# Patient Record
Sex: MALE | Race: WHITE | NOT HISPANIC OR LATINO | Employment: OTHER | URBAN - METROPOLITAN AREA
[De-identification: names, ages, dates, MRNs, and addresses within clinical notes are randomized per-mention and may not be internally consistent; named-entity substitution may affect disease eponyms.]

---

## 2017-05-17 ENCOUNTER — GENERIC CONVERSION - ENCOUNTER (OUTPATIENT)
Dept: OTHER | Facility: OTHER | Age: 82
End: 2017-05-17

## 2017-05-17 ENCOUNTER — TRANSCRIBE ORDERS (OUTPATIENT)
Dept: ADMINISTRATIVE | Facility: HOSPITAL | Age: 82
End: 2017-05-17

## 2017-05-17 DIAGNOSIS — C32.9 CARCINOMA LARYNX (HCC): Primary | ICD-10-CM

## 2017-05-22 ENCOUNTER — GENERIC CONVERSION - ENCOUNTER (OUTPATIENT)
Dept: OTHER | Facility: OTHER | Age: 82
End: 2017-05-22

## 2017-05-25 ENCOUNTER — APPOINTMENT (OUTPATIENT)
Dept: RADIATION ONCOLOGY | Facility: HOSPITAL | Age: 82
End: 2017-05-25
Attending: RADIOLOGY
Payer: COMMERCIAL

## 2017-05-25 ENCOUNTER — GENERIC CONVERSION - ENCOUNTER (OUTPATIENT)
Dept: OTHER | Facility: OTHER | Age: 82
End: 2017-05-25

## 2017-05-25 ENCOUNTER — ALLSCRIPTS OFFICE VISIT (OUTPATIENT)
Dept: OTHER | Facility: OTHER | Age: 82
End: 2017-05-25

## 2017-05-25 PROCEDURE — 99215 OFFICE O/P EST HI 40 MIN: CPT | Performed by: RADIOLOGY

## 2017-05-26 ENCOUNTER — APPOINTMENT (OUTPATIENT)
Dept: RADIATION ONCOLOGY | Facility: HOSPITAL | Age: 82
End: 2017-05-26
Attending: RADIOLOGY
Payer: COMMERCIAL

## 2017-05-26 PROCEDURE — 77290 THER RAD SIMULAJ FIELD CPLX: CPT | Performed by: RADIOLOGY

## 2017-05-26 PROCEDURE — 77334 RADIATION TREATMENT AID(S): CPT | Performed by: RADIOLOGY

## 2017-05-31 ENCOUNTER — GENERIC CONVERSION - ENCOUNTER (OUTPATIENT)
Dept: OTHER | Facility: OTHER | Age: 82
End: 2017-05-31

## 2017-06-01 ENCOUNTER — APPOINTMENT (OUTPATIENT)
Dept: RADIATION ONCOLOGY | Facility: HOSPITAL | Age: 82
End: 2017-06-01
Payer: COMMERCIAL

## 2017-06-08 PROCEDURE — 77300 RADIATION THERAPY DOSE PLAN: CPT | Performed by: RADIOLOGY

## 2017-06-08 PROCEDURE — 77301 RADIOTHERAPY DOSE PLAN IMRT: CPT | Performed by: RADIOLOGY

## 2017-06-08 PROCEDURE — 77338 DESIGN MLC DEVICE FOR IMRT: CPT | Performed by: RADIOLOGY

## 2017-06-08 RX ORDER — SODIUM CHLORIDE 9 MG/ML
20 INJECTION, SOLUTION INTRAVENOUS ONCE
Status: COMPLETED | OUTPATIENT
Start: 2017-06-09 | End: 2017-06-09

## 2017-06-09 ENCOUNTER — HOSPITAL ENCOUNTER (OUTPATIENT)
Dept: INFUSION CENTER | Facility: HOSPITAL | Age: 82
Discharge: HOME | End: 2017-06-09
Payer: COMMERCIAL

## 2017-06-09 VITALS
HEART RATE: 80 BPM | SYSTOLIC BLOOD PRESSURE: 152 MMHG | HEIGHT: 65 IN | WEIGHT: 156.75 LBS | RESPIRATION RATE: 18 BRPM | TEMPERATURE: 98.6 F | DIASTOLIC BLOOD PRESSURE: 71 MMHG | BODY MASS INDEX: 26.12 KG/M2

## 2017-06-09 LAB
ALBUMIN SERPL BCP-MCNC: 3.6 G/DL (ref 3.5–5)
ALP SERPL-CCNC: 68 U/L (ref 46–116)
ALT SERPL W P-5'-P-CCNC: 21 U/L (ref 12–78)
ANION GAP SERPL CALCULATED.3IONS-SCNC: 7 MMOL/L (ref 4–13)
AST SERPL W P-5'-P-CCNC: 15 U/L (ref 5–45)
BASOPHILS # BLD AUTO: 0.01 THOUSANDS/ΜL (ref 0–0.1)
BASOPHILS NFR BLD AUTO: 0 % (ref 0–1)
BILIRUB SERPL-MCNC: 0.43 MG/DL (ref 0.2–1)
BUN SERPL-MCNC: 18 MG/DL (ref 5–25)
CALCIUM SERPL-MCNC: 9.6 MG/DL (ref 8.3–10.1)
CHLORIDE SERPL-SCNC: 99 MMOL/L (ref 100–108)
CO2 SERPL-SCNC: 30 MMOL/L (ref 21–32)
CREAT SERPL-MCNC: 1.06 MG/DL (ref 0.6–1.3)
EOSINOPHIL # BLD AUTO: 0.03 THOUSAND/ΜL (ref 0–0.61)
EOSINOPHIL NFR BLD AUTO: 0 % (ref 0–6)
ERYTHROCYTE [DISTWIDTH] IN BLOOD BY AUTOMATED COUNT: 14.4 % (ref 11.6–15.1)
GFR SERPL CREATININE-BSD FRML MDRD: >60 ML/MIN/1.73SQ M
GLUCOSE SERPL-MCNC: 104 MG/DL (ref 65–140)
HCT VFR BLD AUTO: 39.5 % (ref 36.5–49.3)
HGB BLD-MCNC: 13.1 G/DL (ref 12–17)
LYMPHOCYTES # BLD AUTO: 0.9 THOUSANDS/ΜL (ref 0.6–4.47)
LYMPHOCYTES NFR BLD AUTO: 11 % (ref 14–44)
MCH RBC QN AUTO: 31.3 PG (ref 26.8–34.3)
MCHC RBC AUTO-ENTMCNC: 33.2 G/DL (ref 31.4–37.4)
MCV RBC AUTO: 95 FL (ref 82–98)
MONOCYTES # BLD AUTO: 0.68 THOUSAND/ΜL (ref 0.17–1.22)
MONOCYTES NFR BLD AUTO: 8 % (ref 4–12)
NEUTROPHILS # BLD AUTO: 6.51 THOUSANDS/ΜL (ref 1.85–7.62)
NEUTS SEG NFR BLD AUTO: 81 % (ref 43–75)
NRBC BLD AUTO-RTO: 0 /100 WBCS
PLATELET # BLD AUTO: 210 THOUSANDS/UL (ref 149–390)
PMV BLD AUTO: 9 FL (ref 8.9–12.7)
POTASSIUM SERPL-SCNC: 4.1 MMOL/L (ref 3.5–5.3)
PROT SERPL-MCNC: 7.6 G/DL (ref 6.4–8.2)
RBC # BLD AUTO: 4.18 MILLION/UL (ref 3.88–5.62)
SODIUM SERPL-SCNC: 136 MMOL/L (ref 136–145)
WBC # BLD AUTO: 8.14 THOUSAND/UL (ref 4.31–10.16)

## 2017-06-09 PROCEDURE — 96413 CHEMO IV INFUSION 1 HR: CPT

## 2017-06-09 PROCEDURE — 80053 COMPREHEN METABOLIC PANEL: CPT | Performed by: INTERNAL MEDICINE

## 2017-06-09 PROCEDURE — 85025 COMPLETE CBC W/AUTO DIFF WBC: CPT | Performed by: INTERNAL MEDICINE

## 2017-06-09 PROCEDURE — 77386 HB NTSTY MODUL RAD TX DLVR CPLX: CPT | Performed by: RADIOLOGY

## 2017-06-09 PROCEDURE — 96367 TX/PROPH/DG ADDL SEQ IV INF: CPT

## 2017-06-09 PROCEDURE — 96415 CHEMO IV INFUSION ADDL HR: CPT

## 2017-06-09 PROCEDURE — 77280 THER RAD SIMULAJ FIELD SMPL: CPT | Performed by: RADIOLOGY

## 2017-06-09 RX ORDER — LISINOPRIL 5 MG/1
5 TABLET ORAL DAILY
COMMUNITY
End: 2017-08-19

## 2017-06-09 RX ORDER — ALBUTEROL SULFATE 90 UG/1
1 AEROSOL, METERED RESPIRATORY (INHALATION) EVERY 4 HOURS PRN
COMMUNITY
End: 2017-08-19

## 2017-06-09 RX ORDER — HYDROCODONE BITARTRATE AND ACETAMINOPHEN 5; 325 MG/1; MG/1
1 TABLET ORAL EVERY 4 HOURS PRN
COMMUNITY
End: 2017-08-19

## 2017-06-09 RX ADMIN — DIPHENHYDRAMINE HYDROCHLORIDE 25 MG: 50 INJECTION, SOLUTION INTRAMUSCULAR; INTRAVENOUS at 13:17

## 2017-06-09 RX ADMIN — CETUXIMAB 700 MG: 2 SOLUTION INTRAVENOUS at 13:54

## 2017-06-09 RX ADMIN — SODIUM CHLORIDE 20 ML/HR: 0.9 INJECTION, SOLUTION INTRAVENOUS at 13:18

## 2017-06-09 NOTE — PLAN OF CARE
Problem: Potential for Falls  Goal: Patient will remain free of falls  INTERVENTIONS:  - Assess patient frequently for physical needs  - Identify cognitive and physical deficits and behaviors that affect risk of falls    - [place] fall precautions as indicated by assessment   - Educate patient/family on patient safety including physical limitations  - Instruct patient to call for assistance with activity based on assessment  - Modify environment to reduce risk of injury  - Consider OT/PT consult to assist with strengthening/mobility   Outcome: Progressing

## 2017-06-12 ENCOUNTER — GENERIC CONVERSION - ENCOUNTER (OUTPATIENT)
Dept: OTHER | Facility: OTHER | Age: 82
End: 2017-06-12

## 2017-06-12 PROCEDURE — 77386 HB NTSTY MODUL RAD TX DLVR CPLX: CPT | Performed by: RADIOLOGY

## 2017-06-12 NOTE — SOCIAL WORK
LSW reviewed pt's distress thermometer completed by pt in [facility] on 6/9/2017  Pt rated their distress as a 5/10 and named the following emotional problems: fears, nervousness, sadness, and worry  LSW previously introduced her role and assessed pt's interest in social work assistance on 5/31/2017  According to Akila Godfrey RN, pt was not interested in additional social work follow up  Pt was provided LSW's contact information on 5/31/2017  LSW will remain avaiable to provide psychosocial assistance as desired by pt

## 2017-06-13 PROCEDURE — 77386 HB NTSTY MODUL RAD TX DLVR CPLX: CPT | Performed by: RADIOLOGY

## 2017-06-14 PROCEDURE — 77386 HB NTSTY MODUL RAD TX DLVR CPLX: CPT | Performed by: RADIOLOGY

## 2017-06-15 ENCOUNTER — HOSPITAL ENCOUNTER (OUTPATIENT)
Dept: INFUSION CENTER | Facility: HOSPITAL | Age: 82
Discharge: HOME | End: 2017-06-15
Payer: COMMERCIAL

## 2017-06-15 VITALS
TEMPERATURE: 98.3 F | BODY MASS INDEX: 24.09 KG/M2 | RESPIRATION RATE: 18 BRPM | WEIGHT: 149.91 LBS | SYSTOLIC BLOOD PRESSURE: 133 MMHG | DIASTOLIC BLOOD PRESSURE: 72 MMHG | HEART RATE: 73 BPM | HEIGHT: 66 IN

## 2017-06-15 LAB
BASOPHILS # BLD AUTO: 0.01 THOUSANDS/ΜL (ref 0–0.1)
BASOPHILS NFR BLD AUTO: 0 % (ref 0–1)
EOSINOPHIL # BLD AUTO: 0.06 THOUSAND/ΜL (ref 0–0.61)
EOSINOPHIL NFR BLD AUTO: 1 % (ref 0–6)
ERYTHROCYTE [DISTWIDTH] IN BLOOD BY AUTOMATED COUNT: 14.1 % (ref 11.6–15.1)
HCT VFR BLD AUTO: 39.8 % (ref 36.5–49.3)
HGB BLD-MCNC: 13.2 G/DL (ref 12–17)
LYMPHOCYTES # BLD AUTO: 0.56 THOUSANDS/ΜL (ref 0.6–4.47)
LYMPHOCYTES NFR BLD AUTO: 8 % (ref 14–44)
MCH RBC QN AUTO: 31.2 PG (ref 26.8–34.3)
MCHC RBC AUTO-ENTMCNC: 33.2 G/DL (ref 31.4–37.4)
MCV RBC AUTO: 94 FL (ref 82–98)
MONOCYTES # BLD AUTO: 0.55 THOUSAND/ΜL (ref 0.17–1.22)
MONOCYTES NFR BLD AUTO: 7 % (ref 4–12)
NEUTROPHILS # BLD AUTO: 6.29 THOUSANDS/ΜL (ref 1.85–7.62)
NEUTS SEG NFR BLD AUTO: 84 % (ref 43–75)
NRBC BLD AUTO-RTO: 0 /100 WBCS
PLATELET # BLD AUTO: 234 THOUSANDS/UL (ref 149–390)
PMV BLD AUTO: 9 FL (ref 8.9–12.7)
RBC # BLD AUTO: 4.23 MILLION/UL (ref 3.88–5.62)
WBC # BLD AUTO: 7.49 THOUSAND/UL (ref 4.31–10.16)

## 2017-06-15 PROCEDURE — 77386 HB NTSTY MODUL RAD TX DLVR CPLX: CPT | Performed by: RADIOLOGY

## 2017-06-15 PROCEDURE — 96367 TX/PROPH/DG ADDL SEQ IV INF: CPT

## 2017-06-15 PROCEDURE — 85025 COMPLETE CBC W/AUTO DIFF WBC: CPT | Performed by: INTERNAL MEDICINE

## 2017-06-15 PROCEDURE — 96413 CHEMO IV INFUSION 1 HR: CPT

## 2017-06-15 PROCEDURE — 77336 RADIATION PHYSICS CONSULT: CPT | Performed by: RADIOLOGY

## 2017-06-15 RX ORDER — SODIUM CHLORIDE 9 MG/ML
20 INJECTION, SOLUTION INTRAVENOUS ONCE
Status: COMPLETED | OUTPATIENT
Start: 2017-06-15 | End: 2017-06-15

## 2017-06-15 RX ADMIN — SODIUM CHLORIDE 20 ML/HR: 0.9 INJECTION, SOLUTION INTRAVENOUS at 10:42

## 2017-06-15 RX ADMIN — DIPHENHYDRAMINE HYDROCHLORIDE 25 MG: 50 INJECTION, SOLUTION INTRAMUSCULAR; INTRAVENOUS at 11:17

## 2017-06-15 RX ADMIN — CETUXIMAB 458 MG: 2 SOLUTION INTRAVENOUS at 12:09

## 2017-06-15 NOTE — PLAN OF CARE
Problem: Potential for Falls  Goal: Patient will remain free of falls  INTERVENTIONS:  - Assess patient frequently for physical needs  -  Identify cognitive and physical deficits and behaviors that affect risk of falls    -  [place] fall precautions as indicated by assessment   - Educate patient/family on patient safety including physical limitations  - Instruct patient to call for assistance with activity based on assessment  - Modify environment to reduce risk of injury  - Consider OT/PT consult to assist with strengthening/mobility   Outcome: Progressing

## 2017-06-16 PROCEDURE — 77386 HB NTSTY MODUL RAD TX DLVR CPLX: CPT | Performed by: RADIOLOGY

## 2017-06-16 PROCEDURE — 77417 THER RADIOLOGY PORT IMAGE(S): CPT | Performed by: RADIOLOGY

## 2017-06-19 PROCEDURE — 77386 HB NTSTY MODUL RAD TX DLVR CPLX: CPT | Performed by: RADIOLOGY

## 2017-06-20 ENCOUNTER — APPOINTMENT (OUTPATIENT)
Dept: LAB | Facility: CLINIC | Age: 82
End: 2017-06-20
Payer: COMMERCIAL

## 2017-06-20 DIAGNOSIS — C32.9 MALIGNANT NEOPLASM OF LARYNX (HCC): ICD-10-CM

## 2017-06-20 LAB
ALBUMIN SERPL BCP-MCNC: 3 G/DL (ref 3.5–5)
ALP SERPL-CCNC: 73 U/L (ref 46–116)
ALT SERPL W P-5'-P-CCNC: 34 U/L (ref 12–78)
ANION GAP SERPL CALCULATED.3IONS-SCNC: 8 MMOL/L (ref 4–13)
AST SERPL W P-5'-P-CCNC: 21 U/L (ref 5–45)
BASOPHILS # BLD AUTO: 0.01 THOUSANDS/ΜL (ref 0–0.1)
BASOPHILS NFR BLD AUTO: 0 % (ref 0–1)
BILIRUB SERPL-MCNC: 0.49 MG/DL (ref 0.2–1)
BUN SERPL-MCNC: 17 MG/DL (ref 5–25)
CALCIUM SERPL-MCNC: 8.8 MG/DL (ref 8.3–10.1)
CHLORIDE SERPL-SCNC: 97 MMOL/L (ref 100–108)
CO2 SERPL-SCNC: 31 MMOL/L (ref 21–32)
CREAT SERPL-MCNC: 1.05 MG/DL (ref 0.6–1.3)
EOSINOPHIL # BLD AUTO: 0.05 THOUSAND/ΜL (ref 0–0.61)
EOSINOPHIL NFR BLD AUTO: 1 % (ref 0–6)
ERYTHROCYTE [DISTWIDTH] IN BLOOD BY AUTOMATED COUNT: 14.4 % (ref 11.6–15.1)
GFR SERPL CREATININE-BSD FRML MDRD: >60 ML/MIN/1.73SQ M
GLUCOSE SERPL-MCNC: 146 MG/DL (ref 65–140)
HCT VFR BLD AUTO: 40.5 % (ref 36.5–49.3)
HGB BLD-MCNC: 13 G/DL (ref 12–17)
LYMPHOCYTES # BLD AUTO: 0.44 THOUSANDS/ΜL (ref 0.6–4.47)
LYMPHOCYTES NFR BLD AUTO: 5 % (ref 14–44)
MCH RBC QN AUTO: 30.8 PG (ref 26.8–34.3)
MCHC RBC AUTO-ENTMCNC: 32.1 G/DL (ref 31.4–37.4)
MCV RBC AUTO: 96 FL (ref 82–98)
MONOCYTES # BLD AUTO: 0.57 THOUSAND/ΜL (ref 0.17–1.22)
MONOCYTES NFR BLD AUTO: 7 % (ref 4–12)
NEUTROPHILS # BLD AUTO: 7.16 THOUSANDS/ΜL (ref 1.85–7.62)
NEUTS SEG NFR BLD AUTO: 87 % (ref 43–75)
NRBC BLD AUTO-RTO: 0 /100 WBCS
PLATELET # BLD AUTO: 271 THOUSANDS/UL (ref 149–390)
PMV BLD AUTO: 9.8 FL (ref 8.9–12.7)
POTASSIUM SERPL-SCNC: 4.1 MMOL/L (ref 3.5–5.3)
PROT SERPL-MCNC: 7.1 G/DL (ref 6.4–8.2)
RBC # BLD AUTO: 4.22 MILLION/UL (ref 3.88–5.62)
SODIUM SERPL-SCNC: 136 MMOL/L (ref 136–145)
WBC # BLD AUTO: 8.24 THOUSAND/UL (ref 4.31–10.16)

## 2017-06-20 PROCEDURE — 85025 COMPLETE CBC W/AUTO DIFF WBC: CPT

## 2017-06-20 PROCEDURE — 80053 COMPREHEN METABOLIC PANEL: CPT

## 2017-06-20 PROCEDURE — 36415 COLL VENOUS BLD VENIPUNCTURE: CPT

## 2017-06-20 PROCEDURE — 77386 HB NTSTY MODUL RAD TX DLVR CPLX: CPT | Performed by: RADIOLOGY

## 2017-06-21 PROCEDURE — 77386 HB NTSTY MODUL RAD TX DLVR CPLX: CPT | Performed by: RADIOLOGY

## 2017-06-22 ENCOUNTER — ALLSCRIPTS OFFICE VISIT (OUTPATIENT)
Dept: OTHER | Facility: OTHER | Age: 82
End: 2017-06-22

## 2017-06-22 PROCEDURE — 77386 HB NTSTY MODUL RAD TX DLVR CPLX: CPT | Performed by: RADIOLOGY

## 2017-06-22 PROCEDURE — 77336 RADIATION PHYSICS CONSULT: CPT | Performed by: RADIOLOGY

## 2017-06-22 RX ORDER — SODIUM CHLORIDE 9 MG/ML
20 INJECTION, SOLUTION INTRAVENOUS ONCE
Status: COMPLETED | OUTPATIENT
Start: 2017-06-23 | End: 2017-06-23

## 2017-06-23 ENCOUNTER — HOSPITAL ENCOUNTER (OUTPATIENT)
Dept: INFUSION CENTER | Facility: HOSPITAL | Age: 82
Discharge: HOME | End: 2017-06-23
Payer: COMMERCIAL

## 2017-06-23 VITALS
RESPIRATION RATE: 22 BRPM | WEIGHT: 147.71 LBS | TEMPERATURE: 98.5 F | SYSTOLIC BLOOD PRESSURE: 121 MMHG | HEART RATE: 73 BPM | BODY MASS INDEX: 23.74 KG/M2 | HEIGHT: 66 IN | DIASTOLIC BLOOD PRESSURE: 68 MMHG

## 2017-06-23 PROCEDURE — 96413 CHEMO IV INFUSION 1 HR: CPT

## 2017-06-23 PROCEDURE — 96367 TX/PROPH/DG ADDL SEQ IV INF: CPT

## 2017-06-23 PROCEDURE — 77386 HB NTSTY MODUL RAD TX DLVR CPLX: CPT | Performed by: RADIOLOGY

## 2017-06-23 RX ADMIN — DIPHENHYDRAMINE HYDROCHLORIDE 25 MG: 50 INJECTION, SOLUTION INTRAMUSCULAR; INTRAVENOUS at 09:04

## 2017-06-23 RX ADMIN — SODIUM CHLORIDE 20 ML/HR: 0.9 INJECTION, SOLUTION INTRAVENOUS at 09:04

## 2017-06-23 RX ADMIN — CETUXIMAB 458 MG: 2 SOLUTION INTRAVENOUS at 09:34

## 2017-06-23 NOTE — PLAN OF CARE
Problem: Potential for Falls  Goal: Patient will remain free of falls  INTERVENTIONS:  - Assess patient frequently for physical needs  -  Identify cognitive and physical deficits and behaviors that affect risk of falls    -  [place] fall precautions as indicated by assessment   - Educate patient/family on patient safety including physical limitations  - Instruct patient to call for assistance with activity based on assessment  - Modify environment to reduce risk of injury  - Consider OT/PT consult to assist with strengthening/mobility   Outcome: Progressing

## 2017-06-26 PROCEDURE — 77386 HB NTSTY MODUL RAD TX DLVR CPLX: CPT | Performed by: RADIOLOGY

## 2017-06-26 PROCEDURE — 77387 GUIDANCE FOR RADJ TX DLVR: CPT | Performed by: RADIOLOGY

## 2017-06-27 PROCEDURE — 77386 HB NTSTY MODUL RAD TX DLVR CPLX: CPT | Performed by: RADIOLOGY

## 2017-06-28 PROCEDURE — 77386 HB NTSTY MODUL RAD TX DLVR CPLX: CPT | Performed by: RADIOLOGY

## 2017-06-29 PROCEDURE — 77336 RADIATION PHYSICS CONSULT: CPT | Performed by: RADIOLOGY

## 2017-06-29 PROCEDURE — 77417 THER RADIOLOGY PORT IMAGE(S): CPT | Performed by: RADIOLOGY

## 2017-06-29 PROCEDURE — 77386 HB NTSTY MODUL RAD TX DLVR CPLX: CPT | Performed by: RADIOLOGY

## 2017-06-29 RX ORDER — SODIUM CHLORIDE 9 MG/ML
20 INJECTION, SOLUTION INTRAVENOUS ONCE
Status: COMPLETED | OUTPATIENT
Start: 2017-06-30 | End: 2017-06-30

## 2017-06-30 ENCOUNTER — HOSPITAL ENCOUNTER (OUTPATIENT)
Dept: INFUSION CENTER | Facility: HOSPITAL | Age: 82
Discharge: HOME | End: 2017-06-30
Payer: COMMERCIAL

## 2017-06-30 VITALS
TEMPERATURE: 97.9 F | BODY MASS INDEX: 23.67 KG/M2 | SYSTOLIC BLOOD PRESSURE: 128 MMHG | HEIGHT: 66 IN | HEART RATE: 84 BPM | RESPIRATION RATE: 18 BRPM | WEIGHT: 147.27 LBS | DIASTOLIC BLOOD PRESSURE: 60 MMHG

## 2017-06-30 LAB
ALBUMIN SERPL BCP-MCNC: 2.9 G/DL (ref 3.5–5)
ALP SERPL-CCNC: 74 U/L (ref 46–116)
ALT SERPL W P-5'-P-CCNC: 37 U/L (ref 12–78)
ANION GAP SERPL CALCULATED.3IONS-SCNC: 3 MMOL/L (ref 4–13)
AST SERPL W P-5'-P-CCNC: 22 U/L (ref 5–45)
BASOPHILS # BLD AUTO: 0.01 THOUSANDS/ΜL (ref 0–0.1)
BASOPHILS NFR BLD AUTO: 0 % (ref 0–1)
BILIRUB SERPL-MCNC: 0.5 MG/DL (ref 0.2–1)
BUN SERPL-MCNC: 18 MG/DL (ref 5–25)
CALCIUM SERPL-MCNC: 8.8 MG/DL (ref 8.3–10.1)
CHLORIDE SERPL-SCNC: 99 MMOL/L (ref 100–108)
CO2 SERPL-SCNC: 33 MMOL/L (ref 21–32)
CREAT SERPL-MCNC: 1.02 MG/DL (ref 0.6–1.3)
EOSINOPHIL # BLD AUTO: 0.1 THOUSAND/ΜL (ref 0–0.61)
EOSINOPHIL NFR BLD AUTO: 1 % (ref 0–6)
ERYTHROCYTE [DISTWIDTH] IN BLOOD BY AUTOMATED COUNT: 14.1 % (ref 11.6–15.1)
GFR SERPL CREATININE-BSD FRML MDRD: >60 ML/MIN/1.73SQ M
GLUCOSE SERPL-MCNC: 112 MG/DL (ref 65–140)
HCT VFR BLD AUTO: 39.5 % (ref 36.5–49.3)
HGB BLD-MCNC: 13 G/DL (ref 12–17)
LYMPHOCYTES # BLD AUTO: 0.41 THOUSANDS/ΜL (ref 0.6–4.47)
LYMPHOCYTES NFR BLD AUTO: 5 % (ref 14–44)
MCH RBC QN AUTO: 31.4 PG (ref 26.8–34.3)
MCHC RBC AUTO-ENTMCNC: 32.9 G/DL (ref 31.4–37.4)
MCV RBC AUTO: 95 FL (ref 82–98)
MONOCYTES # BLD AUTO: 0.56 THOUSAND/ΜL (ref 0.17–1.22)
MONOCYTES NFR BLD AUTO: 7 % (ref 4–12)
NEUTROPHILS # BLD AUTO: 7.07 THOUSANDS/ΜL (ref 1.85–7.62)
NEUTS SEG NFR BLD AUTO: 87 % (ref 43–75)
NRBC BLD AUTO-RTO: 0 /100 WBCS
PLATELET # BLD AUTO: 269 THOUSANDS/UL (ref 149–390)
PMV BLD AUTO: 9.3 FL (ref 8.9–12.7)
POTASSIUM SERPL-SCNC: 4.1 MMOL/L (ref 3.5–5.3)
PROT SERPL-MCNC: 7.1 G/DL (ref 6.4–8.2)
RBC # BLD AUTO: 4.14 MILLION/UL (ref 3.88–5.62)
SODIUM SERPL-SCNC: 135 MMOL/L (ref 136–145)
WBC # BLD AUTO: 8.17 THOUSAND/UL (ref 4.31–10.16)

## 2017-06-30 PROCEDURE — 96367 TX/PROPH/DG ADDL SEQ IV INF: CPT

## 2017-06-30 PROCEDURE — 96413 CHEMO IV INFUSION 1 HR: CPT

## 2017-06-30 PROCEDURE — 85025 COMPLETE CBC W/AUTO DIFF WBC: CPT | Performed by: INTERNAL MEDICINE

## 2017-06-30 PROCEDURE — 77386 HB NTSTY MODUL RAD TX DLVR CPLX: CPT | Performed by: RADIOLOGY

## 2017-06-30 PROCEDURE — 80053 COMPREHEN METABOLIC PANEL: CPT | Performed by: INTERNAL MEDICINE

## 2017-06-30 RX ADMIN — CETUXIMAB 458 MG: 2 SOLUTION INTRAVENOUS at 10:11

## 2017-06-30 RX ADMIN — DIPHENHYDRAMINE HYDROCHLORIDE 25 MG: 50 INJECTION, SOLUTION INTRAMUSCULAR; INTRAVENOUS at 09:01

## 2017-06-30 RX ADMIN — SODIUM CHLORIDE 20 ML/HR: 0.9 INJECTION, SOLUTION INTRAVENOUS at 09:00

## 2017-06-30 NOTE — PLAN OF CARE
Problem: Potential for Falls  Goal: Patient will remain free of falls  INTERVENTIONS:  - Assess patient frequently for physical needs  -  Identify cognitive and physical deficits and behaviors that affect risk of falls    -  [place] fall precautions as indicated by assessment   - Educate patient/family on patient safety including physical limitations  - Instruct patient to call for assistance with activity based on assessment  - Modify environment to reduce risk of injury  - Consider OT/PT consult to assist with strengthening/mobility   Outcome: Progressing

## 2017-07-03 ENCOUNTER — APPOINTMENT (OUTPATIENT)
Dept: RADIATION ONCOLOGY | Facility: HOSPITAL | Age: 82
End: 2017-07-03
Payer: COMMERCIAL

## 2017-07-03 PROCEDURE — 77386 HB NTSTY MODUL RAD TX DLVR CPLX: CPT | Performed by: RADIOLOGY

## 2017-07-05 PROCEDURE — 77386 HB NTSTY MODUL RAD TX DLVR CPLX: CPT | Performed by: RADIOLOGY

## 2017-07-06 PROCEDURE — 77386 HB NTSTY MODUL RAD TX DLVR CPLX: CPT | Performed by: RADIOLOGY

## 2017-07-07 ENCOUNTER — TRANSCRIBE ORDERS (OUTPATIENT)
Dept: ADMINISTRATIVE | Facility: HOSPITAL | Age: 82
End: 2017-07-07

## 2017-07-07 ENCOUNTER — GENERIC CONVERSION - ENCOUNTER (OUTPATIENT)
Dept: OTHER | Facility: OTHER | Age: 82
End: 2017-07-07

## 2017-07-07 ENCOUNTER — HOSPITAL ENCOUNTER (OUTPATIENT)
Dept: INFUSION CENTER | Facility: HOSPITAL | Age: 82
Discharge: HOME | End: 2017-07-07
Payer: COMMERCIAL

## 2017-07-07 ENCOUNTER — ALLSCRIPTS OFFICE VISIT (OUTPATIENT)
Dept: OTHER | Facility: OTHER | Age: 82
End: 2017-07-07

## 2017-07-07 VITALS
WEIGHT: 144.84 LBS | RESPIRATION RATE: 18 BRPM | BODY MASS INDEX: 23.28 KG/M2 | TEMPERATURE: 97 F | HEART RATE: 82 BPM | HEIGHT: 66 IN | SYSTOLIC BLOOD PRESSURE: 150 MMHG | DIASTOLIC BLOOD PRESSURE: 72 MMHG

## 2017-07-07 DIAGNOSIS — C32.9 CARCINOMA LARYNX (HCC): Primary | ICD-10-CM

## 2017-07-07 LAB
ALBUMIN SERPL BCP-MCNC: 2.9 G/DL (ref 3.5–5)
ALP SERPL-CCNC: 70 U/L (ref 46–116)
ALT SERPL W P-5'-P-CCNC: 35 U/L (ref 12–78)
ANION GAP SERPL CALCULATED.3IONS-SCNC: 6 MMOL/L (ref 4–13)
AST SERPL W P-5'-P-CCNC: 27 U/L (ref 5–45)
BASOPHILS # BLD AUTO: 0.01 THOUSANDS/ΜL (ref 0–0.1)
BASOPHILS NFR BLD AUTO: 0 % (ref 0–1)
BILIRUB SERPL-MCNC: 0.48 MG/DL (ref 0.2–1)
BUN SERPL-MCNC: 18 MG/DL (ref 5–25)
CALCIUM SERPL-MCNC: 9.1 MG/DL (ref 8.3–10.1)
CHLORIDE SERPL-SCNC: 98 MMOL/L (ref 100–108)
CO2 SERPL-SCNC: 31 MMOL/L (ref 21–32)
CREAT SERPL-MCNC: 1.04 MG/DL (ref 0.6–1.3)
EOSINOPHIL # BLD AUTO: 0.11 THOUSAND/ΜL (ref 0–0.61)
EOSINOPHIL NFR BLD AUTO: 2 % (ref 0–6)
ERYTHROCYTE [DISTWIDTH] IN BLOOD BY AUTOMATED COUNT: 13.9 % (ref 11.6–15.1)
GFR SERPL CREATININE-BSD FRML MDRD: >60 ML/MIN/1.73SQ M
GLUCOSE SERPL-MCNC: 117 MG/DL (ref 65–140)
HCT VFR BLD AUTO: 38.5 % (ref 36.5–49.3)
HGB BLD-MCNC: 12.7 G/DL (ref 12–17)
LYMPHOCYTES # BLD AUTO: 0.39 THOUSANDS/ΜL (ref 0.6–4.47)
LYMPHOCYTES NFR BLD AUTO: 6 % (ref 14–44)
MCH RBC QN AUTO: 31.1 PG (ref 26.8–34.3)
MCHC RBC AUTO-ENTMCNC: 33 G/DL (ref 31.4–37.4)
MCV RBC AUTO: 94 FL (ref 82–98)
MONOCYTES # BLD AUTO: 0.45 THOUSAND/ΜL (ref 0.17–1.22)
MONOCYTES NFR BLD AUTO: 6 % (ref 4–12)
NEUTROPHILS # BLD AUTO: 6.04 THOUSANDS/ΜL (ref 1.85–7.62)
NEUTS SEG NFR BLD AUTO: 86 % (ref 43–75)
NRBC BLD AUTO-RTO: 0 /100 WBCS
PLATELET # BLD AUTO: 272 THOUSANDS/UL (ref 149–390)
PMV BLD AUTO: 8.9 FL (ref 8.9–12.7)
POTASSIUM SERPL-SCNC: 3.8 MMOL/L (ref 3.5–5.3)
PROT SERPL-MCNC: 7.2 G/DL (ref 6.4–8.2)
RBC # BLD AUTO: 4.09 MILLION/UL (ref 3.88–5.62)
SODIUM SERPL-SCNC: 135 MMOL/L (ref 136–145)
WBC # BLD AUTO: 7.01 THOUSAND/UL (ref 4.31–10.16)

## 2017-07-07 PROCEDURE — 77386 HB NTSTY MODUL RAD TX DLVR CPLX: CPT | Performed by: RADIOLOGY

## 2017-07-07 PROCEDURE — 85025 COMPLETE CBC W/AUTO DIFF WBC: CPT | Performed by: INTERNAL MEDICINE

## 2017-07-07 PROCEDURE — 96413 CHEMO IV INFUSION 1 HR: CPT

## 2017-07-07 PROCEDURE — 96367 TX/PROPH/DG ADDL SEQ IV INF: CPT

## 2017-07-07 PROCEDURE — 80053 COMPREHEN METABOLIC PANEL: CPT | Performed by: INTERNAL MEDICINE

## 2017-07-07 PROCEDURE — 77336 RADIATION PHYSICS CONSULT: CPT | Performed by: RADIOLOGY

## 2017-07-07 PROCEDURE — 77417 THER RADIOLOGY PORT IMAGE(S): CPT | Performed by: RADIOLOGY

## 2017-07-07 RX ADMIN — DIPHENHYDRAMINE HYDROCHLORIDE 25 MG: 50 INJECTION, SOLUTION INTRAMUSCULAR; INTRAVENOUS at 09:00

## 2017-07-07 RX ADMIN — CETUXIMAB 458 MG: 2 SOLUTION INTRAVENOUS at 10:06

## 2017-07-10 PROCEDURE — 77417 THER RADIOLOGY PORT IMAGE(S): CPT | Performed by: RADIOLOGY

## 2017-07-10 PROCEDURE — 77386 HB NTSTY MODUL RAD TX DLVR CPLX: CPT | Performed by: RADIOLOGY

## 2017-07-11 ENCOUNTER — TRANSCRIBE ORDERS (OUTPATIENT)
Dept: RADIATION ONCOLOGY | Facility: HOSPITAL | Age: 82
End: 2017-07-11

## 2017-07-11 DIAGNOSIS — C32.1 MALIGNANT NEOPLASM OF SUPRAGLOTTIS (HCC): ICD-10-CM

## 2017-07-11 DIAGNOSIS — C32.9 CARCINOMA LARYNX (HCC): Primary | ICD-10-CM

## 2017-07-11 PROCEDURE — 77386 HB NTSTY MODUL RAD TX DLVR CPLX: CPT | Performed by: RADIOLOGY

## 2017-07-12 ENCOUNTER — HOSPITAL ENCOUNTER (OUTPATIENT)
Dept: RADIOLOGY | Facility: HOSPITAL | Age: 82
Setting detail: RADIATION/ONCOLOGY SERIES
Discharge: HOME | End: 2017-07-12
Attending: RADIOLOGY
Payer: COMMERCIAL

## 2017-07-12 DIAGNOSIS — C32.9 CARCINOMA LARYNX (HCC): ICD-10-CM

## 2017-07-12 DIAGNOSIS — C32.1 MALIGNANT NEOPLASM OF SUPRAGLOTTIS (HCC): ICD-10-CM

## 2017-07-12 PROCEDURE — 77386 HB NTSTY MODUL RAD TX DLVR CPLX: CPT | Performed by: RADIOLOGY

## 2017-07-12 PROCEDURE — 77280 THER RAD SIMULAJ FIELD SMPL: CPT | Performed by: RADIOLOGY

## 2017-07-12 PROCEDURE — 77014 HB CT SCAN FOR THERAPY GUIDE: CPT

## 2017-07-13 PROCEDURE — 77386 HB NTSTY MODUL RAD TX DLVR CPLX: CPT | Performed by: RADIOLOGY

## 2017-07-14 ENCOUNTER — HOSPITAL ENCOUNTER (OUTPATIENT)
Dept: INFUSION CENTER | Facility: HOSPITAL | Age: 82
Discharge: HOME | End: 2017-07-14
Payer: COMMERCIAL

## 2017-07-14 VITALS
RESPIRATION RATE: 18 BRPM | WEIGHT: 142.86 LBS | SYSTOLIC BLOOD PRESSURE: 142 MMHG | BODY MASS INDEX: 22.96 KG/M2 | HEART RATE: 74 BPM | TEMPERATURE: 96 F | HEIGHT: 66 IN | DIASTOLIC BLOOD PRESSURE: 70 MMHG

## 2017-07-14 LAB
ALBUMIN SERPL BCP-MCNC: 2.9 G/DL (ref 3.5–5)
ALP SERPL-CCNC: 71 U/L (ref 46–116)
ALT SERPL W P-5'-P-CCNC: 40 U/L (ref 12–78)
ANION GAP SERPL CALCULATED.3IONS-SCNC: 6 MMOL/L (ref 4–13)
AST SERPL W P-5'-P-CCNC: 21 U/L (ref 5–45)
BASOPHILS # BLD AUTO: 0.01 THOUSANDS/ΜL (ref 0–0.1)
BASOPHILS NFR BLD AUTO: 0 % (ref 0–1)
BILIRUB SERPL-MCNC: 0.44 MG/DL (ref 0.2–1)
BUN SERPL-MCNC: 20 MG/DL (ref 5–25)
CALCIUM SERPL-MCNC: 8.8 MG/DL (ref 8.3–10.1)
CHLORIDE SERPL-SCNC: 101 MMOL/L (ref 100–108)
CHOLEST SERPL-MCNC: 143 MG/DL (ref 50–200)
CO2 SERPL-SCNC: 31 MMOL/L (ref 21–32)
CREAT SERPL-MCNC: 0.98 MG/DL (ref 0.6–1.3)
EOSINOPHIL # BLD AUTO: 0.05 THOUSAND/ΜL (ref 0–0.61)
EOSINOPHIL NFR BLD AUTO: 1 % (ref 0–6)
ERYTHROCYTE [DISTWIDTH] IN BLOOD BY AUTOMATED COUNT: 14.2 % (ref 11.6–15.1)
GFR SERPL CREATININE-BSD FRML MDRD: >60 ML/MIN/1.73SQ M
GLUCOSE P FAST SERPL-MCNC: 112 MG/DL (ref 65–99)
GLUCOSE SERPL-MCNC: 112 MG/DL (ref 65–140)
HCT VFR BLD AUTO: 38.4 % (ref 36.5–49.3)
HDLC SERPL-MCNC: 44 MG/DL (ref 40–60)
HGB BLD-MCNC: 12.4 G/DL (ref 12–17)
LDLC SERPL CALC-MCNC: 82 MG/DL (ref 0–100)
LYMPHOCYTES # BLD AUTO: 0.3 THOUSANDS/ΜL (ref 0.6–4.47)
LYMPHOCYTES NFR BLD AUTO: 5 % (ref 14–44)
MCH RBC QN AUTO: 30.8 PG (ref 26.8–34.3)
MCHC RBC AUTO-ENTMCNC: 32.3 G/DL (ref 31.4–37.4)
MCV RBC AUTO: 96 FL (ref 82–98)
MONOCYTES # BLD AUTO: 0.39 THOUSAND/ΜL (ref 0.17–1.22)
MONOCYTES NFR BLD AUTO: 6 % (ref 4–12)
NEUTROPHILS # BLD AUTO: 5.82 THOUSANDS/ΜL (ref 1.85–7.62)
NEUTS SEG NFR BLD AUTO: 88 % (ref 43–75)
NRBC BLD AUTO-RTO: 0 /100 WBCS
PLATELET # BLD AUTO: 238 THOUSANDS/UL (ref 149–390)
PMV BLD AUTO: 8.9 FL (ref 8.9–12.7)
POTASSIUM SERPL-SCNC: 3.9 MMOL/L (ref 3.5–5.3)
PROT SERPL-MCNC: 7.1 G/DL (ref 6.4–8.2)
RBC # BLD AUTO: 4.02 MILLION/UL (ref 3.88–5.62)
SODIUM SERPL-SCNC: 138 MMOL/L (ref 136–145)
TRIGL SERPL-MCNC: 83 MG/DL
TSH SERPL DL<=0.05 MIU/L-ACNC: 0.63 UIU/ML (ref 0.36–3.74)
WBC # BLD AUTO: 6.58 THOUSAND/UL (ref 4.31–10.16)

## 2017-07-14 PROCEDURE — 80061 LIPID PANEL: CPT | Performed by: INTERNAL MEDICINE

## 2017-07-14 PROCEDURE — 84443 ASSAY THYROID STIM HORMONE: CPT | Performed by: INTERNAL MEDICINE

## 2017-07-14 PROCEDURE — 96367 TX/PROPH/DG ADDL SEQ IV INF: CPT

## 2017-07-14 PROCEDURE — 77336 RADIATION PHYSICS CONSULT: CPT | Performed by: RADIOLOGY

## 2017-07-14 PROCEDURE — 85025 COMPLETE CBC W/AUTO DIFF WBC: CPT | Performed by: INTERNAL MEDICINE

## 2017-07-14 PROCEDURE — 80053 COMPREHEN METABOLIC PANEL: CPT | Performed by: INTERNAL MEDICINE

## 2017-07-14 PROCEDURE — 77386 HB NTSTY MODUL RAD TX DLVR CPLX: CPT | Performed by: RADIOLOGY

## 2017-07-14 PROCEDURE — 96413 CHEMO IV INFUSION 1 HR: CPT

## 2017-07-14 RX ADMIN — CETUXIMAB 442 MG: 2 SOLUTION INTRAVENOUS at 10:14

## 2017-07-14 RX ADMIN — DIPHENHYDRAMINE HYDROCHLORIDE 25 MG: 50 INJECTION, SOLUTION INTRAMUSCULAR; INTRAVENOUS at 09:38

## 2017-07-17 PROCEDURE — 77386 HB NTSTY MODUL RAD TX DLVR CPLX: CPT | Performed by: RADIOLOGY

## 2017-07-18 PROCEDURE — 77386 HB NTSTY MODUL RAD TX DLVR CPLX: CPT | Performed by: RADIOLOGY

## 2017-07-18 PROCEDURE — 77417 THER RADIOLOGY PORT IMAGE(S): CPT | Performed by: RADIOLOGY

## 2017-07-19 PROCEDURE — 77386 HB NTSTY MODUL RAD TX DLVR CPLX: CPT | Performed by: RADIOLOGY

## 2017-07-20 PROCEDURE — 77386 HB NTSTY MODUL RAD TX DLVR CPLX: CPT | Performed by: RADIOLOGY

## 2017-07-21 ENCOUNTER — HOSPITAL ENCOUNTER (OUTPATIENT)
Dept: INFUSION CENTER | Facility: HOSPITAL | Age: 82
Discharge: HOME | End: 2017-07-21
Payer: COMMERCIAL

## 2017-07-21 VITALS
HEIGHT: 66 IN | BODY MASS INDEX: 22.89 KG/M2 | RESPIRATION RATE: 18 BRPM | SYSTOLIC BLOOD PRESSURE: 118 MMHG | DIASTOLIC BLOOD PRESSURE: 66 MMHG | HEART RATE: 72 BPM | WEIGHT: 142.42 LBS | TEMPERATURE: 97.5 F

## 2017-07-21 LAB
ALBUMIN SERPL BCP-MCNC: 2.7 G/DL (ref 3.5–5)
ALP SERPL-CCNC: 65 U/L (ref 46–116)
ALT SERPL W P-5'-P-CCNC: 25 U/L (ref 12–78)
ANION GAP SERPL CALCULATED.3IONS-SCNC: 4 MMOL/L (ref 4–13)
AST SERPL W P-5'-P-CCNC: 24 U/L (ref 5–45)
BASOPHILS # BLD AUTO: 0.01 THOUSANDS/ΜL (ref 0–0.1)
BASOPHILS NFR BLD AUTO: 0 % (ref 0–1)
BILIRUB SERPL-MCNC: 0.58 MG/DL (ref 0.2–1)
BUN SERPL-MCNC: 20 MG/DL (ref 5–25)
CALCIUM SERPL-MCNC: 8.7 MG/DL (ref 8.3–10.1)
CHLORIDE SERPL-SCNC: 102 MMOL/L (ref 100–108)
CO2 SERPL-SCNC: 31 MMOL/L (ref 21–32)
CREAT SERPL-MCNC: 0.95 MG/DL (ref 0.6–1.3)
EOSINOPHIL # BLD AUTO: 0.06 THOUSAND/ΜL (ref 0–0.61)
EOSINOPHIL NFR BLD AUTO: 1 % (ref 0–6)
ERYTHROCYTE [DISTWIDTH] IN BLOOD BY AUTOMATED COUNT: 14.9 % (ref 11.6–15.1)
GFR SERPL CREATININE-BSD FRML MDRD: >60 ML/MIN/1.73SQ M
GLUCOSE SERPL-MCNC: 125 MG/DL (ref 65–140)
HCT VFR BLD AUTO: 37.8 % (ref 36.5–49.3)
HGB BLD-MCNC: 12 G/DL (ref 12–17)
LYMPHOCYTES # BLD AUTO: 0.24 THOUSANDS/ΜL (ref 0.6–4.47)
LYMPHOCYTES NFR BLD AUTO: 3 % (ref 14–44)
MCH RBC QN AUTO: 30.8 PG (ref 26.8–34.3)
MCHC RBC AUTO-ENTMCNC: 31.7 G/DL (ref 31.4–37.4)
MCV RBC AUTO: 97 FL (ref 82–98)
MONOCYTES # BLD AUTO: 0.49 THOUSAND/ΜL (ref 0.17–1.22)
MONOCYTES NFR BLD AUTO: 7 % (ref 4–12)
NEUTROPHILS # BLD AUTO: 6.57 THOUSANDS/ΜL (ref 1.85–7.62)
NEUTS SEG NFR BLD AUTO: 89 % (ref 43–75)
NRBC BLD AUTO-RTO: 0 /100 WBCS
PLATELET # BLD AUTO: 214 THOUSANDS/UL (ref 149–390)
PMV BLD AUTO: 8.9 FL (ref 8.9–12.7)
POTASSIUM SERPL-SCNC: 4.1 MMOL/L (ref 3.5–5.3)
PROT SERPL-MCNC: 7 G/DL (ref 6.4–8.2)
RBC # BLD AUTO: 3.9 MILLION/UL (ref 3.88–5.62)
SODIUM SERPL-SCNC: 137 MMOL/L (ref 136–145)
WBC # BLD AUTO: 7.38 THOUSAND/UL (ref 4.31–10.16)

## 2017-07-21 PROCEDURE — 85025 COMPLETE CBC W/AUTO DIFF WBC: CPT | Performed by: INTERNAL MEDICINE

## 2017-07-21 PROCEDURE — 77386 HB NTSTY MODUL RAD TX DLVR CPLX: CPT | Performed by: RADIOLOGY

## 2017-07-21 PROCEDURE — 96367 TX/PROPH/DG ADDL SEQ IV INF: CPT

## 2017-07-21 PROCEDURE — 80053 COMPREHEN METABOLIC PANEL: CPT | Performed by: INTERNAL MEDICINE

## 2017-07-21 PROCEDURE — 96413 CHEMO IV INFUSION 1 HR: CPT

## 2017-07-21 PROCEDURE — 77336 RADIATION PHYSICS CONSULT: CPT | Performed by: RADIOLOGY

## 2017-07-21 RX ADMIN — DIPHENHYDRAMINE HYDROCHLORIDE 25 MG: 50 INJECTION, SOLUTION INTRAMUSCULAR; INTRAVENOUS at 09:12

## 2017-07-21 RX ADMIN — CETUXIMAB 442 MG: 2 SOLUTION INTRAVENOUS at 09:44

## 2017-07-21 NOTE — PLAN OF CARE
Problem: Potential for Falls  Goal: Patient will remain free of falls  INTERVENTIONS:  - Assess patient frequently for physical needs  -  Identify cognitive and physical deficits and behaviors that affect risk of falls    -  [place] fall precautions as indicated by assessment   - Educate patient/family on patient safety including physical limitations  - Instruct patient to call for assistance with activity based on assessment  - Modify environment to reduce risk of injury  - Consider OT/PT consult to assist with strengthening/mobility   Outcome: Progressing

## 2017-07-24 PROCEDURE — 77386 HB NTSTY MODUL RAD TX DLVR CPLX: CPT | Performed by: RADIOLOGY

## 2017-07-25 PROCEDURE — 77417 THER RADIOLOGY PORT IMAGE(S): CPT | Performed by: RADIOLOGY

## 2017-07-25 PROCEDURE — 77386 HB NTSTY MODUL RAD TX DLVR CPLX: CPT | Performed by: RADIOLOGY

## 2017-07-26 PROCEDURE — 77386 HB NTSTY MODUL RAD TX DLVR CPLX: CPT | Performed by: RADIOLOGY

## 2017-07-27 PROCEDURE — 77386 HB NTSTY MODUL RAD TX DLVR CPLX: CPT | Performed by: RADIOLOGY

## 2017-07-28 PROCEDURE — 77336 RADIATION PHYSICS CONSULT: CPT | Performed by: RADIOLOGY

## 2017-07-28 PROCEDURE — 77386 HB NTSTY MODUL RAD TX DLVR CPLX: CPT | Performed by: RADIOLOGY

## 2017-08-19 ENCOUNTER — HOSPITAL ENCOUNTER (OUTPATIENT)
Facility: HOSPITAL | Age: 82
Setting detail: OBSERVATION
Discharge: LEFT BEFORE DISCHARGE | DRG: 012 | End: 2017-08-19
Attending: EMERGENCY MEDICINE | Admitting: INTERNAL MEDICINE
Payer: COMMERCIAL

## 2017-08-19 ENCOUNTER — APPOINTMENT (EMERGENCY)
Dept: RADIOLOGY | Facility: HOSPITAL | Age: 82
DRG: 012 | End: 2017-08-19
Payer: COMMERCIAL

## 2017-08-19 VITALS
DIASTOLIC BLOOD PRESSURE: 89 MMHG | OXYGEN SATURATION: 99 % | HEART RATE: 79 BPM | WEIGHT: 140 LBS | BODY MASS INDEX: 22.5 KG/M2 | SYSTOLIC BLOOD PRESSURE: 139 MMHG | RESPIRATION RATE: 18 BRPM | HEIGHT: 66 IN | TEMPERATURE: 97.7 F

## 2017-08-19 DIAGNOSIS — J45.901 ACUTE ASTHMA EXACERBATION: Primary | ICD-10-CM

## 2017-08-19 DIAGNOSIS — R06.02 SHORTNESS OF BREATH: ICD-10-CM

## 2017-08-19 PROBLEM — J45.909 ASTHMA: Status: ACTIVE | Noted: 2017-08-19

## 2017-08-19 LAB
ATRIAL RATE: 73 BPM
P AXIS: 87 DEGREES
PR INTERVAL: 148 MS
QRS AXIS: 29 DEGREES
QRSD INTERVAL: 70 MS
QT INTERVAL: 370 MS
QTC INTERVAL: 407 MS
T WAVE AXIS: 53 DEGREES
VENTRICULAR RATE: 73 BPM

## 2017-08-19 PROCEDURE — 96374 THER/PROPH/DIAG INJ IV PUSH: CPT

## 2017-08-19 PROCEDURE — 99284 EMERGENCY DEPT VISIT MOD MDM: CPT

## 2017-08-19 PROCEDURE — 93005 ELECTROCARDIOGRAM TRACING: CPT

## 2017-08-19 PROCEDURE — 71020 HB CHEST X-RAY 2VW FRONTAL&LATL: CPT

## 2017-08-19 PROCEDURE — 94640 AIRWAY INHALATION TREATMENT: CPT

## 2017-08-19 RX ORDER — PREDNISONE 20 MG/1
60 TABLET ORAL DAILY
Status: DISCONTINUED | OUTPATIENT
Start: 2017-08-19 | End: 2017-08-19 | Stop reason: HOSPADM

## 2017-08-19 RX ORDER — ALBUTEROL SULFATE 2.5 MG/3ML
2.5 SOLUTION RESPIRATORY (INHALATION)
Status: DISCONTINUED | OUTPATIENT
Start: 2017-08-19 | End: 2017-08-19 | Stop reason: HOSPADM

## 2017-08-19 RX ORDER — ALBUTEROL SULFATE 2.5 MG/3ML
5 SOLUTION RESPIRATORY (INHALATION) ONCE
Status: COMPLETED | OUTPATIENT
Start: 2017-08-19 | End: 2017-08-19

## 2017-08-19 RX ORDER — BENZONATATE 100 MG/1
100 CAPSULE ORAL 3 TIMES DAILY PRN
Status: DISCONTINUED | OUTPATIENT
Start: 2017-08-19 | End: 2017-08-19 | Stop reason: HOSPADM

## 2017-08-19 RX ORDER — AZITHROMYCIN 250 MG/1
250 TABLET, FILM COATED ORAL EVERY 24 HOURS
COMMUNITY
End: 2017-08-29 | Stop reason: HOSPADM

## 2017-08-19 RX ORDER — ALBUTEROL SULFATE 90 UG/1
2 AEROSOL, METERED RESPIRATORY (INHALATION) EVERY 4 HOURS PRN
Status: DISCONTINUED | OUTPATIENT
Start: 2017-08-19 | End: 2017-08-19 | Stop reason: HOSPADM

## 2017-08-19 RX ORDER — FLUNISOLIDE 0.25 MG/ML
2 SOLUTION NASAL DAILY
COMMUNITY
End: 2017-08-21 | Stop reason: SDUPTHER

## 2017-08-19 RX ORDER — ALBUTEROL SULFATE 90 UG/1
2 AEROSOL, METERED RESPIRATORY (INHALATION) AS NEEDED
COMMUNITY

## 2017-08-19 RX ORDER — OXYCODONE HYDROCHLORIDE 5 MG/1
5 CAPSULE ORAL AS NEEDED
COMMUNITY
Start: 2017-05-30 | End: 2017-08-29 | Stop reason: HOSPADM

## 2017-08-19 RX ADMIN — ALBUTEROL SULFATE 5 MG: 2.5 SOLUTION RESPIRATORY (INHALATION) at 05:34

## 2017-08-19 RX ADMIN — IPRATROPIUM BROMIDE 0.5 MG: 0.5 SOLUTION RESPIRATORY (INHALATION) at 05:34

## 2017-08-19 RX ADMIN — IPRATROPIUM BROMIDE 0.5 MG: 0.5 SOLUTION RESPIRATORY (INHALATION) at 04:38

## 2017-08-19 RX ADMIN — DEXAMETHASONE SODIUM PHOSPHATE 10 MG: 10 INJECTION, SOLUTION INTRAMUSCULAR; INTRAVENOUS at 05:32

## 2017-08-19 RX ADMIN — ALBUTEROL SULFATE 5 MG: 2.5 SOLUTION RESPIRATORY (INHALATION) at 04:38

## 2017-08-21 ENCOUNTER — HOSPITAL ENCOUNTER (INPATIENT)
Facility: HOSPITAL | Age: 82
LOS: 8 days | Discharge: NURSING, REHAB OR LONG-TERM CARE | DRG: 012 | End: 2017-08-29
Attending: EMERGENCY MEDICINE | Admitting: HOSPITALIST
Payer: COMMERCIAL

## 2017-08-21 ENCOUNTER — APPOINTMENT (EMERGENCY)
Dept: RADIOLOGY | Facility: HOSPITAL | Age: 82
DRG: 012 | End: 2017-08-21
Payer: COMMERCIAL

## 2017-08-21 DIAGNOSIS — J45.21 MILD INTERMITTENT ASTHMA WITH ACUTE EXACERBATION: ICD-10-CM

## 2017-08-21 DIAGNOSIS — R69 DIAGNOSIS UNKNOWN: ICD-10-CM

## 2017-08-21 DIAGNOSIS — R06.02 SOB (SHORTNESS OF BREATH): ICD-10-CM

## 2017-08-21 DIAGNOSIS — C32.9 LARYNGEAL CANCER (HCC): ICD-10-CM

## 2017-08-21 DIAGNOSIS — R06.02 SHORTNESS OF BREATH: ICD-10-CM

## 2017-08-21 DIAGNOSIS — R06.2 WHEEZING: Primary | ICD-10-CM

## 2017-08-21 PROBLEM — J45.901 REACTIVE AIRWAY DISEASE WITH ACUTE EXACERBATION: Status: ACTIVE | Noted: 2017-08-21

## 2017-08-21 LAB
ANION GAP SERPL CALCULATED.3IONS-SCNC: 5 MMOL/L (ref 4–13)
ATRIAL RATE: 80 BPM
BASOPHILS # BLD AUTO: 0.01 THOUSANDS/ΜL (ref 0–0.1)
BASOPHILS NFR BLD AUTO: 0 % (ref 0–1)
BUN SERPL-MCNC: 23 MG/DL (ref 5–25)
CALCIUM SERPL-MCNC: 9.3 MG/DL (ref 8.3–10.1)
CHLORIDE SERPL-SCNC: 99 MMOL/L (ref 100–108)
CO2 SERPL-SCNC: 33 MMOL/L (ref 21–32)
CREAT SERPL-MCNC: 0.98 MG/DL (ref 0.6–1.3)
EOSINOPHIL # BLD AUTO: 0.01 THOUSAND/ΜL (ref 0–0.61)
EOSINOPHIL NFR BLD AUTO: 0 % (ref 0–6)
ERYTHROCYTE [DISTWIDTH] IN BLOOD BY AUTOMATED COUNT: 15 % (ref 11.6–15.1)
GFR SERPL CREATININE-BSD FRML MDRD: 72 ML/MIN/1.73SQ M
GLUCOSE SERPL-MCNC: 91 MG/DL (ref 65–140)
HCT VFR BLD AUTO: 35.7 % (ref 36.5–49.3)
HGB BLD-MCNC: 11.4 G/DL (ref 12–17)
HOLD SPECIMEN: NORMAL
LYMPHOCYTES # BLD AUTO: 0.38 THOUSANDS/ΜL (ref 0.6–4.47)
LYMPHOCYTES NFR BLD AUTO: 4 % (ref 14–44)
MCH RBC QN AUTO: 30.6 PG (ref 26.8–34.3)
MCHC RBC AUTO-ENTMCNC: 31.9 G/DL (ref 31.4–37.4)
MCV RBC AUTO: 96 FL (ref 82–98)
MONOCYTES # BLD AUTO: 0.62 THOUSAND/ΜL (ref 0.17–1.22)
MONOCYTES NFR BLD AUTO: 7 % (ref 4–12)
NEUTROPHILS # BLD AUTO: 7.6 THOUSANDS/ΜL (ref 1.85–7.62)
NEUTS SEG NFR BLD AUTO: 89 % (ref 43–75)
NRBC BLD AUTO-RTO: 0 /100 WBCS
P AXIS: 73 DEGREES
PLATELET # BLD AUTO: 302 THOUSANDS/UL (ref 149–390)
PMV BLD AUTO: 8.9 FL (ref 8.9–12.7)
POTASSIUM SERPL-SCNC: 4 MMOL/L (ref 3.5–5.3)
PR INTERVAL: 138 MS
QRS AXIS: 35 DEGREES
QRSD INTERVAL: 72 MS
QT INTERVAL: 350 MS
QTC INTERVAL: 403 MS
RBC # BLD AUTO: 3.72 MILLION/UL (ref 3.88–5.62)
SODIUM SERPL-SCNC: 137 MMOL/L (ref 136–145)
T WAVE AXIS: 36 DEGREES
TROPONIN I SERPL-MCNC: <0.02 NG/ML
VENTRICULAR RATE: 80 BPM
WBC # BLD AUTO: 8.64 THOUSAND/UL (ref 4.31–10.16)

## 2017-08-21 PROCEDURE — 36415 COLL VENOUS BLD VENIPUNCTURE: CPT | Performed by: EMERGENCY MEDICINE

## 2017-08-21 PROCEDURE — 80048 BASIC METABOLIC PNL TOTAL CA: CPT | Performed by: EMERGENCY MEDICINE

## 2017-08-21 PROCEDURE — 94760 N-INVAS EAR/PLS OXIMETRY 1: CPT

## 2017-08-21 PROCEDURE — 94640 AIRWAY INHALATION TREATMENT: CPT

## 2017-08-21 PROCEDURE — 96374 THER/PROPH/DIAG INJ IV PUSH: CPT

## 2017-08-21 PROCEDURE — 94644 CONT INHLJ TX 1ST HOUR: CPT

## 2017-08-21 PROCEDURE — 93005 ELECTROCARDIOGRAM TRACING: CPT | Performed by: EMERGENCY MEDICINE

## 2017-08-21 PROCEDURE — 84484 ASSAY OF TROPONIN QUANT: CPT | Performed by: EMERGENCY MEDICINE

## 2017-08-21 PROCEDURE — 71020 HB CHEST X-RAY 2VW FRONTAL&LATL: CPT

## 2017-08-21 PROCEDURE — 85025 COMPLETE CBC W/AUTO DIFF WBC: CPT | Performed by: EMERGENCY MEDICINE

## 2017-08-21 PROCEDURE — 99285 EMERGENCY DEPT VISIT HI MDM: CPT

## 2017-08-21 RX ORDER — ACETAMINOPHEN 325 MG/1
650 TABLET ORAL EVERY 6 HOURS PRN
Status: DISCONTINUED | OUTPATIENT
Start: 2017-08-21 | End: 2017-08-29 | Stop reason: HOSPADM

## 2017-08-21 RX ORDER — PREDNISONE 20 MG/1
40 TABLET ORAL DAILY
Status: DISCONTINUED | OUTPATIENT
Start: 2017-08-21 | End: 2017-08-21

## 2017-08-21 RX ORDER — LANOLIN ALCOHOL/MO/W.PET/CERES
3 CREAM (GRAM) TOPICAL
Status: DISCONTINUED | OUTPATIENT
Start: 2017-08-21 | End: 2017-08-29 | Stop reason: HOSPADM

## 2017-08-21 RX ORDER — METHYLPREDNISOLONE SODIUM SUCCINATE 125 MG/2ML
125 INJECTION, POWDER, LYOPHILIZED, FOR SOLUTION INTRAMUSCULAR; INTRAVENOUS ONCE
Status: COMPLETED | OUTPATIENT
Start: 2017-08-21 | End: 2017-08-21

## 2017-08-21 RX ORDER — MIRTAZAPINE 15 MG/1
1 TABLET, FILM COATED ORAL
COMMUNITY
Start: 2017-06-12 | End: 2017-08-29 | Stop reason: HOSPADM

## 2017-08-21 RX ORDER — HYDROCODONE BITARTRATE AND ACETAMINOPHEN 10; 300 MG/1; MG/1
1 TABLET ORAL EVERY 6 HOURS PRN
COMMUNITY
End: 2017-08-29 | Stop reason: HOSPADM

## 2017-08-21 RX ORDER — METHYLPREDNISOLONE SODIUM SUCCINATE 40 MG/ML
40 INJECTION, POWDER, LYOPHILIZED, FOR SOLUTION INTRAMUSCULAR; INTRAVENOUS EVERY 12 HOURS SCHEDULED
Status: DISCONTINUED | OUTPATIENT
Start: 2017-08-21 | End: 2017-08-26

## 2017-08-21 RX ORDER — LEVALBUTEROL 1.25 MG/.5ML
1.25 SOLUTION, CONCENTRATE RESPIRATORY (INHALATION)
Status: DISCONTINUED | OUTPATIENT
Start: 2017-08-21 | End: 2017-08-22

## 2017-08-21 RX ORDER — MIRTAZAPINE 15 MG/1
15 TABLET, FILM COATED ORAL
Status: DISCONTINUED | OUTPATIENT
Start: 2017-08-21 | End: 2017-08-29

## 2017-08-21 RX ORDER — ALBUTEROL SULFATE 2.5 MG/3ML
5 SOLUTION RESPIRATORY (INHALATION) ONCE
Status: COMPLETED | OUTPATIENT
Start: 2017-08-21 | End: 2017-08-21

## 2017-08-21 RX ORDER — FLUNISOLIDE 0.25 MG/ML
SOLUTION NASAL
COMMUNITY
End: 2017-12-13

## 2017-08-21 RX ORDER — ALBUTEROL SULFATE 2.5 MG/3ML
10 SOLUTION RESPIRATORY (INHALATION) ONCE
Status: COMPLETED | OUTPATIENT
Start: 2017-08-21 | End: 2017-08-21

## 2017-08-21 RX ORDER — SODIUM CHLORIDE FOR INHALATION 0.9 %
3 VIAL, NEBULIZER (ML) INHALATION
Status: DISCONTINUED | OUTPATIENT
Start: 2017-08-21 | End: 2017-08-22

## 2017-08-21 RX ORDER — PROCHLORPERAZINE MALEATE 10 MG
1 TABLET ORAL EVERY 6 HOURS PRN
COMMUNITY
Start: 2017-06-02 | End: 2017-08-29 | Stop reason: HOSPADM

## 2017-08-21 RX ADMIN — ALBUTEROL SULFATE 5 MG: 2.5 SOLUTION RESPIRATORY (INHALATION) at 12:26

## 2017-08-21 RX ADMIN — ISODIUM CHLORIDE 3 ML: 0.03 SOLUTION RESPIRATORY (INHALATION) at 20:54

## 2017-08-21 RX ADMIN — MENTHOL 5.4 MG: 5.4 LOZENGE ORAL at 23:37

## 2017-08-21 RX ADMIN — LEVALBUTEROL HYDROCHLORIDE 1.25 MG: 1.25 SOLUTION, CONCENTRATE RESPIRATORY (INHALATION) at 20:54

## 2017-08-21 RX ADMIN — IPRATROPIUM BROMIDE 0.5 MG: 0.5 SOLUTION RESPIRATORY (INHALATION) at 12:26

## 2017-08-21 RX ADMIN — MELATONIN TAB 3 MG 3 MG: 3 TAB at 22:33

## 2017-08-21 RX ADMIN — METHYLPREDNISOLONE SODIUM SUCCINATE 125 MG: 125 INJECTION, POWDER, FOR SOLUTION INTRAMUSCULAR; INTRAVENOUS at 08:48

## 2017-08-21 RX ADMIN — METHYLPREDNISOLONE SODIUM SUCCINATE 40 MG: 40 INJECTION, POWDER, FOR SOLUTION INTRAMUSCULAR; INTRAVENOUS at 20:53

## 2017-08-21 RX ADMIN — IPRATROPIUM BROMIDE 1 MG: 0.5 SOLUTION RESPIRATORY (INHALATION) at 08:38

## 2017-08-21 RX ADMIN — ALBUTEROL SULFATE 10 MG: 2.5 SOLUTION RESPIRATORY (INHALATION) at 08:38

## 2017-08-21 RX ADMIN — LEVALBUTEROL HYDROCHLORIDE 1.25 MG: 1.25 SOLUTION, CONCENTRATE RESPIRATORY (INHALATION) at 17:23

## 2017-08-21 RX ADMIN — MIRTAZAPINE 15 MG: 15 TABLET, FILM COATED ORAL at 20:53

## 2017-08-21 RX ADMIN — ISODIUM CHLORIDE 3 ML: 0.03 SOLUTION RESPIRATORY (INHALATION) at 17:23

## 2017-08-21 RX ADMIN — ENOXAPARIN SODIUM 40 MG: 40 INJECTION SUBCUTANEOUS at 14:57

## 2017-08-21 RX ADMIN — PREDNISONE 40 MG: 20 TABLET ORAL at 14:56

## 2017-08-22 ENCOUNTER — APPOINTMENT (INPATIENT)
Dept: RADIOLOGY | Facility: HOSPITAL | Age: 82
DRG: 012 | End: 2017-08-22
Payer: COMMERCIAL

## 2017-08-22 LAB
ANION GAP SERPL CALCULATED.3IONS-SCNC: 4 MMOL/L (ref 4–13)
BUN SERPL-MCNC: 23 MG/DL (ref 5–25)
CALCIUM SERPL-MCNC: 9.8 MG/DL (ref 8.3–10.1)
CHLORIDE SERPL-SCNC: 101 MMOL/L (ref 100–108)
CO2 SERPL-SCNC: 32 MMOL/L (ref 21–32)
CREAT SERPL-MCNC: 0.88 MG/DL (ref 0.6–1.3)
ERYTHROCYTE [DISTWIDTH] IN BLOOD BY AUTOMATED COUNT: 15.3 % (ref 11.6–15.1)
GFR SERPL CREATININE-BSD FRML MDRD: 81 ML/MIN/1.73SQ M
GLUCOSE SERPL-MCNC: 108 MG/DL (ref 65–140)
HCT VFR BLD AUTO: 36.7 % (ref 36.5–49.3)
HGB BLD-MCNC: 12.2 G/DL (ref 12–17)
MAGNESIUM SERPL-MCNC: 2.3 MG/DL (ref 1.6–2.6)
MCH RBC QN AUTO: 31.4 PG (ref 26.8–34.3)
MCHC RBC AUTO-ENTMCNC: 33.2 G/DL (ref 31.4–37.4)
MCV RBC AUTO: 95 FL (ref 82–98)
PHOSPHATE SERPL-MCNC: 3.4 MG/DL (ref 2.3–4.1)
PLATELET # BLD AUTO: 356 THOUSANDS/UL (ref 149–390)
PMV BLD AUTO: 9 FL (ref 8.9–12.7)
POTASSIUM SERPL-SCNC: 4.5 MMOL/L (ref 3.5–5.3)
RBC # BLD AUTO: 3.88 MILLION/UL (ref 3.88–5.62)
SODIUM SERPL-SCNC: 137 MMOL/L (ref 136–145)
WBC # BLD AUTO: 6.34 THOUSAND/UL (ref 4.31–10.16)

## 2017-08-22 PROCEDURE — 84100 ASSAY OF PHOSPHORUS: CPT | Performed by: HOSPITALIST

## 2017-08-22 PROCEDURE — 94640 AIRWAY INHALATION TREATMENT: CPT

## 2017-08-22 PROCEDURE — 80048 BASIC METABOLIC PNL TOTAL CA: CPT | Performed by: HOSPITALIST

## 2017-08-22 PROCEDURE — 83735 ASSAY OF MAGNESIUM: CPT | Performed by: HOSPITALIST

## 2017-08-22 PROCEDURE — 85027 COMPLETE CBC AUTOMATED: CPT | Performed by: HOSPITALIST

## 2017-08-22 PROCEDURE — 87070 CULTURE OTHR SPECIMN AEROBIC: CPT | Performed by: HOSPITALIST

## 2017-08-22 PROCEDURE — 94760 N-INVAS EAR/PLS OXIMETRY 1: CPT

## 2017-08-22 PROCEDURE — 70491 CT SOFT TISSUE NECK W/DYE: CPT

## 2017-08-22 PROCEDURE — 87205 SMEAR GRAM STAIN: CPT | Performed by: HOSPITALIST

## 2017-08-22 RX ORDER — FLUCONAZOLE 200 MG/1
200 TABLET ORAL DAILY
Status: DISCONTINUED | OUTPATIENT
Start: 2017-08-22 | End: 2017-08-25

## 2017-08-22 RX ORDER — ZOLPIDEM TARTRATE 5 MG/1
5 TABLET ORAL
Status: DISCONTINUED | OUTPATIENT
Start: 2017-08-22 | End: 2017-08-29 | Stop reason: HOSPADM

## 2017-08-22 RX ORDER — SODIUM CHLORIDE 9 MG/ML
75 INJECTION, SOLUTION INTRAVENOUS CONTINUOUS
Status: DISCONTINUED | OUTPATIENT
Start: 2017-08-22 | End: 2017-08-23

## 2017-08-22 RX ORDER — SODIUM CHLORIDE FOR INHALATION 0.9 %
3 VIAL, NEBULIZER (ML) INHALATION
Status: DISCONTINUED | OUTPATIENT
Start: 2017-08-22 | End: 2017-08-24

## 2017-08-22 RX ORDER — LEVALBUTEROL 1.25 MG/.5ML
1.25 SOLUTION, CONCENTRATE RESPIRATORY (INHALATION) EVERY 6 HOURS PRN
Status: DISCONTINUED | OUTPATIENT
Start: 2017-08-22 | End: 2017-08-24

## 2017-08-22 RX ORDER — GUAIFENESIN 600 MG
600 TABLET, EXTENDED RELEASE 12 HR ORAL EVERY 12 HOURS SCHEDULED
Status: DISCONTINUED | OUTPATIENT
Start: 2017-08-22 | End: 2017-08-29 | Stop reason: HOSPADM

## 2017-08-22 RX ADMIN — MIRTAZAPINE 15 MG: 15 TABLET, FILM COATED ORAL at 21:08

## 2017-08-22 RX ADMIN — MELATONIN TAB 3 MG 3 MG: 3 TAB at 21:08

## 2017-08-22 RX ADMIN — IOHEXOL 85 ML: 350 INJECTION, SOLUTION INTRAVENOUS at 20:15

## 2017-08-22 RX ADMIN — FLUCONAZOLE 200 MG: 200 TABLET ORAL at 12:17

## 2017-08-22 RX ADMIN — GUAIFENESIN 600 MG: 600 TABLET, EXTENDED RELEASE ORAL at 09:55

## 2017-08-22 RX ADMIN — ZOLPIDEM TARTRATE 5 MG: 5 TABLET ORAL at 21:09

## 2017-08-22 RX ADMIN — ISODIUM CHLORIDE 3 ML: 0.03 SOLUTION RESPIRATORY (INHALATION) at 07:29

## 2017-08-22 RX ADMIN — ISODIUM CHLORIDE 3 ML: 0.03 SOLUTION RESPIRATORY (INHALATION) at 22:11

## 2017-08-22 RX ADMIN — GUAIFENESIN 600 MG: 600 TABLET, EXTENDED RELEASE ORAL at 21:08

## 2017-08-22 RX ADMIN — LEVALBUTEROL HYDROCHLORIDE 1.25 MG: 1.25 SOLUTION, CONCENTRATE RESPIRATORY (INHALATION) at 07:29

## 2017-08-22 RX ADMIN — SODIUM CHLORIDE 75 ML/HR: 0.9 INJECTION, SOLUTION INTRAVENOUS at 17:46

## 2017-08-22 RX ADMIN — METHYLPREDNISOLONE SODIUM SUCCINATE 40 MG: 40 INJECTION, POWDER, FOR SOLUTION INTRAMUSCULAR; INTRAVENOUS at 21:08

## 2017-08-22 RX ADMIN — METHYLPREDNISOLONE SODIUM SUCCINATE 40 MG: 40 INJECTION, POWDER, FOR SOLUTION INTRAMUSCULAR; INTRAVENOUS at 08:33

## 2017-08-23 LAB
ANION GAP SERPL CALCULATED.3IONS-SCNC: 5 MMOL/L (ref 4–13)
BUN SERPL-MCNC: 26 MG/DL (ref 5–25)
CALCIUM SERPL-MCNC: 9.6 MG/DL (ref 8.3–10.1)
CHLORIDE SERPL-SCNC: 100 MMOL/L (ref 100–108)
CO2 SERPL-SCNC: 32 MMOL/L (ref 21–32)
CREAT SERPL-MCNC: 0.87 MG/DL (ref 0.6–1.3)
GFR SERPL CREATININE-BSD FRML MDRD: 81 ML/MIN/1.73SQ M
GLUCOSE SERPL-MCNC: 114 MG/DL (ref 65–140)
POTASSIUM SERPL-SCNC: 4.3 MMOL/L (ref 3.5–5.3)
SODIUM SERPL-SCNC: 137 MMOL/L (ref 136–145)

## 2017-08-23 PROCEDURE — 80048 BASIC METABOLIC PNL TOTAL CA: CPT | Performed by: INTERNAL MEDICINE

## 2017-08-23 RX ORDER — PANTOPRAZOLE SODIUM 40 MG/1
40 TABLET, DELAYED RELEASE ORAL
Status: DISCONTINUED | OUTPATIENT
Start: 2017-08-24 | End: 2017-08-29 | Stop reason: HOSPADM

## 2017-08-23 RX ADMIN — MELATONIN TAB 3 MG 3 MG: 3 TAB at 22:13

## 2017-08-23 RX ADMIN — METHYLPREDNISOLONE SODIUM SUCCINATE 40 MG: 40 INJECTION, POWDER, FOR SOLUTION INTRAMUSCULAR; INTRAVENOUS at 09:27

## 2017-08-23 RX ADMIN — GUAIFENESIN 600 MG: 600 TABLET, EXTENDED RELEASE ORAL at 09:27

## 2017-08-23 RX ADMIN — ZOLPIDEM TARTRATE 5 MG: 5 TABLET ORAL at 22:13

## 2017-08-23 RX ADMIN — MIRTAZAPINE 15 MG: 15 TABLET, FILM COATED ORAL at 22:13

## 2017-08-23 RX ADMIN — FLUCONAZOLE 200 MG: 200 TABLET ORAL at 09:27

## 2017-08-23 RX ADMIN — ENOXAPARIN SODIUM 40 MG: 40 INJECTION SUBCUTANEOUS at 09:27

## 2017-08-23 RX ADMIN — GUAIFENESIN 600 MG: 600 TABLET, EXTENDED RELEASE ORAL at 22:13

## 2017-08-23 RX ADMIN — METHYLPREDNISOLONE SODIUM SUCCINATE 40 MG: 40 INJECTION, POWDER, FOR SOLUTION INTRAMUSCULAR; INTRAVENOUS at 22:13

## 2017-08-23 RX ADMIN — ACETAMINOPHEN 650 MG: 325 TABLET, FILM COATED ORAL at 22:18

## 2017-08-24 LAB
BACTERIA SPT RESP CULT: NORMAL
GRAM STN SPEC: NORMAL

## 2017-08-24 PROCEDURE — 94760 N-INVAS EAR/PLS OXIMETRY 1: CPT

## 2017-08-24 PROCEDURE — 0CJS8ZZ INSPECTION OF LARYNX, VIA NATURAL OR ARTIFICIAL OPENING ENDOSCOPIC: ICD-10-PCS | Performed by: OTOLARYNGOLOGY

## 2017-08-24 PROCEDURE — 94640 AIRWAY INHALATION TREATMENT: CPT

## 2017-08-24 PROCEDURE — 94668 MNPJ CHEST WALL SBSQ: CPT

## 2017-08-24 RX ORDER — LEVALBUTEROL 1.25 MG/.5ML
1.25 SOLUTION, CONCENTRATE RESPIRATORY (INHALATION) EVERY 6 HOURS PRN
Status: DISCONTINUED | OUTPATIENT
Start: 2017-08-24 | End: 2017-08-29 | Stop reason: HOSPADM

## 2017-08-24 RX ORDER — LORAZEPAM 0.5 MG/1
0.25 TABLET ORAL ONCE
Status: COMPLETED | OUTPATIENT
Start: 2017-08-24 | End: 2017-08-24

## 2017-08-24 RX ORDER — SODIUM CHLORIDE FOR INHALATION 0.9 %
3 VIAL, NEBULIZER (ML) INHALATION EVERY 6 HOURS PRN
Status: DISCONTINUED | OUTPATIENT
Start: 2017-08-24 | End: 2017-08-29 | Stop reason: HOSPADM

## 2017-08-24 RX ADMIN — MIRTAZAPINE 15 MG: 15 TABLET, FILM COATED ORAL at 21:52

## 2017-08-24 RX ADMIN — MELATONIN TAB 3 MG 3 MG: 3 TAB at 21:52

## 2017-08-24 RX ADMIN — LEVALBUTEROL HYDROCHLORIDE 1.25 MG: 1.25 SOLUTION, CONCENTRATE RESPIRATORY (INHALATION) at 23:29

## 2017-08-24 RX ADMIN — ISODIUM CHLORIDE 3 ML: 0.03 SOLUTION RESPIRATORY (INHALATION) at 23:29

## 2017-08-24 RX ADMIN — LORAZEPAM 0.25 MG: 0.5 TABLET ORAL at 23:58

## 2017-08-24 RX ADMIN — METHYLPREDNISOLONE SODIUM SUCCINATE 40 MG: 40 INJECTION, POWDER, FOR SOLUTION INTRAMUSCULAR; INTRAVENOUS at 10:04

## 2017-08-24 RX ADMIN — FLUCONAZOLE 200 MG: 200 TABLET ORAL at 10:04

## 2017-08-24 RX ADMIN — GUAIFENESIN 600 MG: 600 TABLET, EXTENDED RELEASE ORAL at 21:52

## 2017-08-24 RX ADMIN — METHYLPREDNISOLONE SODIUM SUCCINATE 40 MG: 40 INJECTION, POWDER, FOR SOLUTION INTRAMUSCULAR; INTRAVENOUS at 21:52

## 2017-08-24 RX ADMIN — PANTOPRAZOLE SODIUM 40 MG: 40 TABLET, DELAYED RELEASE ORAL at 05:54

## 2017-08-24 RX ADMIN — GUAIFENESIN 600 MG: 600 TABLET, EXTENDED RELEASE ORAL at 10:04

## 2017-08-24 RX ADMIN — ENOXAPARIN SODIUM 40 MG: 40 INJECTION SUBCUTANEOUS at 10:04

## 2017-08-25 ENCOUNTER — ANESTHESIA EVENT (INPATIENT)
Dept: PERIOP | Facility: HOSPITAL | Age: 82
DRG: 012 | End: 2017-08-25
Payer: COMMERCIAL

## 2017-08-25 ENCOUNTER — ANESTHESIA (INPATIENT)
Dept: PERIOP | Facility: HOSPITAL | Age: 82
DRG: 012 | End: 2017-08-25
Payer: COMMERCIAL

## 2017-08-25 LAB — GLUCOSE SERPL-MCNC: 92 MG/DL (ref 65–140)

## 2017-08-25 PROCEDURE — 94760 N-INVAS EAR/PLS OXIMETRY 1: CPT

## 2017-08-25 PROCEDURE — 82948 REAGENT STRIP/BLOOD GLUCOSE: CPT

## 2017-08-25 PROCEDURE — 0CBS8ZX EXCISION OF LARYNX, VIA NATURAL OR ARTIFICIAL OPENING ENDOSCOPIC, DIAGNOSTIC: ICD-10-PCS | Performed by: OTOLARYNGOLOGY

## 2017-08-25 PROCEDURE — 88305 TISSUE EXAM BY PATHOLOGIST: CPT | Performed by: OTOLARYNGOLOGY

## 2017-08-25 PROCEDURE — 0B110F4 BYPASS TRACHEA TO CUTANEOUS WITH TRACHEOSTOMY DEVICE, OPEN APPROACH: ICD-10-PCS | Performed by: OTOLARYNGOLOGY

## 2017-08-25 PROCEDURE — 93005 ELECTROCARDIOGRAM TRACING: CPT | Performed by: PHYSICIAN ASSISTANT

## 2017-08-25 RX ORDER — FENTANYL CITRATE 50 UG/ML
INJECTION, SOLUTION INTRAMUSCULAR; INTRAVENOUS AS NEEDED
Status: DISCONTINUED | OUTPATIENT
Start: 2017-08-25 | End: 2017-08-25 | Stop reason: SURG

## 2017-08-25 RX ORDER — LIDOCAINE HYDROCHLORIDE AND EPINEPHRINE 10; 10 MG/ML; UG/ML
INJECTION, SOLUTION INFILTRATION; PERINEURAL AS NEEDED
Status: DISCONTINUED | OUTPATIENT
Start: 2017-08-25 | End: 2017-08-25 | Stop reason: HOSPADM

## 2017-08-25 RX ORDER — MORPHINE SULFATE 2 MG/ML
2 INJECTION, SOLUTION INTRAMUSCULAR; INTRAVENOUS EVERY 4 HOURS PRN
Status: DISCONTINUED | OUTPATIENT
Start: 2017-08-25 | End: 2017-08-29 | Stop reason: HOSPADM

## 2017-08-25 RX ORDER — LIDOCAINE HYDROCHLORIDE 20 MG/ML
5 INJECTION, SOLUTION EPIDURAL; INFILTRATION; INTRACAUDAL; PERINEURAL ONCE
Status: COMPLETED | OUTPATIENT
Start: 2017-08-25 | End: 2017-08-25

## 2017-08-25 RX ORDER — SODIUM CHLORIDE, SODIUM LACTATE, POTASSIUM CHLORIDE, CALCIUM CHLORIDE 600; 310; 30; 20 MG/100ML; MG/100ML; MG/100ML; MG/100ML
50 INJECTION, SOLUTION INTRAVENOUS CONTINUOUS
Status: DISCONTINUED | OUTPATIENT
Start: 2017-08-25 | End: 2017-08-29

## 2017-08-25 RX ORDER — PROPOFOL 10 MG/ML
INJECTION, EMULSION INTRAVENOUS CONTINUOUS PRN
Status: DISCONTINUED | OUTPATIENT
Start: 2017-08-25 | End: 2017-08-25 | Stop reason: SURG

## 2017-08-25 RX ORDER — SODIUM CHLORIDE, SODIUM LACTATE, POTASSIUM CHLORIDE, CALCIUM CHLORIDE 600; 310; 30; 20 MG/100ML; MG/100ML; MG/100ML; MG/100ML
INJECTION, SOLUTION INTRAVENOUS CONTINUOUS PRN
Status: DISCONTINUED | OUTPATIENT
Start: 2017-08-25 | End: 2017-08-25 | Stop reason: SURG

## 2017-08-25 RX ORDER — FENTANYL CITRATE/PF 50 MCG/ML
25 SYRINGE (ML) INJECTION
Status: DISCONTINUED | OUTPATIENT
Start: 2017-08-25 | End: 2017-08-25 | Stop reason: HOSPADM

## 2017-08-25 RX ORDER — LIDOCAINE HYDROCHLORIDE 40 MG/ML
SOLUTION TOPICAL AS NEEDED
Status: DISCONTINUED | OUTPATIENT
Start: 2017-08-25 | End: 2017-08-25 | Stop reason: HOSPADM

## 2017-08-25 RX ORDER — ONDANSETRON 2 MG/ML
4 INJECTION INTRAMUSCULAR; INTRAVENOUS ONCE AS NEEDED
Status: DISCONTINUED | OUTPATIENT
Start: 2017-08-25 | End: 2017-08-25 | Stop reason: HOSPADM

## 2017-08-25 RX ORDER — GLYCOPYRROLATE 0.2 MG/ML
INJECTION INTRAMUSCULAR; INTRAVENOUS AS NEEDED
Status: DISCONTINUED | OUTPATIENT
Start: 2017-08-25 | End: 2017-08-25 | Stop reason: SURG

## 2017-08-25 RX ADMIN — FENTANYL CITRATE 50 MCG: 50 INJECTION, SOLUTION INTRAMUSCULAR; INTRAVENOUS at 18:54

## 2017-08-25 RX ADMIN — DEXMEDETOMIDINE HYDROCHLORIDE 0.5 MCG/KG/HR: 100 INJECTION, SOLUTION INTRAVENOUS at 18:29

## 2017-08-25 RX ADMIN — METHYLPREDNISOLONE SODIUM SUCCINATE 40 MG: 40 INJECTION, POWDER, FOR SOLUTION INTRAMUSCULAR; INTRAVENOUS at 08:37

## 2017-08-25 RX ADMIN — SODIUM CHLORIDE, SODIUM LACTATE, POTASSIUM CHLORIDE, AND CALCIUM CHLORIDE: .6; .31; .03; .02 INJECTION, SOLUTION INTRAVENOUS at 19:16

## 2017-08-25 RX ADMIN — LIDOCAINE HYDROCHLORIDE 5 ML: 20 INJECTION, SOLUTION EPIDURAL; INFILTRATION; INTRACAUDAL; PERINEURAL at 18:07

## 2017-08-25 RX ADMIN — METHYLPREDNISOLONE SODIUM SUCCINATE 40 MG: 40 INJECTION, POWDER, FOR SOLUTION INTRAMUSCULAR; INTRAVENOUS at 22:12

## 2017-08-25 RX ADMIN — PROPOFOL 50 MCG/KG/MIN: 10 INJECTION, EMULSION INTRAVENOUS at 18:50

## 2017-08-25 RX ADMIN — SODIUM CHLORIDE, SODIUM LACTATE, POTASSIUM CHLORIDE, AND CALCIUM CHLORIDE: .6; .31; .03; .02 INJECTION, SOLUTION INTRAVENOUS at 18:08

## 2017-08-25 RX ADMIN — FENTANYL CITRATE 25 MCG: 50 INJECTION INTRAMUSCULAR; INTRAVENOUS at 19:45

## 2017-08-25 RX ADMIN — GLYCOPYRROLATE 0.2 MG: 0.2 INJECTION, SOLUTION INTRAMUSCULAR; INTRAVENOUS at 18:25

## 2017-08-25 RX ADMIN — FENTANYL CITRATE 50 MCG: 50 INJECTION, SOLUTION INTRAMUSCULAR; INTRAVENOUS at 18:50

## 2017-08-25 RX ADMIN — PANTOPRAZOLE SODIUM 40 MG: 40 TABLET, DELAYED RELEASE ORAL at 06:04

## 2017-08-26 LAB
ATRIAL RATE: 67 BPM
GLUCOSE SERPL-MCNC: 93 MG/DL (ref 65–140)
P AXIS: -8 DEGREES
PR INTERVAL: 110 MS
QRS AXIS: -33 DEGREES
QRSD INTERVAL: 74 MS
QT INTERVAL: 398 MS
QTC INTERVAL: 420 MS
T WAVE AXIS: 27 DEGREES
VENTRICULAR RATE: 67 BPM

## 2017-08-26 PROCEDURE — 94760 N-INVAS EAR/PLS OXIMETRY 1: CPT

## 2017-08-26 PROCEDURE — 82948 REAGENT STRIP/BLOOD GLUCOSE: CPT

## 2017-08-26 RX ORDER — METHYLPREDNISOLONE SODIUM SUCCINATE 40 MG/ML
40 INJECTION, POWDER, LYOPHILIZED, FOR SOLUTION INTRAMUSCULAR; INTRAVENOUS DAILY
Status: DISCONTINUED | OUTPATIENT
Start: 2017-08-27 | End: 2017-08-28

## 2017-08-26 RX ADMIN — GUAIFENESIN 600 MG: 600 TABLET, EXTENDED RELEASE ORAL at 10:52

## 2017-08-26 RX ADMIN — METHYLPREDNISOLONE SODIUM SUCCINATE 40 MG: 40 INJECTION, POWDER, FOR SOLUTION INTRAMUSCULAR; INTRAVENOUS at 10:51

## 2017-08-26 RX ADMIN — GUAIFENESIN 600 MG: 600 TABLET, EXTENDED RELEASE ORAL at 22:06

## 2017-08-26 RX ADMIN — MIRTAZAPINE 15 MG: 15 TABLET, FILM COATED ORAL at 22:06

## 2017-08-26 RX ADMIN — ZOLPIDEM TARTRATE 5 MG: 5 TABLET ORAL at 22:06

## 2017-08-26 RX ADMIN — MELATONIN TAB 3 MG 3 MG: 3 TAB at 22:06

## 2017-08-26 RX ADMIN — ENOXAPARIN SODIUM 40 MG: 40 INJECTION SUBCUTANEOUS at 10:52

## 2017-08-27 ENCOUNTER — GENERIC CONVERSION - ENCOUNTER (OUTPATIENT)
Dept: OTHER | Facility: OTHER | Age: 82
End: 2017-08-27

## 2017-08-27 LAB
ANION GAP SERPL CALCULATED.3IONS-SCNC: 4 MMOL/L (ref 4–13)
BUN SERPL-MCNC: 34 MG/DL (ref 5–25)
CALCIUM SERPL-MCNC: 8.6 MG/DL (ref 8.3–10.1)
CHLORIDE SERPL-SCNC: 102 MMOL/L (ref 100–108)
CO2 SERPL-SCNC: 32 MMOL/L (ref 21–32)
CREAT SERPL-MCNC: 0.98 MG/DL (ref 0.6–1.3)
GFR SERPL CREATININE-BSD FRML MDRD: 72 ML/MIN/1.73SQ M
GLUCOSE SERPL-MCNC: 109 MG/DL (ref 65–140)
MAGNESIUM SERPL-MCNC: 2.2 MG/DL (ref 1.6–2.6)
POTASSIUM SERPL-SCNC: 3.9 MMOL/L (ref 3.5–5.3)
SODIUM SERPL-SCNC: 138 MMOL/L (ref 136–145)

## 2017-08-27 PROCEDURE — 92610 EVALUATE SWALLOWING FUNCTION: CPT

## 2017-08-27 PROCEDURE — 80048 BASIC METABOLIC PNL TOTAL CA: CPT | Performed by: PHYSICIAN ASSISTANT

## 2017-08-27 PROCEDURE — 94760 N-INVAS EAR/PLS OXIMETRY 1: CPT

## 2017-08-27 PROCEDURE — L8501 TRACHEOSTOMY SPEAKING VALVE: HCPCS

## 2017-08-27 PROCEDURE — 83735 ASSAY OF MAGNESIUM: CPT | Performed by: PHYSICIAN ASSISTANT

## 2017-08-27 PROCEDURE — 92522 EVALUATE SPEECH PRODUCTION: CPT

## 2017-08-27 RX ADMIN — MIRTAZAPINE 15 MG: 15 TABLET, FILM COATED ORAL at 21:03

## 2017-08-27 RX ADMIN — GUAIFENESIN 600 MG: 600 TABLET, EXTENDED RELEASE ORAL at 21:03

## 2017-08-27 RX ADMIN — METHYLPREDNISOLONE SODIUM SUCCINATE 40 MG: 40 INJECTION, POWDER, FOR SOLUTION INTRAMUSCULAR; INTRAVENOUS at 08:17

## 2017-08-27 RX ADMIN — ENOXAPARIN SODIUM 40 MG: 40 INJECTION SUBCUTANEOUS at 08:17

## 2017-08-27 RX ADMIN — MELATONIN TAB 3 MG 3 MG: 3 TAB at 21:03

## 2017-08-28 PROCEDURE — G8989 SELF CARE D/C STATUS: HCPCS

## 2017-08-28 PROCEDURE — G8988 SELF CARE GOAL STATUS: HCPCS

## 2017-08-28 PROCEDURE — G8987 SELF CARE CURRENT STATUS: HCPCS

## 2017-08-28 PROCEDURE — 97165 OT EVAL LOW COMPLEX 30 MIN: CPT

## 2017-08-28 RX ORDER — POLYETHYLENE GLYCOL 3350 17 G/17G
17 POWDER, FOR SOLUTION ORAL DAILY PRN
Status: DISCONTINUED | OUTPATIENT
Start: 2017-08-28 | End: 2017-08-29 | Stop reason: HOSPADM

## 2017-08-28 RX ADMIN — ENOXAPARIN SODIUM 40 MG: 40 INJECTION SUBCUTANEOUS at 08:17

## 2017-08-28 RX ADMIN — GUAIFENESIN 600 MG: 600 TABLET, EXTENDED RELEASE ORAL at 08:17

## 2017-08-28 RX ADMIN — ACETAMINOPHEN 650 MG: 325 TABLET, FILM COATED ORAL at 02:22

## 2017-08-28 RX ADMIN — METHYLPREDNISOLONE SODIUM SUCCINATE 40 MG: 40 INJECTION, POWDER, FOR SOLUTION INTRAMUSCULAR; INTRAVENOUS at 08:17

## 2017-08-28 RX ADMIN — POLYETHYLENE GLYCOL 3350 17 G: 17 POWDER, FOR SOLUTION ORAL at 19:02

## 2017-08-28 RX ADMIN — MIRTAZAPINE 15 MG: 15 TABLET, FILM COATED ORAL at 21:47

## 2017-08-28 RX ADMIN — PANTOPRAZOLE SODIUM 40 MG: 40 TABLET, DELAYED RELEASE ORAL at 05:28

## 2017-08-28 RX ADMIN — MELATONIN TAB 3 MG 3 MG: 3 TAB at 21:47

## 2017-08-28 RX ADMIN — GUAIFENESIN 600 MG: 600 TABLET, EXTENDED RELEASE ORAL at 21:46

## 2017-08-29 VITALS
HEART RATE: 84 BPM | DIASTOLIC BLOOD PRESSURE: 75 MMHG | OXYGEN SATURATION: 93 % | TEMPERATURE: 97.9 F | HEIGHT: 67 IN | BODY MASS INDEX: 21.97 KG/M2 | SYSTOLIC BLOOD PRESSURE: 142 MMHG | RESPIRATION RATE: 20 BRPM | WEIGHT: 140 LBS

## 2017-08-29 PROBLEM — J45.909 ASTHMA: Status: ACTIVE | Noted: 2017-08-29

## 2017-08-29 PROBLEM — J45.901 REACTIVE AIRWAY DISEASE WITH ACUTE EXACERBATION: Status: RESOLVED | Noted: 2017-08-21 | Resolved: 2017-08-29

## 2017-08-29 PROBLEM — R06.02 SOB (SHORTNESS OF BREATH): Status: RESOLVED | Noted: 2017-08-19 | Resolved: 2017-08-29

## 2017-08-29 PROBLEM — J38.6 LARYNGEAL STENOSIS: Status: ACTIVE | Noted: 2017-08-29

## 2017-08-29 PROBLEM — F32.A DEPRESSION: Status: ACTIVE | Noted: 2017-08-29

## 2017-08-29 PROCEDURE — 94640 AIRWAY INHALATION TREATMENT: CPT

## 2017-08-29 PROCEDURE — G8979 MOBILITY GOAL STATUS: HCPCS

## 2017-08-29 PROCEDURE — G8978 MOBILITY CURRENT STATUS: HCPCS

## 2017-08-29 PROCEDURE — 97163 PT EVAL HIGH COMPLEX 45 MIN: CPT

## 2017-08-29 PROCEDURE — 94760 N-INVAS EAR/PLS OXIMETRY 1: CPT

## 2017-08-29 RX ORDER — PANTOPRAZOLE SODIUM 40 MG/1
40 TABLET, DELAYED RELEASE ORAL
Refills: 0 | Status: ON HOLD
Start: 2017-08-29 | End: 2017-11-20 | Stop reason: ALTCHOICE

## 2017-08-29 RX ORDER — MIRTAZAPINE 15 MG/1
30 TABLET, FILM COATED ORAL
Status: DISCONTINUED | OUTPATIENT
Start: 2017-08-29 | End: 2017-08-29 | Stop reason: HOSPADM

## 2017-08-29 RX ORDER — LEVALBUTEROL 1.25 MG/.5ML
1.25 SOLUTION, CONCENTRATE RESPIRATORY (INHALATION) EVERY 6 HOURS PRN
Qty: 1 EACH | Refills: 0
Start: 2017-08-29 | End: 2017-11-20

## 2017-08-29 RX ORDER — POLYETHYLENE GLYCOL 3350 17 G/17G
17 POWDER, FOR SOLUTION ORAL DAILY PRN
Qty: 14 EACH | Refills: 0
Start: 2017-08-29

## 2017-08-29 RX ORDER — ACETAMINOPHEN 325 MG/1
650 TABLET ORAL EVERY 6 HOURS PRN
Qty: 30 TABLET | Refills: 0
Start: 2017-08-29

## 2017-08-29 RX ORDER — LANOLIN ALCOHOL/MO/W.PET/CERES
3 CREAM (GRAM) TOPICAL
Refills: 0
Start: 2017-08-29

## 2017-08-29 RX ORDER — GUAIFENESIN 600 MG
600 TABLET, EXTENDED RELEASE 12 HR ORAL EVERY 12 HOURS SCHEDULED
Refills: 0 | Status: ON HOLD
Start: 2017-08-29 | End: 2017-11-20 | Stop reason: ALTCHOICE

## 2017-08-29 RX ORDER — ZOLPIDEM TARTRATE 5 MG/1
5 TABLET ORAL
Qty: 30 TABLET | Refills: 0 | Status: ON HOLD
Start: 2017-08-29 | End: 2017-11-20 | Stop reason: ALTCHOICE

## 2017-08-29 RX ORDER — SODIUM CHLORIDE FOR INHALATION 0.9 %
3 VIAL, NEBULIZER (ML) INHALATION EVERY 6 HOURS PRN
Qty: 90 ML | Refills: 0
Start: 2017-08-29 | End: 2017-12-13

## 2017-08-29 RX ORDER — MIRTAZAPINE 30 MG/1
30 TABLET, FILM COATED ORAL
Refills: 0 | Status: ON HOLD
Start: 2017-08-29 | End: 2017-11-20 | Stop reason: ALTCHOICE

## 2017-08-29 RX ADMIN — LEVALBUTEROL HYDROCHLORIDE 1.25 MG: 1.25 SOLUTION, CONCENTRATE RESPIRATORY (INHALATION) at 11:31

## 2017-08-29 RX ADMIN — GUAIFENESIN 600 MG: 600 TABLET, EXTENDED RELEASE ORAL at 10:00

## 2017-08-29 RX ADMIN — ENOXAPARIN SODIUM 40 MG: 40 INJECTION SUBCUTANEOUS at 09:00

## 2017-08-29 RX ADMIN — PANTOPRAZOLE SODIUM 40 MG: 40 TABLET, DELAYED RELEASE ORAL at 05:26

## 2017-08-29 RX ADMIN — ISODIUM CHLORIDE 3 ML: 0.03 SOLUTION RESPIRATORY (INHALATION) at 11:31

## 2017-08-29 RX ADMIN — ACETAMINOPHEN 650 MG: 325 TABLET, FILM COATED ORAL at 01:51

## 2017-09-06 ENCOUNTER — TRANSCRIBE ORDERS (OUTPATIENT)
Dept: ADMINISTRATIVE | Facility: HOSPITAL | Age: 82
End: 2017-09-06

## 2017-09-06 DIAGNOSIS — R63.4 LOSS OF WEIGHT: ICD-10-CM

## 2017-09-06 DIAGNOSIS — C32.9 CARCINOMA LARYNX (HCC): Primary | ICD-10-CM

## 2017-09-08 ENCOUNTER — HOSPITAL ENCOUNTER (OUTPATIENT)
Dept: RADIOLOGY | Facility: HOSPITAL | Age: 82
Discharge: HOME | End: 2017-09-08
Attending: OTOLARYNGOLOGY
Payer: COMMERCIAL

## 2017-09-08 DIAGNOSIS — C32.9 CARCINOMA LARYNX (HCC): ICD-10-CM

## 2017-09-08 PROCEDURE — 71260 CT THORAX DX C+: CPT

## 2017-09-08 RX ADMIN — IODIXANOL 85 ML: 320 INJECTION, SOLUTION INTRAVASCULAR at 14:44

## 2017-09-14 ENCOUNTER — GENERIC CONVERSION - ENCOUNTER (OUTPATIENT)
Dept: OTHER | Facility: OTHER | Age: 82
End: 2017-09-14

## 2017-09-19 ENCOUNTER — APPOINTMENT (OUTPATIENT)
Dept: LAB | Facility: CLINIC | Age: 82
End: 2017-09-19
Payer: COMMERCIAL

## 2017-09-19 ENCOUNTER — GENERIC CONVERSION - ENCOUNTER (OUTPATIENT)
Dept: OTHER | Facility: OTHER | Age: 82
End: 2017-09-19

## 2017-09-19 DIAGNOSIS — R63.4 LOSS OF WEIGHT: ICD-10-CM

## 2017-09-19 LAB
ALBUMIN SERPL BCP-MCNC: 2.2 G/DL (ref 3.5–5)
ALP SERPL-CCNC: 68 U/L (ref 46–116)
ALT SERPL W P-5'-P-CCNC: 36 U/L (ref 12–78)
ANION GAP SERPL CALCULATED.3IONS-SCNC: 6 MMOL/L (ref 4–13)
AST SERPL W P-5'-P-CCNC: 22 U/L (ref 5–45)
BILIRUB SERPL-MCNC: 0.46 MG/DL (ref 0.2–1)
BUN SERPL-MCNC: 19 MG/DL (ref 5–25)
CALCIUM SERPL-MCNC: 8.8 MG/DL (ref 8.3–10.1)
CHLORIDE SERPL-SCNC: 103 MMOL/L (ref 100–108)
CO2 SERPL-SCNC: 30 MMOL/L (ref 21–32)
CREAT SERPL-MCNC: 0.85 MG/DL (ref 0.6–1.3)
GFR SERPL CREATININE-BSD FRML MDRD: 82 ML/MIN/1.73SQ M
GLUCOSE P FAST SERPL-MCNC: 108 MG/DL (ref 65–99)
POTASSIUM SERPL-SCNC: 3.7 MMOL/L (ref 3.5–5.3)
PROT SERPL-MCNC: 6.5 G/DL (ref 6.4–8.2)
SODIUM SERPL-SCNC: 139 MMOL/L (ref 136–145)

## 2017-09-19 PROCEDURE — 80053 COMPREHEN METABOLIC PANEL: CPT

## 2017-09-19 PROCEDURE — 36415 COLL VENOUS BLD VENIPUNCTURE: CPT

## 2017-10-02 ENCOUNTER — APPOINTMENT (OUTPATIENT)
Dept: LAB | Age: 82
End: 2017-10-02
Payer: COMMERCIAL

## 2017-10-02 ENCOUNTER — TRANSCRIBE ORDERS (OUTPATIENT)
Dept: ADMINISTRATIVE | Age: 82
End: 2017-10-02

## 2017-10-02 DIAGNOSIS — C32.9 CARCINOMA LARYNX (HCC): Primary | ICD-10-CM

## 2017-10-02 DIAGNOSIS — C32.9 CARCINOMA LARYNX (HCC): ICD-10-CM

## 2017-10-02 LAB
T4 FREE SERPL-MCNC: 1.26 NG/DL (ref 0.76–1.46)
TSH SERPL DL<=0.05 MIU/L-ACNC: 1.02 UIU/ML (ref 0.36–3.74)

## 2017-10-02 PROCEDURE — 84439 ASSAY OF FREE THYROXINE: CPT

## 2017-10-02 PROCEDURE — 84443 ASSAY THYROID STIM HORMONE: CPT

## 2017-10-02 PROCEDURE — 36415 COLL VENOUS BLD VENIPUNCTURE: CPT

## 2017-10-04 ENCOUNTER — TRANSCRIBE ORDERS (OUTPATIENT)
Dept: ADMINISTRATIVE | Facility: HOSPITAL | Age: 82
End: 2017-10-04

## 2017-10-04 DIAGNOSIS — C32.9 CARCINOMA LARYNX (HCC): Primary | ICD-10-CM

## 2017-10-13 ENCOUNTER — HOSPITAL ENCOUNTER (OUTPATIENT)
Dept: RADIOLOGY | Facility: HOSPITAL | Age: 82
Discharge: HOME | End: 2017-10-13
Payer: COMMERCIAL

## 2017-10-13 ENCOUNTER — GENERIC CONVERSION - ENCOUNTER (OUTPATIENT)
Dept: OTHER | Facility: OTHER | Age: 82
End: 2017-10-13

## 2017-10-13 DIAGNOSIS — C32.9 CARCINOMA LARYNX (HCC): ICD-10-CM

## 2017-10-13 PROCEDURE — 70491 CT SOFT TISSUE NECK W/DYE: CPT

## 2017-10-13 RX ADMIN — IOHEXOL 85 ML: 350 INJECTION, SOLUTION INTRAVENOUS at 14:03

## 2017-10-16 DIAGNOSIS — C32.9 MALIGNANT NEOPLASM OF LARYNX (HCC): ICD-10-CM

## 2017-11-19 ENCOUNTER — APPOINTMENT (EMERGENCY)
Dept: RADIOLOGY | Facility: HOSPITAL | Age: 82
DRG: 853 | End: 2017-11-19
Payer: COMMERCIAL

## 2017-11-19 ENCOUNTER — HOSPITAL ENCOUNTER (INPATIENT)
Facility: HOSPITAL | Age: 82
LOS: 8 days | Discharge: NURSING, REHAB OR LONG-TERM CARE | DRG: 853 | End: 2017-11-27
Attending: EMERGENCY MEDICINE | Admitting: INTERNAL MEDICINE
Payer: COMMERCIAL

## 2017-11-19 DIAGNOSIS — E43 SEVERE PROTEIN-CALORIE MALNUTRITION (HCC): Primary | ICD-10-CM

## 2017-11-19 DIAGNOSIS — F32.A DEPRESSION: ICD-10-CM

## 2017-11-19 DIAGNOSIS — A41.9 SEPSIS (HCC): ICD-10-CM

## 2017-11-19 DIAGNOSIS — L89.154 SACRAL DECUBITUS ULCER, STAGE IV (HCC): ICD-10-CM

## 2017-11-19 DIAGNOSIS — L89.154 DECUBITUS ULCER OF SACRAL REGION, STAGE 4 (HCC): ICD-10-CM

## 2017-11-19 DIAGNOSIS — N39.0 UTI (URINARY TRACT INFECTION): ICD-10-CM

## 2017-11-19 LAB
ALBUMIN SERPL BCP-MCNC: 2 G/DL (ref 3.5–5)
ALP SERPL-CCNC: 67 U/L (ref 46–116)
ALT SERPL W P-5'-P-CCNC: 36 U/L (ref 12–78)
ANION GAP SERPL CALCULATED.3IONS-SCNC: 5 MMOL/L (ref 4–13)
APTT PPP: 34 SECONDS (ref 23–35)
AST SERPL W P-5'-P-CCNC: 35 U/L (ref 5–45)
BACTERIA UR QL AUTO: ABNORMAL /HPF
BILIRUB SERPL-MCNC: 0.51 MG/DL (ref 0.2–1)
BILIRUB UR QL STRIP: NEGATIVE
BUN SERPL-MCNC: 15 MG/DL (ref 5–25)
CALCIUM SERPL-MCNC: 8 MG/DL (ref 8.3–10.1)
CHLORIDE SERPL-SCNC: 100 MMOL/L (ref 100–108)
CLARITY UR: CLEAR
CO2 SERPL-SCNC: 31 MMOL/L (ref 21–32)
COLOR UR: YELLOW
CREAT SERPL-MCNC: 0.74 MG/DL (ref 0.6–1.3)
GFR SERPL CREATININE-BSD FRML MDRD: 87 ML/MIN/1.73SQ M
GLUCOSE SERPL-MCNC: 105 MG/DL (ref 65–140)
GLUCOSE UR STRIP-MCNC: NEGATIVE MG/DL
HGB UR QL STRIP.AUTO: NEGATIVE
HYALINE CASTS #/AREA URNS LPF: ABNORMAL /LPF
INR PPP: 1.16 (ref 0.86–1.16)
KETONES UR STRIP-MCNC: NEGATIVE MG/DL
LACTATE SERPL-SCNC: 1.4 MMOL/L (ref 0.5–2)
LEUKOCYTE ESTERASE UR QL STRIP: ABNORMAL
NITRITE UR QL STRIP: NEGATIVE
NON-SQ EPI CELLS URNS QL MICRO: ABNORMAL /HPF
PH UR STRIP.AUTO: 8.5 [PH] (ref 4.5–8)
POTASSIUM SERPL-SCNC: 4 MMOL/L (ref 3.5–5.3)
PROT SERPL-MCNC: 5.7 G/DL (ref 6.4–8.2)
PROT UR STRIP-MCNC: NEGATIVE MG/DL
PROTHROMBIN TIME: 14.9 SECONDS (ref 12.1–14.4)
RBC #/AREA URNS AUTO: ABNORMAL /HPF
SODIUM SERPL-SCNC: 136 MMOL/L (ref 136–145)
SP GR UR STRIP.AUTO: 1.01 (ref 1–1.03)
TROPONIN I SERPL-MCNC: <0.02 NG/ML
UROBILINOGEN UR QL STRIP.AUTO: 1 E.U./DL
WBC #/AREA URNS AUTO: ABNORMAL /HPF

## 2017-11-19 PROCEDURE — 85007 BL SMEAR W/DIFF WBC COUNT: CPT | Performed by: EMERGENCY MEDICINE

## 2017-11-19 PROCEDURE — 36415 COLL VENOUS BLD VENIPUNCTURE: CPT

## 2017-11-19 PROCEDURE — 93005 ELECTROCARDIOGRAM TRACING: CPT | Performed by: EMERGENCY MEDICINE

## 2017-11-19 PROCEDURE — 83605 ASSAY OF LACTIC ACID: CPT | Performed by: EMERGENCY MEDICINE

## 2017-11-19 PROCEDURE — 84484 ASSAY OF TROPONIN QUANT: CPT | Performed by: EMERGENCY MEDICINE

## 2017-11-19 PROCEDURE — 85730 THROMBOPLASTIN TIME PARTIAL: CPT | Performed by: EMERGENCY MEDICINE

## 2017-11-19 PROCEDURE — 80053 COMPREHEN METABOLIC PANEL: CPT | Performed by: EMERGENCY MEDICINE

## 2017-11-19 PROCEDURE — 85027 COMPLETE CBC AUTOMATED: CPT | Performed by: EMERGENCY MEDICINE

## 2017-11-19 PROCEDURE — 81001 URINALYSIS AUTO W/SCOPE: CPT | Performed by: EMERGENCY MEDICINE

## 2017-11-19 PROCEDURE — 85610 PROTHROMBIN TIME: CPT | Performed by: EMERGENCY MEDICINE

## 2017-11-19 PROCEDURE — 87040 BLOOD CULTURE FOR BACTERIA: CPT | Performed by: EMERGENCY MEDICINE

## 2017-11-19 PROCEDURE — 71010 HB CHEST X-RAY 1 VIEW FRONTAL: CPT

## 2017-11-19 PROCEDURE — 96360 HYDRATION IV INFUSION INIT: CPT

## 2017-11-19 RX ORDER — GUARN/MA-HUANG/P.GIN/S.GINSENG
1 TABLET ORAL DAILY
COMMUNITY

## 2017-11-19 RX ORDER — DOXAZOSIN 2 MG/1
2 TABLET ORAL
COMMUNITY
End: 2018-02-02 | Stop reason: HOSPADM

## 2017-11-19 RX ORDER — OMEPRAZOLE 40 MG/1
40 CAPSULE, DELAYED RELEASE ORAL DAILY
COMMUNITY

## 2017-11-19 RX ORDER — LISINOPRIL 2.5 MG/1
2.5 TABLET ORAL DAILY
COMMUNITY
End: 2017-12-13

## 2017-11-19 RX ORDER — OXYCODONE HYDROCHLORIDE 5 MG/1
5 CAPSULE ORAL EVERY 4 HOURS PRN
Status: ON HOLD | COMMUNITY
End: 2018-02-02

## 2017-11-19 RX ADMIN — SODIUM CHLORIDE 1000 ML: 0.9 INJECTION, SOLUTION INTRAVENOUS at 21:59

## 2017-11-19 RX ADMIN — CEFEPIME 2000 MG: 2 INJECTION, POWDER, FOR SOLUTION INTRAMUSCULAR; INTRAVENOUS at 23:53

## 2017-11-20 PROBLEM — J18.9 HCAP (HEALTHCARE-ASSOCIATED PNEUMONIA): Status: ACTIVE | Noted: 2017-11-20

## 2017-11-20 PROBLEM — F11.20 CONTINUOUS OPIOID DEPENDENCE (HCC): Status: ACTIVE | Noted: 2017-11-20

## 2017-11-20 PROBLEM — D64.9 ANEMIA: Status: ACTIVE | Noted: 2017-11-20

## 2017-11-20 PROBLEM — L89.154 DECUBITUS ULCER OF SACRAL REGION, STAGE 4 (HCC): Status: ACTIVE | Noted: 2017-11-20

## 2017-11-20 LAB
ALBUMIN SERPL BCP-MCNC: 1.9 G/DL (ref 3.5–5)
ALP SERPL-CCNC: 66 U/L (ref 46–116)
ALT SERPL W P-5'-P-CCNC: 35 U/L (ref 12–78)
ANION GAP SERPL CALCULATED.3IONS-SCNC: 5 MMOL/L (ref 4–13)
ANISOCYTOSIS BLD QL SMEAR: PRESENT
AST SERPL W P-5'-P-CCNC: 65 U/L (ref 5–45)
ATRIAL RATE: 100 BPM
BACTERIA UR QL AUTO: NORMAL /HPF
BASOPHILS # BLD MANUAL: 0 THOUSAND/UL (ref 0–0.1)
BASOPHILS NFR MAR MANUAL: 0 % (ref 0–1)
BILIRUB SERPL-MCNC: 0.49 MG/DL (ref 0.2–1)
BILIRUB UR QL STRIP: NEGATIVE
BUN SERPL-MCNC: 13 MG/DL (ref 5–25)
CALCIUM SERPL-MCNC: 8 MG/DL (ref 8.3–10.1)
CHLORIDE SERPL-SCNC: 103 MMOL/L (ref 100–108)
CLARITY UR: CLEAR
CO2 SERPL-SCNC: 30 MMOL/L (ref 21–32)
COLOR UR: YELLOW
CREAT SERPL-MCNC: 0.68 MG/DL (ref 0.6–1.3)
EOSINOPHIL # BLD MANUAL: 0 THOUSAND/UL (ref 0–0.4)
EOSINOPHIL NFR BLD MANUAL: 0 % (ref 0–6)
ERYTHROCYTE [DISTWIDTH] IN BLOOD BY AUTOMATED COUNT: 16.2 % (ref 11.6–15.1)
ERYTHROCYTE [DISTWIDTH] IN BLOOD BY AUTOMATED COUNT: 16.2 % (ref 11.6–15.1)
GFR SERPL CREATININE-BSD FRML MDRD: 90 ML/MIN/1.73SQ M
GLUCOSE SERPL-MCNC: 95 MG/DL (ref 65–140)
GLUCOSE UR STRIP-MCNC: NEGATIVE MG/DL
HCT VFR BLD AUTO: 29.1 % (ref 36.5–49.3)
HCT VFR BLD AUTO: 29.9 % (ref 36.5–49.3)
HGB BLD-MCNC: 9.4 G/DL (ref 12–17)
HGB BLD-MCNC: 9.7 G/DL (ref 12–17)
HGB UR QL STRIP.AUTO: ABNORMAL
HYALINE CASTS #/AREA URNS LPF: NORMAL /LPF
KETONES UR STRIP-MCNC: NEGATIVE MG/DL
L PNEUMO1 AG UR QL IA.RAPID: NEGATIVE
LEUKOCYTE ESTERASE UR QL STRIP: NEGATIVE
LYMPHOCYTES # BLD AUTO: 0.13 THOUSAND/UL (ref 0.6–4.47)
LYMPHOCYTES # BLD AUTO: 1 % (ref 14–44)
MAGNESIUM SERPL-MCNC: 2.1 MG/DL (ref 1.6–2.6)
MCH RBC QN AUTO: 31.5 PG (ref 26.8–34.3)
MCH RBC QN AUTO: 31.8 PG (ref 26.8–34.3)
MCHC RBC AUTO-ENTMCNC: 32.3 G/DL (ref 31.4–37.4)
MCHC RBC AUTO-ENTMCNC: 32.4 G/DL (ref 31.4–37.4)
MCV RBC AUTO: 98 FL (ref 82–98)
MCV RBC AUTO: 98 FL (ref 82–98)
MONOCYTES # BLD AUTO: 0.39 THOUSAND/UL (ref 0–1.22)
MONOCYTES NFR BLD: 3 % (ref 4–12)
NEUTROPHILS # BLD MANUAL: 12.6 THOUSAND/UL (ref 1.85–7.62)
NEUTS SEG NFR BLD AUTO: 96 % (ref 43–75)
NITRITE UR QL STRIP: NEGATIVE
NON-SQ EPI CELLS URNS QL MICRO: NORMAL /HPF
NRBC BLD AUTO-RTO: 0 /100 WBCS
P AXIS: 61 DEGREES
PH UR STRIP.AUTO: 8 [PH] (ref 4.5–8)
PHOSPHATE SERPL-MCNC: 2.7 MG/DL (ref 2.3–4.1)
PLATELET # BLD AUTO: 252 THOUSANDS/UL (ref 149–390)
PLATELET # BLD AUTO: 257 THOUSANDS/UL (ref 149–390)
PLATELET BLD QL SMEAR: ADEQUATE
PMV BLD AUTO: 8.4 FL (ref 8.9–12.7)
PMV BLD AUTO: 8.5 FL (ref 8.9–12.7)
POTASSIUM SERPL-SCNC: 4 MMOL/L (ref 3.5–5.3)
PR INTERVAL: 134 MS
PROT SERPL-MCNC: 5.7 G/DL (ref 6.4–8.2)
PROT UR STRIP-MCNC: NEGATIVE MG/DL
QRS AXIS: 55 DEGREES
QRSD INTERVAL: 68 MS
QT INTERVAL: 330 MS
QTC INTERVAL: 425 MS
RBC # BLD AUTO: 2.98 MILLION/UL (ref 3.88–5.62)
RBC # BLD AUTO: 3.05 MILLION/UL (ref 3.88–5.62)
RBC #/AREA URNS AUTO: NORMAL /HPF
RBC MORPH BLD: PRESENT
S PNEUM AG UR QL: NEGATIVE
SODIUM SERPL-SCNC: 138 MMOL/L (ref 136–145)
SP GR UR STRIP.AUTO: 1.01 (ref 1–1.03)
T WAVE AXIS: 62 DEGREES
UROBILINOGEN UR QL STRIP.AUTO: 0.2 E.U./DL
VENTRICULAR RATE: 100 BPM
WBC # BLD AUTO: 12.86 THOUSAND/UL (ref 4.31–10.16)
WBC # BLD AUTO: 13.13 THOUSAND/UL (ref 4.31–10.16)
WBC #/AREA URNS AUTO: NORMAL /HPF

## 2017-11-20 PROCEDURE — 83735 ASSAY OF MAGNESIUM: CPT | Performed by: HOSPITALIST

## 2017-11-20 PROCEDURE — 87070 CULTURE OTHR SPECIMN AEROBIC: CPT | Performed by: HOSPITALIST

## 2017-11-20 PROCEDURE — 94760 N-INVAS EAR/PLS OXIMETRY 1: CPT

## 2017-11-20 PROCEDURE — 85027 COMPLETE CBC AUTOMATED: CPT | Performed by: HOSPITALIST

## 2017-11-20 PROCEDURE — 87186 SC STD MICRODIL/AGAR DIL: CPT | Performed by: HOSPITALIST

## 2017-11-20 PROCEDURE — 80053 COMPREHEN METABOLIC PANEL: CPT | Performed by: HOSPITALIST

## 2017-11-20 PROCEDURE — 92610 EVALUATE SWALLOWING FUNCTION: CPT

## 2017-11-20 PROCEDURE — 87081 CULTURE SCREEN ONLY: CPT | Performed by: HOSPITALIST

## 2017-11-20 PROCEDURE — 87147 CULTURE TYPE IMMUNOLOGIC: CPT | Performed by: HOSPITALIST

## 2017-11-20 PROCEDURE — 84100 ASSAY OF PHOSPHORUS: CPT | Performed by: HOSPITALIST

## 2017-11-20 PROCEDURE — 81001 URINALYSIS AUTO W/SCOPE: CPT | Performed by: HOSPITALIST

## 2017-11-20 PROCEDURE — 87205 SMEAR GRAM STAIN: CPT | Performed by: HOSPITALIST

## 2017-11-20 PROCEDURE — 87449 NOS EACH ORGANISM AG IA: CPT | Performed by: PHYSICIAN ASSISTANT

## 2017-11-20 PROCEDURE — 99285 EMERGENCY DEPT VISIT HI MDM: CPT

## 2017-11-20 RX ORDER — POLYETHYLENE GLYCOL 3350 17 G/17G
17 POWDER, FOR SOLUTION ORAL DAILY PRN
Status: DISCONTINUED | OUTPATIENT
Start: 2017-11-20 | End: 2017-11-26

## 2017-11-20 RX ORDER — FLUTICASONE PROPIONATE 110 UG/1
2 AEROSOL, METERED RESPIRATORY (INHALATION)
Status: DISCONTINUED | OUTPATIENT
Start: 2017-11-20 | End: 2017-11-27 | Stop reason: HOSPADM

## 2017-11-20 RX ORDER — SODIUM CHLORIDE FOR INHALATION 0.9 %
VIAL, NEBULIZER (ML) INHALATION
Status: DISCONTINUED
Start: 2017-11-20 | End: 2017-11-20 | Stop reason: WASHOUT

## 2017-11-20 RX ORDER — DOXAZOSIN 2 MG/1
2 TABLET ORAL
Status: DISCONTINUED | OUTPATIENT
Start: 2017-11-20 | End: 2017-11-20

## 2017-11-20 RX ORDER — ZOLPIDEM TARTRATE 5 MG/1
5 TABLET ORAL
Status: DISCONTINUED | OUTPATIENT
Start: 2017-11-20 | End: 2017-11-20

## 2017-11-20 RX ORDER — LANOLIN ALCOHOL/MO/W.PET/CERES
3 CREAM (GRAM) TOPICAL
Status: DISCONTINUED | OUTPATIENT
Start: 2017-11-20 | End: 2017-11-27 | Stop reason: HOSPADM

## 2017-11-20 RX ORDER — OXYCODONE HYDROCHLORIDE 5 MG/1
5 TABLET ORAL EVERY 6 HOURS PRN
Status: DISCONTINUED | OUTPATIENT
Start: 2017-11-20 | End: 2017-11-20

## 2017-11-20 RX ORDER — SODIUM CHLORIDE 9 MG/ML
100 INJECTION, SOLUTION INTRAVENOUS CONTINUOUS
Status: DISCONTINUED | OUTPATIENT
Start: 2017-11-20 | End: 2017-11-22

## 2017-11-20 RX ORDER — ACETAMINOPHEN 325 MG/1
650 TABLET ORAL EVERY 6 HOURS PRN
Status: DISCONTINUED | OUTPATIENT
Start: 2017-11-20 | End: 2017-11-20 | Stop reason: SDUPTHER

## 2017-11-20 RX ORDER — ACETAMINOPHEN 325 MG/1
975 TABLET ORAL EVERY 8 HOURS SCHEDULED
Status: DISCONTINUED | OUTPATIENT
Start: 2017-11-20 | End: 2017-11-27 | Stop reason: HOSPADM

## 2017-11-20 RX ORDER — MIRTAZAPINE 15 MG/1
15 TABLET, FILM COATED ORAL
Status: DISCONTINUED | OUTPATIENT
Start: 2017-11-20 | End: 2017-11-27 | Stop reason: HOSPADM

## 2017-11-20 RX ORDER — SODIUM CHLORIDE FOR INHALATION 0.9 %
3 VIAL, NEBULIZER (ML) INHALATION EVERY 6 HOURS PRN
Status: DISCONTINUED | OUTPATIENT
Start: 2017-11-20 | End: 2017-11-20

## 2017-11-20 RX ORDER — FLUTICASONE PROPIONATE 50 MCG
1 SPRAY, SUSPENSION (ML) NASAL 2 TIMES DAILY
Status: DISCONTINUED | OUTPATIENT
Start: 2017-11-20 | End: 2017-11-27 | Stop reason: HOSPADM

## 2017-11-20 RX ORDER — MIRTAZAPINE 15 MG/1
30 TABLET, FILM COATED ORAL
Status: DISCONTINUED | OUTPATIENT
Start: 2017-11-20 | End: 2017-11-20

## 2017-11-20 RX ORDER — LISINOPRIL 2.5 MG/1
2.5 TABLET ORAL DAILY
Status: DISCONTINUED | OUTPATIENT
Start: 2017-11-20 | End: 2017-11-20

## 2017-11-20 RX ORDER — B-COMPLEX WITH VITAMIN C
1 TABLET ORAL
Status: DISCONTINUED | OUTPATIENT
Start: 2017-11-20 | End: 2017-11-27 | Stop reason: HOSPADM

## 2017-11-20 RX ORDER — IPRATROPIUM BROMIDE AND ALBUTEROL SULFATE 2.5; .5 MG/3ML; MG/3ML
3 SOLUTION RESPIRATORY (INHALATION)
Status: DISCONTINUED | OUTPATIENT
Start: 2017-11-20 | End: 2017-11-20

## 2017-11-20 RX ORDER — AMOXICILLIN 250 MG
1 CAPSULE ORAL
Status: DISCONTINUED | OUTPATIENT
Start: 2017-11-20 | End: 2017-11-23

## 2017-11-20 RX ORDER — PANTOPRAZOLE SODIUM 20 MG/1
20 TABLET, DELAYED RELEASE ORAL
Status: DISCONTINUED | OUTPATIENT
Start: 2017-11-20 | End: 2017-11-20 | Stop reason: SDUPTHER

## 2017-11-20 RX ORDER — ALBUTEROL SULFATE 90 UG/1
2 AEROSOL, METERED RESPIRATORY (INHALATION) EVERY 4 HOURS PRN
Status: DISCONTINUED | OUTPATIENT
Start: 2017-11-20 | End: 2017-11-27 | Stop reason: HOSPADM

## 2017-11-20 RX ORDER — HEPARIN SODIUM 5000 [USP'U]/ML
5000 INJECTION, SOLUTION INTRAVENOUS; SUBCUTANEOUS EVERY 12 HOURS SCHEDULED
Status: DISCONTINUED | OUTPATIENT
Start: 2017-11-20 | End: 2017-11-27 | Stop reason: HOSPADM

## 2017-11-20 RX ORDER — BUDESONIDE AND FORMOTEROL FUMARATE DIHYDRATE 160; 4.5 UG/1; UG/1
2 AEROSOL RESPIRATORY (INHALATION)
Status: DISCONTINUED | OUTPATIENT
Start: 2017-11-20 | End: 2017-11-27 | Stop reason: HOSPADM

## 2017-11-20 RX ORDER — PANTOPRAZOLE SODIUM 40 MG/1
40 TABLET, DELAYED RELEASE ORAL
Status: DISCONTINUED | OUTPATIENT
Start: 2017-11-20 | End: 2017-11-27 | Stop reason: HOSPADM

## 2017-11-20 RX ORDER — OXYCODONE HYDROCHLORIDE 10 MG/1
10 TABLET ORAL EVERY 4 HOURS PRN
Status: DISCONTINUED | OUTPATIENT
Start: 2017-11-20 | End: 2017-11-21

## 2017-11-20 RX ORDER — ACETAMINOPHEN 325 MG/1
650 TABLET ORAL EVERY 6 HOURS PRN
Status: DISCONTINUED | OUTPATIENT
Start: 2017-11-20 | End: 2017-11-20

## 2017-11-20 RX ORDER — GUAIFENESIN 600 MG
600 TABLET, EXTENDED RELEASE 12 HR ORAL EVERY 12 HOURS SCHEDULED
Status: DISCONTINUED | OUTPATIENT
Start: 2017-11-20 | End: 2017-11-27 | Stop reason: HOSPADM

## 2017-11-20 RX ADMIN — HEPARIN SODIUM 5000 UNITS: 5000 INJECTION, SOLUTION INTRAVENOUS; SUBCUTANEOUS at 02:06

## 2017-11-20 RX ADMIN — MIRTAZAPINE 15 MG: 15 TABLET, FILM COATED ORAL at 21:24

## 2017-11-20 RX ADMIN — ACETAMINOPHEN 975 MG: 325 TABLET ORAL at 13:19

## 2017-11-20 RX ADMIN — HEPARIN SODIUM 5000 UNITS: 5000 INJECTION, SOLUTION INTRAVENOUS; SUBCUTANEOUS at 09:30

## 2017-11-20 RX ADMIN — FLUTICASONE PROPIONATE 1 SPRAY: 50 SPRAY, METERED NASAL at 21:25

## 2017-11-20 RX ADMIN — OXYCODONE HYDROCHLORIDE 10 MG: 10 TABLET ORAL at 19:55

## 2017-11-20 RX ADMIN — HEPARIN SODIUM 5000 UNITS: 5000 INJECTION, SOLUTION INTRAVENOUS; SUBCUTANEOUS at 21:24

## 2017-11-20 RX ADMIN — OXYCODONE HYDROCHLORIDE 10 MG: 10 TABLET ORAL at 10:06

## 2017-11-20 RX ADMIN — Medication 1 TABLET: at 02:05

## 2017-11-20 RX ADMIN — COLLAGENASE SANTYL: 250 OINTMENT TOPICAL at 09:30

## 2017-11-20 RX ADMIN — CEFEPIME HYDROCHLORIDE 2000 MG: 2 INJECTION, POWDER, FOR SOLUTION INTRAVENOUS at 12:12

## 2017-11-20 RX ADMIN — GUAIFENESIN 600 MG: 600 TABLET, EXTENDED RELEASE ORAL at 21:24

## 2017-11-20 RX ADMIN — ACETAMINOPHEN 975 MG: 325 TABLET ORAL at 21:24

## 2017-11-20 RX ADMIN — MELATONIN TAB 3 MG 3 MG: 3 TAB at 02:05

## 2017-11-20 RX ADMIN — Medication 1 TABLET: at 21:24

## 2017-11-20 RX ADMIN — OXYCODONE HYDROCHLORIDE 5 MG: 5 TABLET ORAL at 02:06

## 2017-11-20 RX ADMIN — GUAIFENESIN 600 MG: 600 TABLET, EXTENDED RELEASE ORAL at 09:30

## 2017-11-20 RX ADMIN — SODIUM CHLORIDE 100 ML/HR: 0.9 INJECTION, SOLUTION INTRAVENOUS at 12:12

## 2017-11-20 RX ADMIN — SODIUM CHLORIDE 100 ML/HR: 0.9 INJECTION, SOLUTION INTRAVENOUS at 02:03

## 2017-11-20 RX ADMIN — PANTOPRAZOLE SODIUM 40 MG: 40 TABLET, DELAYED RELEASE ORAL at 09:30

## 2017-11-20 RX ADMIN — MELATONIN TAB 3 MG 3 MG: 3 TAB at 21:24

## 2017-11-20 RX ADMIN — CALCIUM CARBONATE 500 MG (1,250 MG)-VITAMIN D3 200 UNIT TABLET 1 TABLET: at 09:30

## 2017-11-20 NOTE — ASSESSMENT & PLAN NOTE
· Clearly patient with very deep sacral decubitus ulcer--will ask for ID and wound care to assess    Continue to turn and reposition frequently to off load weight  · Continue medication for pain

## 2017-11-20 NOTE — PLAN OF CARE
Problem: SLP ADULT - SWALLOWING, IMPAIRED  Goal: Initial SLP swallow eval performed  Outcome: Progressing

## 2017-11-20 NOTE — CONSULTS
[facility] - [id number] [facility] [address]  81 y o  male MRN: [id number]  Unit/Bed#: -01 Encounter: [id number]      Assessment/Plan     Assessment:  Patient Active Problem List   Diagnosis    Asthma    Laryngeal [facility] ([facility] )    Asthma    Depression    Laryngeal stenosis    [facility]    Decubitus ulcer of sacral region, stage 4 ([facility] )    HCAP (healthcare-associated pneumonia)    Continuous opioid dependence ([facility] )    Anemia       Plan:  Symptom management:  Patient's primary symptoms are depression and low appetite  We will start mirtazapine q h s  15 mg for appetite stimulation hopefully for some antidepressant effect  Patient has used some of these agents before for sleep but has refused to take them  I had a productive 1st conversation with patient's daughter regarding his symptoms and we will continue to revisit his condition daily  For pain we will continue to monitor patient's p r n  medication use, I expect that tomorrow we will make a recommendation on standing medications for Akhil Turk  Goals of care:  Initial goals of care discussion with daughter indicates that patient and daughter would like him to receive full treatment  They are hopeful that if his nutritional status would improve that he would make a full recovery  This will continue to be an ongoing conversation  History of Present Illness   Physician Requesting Consult: Kristyn Kang DO  Reason for Consult / Principal Problem:  Depression, poor appetite  Hx and PE limited by: The patient not awakening to talk with me at this point  HPI: Andreea Hayden  is a 81y o  year old male with stage IV laryngeal cancer who presents for pain, wound care, fever, and decreased oral intake  Patient was an active independent man until his diagnosis several months ago  Since that time he underwent chemo radiation as well as recent laryngectomy    Unfortunately patient was not willing to speak with me today, but I did contact his daughter  She tells me that since his diagnosis he has been depressed  He has had poor oral intake  He has not made much recovery with his appetite progression  He had been offered antidepressant as well as medications support his appetite previously but he had denied wanting to take them at that time  She is hopeful that during this hospital stay we can get him on such medication and ultimately get him on the path back toward wound healing and further treatment  Inpatient consult to Palliative Care  Date/Time: 11/20/2017 5:17 PM  Performed by: Urvashi Castrejon  Authorized by: Ciara Mills           Review of Systems   Unable to perform ROS: Other (patient not wishing to communicate with me at this time)       Historical Information   Past Medical History:   Diagnosis Date    Asthma     Hypertension     Larynx cancer [facility])      Past Surgical History:   Procedure Laterality Date    EGD AND COLONOSCOPY      TRACHEOSTOMY N/A 8/25/2017    Procedure: TRACHEOSTOMY (POSSIBLE AWAKE) , MICRO DIRECT LARYNGOSCOPY, Biopsy;  Surgeon: Nikko Martínez MD;  Location: [facility];  Service: ENT     Social History     Social History    Marital status: Single     Spouse name: N/A    Number of children: N/A    Years of education: N/A     Social History Main Topics    Smoking status: Former Smoker    Smokeless tobacco: None    Alcohol use No    Drug use: No    Sexual activity: Not Asked     Other Topics Concern    None     Social History Narrative    None     History reviewed  No pertinent family history      Meds/Allergies   all current active meds have been reviewed and current meds:   Current Facility-Administered Medications   Medication Dose Route Frequency    acetaminophen (TYLENOL) tablet 975 mg  975 mg Oral Q8H [address]    albuterol (PROVENTIL HFA,VENTOLIN HFA) inhaler 2 puff  2 puff Inhalation Q4H PRN    budesonide-formoterol (SYMBICORT) 160-4 5 mcg/act inhaler 2 puff  2 puff Inhalation BID    calcium carbonate-vitamin D (OSCAL-D) 500 mg-200 units per tablet 1 tablet  1 tablet Oral Daily With Breakfast    cefepime (MAXIPIME) 2,000 mg in dextrose 5 % 50 mL IVPB  2,000 mg Intravenous Q12H    collagenase (SANTYL) ointment   Topical Daily    fluticasone (FLONASE) 50 mcg/act nasal spray 1 spray  1 spray Each Nare BID    fluticasone (FLOVENT HFA) 110 MCG/ACT inhaler 2 puff  2 puff Inhalation BID    guaiFENesin (MUCINEX) 12 hr tablet 600 mg  600 mg Oral Q12H JAMES    heparin (porcine) subcutaneous injection 5,000 Units  5,000 Units Subcutaneous Q12H [address]    melatonin tablet 3 mg  3 mg Oral HS    mirtazapine (REMERON) tablet 15 mg  15 mg Oral HS    oxyCODONE (ROXICODONE) immediate release tablet 10 mg  10 mg Oral Q4H PRN    pantoprazole (PROTONIX) EC tablet 40 mg  40 mg Oral Early Morning    polyethylene glycol (MIRALAX) packet 17 g  17 g Oral Daily PRN    senna-docusate sodium (SENOKOT S) 8 6-50 mg per tablet 1 tablet  1 tablet Oral HS    sodium chloride 0 9 % infusion  100 mL/hr Intravenous Continuous           Allergies   Allergen Reactions    Aspirin     Cleocin [Clindamycin]        Objective     Physical Exam   Constitutional: No distress  HENT:   Significant facial swelling   Eyes:   closed   Neck:   trach   Cardiovascular: Normal rate and normal heart sounds  Pulmonary/Chest: Effort normal  No stridor  No respiratory distress  Abdominal: Soft  He exhibits no distension  Musculoskeletal: He exhibits edema  Neurological:   Asleep, refusing to communicate   Skin: Skin is warm  He is not diaphoretic  Lab Results:   I have personally reviewed pertinent labs  , CBC:   Lab Results   Component Value Date    WBC 12 86 (H) 11/20/2017    HGB 9 7 (L) 11/20/2017    HCT 29 9 (L) 11/20/2017    MCV 98 11/20/2017     11/20/2017    MCH 31 8 11/20/2017    MCHC 32 4 11/20/2017    RDW 16 2 (H) 11/20/2017    MPV 8 4 (L) 11/20/2017    NRBC 0 11/19/2017   , CMP:   Lab Results   Component Value Date     11/20/2017    K 4 0 11/20/2017     11/20/2017    CO2 30 11/20/2017    ANIONGAP 5 11/20/2017    BUN 13 11/20/2017    CREATININE 0 68 11/20/2017    GLUCOSE 95 11/20/2017    CALCIUM 8 0 (L) 11/20/2017    AST 65 (H) 11/20/2017    ALT 35 11/20/2017    ALKPHOS 66 11/20/2017    PROT 5 7 (L) 11/20/2017    ALBUMIN 1 9 (L) 11/20/2017    BILITOT 0 49 11/20/2017    EGFR 90 11/20/2017     Imaging Studies: I have personally reviewed pertinent reports  Chest x-ray suspicious for pneumonia  EKG, Pathology, and Other Studies: I have personally reviewed pertinent reports  Code Status: Level 1 - Full Code  Advance Directive and Living Will:     none  Power of : Yes artem Berry  POLST:   n/a    Counseling / Coordination of Care  Total floor / unit time spent today 30 minutes  Greater than 50% of total time was spent with the patient and / or family counseling and / or coordination of care  A description of the counseling / coordination of care:  Patient evaluation, medication review, initial goals discussion with patient's daughter, addition of new medication

## 2017-11-20 NOTE — ASSESSMENT & PLAN NOTE
· Continue IV cefepime, may need to broaden antibiotics to include vancomycin and Flagyl as well    · Will ask for speech therapy evaluation to assess for aspiration and also consider video barium swallow

## 2017-11-20 NOTE — ASSESSMENT & PLAN NOTE
· Status post total laryngectomy at [facility]  · Surgical site appears to be healing well

## 2017-11-20 NOTE — MALNUTRITION/BMI
This medical record reflects one or more clinical indicators suggestive of malnutrition     Malnutrition in the context of chronic illness/other severe protein/calorie malnutrition evidenced by 25% wt loss x 6 mo, <75% energy intake compared to estimated energy needs for >1 mo, muscle loss    chronic illness  other severe protein calorie malnutrition    BMI Findings:  19-24 9    Body mass index is 21 3 kg/m²  See Nutrition note dated 11/20/2017 for additional details  Completed nutrition assessment is viewable in the nutrition documentation

## 2017-11-20 NOTE — ED PROVIDER NOTES
History  Chief Complaint   Patient presents with    Fever - 75 years or older     Pt started with fever of 102 4 today at the [facility]  Pt is currently being treated for a UTI     Patient is a 81year old male presenting to the ER with a chief complaint of fever  One patient is coming from a local nursing home block  Patient had been treated for a urinary tract infection for the past 2 days  The patient had nitrate positive urine grew E coli in his urine  At this time I do not know what antibiotic he was put on  Nonetheless tonight patient's fever became as high as 102  Given this change patient was given 650 mg of Tylenol and was transported to the ER for further evaluation and treatment  A:  -Fever  -Tachycardia  P:  -Sepsis labs, abx, admission         History provided by:  Patient   used: No        Prior to Admission Medications   Prescriptions Last Dose Informant Patient Reported? Taking?    Menthol 5 4 MG LOZG   No No   Sig: Apply 1 lozenge to the mouth or throat every 2 (two) hours as needed (Sore throat)   Mometasone Furo-Formoterol Fum (DULERA) 200-5 MCG/ACT AERO   Yes No   Sig: Inhale 2 puffs 2 (two) times a day   acetaminophen (TYLENOL) 325 mg tablet   No No   Sig: Take 2 tablets by mouth every 6 (six) hours as needed for mild pain, headaches or fever   albuterol (PROVENTIL HFA) 90 mcg/act inhaler   Yes No   Sig: Inhale 2 puffs as needed   flunisolide (NASALIDE) 25 MCG/ACT (0 025%) SOLN   Yes No   Sig: into each nostril   guaiFENesin (MUCINEX) 600 mg 12 hr tablet   No No   Sig: Take 1 tablet by mouth every 12 (twelve) hours   levalbuterol (XOPENEX) 1 25 mg/0 5 mL nebulizer solution   No No   Sig: Take 0 5 mL by nebulization every 6 (six) hours as needed for wheezing   melatonin 3 mg   No No   Sig: Take 1 tablet by mouth daily at bedtime   mirtazapine (REMERON) 30 mg tablet   No No   Sig: Take 1 tablet by mouth daily at bedtime   mometasone (ASMANEX 14 METERED DOSES) 220 MCG/INH inhaler   Yes No   Sig: Inhale 1 puff daily   pantoprazole (PROTONIX) 40 mg tablet   No No   Sig: Take 1 tablet by mouth daily in the early morning   polyethylene glycol (MIRALAX) 17 g packet   No No   Sig: Take 17 g by mouth daily as needed (CONSTIPATION)   sodium chloride 0 9 % nebulizer solution   No No   Sig: Take 3 mL by nebulization every 6 (six) hours as needed for wheezing   zolpidem (AMBIEN) 5 mg tablet   No No   Sig: Take 1 tablet by mouth daily at bedtime as needed for sleep for up to 10 days      Facility-Administered Medications: None       Past Medical History:   Diagnosis Date    Asthma     Hypertension     Larynx cancer ([facility] [address] )        Past Surgical History:   Procedure Laterality Date    EGD AND COLONOSCOPY      TRACHEOSTOMY N/A 8/25/2017    Procedure: TRACHEOSTOMY (POSSIBLE AWAKE) , MICRO DIRECT LARYNGOSCOPY, Biopsy;  Surgeon: Bonifacio King MD;  Location: [facility];  Service: ENT       History reviewed  No pertinent family history  I have reviewed and agree with the history as documented  Social History   Substance Use Topics    Smoking status: Former Smoker    Smokeless tobacco: Not on file    Alcohol use No        Review of Systems   Constitutional: Positive for fever  Negative for activity change, appetite change, chills and fatigue  HENT: Negative  Eyes: Negative  Respiratory: Negative  Cardiovascular: Negative  Gastrointestinal: Negative for abdominal distention, abdominal pain, blood in stool, constipation, diarrhea, nausea and vomiting  Endocrine: Negative  Genitourinary: Negative for decreased urine volume, difficulty urinating, dysuria, enuresis, flank pain, frequency, hematuria, penile swelling, scrotal swelling, testicular pain and urgency  Musculoskeletal: Negative  Skin: Negative  Allergic/Immunologic: Negative  Neurological: Negative  Hematological: Negative  Psychiatric/Behavioral: Negative      All other systems reviewed and are negative  Physical Exam  ED Triage Vitals   Temperature Pulse Respirations Blood Pressure SpO2   11/19/17 2136 11/19/17 2132 11/19/17 2145 11/19/17 2145 11/19/17 2132   100 5 °F (38 1 °C) 101 15 106/58 92 %      Temp Source Heart Rate Source Patient Position - Orthostatic VS BP Location FiO2 (%)   11/19/17 2136 11/19/17 2132 11/19/17 2132 11/19/17 2132 --   Rectal Monitor Lying Right arm       Pain Score       --                  Orthostatic Vital Signs  Vitals:    11/19/17 2132 11/19/17 2145   BP:  106/58   Pulse: 101 100   Patient Position - Orthostatic VS: Lying        Physical Exam   Constitutional: He is oriented to person, place, and time  He appears well-developed and well-nourished  HENT:   Head: Normocephalic and atraumatic  Right Ear: External ear normal    Left Ear: External ear normal    Nose: Nose normal    Mouth/Throat: Oropharynx is clear and moist    Eyes: Conjunctivae and EOM are normal  Pupils are equal, round, and reactive to light  Neck: Normal range of motion  Neck supple  No JVD present  No tracheal deviation present  No thyromegaly present  Cardiovascular: Normal rate, regular rhythm, normal heart sounds and intact distal pulses  Exam reveals no gallop and no friction rub  No murmur heard  Pulmonary/Chest: Effort normal and breath sounds normal  No stridor  No respiratory distress  He has no wheezes  He has no rales  He exhibits no tenderness  Abdominal: Soft  Bowel sounds are normal  He exhibits no distension and no mass  There is no tenderness  There is no rebound and no guarding  No hernia  Musculoskeletal: Normal range of motion  He exhibits no edema, tenderness or deformity  Lymphadenopathy:     He has no cervical adenopathy  Neurological: He is alert and oriented to person, place, and time  He has normal reflexes  He displays normal reflexes  No cranial nerve deficit  He exhibits normal muscle tone  Coordination normal    Skin: Skin is warm  No rash noted  No erythema  No pallor  Psychiatric: He has a normal mood and affect  His behavior is normal  Judgment and thought content normal    Nursing note and vitals reviewed  ED Medications  Medications   cefepime (MAXIPIME) 2 g/50 mL dextrose IVPB (not administered)   sodium chloride 0 9 % bolus 1,000 mL (1,000 mL Intravenous New Bag 11/19/17 2159)       Diagnostic Studies  Results Reviewed     Procedure Component Value Units Date/Time    APTT [[id number]]  (Normal) Collected:  11/19/17 2143    Lab Status:  Final result Specimen:  Blood from Arm, Right Updated:  11/19/17 2249     PTT 34 seconds     Narrative:          Therapeutic Heparin Range = 60-90 seconds    Protime-INR [[id number]]  (Abnormal) Collected:  11/19/17 2143    Lab Status:  Final result Specimen:  Blood from Arm, Right Updated:  11/19/17 2249     Protime 14 9 (H) seconds      INR 1 16    Urine Microscopic [[id number]]  (Abnormal) Collected:  11/19/17 2157    Lab Status:  Final result Specimen:  Urine from Urine, Clean Catch Updated:  11/19/17 2225     RBC, UA None Seen /hpf      WBC, UA 2-4 (A) /hpf      Epithelial Cells None Seen /hpf      Bacteria, UA None Seen /hpf      Hyaline Casts, UA None Seen /lpf     Comprehensive metabolic panel [[id number]]  (Abnormal) Collected:  11/19/17 2143    Lab Status:  Final result Specimen:  Blood from Arm, Right Updated:  11/19/17 2224     Sodium 136 mmol/L      Potassium 4 0 mmol/L      Chloride 100 mmol/L      CO2 31 mmol/L      Anion Gap 5 mmol/L      BUN 15 mg/dL      Creatinine 0 74 mg/dL      Glucose 105 mg/dL      Calcium 8 0 (L) mg/dL      AST 35 U/L      ALT 36 U/L      Alkaline Phosphatase 67 U/L      Total Protein 5 7 (L) g/dL      Albumin 2 0 (L) g/dL      Total Bilirubin 0 51 mg/dL      eGFR 87 ml/min/1 73sq m     Narrative:         National Kidney Disease Education Program recommendations are as follows:  GFR calculation is accurate only with a steady state creatinine  Chronic Kidney disease less than 60 ml/min/1 73 sq  meters  Kidney failure less than 15 ml/min/1 73 sq  meters  Lactic Acid x2 [[id number]]  (Normal) Collected:  11/19/17 2143    Lab Status:  Final result Specimen:  Blood from Arm, Right Updated:  11/19/17 2223     LACTIC ACID 1 4 mmol/L     Narrative:         Result may be elevated if tourniquet was used during collection  UA w Reflex to Microscopic w Reflex to Culture [[id number]]  (Abnormal) Collected:  11/19/17 2157    Lab Status:  Final result Specimen:  Urine from Urine, Clean Catch Updated:  11/19/17 2219     Color, UA Yellow     Clarity, UA Clear     Specific Gravity, UA 1 012     pH, UA 8 5 (H)     Leukocytes, UA Trace (A)     Nitrite, UA Negative     Protein, UA Negative mg/dl      Glucose, UA Negative mg/dl      Ketones, UA Negative mg/dl      Urobilinogen, UA 1 0 E U /dl      Bilirubin, UA Negative     Blood, UA Negative    Troponin I [[id number]]  (Normal) Collected:  11/19/17 2143    Lab Status:  Final result Specimen:  Blood from Arm, Right Updated:  11/19/17 2216     Troponin I <0 02 ng/mL     Narrative:         [employer] Chemistry analyzer 99% cutoff is > 0 04 ng/mL in network labs    o cTnI 99% cutoff is useful only when applied to patients in the clinical setting of myocardial ischemia  o cTnI 99% cutoff should be interpreted in the context of clinical history, ECG findings and possibly cardiac imaging to establish correct diagnosis  o cTnI 99% cutoff may be suggestive but clearly not indicative of a coronary event without the clinical setting of myocardial ischemia      CBC and differential [[id number]]  (Abnormal) Collected:  11/19/17 2144    Lab Status:  Preliminary result Specimen:  Blood from Arm, Right Updated:  11/19/17 2204     WBC 13 13 (H) Thousand/uL      RBC 2 98 (L) Million/uL      Hemoglobin 9 4 (L) g/dL      Hematocrit 29 1 (L) %      MCV 98 fL      MCH 31 5 pg      MCHC 32 3 g/dL      RDW 16 2 (H) %      MPV 8 5 (L) fL      Platelets 205 Thousands/uL     Blood culture #2 [[id number]] Collected:  11/19/17 2143    Lab Status: In process Specimen:  Blood from Arm, Right Updated:  11/19/17 2154    Blood culture #1 [[id number]] Collected:  11/19/17 2147    Lab Status: In process Specimen:  Blood from Arm, Left Updated:  11/19/17 2154    Lactic Acid x2 [[id number]]     Lab Status:  No result Specimen:  Blood                  XR chest 1 view    (Results Pending)         Procedures  Procedures      Phone Consults  ED Phone Contact    ED Course  ED Course as of Nov 19 2321   Shelbie Rossi Nov 19, 2017 2205 WBC: (!) 13 13   2205 Hemoglobin: (!) 9 4   2228 LACTIC ACID: 1 4                         Initial Sepsis Screening     Row Name 11/19/17 2304 11/19/17 2150             Is the patient's history suggestive of a new or worsening infection? (!)  Yes (Proceed)  -DR Lianet Espinosa  Yes (Proceed)  -       Suspected source of infection pneumonia;urinary tract infection  - urinary tract infection  -DR       Are two or more of the following signs & symptoms of infection both present and new to the patient? (!)  Yes (Proceed)  -DR Yun Vauxhall       Indicate SIRS criteria Hyperthemia > 38 3C (100 9F); Leukocytosis (WBC > 90596 IJL)  - Tachycardia > 90 bpm;Hyperthemia > 38 3C (100 9F)  -       If the answer is yes to both questions, suspicion of sepsis is present  Jama Vauxhall         If severe sepsis is present AND tissue hypoperfusion perists in the hour after fluid resuscitation or lactate > 4, the patient meets criteria for SEPTIC SHOCK           Are any of the following organ dysfunction criteria present within 6 hours of suspected infection and SIRS criteria that are NOT considered to be chronic conditions?  (!)  Yes  -DR Lianet Espinosa  Yes  -       Organ dysfunction SBP < 90 mmHg  -DR Yun Vauxhall       Date of presentation of severe sepsis           Time of presentation of severe sepsis           Tissue hypoperfusion persists in the hour after crystalloid fluid administration, evidenced, by either:           Was hypotension present within one hour of the conclusion of crystalloid fluid administration? Antonio Moody       Date of presentation of septic shock           Time of presentation of septic shock             User Key  (r) = Recorded By, (t) = Taken By, (c) = Cosigned By    [address] Name Provider Type    DR Bennie Guzman DO Resident                  MDM  Number of Diagnoses or Management Options  Sepsis [facility]): new and requires workup  UTI (urinary tract infection): new and requires workup     Amount and/or Complexity of Data Reviewed  Clinical lab tests: ordered and reviewed  Tests in the radiology section of CPT®: ordered and reviewed  Tests in the medicine section of CPT®: reviewed and ordered  Decide to obtain previous medical records or to obtain history from someone other than the patient: yes  Independent visualization of images, tracings, or specimens: yes    Patient Progress  Patient progress: stable    CritCare Time    Disposition  Final diagnoses:   UTI (urinary tract infection)   Sepsis ([address] )     Time reflects when diagnosis was documented in both MDM as applicable and the Disposition within this note     Time User Action Codes Description Comment    11/19/2017 11:18 PM Yessenia Alexander Add [N39 0] UTI (urinary tract infection)     11/19/2017 11:18 PM Veronika Sy Add [A41 9] Sepsis [facility])       ED Disposition     ED Disposition Condition Comment    Admit  Case was discussed with Shazia Mills and the patient's admission status was agreed to be Admission Status: inpatient status to the service of Dr Shazia Mills    Follow-up Information    None       Patient's Medications   Discharge Prescriptions    No medications on file     No discharge procedures on file  ED Provider  Attending physically available and evaluated Laurie Ames I managed the patient along with the ED Attending      Electronically Signed by         Bennie Guzman DO  Resident  11/19/17 8563

## 2017-11-20 NOTE — ASSESSMENT & PLAN NOTE
· Continue p r n  oxycodone for pain    Consider changing to an around the clock dose if we are not able to adequately address his pain or if palliative medicine team feels that is the preferred method

## 2017-11-20 NOTE — PROGRESS NOTES
Rounded with JAMILAH Montemayor  Orders to follow for consults  Daughter at bedside discussing care plan with Phys

## 2017-11-20 NOTE — PROGRESS NOTES
Progress Note - Aliyah Dean  81 y o  male MRN: [id number]    Unit/Bed#: -01 Encounter: [id number]        * Sepsis [facility])   Assessment & Plan    · Patient with leukocytosis, fever, tachycardia, hypotension present on admission  · Recently hospitalized for 3 weeks at [facility] and then was [facility] [place]  Reportedly already started developing fevers at [facility] more than a week ago  Family tells me that patient was treated with antibiotics (not sure which one) for UTI and also to cover for bacteria cultured from the sacral ulcer recently  · Noted to have significant stage IV sacral ulcer--needs to be evaluated by Infectious Disease and by wound care nurse  Defer to ID regarding what additional imaging may be appropriate  · Chest x-ray shows right lower lobe pneumonia, qualifies as healthcare acquired pneumonia/possible gram-negative phu pneumonia, need to question possibility of aspiration  · Continue IV cefepime and IV fluids at this time  · Blood cultures pending        HCAP (healthcare-associated pneumonia)   Assessment & Plan    · Continue IV cefepime, may need to broaden antibiotics to include vancomycin and Flagyl as well  · Will ask for speech therapy evaluation to assess for aspiration and also consider video barium swallow        Decubitus ulcer of sacral region, stage 4 (HCC)   Assessment & Plan    · Clearly patient with very deep sacral decubitus ulcer--will ask for ID and wound care to assess  Continue to turn and reposition frequently to off load weight  · Continue medication for pain        Depression   Assessment & Plan    · Spoke with family at length, they have been struggling to get patient has situational depression adequately treated  · Will involve our palliative medicine team as per my discussion with them    · Am also concerned that he is underweight, albumin is low, will ask for nutrition evaluation but suspect he has protein calorie malnutrition overall Laryngeal cancer [facility])   Assessment & Plan    · Status post total laryngectomy at [facility]  · Surgical site appears to be healing well        Continuous opioid dependence ([address] )   Assessment & Plan    · Continue p r n  oxycodone for pain  Consider changing to an around the clock dose if we are not able to adequately address his pain or if palliative medicine team feels that is the preferred method        Anemia   Assessment & Plan    · Suspected anemia of chronic disease, will monitor          Pharmacologic: Heparin  Mechanical VTE Prophylaxis in Place: Yes    Patient Centered Rounds: I have performed bedside rounds with nursing staff today  Discussions with Specialists or Other Care Team Provider: palliative medicine    Education and Discussions with Family / Patient:  Spoke with patient's daughter in detail at bedside  All questions and concerns addressed  She left her phone number and would like to be updated    Time Spent for Care: 35 min  More than 50% of total time spent on counseling and coordination of care as described above  Current Length of Stay: 1 day(s)    Current Patient Status: Inpatient   Certification Statement: The patient will continue to require additional inpatient hospital stay due to sepsis    Discharge Plan / Estimated Discharge Date: not stable    Code Status: Level 1 - Full Code    Subjective:   Patient indicates that he has pain in his sacral decubitus ulcer by pointing when asked  Nursing has not reported any respiratory distress but did report to me earlier this morning that he was not being given the appropriate dose of oxycodone, which I immediately remedied remotely prior to evaluating him  I spoke with his daughter at bedside who was very helpful in providing information  She states that he had been very physically active and healthy and independent until about 6 months ago    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 he had chemo and radiation and then subsequently the total laryngectomy a [facility] where he remained for 3 weeks  After that he was discharged to [facility]   He was already noted to have increasing temperatures sometime around  and she states she was told that he had a urinary tract infection and also an infection in his sacral decubitus ulcer based on a wound culture  He was given antibiotics at that time but his temperature was getting worse at which point they brought him over  His daughter also states she did not realize the severity of his sacral ulcer until she got here  She reports they have also been struggling with him and depression and have not found of physician who has been willing to treat him for such  She states at times he does refuse to comply with treatment but she believes that that all circles back to the depression  Patient also was noted to eat food orally and does not have any artificial feeding tube  Objective:     Vitals:   Temp (24hrs), Av 6 °F (37 6 °C), Min:98 7 °F (37 1 °C), Max:100 5 °F (38 1 °C)    HR:  [] 71  Resp:  [15-18] 18  BP: ()/(46-58) 106/49  SpO2:  [92 %-96 %] 96 %  Body mass index is 21 3 kg/m²  Input and Output Summary (last 24 hours): Intake/Output Summary (Last 24 hours) at 17 1056  Last data filed at 17 1024   Gross per 24 hour   Intake          1591 67 ml   Output              750 ml   Net           841 67 ml       Physical Exam:     Physical Exam   Constitutional: No distress  Frail underweight appearing gentleman   HENT:   Head: Normocephalic and atraumatic  Eyes: Conjunctivae are normal  Right eye exhibits no discharge  Left eye exhibits no discharge  No scleral icterus  Neck:   Incision from laryngectomy is healing well  Tracheostomy in place  Cardiovascular: Normal rate, regular rhythm and normal heart sounds  No murmur heard  Pulmonary/Chest: Effort normal  No respiratory distress  He has no wheezes   He has no rales  No cough or dyspnea noted   Abdominal: Soft  Bowel sounds are normal  He exhibits no distension  There is no tenderness  There is no rebound  Genitourinary:   Genitourinary Comments: Very deep stage IV sacral decubitus ulcer noted with mild surrounding erythema  Musculoskeletal: He exhibits no edema  Neurological: He is alert  Awake alert but does not interact verbally  Skin: Skin is warm and dry  No rash noted  He is not diaphoretic  No erythema  No pallor  Psychiatric:   Patient appears depressed and withdrawn   Nursing note and vitals reviewed        Additional Data:  All labs and diagnostic studies were reviewed    Labs:      Results from last 7 days  Lab Units 11/20/17  0435 11/19/17  2144   WBC Thousand/uL 12 86* 13 13*   HEMOGLOBIN g/dL 9 7* 9 4*   HEMATOCRIT % 29 9* 29 1*   PLATELETS Thousands/uL 252 257   LYMPHO PCT %  --  1*   MONO PCT MAN %  --  3*   EOSINO PCT MANUAL %  --  0       Results from last 7 days  Lab Units 11/20/17  0435   SODIUM mmol/L 138   POTASSIUM mmol/L 4 0   CHLORIDE mmol/L 103   CO2 mmol/L 30   BUN mg/dL 13   CREATININE mg/dL 0 68   CALCIUM mg/dL 8 0*   TOTAL PROTEIN g/dL 5 7*   BILIRUBIN TOTAL mg/dL 0 49   ALK PHOS U/L 66   ALT U/L 35   AST U/L 65*   GLUCOSE RANDOM mg/dL 95       Results from last 7 days  Lab Units 11/19/17  2143   INR  1 16     Recent Cultures (last 7 days):           Last 24 Hours Medication List:     budesonide-formoterol 2 puff Inhalation BID   calcium carbonate-vitamin D 1 tablet Oral Daily With Breakfast   cefepime 2,000 mg Intravenous Q12H   collagenase  Topical Daily   fluticasone 1 spray Each Nare BID   fluticasone 2 puff Inhalation BID   guaiFENesin 600 mg Oral Q12H [address]   heparin (porcine) 5,000 Units Subcutaneous Q12H [address]   melatonin 3 mg Oral HS   pantoprazole 40 mg Oral Early Morning   senna-docusate sodium 1 tablet Oral HS        Today, Patient Was Seen By: David Hwang PA-C    ** Please Note: Dragon 360 Dictation voice to text software may have been used in the creation of this document   **

## 2017-11-20 NOTE — SEPSIS NOTE
Sepsis Note   Randy Jolly  81 y o  male MRN: [id number]  Unit/Bed#: ED 20 Encounter: [id number]            Initial Sepsis Screening     Row Name 11/19/17 0582                Is the patient's history suggestive of a new or worsening infection? (!)  Yes (Proceed)  -DR        Suspected source of infection urinary tract infection  -DR        Are two or more of the following signs & symptoms of infection both present and new to the patient?         Indicate SIRS criteria Tachycardia > 90 bpm;Hyperthemia > 38 3C (100 9F)  -DR        If the answer is yes to both questions, suspicion of sepsis is present          If severe sepsis is present AND tissue hypoperfusion perists in the hour after fluid resuscitation or lactate > 4, the patient meets criteria for SEPTIC SHOCK          Are any of the following organ dysfunction criteria present within 6 hours of suspected infection and SIRS criteria that are NOT considered to be chronic conditions? (!)  Yes  -DR        Organ dysfunction          Date of presentation of severe sepsis          Time of presentation of severe sepsis          Tissue hypoperfusion persists in the hour after crystalloid fluid administration, evidenced, by either:          Was hypotension present within one hour of the conclusion of crystalloid fluid administration?           Date of presentation of septic shock          Time of presentation of septic shock            User Key  (r) = Recorded By, (t) = Taken By, (c) = Cosigned By    Initials Name Provider Type    DR Harmony Mays DO Resident

## 2017-11-20 NOTE — CASE MANAGEMENT
[address] [facility] in the [facility] by Gerry Jacobson for 2017  Network Utilization Review Department  Phone: [phone]; Fax [phone]  ATTENTION: The Network Utilization Review Department is now centralized for our 7 Facilities  Make a note that we have a new phone and fax numbers for our Department  Please call with any questions or concerns to [phone] and carefully follow the prompts so that you are directed to the right person  All voicemails are confidential  Fax any determinations, approvals, denials, and requests for initial or continue stay review clinical to [phone]  Due to HIGH CALL volume, it would be easier if you could please send faxed requests to expedite your requests and in part, help us provide discharge notifications faster  Initial Clinical Review    Admission: Date/Time/Statement:   11/19/17 AT 2319     Orders Placed This Encounter   Procedures    Inpatient Admission     Standing Status:   Standing     Number of Occurrences:   1     Order Specific Question:   Admitting Physician     Answer:   Pam Clarke [[id number]]     Order Specific Question:   Level of Care     Answer:   Med Surg [16]     Order Specific Question:   Estimated length of stay     Answer:   More than 2 Midnights     Order Specific Question:   Certification     Answer:   I certify that inpatient services are medically necessary for this patient for a duration of greater than two midnights  See H&P and MD Progress Notes for additional information about the patient's course of treatment       ED: Date/Time/Mode of Arrival:   ED Arrival Information     Expected Arrival Acuity Means of Arrival Escorted By Service Admission Type    11/19/2017 20:58 11/19/2017 21:30 Emergent Ambulance [facility] Medicine Emergency    Arrival Complaint    fevers        Chief Complaint:   Chief Complaint   Patient presents with    Fever - 76 years or older     Pt started with fever of 102 4 today at the [facility]  Pt is currently being treated for a UTI     Chief Complaint:   Referred from nursing for further evaluation of fever  History of Present Illness:   Keely Lees  is a 81 y o  male who presents with fever after being brought from nursing home  He has a 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, currently has a tracheostomy-collar  He is able to communicated with mouthing words/ and writing  ROS significant for back pain  History is limited due to trach-collar  He denied any headaches, chest pain, shortness of breath, abdominal pain  He  pointed severe lower back pain more in the sacral area  He had nonproductive cough during interview  Review of Systems:  Reason unable to perform ROS: trach-collar  Constitutional: Positive for fever  Physical Exam   Constitutional: He is oriented to person, place, and time  Poorly nourished   Trach collar present, no notable purulent discharges from ostomy    Cardiovascular: Normal rate, regular rhythm, normal heart sounds and intact distal pulses  Exam reveals no gallop and no friction rub  No murmur heard  Pulmonary/Chest: Effort normal  No stridor  No respiratory distress  He has no wheezes  He has no rales  He exhibits no tenderness  Diminished breath sounds bilaterally   Abdominal: Soft  Bowel sounds are normal  He exhibits no distension  There is tenderness  There is no rebound and no guarding  Right lower quadrant   Genitourinary: Penis normal    Musculoskeletal: Normal range of motion  He exhibits no edema, tenderness or deformity     Large surgical repair scar of right lateral thigh       ED Vital Signs:   ED Triage Vitals   Temperature Pulse Respirations Blood Pressure SpO2   11/19/17 2136 11/19/17 2132 11/19/17 2145 11/19/17 2145 11/19/17 2132   100 5 °F (38 1 °C) 101 15 106/58 92 %      Temp Source Heart Rate Source Patient Position - Orthostatic VS BP Location FiO2 (%)   11/19/17 2136 11/19/17 2132 11/19/17 2132 11/19/17 2132 --   Rectal Monitor Lying Right arm       Pain Score       11/20/17 0100       4        Wt Readings from Last 1 Encounters:   11/20/17 61 7 kg (136 lb)      11/19 0701  11/20 0700 11/20 0701  11/20 1045  Most Recent    Temperature (°F) 98 7100 5    98 7 (37 1)    Pulse 71101    71    Respirations 1518    18    Blood Pressure 96/46    106/49    SpO2 (%) 9296    96        LABS/Diagnostic Test Results:   CBC  Results from last 7 days  Lab Units 11/19/17  2144   WBC Thousand/uL 13 13*   HEMOGLOBIN g/dL 9 4*   HEMATOCRIT % 29 1*   PLATELETS Thousands/uL 257      CMP  Results from last 7 days  Lab Units 11/19/17  2143   SODIUM mmol/L 136   POTASSIUM mmol/L 4 0   CHLORIDE mmol/L 100   CO2 mmol/L 31   BUN mg/dL 15   CREATININE mg/dL 0 74   CALCIUM mg/dL 8 0*   TOTAL PROTEIN g/dL 5 7*   BILIRUBIN TOTAL mg/dL 0 51   ALK PHOS U/L 67   ALT U/L 36   AST U/L 35   GLUCOSE RANDOM mg/dL 105      Lab Units 11/19/17  2143   INR   1 16     Microbiology:  Procedure Component Value - Date/Time   MRSA culture [[id number]] Collected: 11/20/17 0138   Lab Status: In process Specimen: Nares from Nose Updated: 11/20/17 0145   Sputum culture and Gram stain [[id number]]    Lab Status: No result Specimen: Sputum from Tracheal Aspirate    Blood culture #1 [[id number]] Collected: 11/19/17 2147   Lab Status: In process Specimen: Blood from Arm, Left Updated: 11/19/17 2154   Blood culture #2 [[id number]] Collected: 11/19/17 2143   Lab Status:  In process Specimen: Blood from Arm, Right Updated: 11/19/17 2154       CHEST X RAY -  New right basilar patchy consolidation is suspicious for pneumonia          ED Treatment:   Medication Administration from 11/19/2017 2058 to 11/20/2017 0031       Date/Time Order Dose Route Action Action by Comments     11/19/2017 2259 sodium chloride 0 9 % bolus 1,000 mL 0 mL Intravenous Stopped Denia Ribera RN      11/19/2017 2159 sodium chloride 0 9 % bolus 1,000 mL 1,000 mL Intravenous New Bag Rosalia Sidhu RN      11/19/2017 0946 cefepime (MAXIPIME) 2 g/50 mL dextrose IVPB 2,000 mg Intravenous New Bag Rosalia Sidhu RN           Past Medical/Surgical History: Active Ambulatory Problems     Diagnosis Date Noted    Asthma 08/19/2017    [facility] ([facility] ) 08/21/2017    Asthma 08/29/2017    Depression 08/29/2017    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     Resolved Ambulatory Problems     Diagnosis Date Noted    SOB (shortness of breath) 08/19/2017    Reactive airway disease with acute exacerbation 08/21/2017     Past Medical History:   Diagnosis Date    Asthma     Hypertension     Larynx cancer ([place] [address] )        Admitting Diagnosis: UTI (urinary tract infection) [N39 0]  Severe protein-calorie malnutrition ([place] [address] ) [E43]  Sepsis ([place] [address] ) [A41 9]    Age/Sex: 81 y o  male      Assessment/Plan:  Hospital Problem List:    Principal Problem:    Sepsis ([place] [address] )  Active Problems:    Asthma    Laryngeal cancer ([place] [address] )    Depression      Assessment /Plan for the Primary Problem(s):  · Sepsis with unknown source:  Final signs with tachycardia, fever, and on admission in the ER hypotension with blood pressure 96/46 mm Hg    Responded well to IV fluid resuscitation;   T-max recorded 100 5 degree F; leukocytosis of 13 13, anemia hemoglobin 9 4/hematocrit 29 1  Increased prothrombin time 14 9; corrected calcium within normal range  Lactic acid 1 4-within normal range  Urinalysis benign  Sign of multiorgan involvement  Possible sources of sepsis includes  chest with pneumonia, and infected sacral decubitus stage IV  -Follow-up chest x-ray result   -vital signs monitoring  -continue IV fluid hydration  -follow labs :  CBC for leukocytosis, CMP  -trend lactic acid  -follow-up blood ,urine, sputum cultures  -continue with cefepime, for broad-spectrum coverage  -consider ID consult- have clinical status worsens  -continue suctioning per respiratory secretion clearance from the airway  -wound care consult for stage IV  sacral decubitus I  -CT of abdomen and pelvis without contrast-due to concerns of right lower quadrant tenderness on clinical exam     Plan for Additional Problems:   · Reactive airway disease without exacerbation:  Continue duo nebs q 6 hours; Symbicort  · Laryngeal cancer- status post chemo radiation; stable  · Protein calorie malnutrition:  Severe- nutrition consult; protein supplementation 3 times a day  · Depression:  Stable      VTE Prophylaxis: Heparin  / sequential compression device   Code Status: full  POLST: POLST is not applicable to this patient     Anticipated Length of Stay:  Patient will be admitted on an Inpatient basis with an anticipated length of stay of  > 2 midnights  Justification for Hospital Stay:  Medical management of sepsis        Admission Orders:  11/19/17 AT 2319  ADMIT INPATIENT  TELEMETRY  VS q4HRS    Up + OOB as Tolerated  SCD    Diet Dysphagia/Modified Consistency;  Dysphagia 3-Dental Soft; Dysphagia 3-Dental Soft; Honey Thick Liquid     Dietary nutrition supplements Prosource Protein QD with Meals + HS      Continuous IV Infusions:   sodium chloride 100 mL/hr Last Rate: 100 mL/hr (11/20/17 0203)       Scheduled Meds:   budesonide-formoterol 2 puff Inhalation BID   calcium carbonate-vitamin D 1 tablet Oral Daily With Breakfast   cefepime 2,000 mg Intravenous Q12H   collagenase  Topical Daily   fluticasone 1 spray Each Nare BID   fluticasone 2 puff Inhalation BID   guaiFENesin 600 mg Oral Q12H [facility]   heparin (porcine) 5,000 Units Subcutaneous Q12H [facility]   melatonin 3 mg Oral HS   pantoprazole 40 mg Oral Early Morning   senna-docusate sodium 1 tablet Oral HS     PRN Meds:     acetaminophen    albuterol    oxyCODONE 5 mg q4hrs prn given x 1     oxyCODONE 10 mg q4hrs prn given x 1    polyethylene glycol    Consult I+D pending    PT Eval    OT Eval    SPEECH Eval

## 2017-11-20 NOTE — H&P
History and Physical - [facility]    Patient Information: Echo Mott  81 y o  male MRN: [id number]  Unit/Bed#: ED 20 Encounter: [id number]  Admitting Physician: Romeo Beverly MD  PCP: Cj Awad MD  Date of Admission:  11/20/17    Assessment/Plan:    Hospital Problem List:     Principal Problem:    Sepsis [facility])  Active Problems:    Asthma    Laryngeal cancer ([facility] )    Depression      Assessment /Plan for the Primary Problem(s):  · Sepsis with unknown source:  Final signs with tachycardia, fever, and on admission in the ER hypotension with blood pressure 96/46 mm Hg  Responded well to IV fluid resuscitation;   T-max recorded 100 5 degree F; leukocytosis of 13 13, anemia hemoglobin 9 4/hematocrit 29 1  Increased prothrombin time 14 9; corrected calcium within normal range  Lactic acid 1 4-within normal range  Urinalysis benign  Sign of multiorgan involvement  Possible sources of sepsis includes  chest with pneumonia, and infected sacral decubitus stage IV  -Follow-up chest x-ray result   -vital signs monitoring  -continue IV fluid hydration  -follow labs :  CBC for leukocytosis, CMP  -trend lactic acid  -follow-up blood ,urine, sputum cultures  -continue with cefepime, for broad-spectrum coverage  -consider ID consult- have clinical status worsens  -continue suctioning per respiratory secretion clearance from the airway  -wound care consult for stage IV  sacral decubitus I  -CT of abdomen and pelvis without contrast-due to concerns of right lower quadrant tenderness on clinical exam    Plan for Additional Problems:   · Reactive airway disease without exacerbation:  Continue duo nebs q 6 hours;  Symbicort  · Laryngeal cancer- status post chemo radiation; stable  · Protein calorie malnutrition:  Severe- nutrition consult; protein supplementation 3 times a day  · Depression:  Stable      VTE Prophylaxis: Heparin  / sequential compression device   Code Status: full  POLST: POLST is not applicable to this patient    Anticipated Length of Stay:  Patient will be admitted on an Inpatient basis with an anticipated length of stay of  > 2 midnights  Justification for Hospital Stay:  Medical management of sepsis  Total Time for Visit, including Counseling / Coordination of Care: 30 minutes  Greater than 50% of this total time spent on direct patient counseling and coordination of care  Chief Complaint:   Referred from nursing for further evaluation of fever  History of Present Illness:    Eric Gomez  is a 81 y o  male who presents with fever after being brought from nursing home  He has a 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, currently has a tracheostomy-collar  He is able to communicated with mouthing words/ and writing  ROS significant for back pain  History is limited due to trach-collar  He denied any headaches, chest pain, shortness of breath, abdominal pain  He  pointed severe lower back pain more in the sacral area  He had nonproductive cough during interview  Review of Systems:    Review of Systems   Reason unable to perform ROS: trach-collar  Constitutional: Positive for fever  HENT: Negative  Past Medical and Surgical History:     Past Medical History:   Diagnosis Date    Asthma     Hypertension     Larynx cancer [facility])        Past Surgical History:   Procedure Laterality Date    EGD AND COLONOSCOPY      TRACHEOSTOMY N/A 8/25/2017    Procedure: TRACHEOSTOMY (POSSIBLE AWAKE) , MICRO DIRECT LARYNGOSCOPY, Biopsy;  Surgeon: Amaury Kapoor MD;  Location: [facility];  Service: ENT       Meds/Allergies:    Prior to Admission medications    Medication Sig Start Date End Date Taking?  Authorizing Provider   Calcium 600-200 MG-UNIT per tablet Take 1 tablet by mouth daily   Yes Historical Provider, MD   collagenase (SANTYL) ointment Apply topically daily   Yes Historical Provider, MD   doxazosin (CARDURA) 2 mg tablet Take 2 mg by mouth daily at bedtime   Yes Historical Provider, MD   lisinopril (ZESTRIL) 2 5 mg tablet Take 2 5 mg by mouth daily   Yes Historical Provider, MD   melatonin 3 mg Take 1 tablet by mouth daily at bedtime 8/29/17  Yes [place] [employer], DO   omeprazole (PriLOSEC) 40 MG capsule Take 40 mg by mouth daily   Yes Historical Provider, MD   oxyCODONE (OXY-IR) 5 MG capsule Take 5 mg by mouth every 4 (four) hours as needed for moderate pain 5 mg for mod pain q4 10 mg for severe pain q4   Yes Historical Provider, MD   Sennosides-Docusate Sodium (SENEXON-S PO) Take 1 tablet by mouth   Yes Historical Provider, MD   acetaminophen (TYLENOL) 325 mg tablet Take 2 tablets by mouth every 6 (six) hours as needed for mild pain, headaches or fever 8/29/17   [place] [employer]iment, DO   albuterol (PROVENTIL HFA) 90 mcg/act inhaler Inhale 2 puffs as needed    Historical Provider, MD   flunisolide (NASALIDE) 25 MCG/ACT (0 025%) SOLN into each nostril    Historical Provider, MD   guaiFENesin (MUCINEX) 600 mg 12 hr tablet Take 1 tablet by mouth every 12 (twelve) hours 8/29/17   [place] [employer]iment, DO   Menthol 5 4 MG LOZG Apply 1 lozenge to the mouth or throat every 2 (two) hours as needed (Sore throat) 8/29/17   [place] Aliment, DO   mirtazapine (REMERON) 30 mg tablet Take 1 tablet by mouth daily at bedtime 8/29/17   [place] Aliment, DO   mometasone (ASMANEX 14 METERED DOSES) 220 MCG/INH inhaler Inhale 1 puff daily    Historical Provider, MD   Mometasone Furo-Formoterol Fum (DULERA) 200-5 MCG/ACT AERO Inhale 2 puffs 2 (two) times a day    Historical Provider, MD   pantoprazole (PROTONIX) 40 mg tablet Take 1 tablet by mouth daily in the early morning 8/29/17   [place] Aliment, DO   polyethylene glycol (MIRALAX) 17 g packet Take 17 g by mouth daily as needed (CONSTIPATION) 8/29/17   [place] Aliment, DO   sodium chloride 0 9 % nebulizer solution Take 3 mL by nebulization every 6 (six) hours as needed for wheezing 8/29/17   Yohan Wagner,    zolpidem (AMBIEN) 5 mg tablet Take 1 tablet by mouth daily at bedtime as needed for sleep for up to 10 days 8/29/17 9/8/17  Michael Bose, DO   levalbuterol (XOPENEX) 1 25 mg/0 5 mL nebulizer solution Take 0 5 mL by nebulization every 6 (six) hours as needed for wheezing 8/29/17 11/20/17  Michael Bose, DO     I have reviewed home medications with patient personally  Allergies: Allergies   Allergen Reactions    Aspirin     Cleocin [Clindamycin]        Social History:     Marital Status: Single   Occupation:  Unknown  Patient Pre-hospital Living Situation:  Nursing  Patient Pre-hospital Level of Mobility:  In bed    Substance Use History:   History   Alcohol Use No     History   Smoking Status    Former Smoker   Smokeless Tobacco    Not on file     History   Drug Use No       Family History:    History reviewed  No pertinent family history  Physical Exam:     Vitals:   Blood Pressure: (!) 96/46 (11/20/17 0000)  Pulse: 100 (11/20/17 0000)  Temperature: 100 5 °F (38 1 °C) (11/19/17 2136)  Temp Source: Rectal (11/19/17 2136)  Respirations: 15 (11/19/17 2145)  Height: 5' 7" (170 2 cm) (11/19/17 2132)  SpO2: 93 % (11/20/17 0000)    Physical Exam   Constitutional: He is oriented to person, place, and time  No distress  Poorly nourished   HENT:   Head: Normocephalic and atraumatic  Right Ear: External ear normal    Left Ear: External ear normal    Mouth/Throat: Oropharynx is clear and moist  No oropharyngeal exudate  Eyes: Conjunctivae and EOM are normal  Pupils are equal, round, and reactive to light  Right eye exhibits no discharge  Neck: Normal range of motion  Neck supple  No JVD present  No thyromegaly present  Trach collar present, no notable purulent discharges from ostomy    Cardiovascular: Normal rate, regular rhythm, normal heart sounds and intact distal pulses  Exam reveals no gallop and no friction rub  No murmur heard  Pulmonary/Chest: Effort normal  No stridor  No respiratory distress  He has no wheezes   He has no rales  He exhibits no tenderness  Diminished breath sounds bilaterally     Abdominal: Soft  Bowel sounds are normal  He exhibits no distension  There is tenderness  There is no rebound and no guarding  Right lower quadrant   Genitourinary: Penis normal    Musculoskeletal: Normal range of motion  He exhibits no edema, tenderness or deformity  Large surgical repair scar of right lateral thigh   Lymphadenopathy:     He has no cervical adenopathy  Neurological: He is alert and oriented to person, place, and time  He has normal reflexes  No cranial nerve deficit  Skin: Skin is warm and dry  No rash noted  He is not diaphoretic  No erythema  Nails show no clubbing  Psychiatric: He has a normal mood and affect  His behavior is normal  Judgment and thought content normal            Additional Data:     Lab Results: I have personally reviewed pertinent reports  Results from last 7 days  Lab Units 11/19/17  2144   WBC Thousand/uL 13 13*   HEMOGLOBIN g/dL 9 4*   HEMATOCRIT % 29 1*   PLATELETS Thousands/uL 257       Results from last 7 days  Lab Units 11/19/17  2143   SODIUM mmol/L 136   POTASSIUM mmol/L 4 0   CHLORIDE mmol/L 100   CO2 mmol/L 31   BUN mg/dL 15   CREATININE mg/dL 0 74   CALCIUM mg/dL 8 0*   TOTAL PROTEIN g/dL 5 7*   BILIRUBIN TOTAL mg/dL 0 51   ALK PHOS U/L 67   ALT U/L 36   AST U/L 35   GLUCOSE RANDOM mg/dL 105       Results from last 7 days  Lab Units 11/19/17  2143   INR  1 16       Imaging: I have personally reviewed pertinent reports  No results found  EKG, Pathology, and Other Studies Reviewed on Admission:   · EKG:     Allscripts / Epic Records Reviewed: Yes     ** Please Note: This note has been constructed using a voice recognition system   **

## 2017-11-20 NOTE — ASSESSMENT & PLAN NOTE
· Spoke with family at length, they have been struggling to get patient has situational depression adequately treated  · Will involve our palliative medicine team as per my discussion with them    · Am also concerned that he is underweight, albumin is low, will ask for nutrition evaluation but suspect he has protein calorie malnutrition overall

## 2017-11-20 NOTE — CONSULTS
Progress Note - Wound   Denis Baires  81 y o  male MRN: [id number]  Unit/Bed#: -01 Encounter: [id number]      Assessment: Patient is seen for wound care consult today   The patient is a 81year old male admitted from a skilled nursing facility with fevers   The patient has a past medical history of squamous cell carcinoma / laryngeal cancer S/P chemo and radiation   The patient has a trach and is non verbal   The patient is seen for sacral wound that he has had for sometime   Noted unstageable sacral wound POA with 100% thick yellow slough and rolled edges  Note able to access into the wound 1 5 cm   Distal to the wound is an area of induration and hard painful to the touch   The wound will need surgical debridement and surgery is consulted   Following debridement the wound bed is suspected to be a stage 4   Bilateral heels are dry and intact   Noted bruise on the left hip that is reabsorbing   Discussed plan of care with the RN   Placed orders for the maxorb to the wound bed due to amount of drainage from the wound   Please refer to the plan of care listed below   Plan:   1  Sacral wound - cleanse with NSS then place maxorb to wound bed then top with silicone foam change every other day   2  Apply skin nourishing cream to the skin daily   3  Apply hydraguard bid and prn to heels   4  Elevate heels off of the bed with pillows   5  Turn and reposition every 2 hours to offload and prevent pressure   6  Soft care cushion when OOB in the chair           Objective:    Vitals: Blood pressure 93/51, pulse 64, temperature 98 3 °F (36 8 °C), temperature source Axillary, resp  rate 18, height 5' 7" (1 702 m), weight 61 7 kg (136 lb), SpO2 97 %  ,Body mass index is 21 3 kg/m²            Pressure Ulcer 11/20/17 Sacrum Mid Unstageable - POA  (Active)   Staging Unstagable 11/20/2017  3:43 PM   Wound Description [facility] 11/20/2017  3:43 PM   Kelly-wound Assessment Erythema;Fragile 11/20/2017  3:43 PM   Shape oval 11/20/2017  3:43 PM   Wound Length (cm) 2 cm 11/20/2017  3:43 PM   Wound Width (cm) 1 5 cm 11/20/2017  3:43 PM   Wound Depth (cm) 1 5 11/20/2017  3:43 PM   Calculated Wound Area (cm^2) 3 cm^2 11/20/2017  3:43 PM   Calculated Wound Volume (cm^3) 4 5 cm^3 11/20/2017  3:43 PM   Drainage Amount Moderate 11/20/2017  3:43 PM   Drainage Description Tan 11/20/2017  3:43 PM   Treatment Offload; Turn & reposition 11/20/2017  3:43 PM   Dressing Calcium Alginate; Foam 11/20/2017  3:43 PM   Dressing Changed Reinforced 11/20/2017  9:00 AM   Patient Tolerance Tolerated well 11/20/2017  3:43 PM   Dressing Status Dry; Intact; Clean 11/20/2017  9:00 AM       Will follow weekly     Bartolo Jacobsen RN BSN Shawn Salamanca

## 2017-11-20 NOTE — CONSULTS
Consultation - Infectious Disease   Shu Castro  81 y o  male MRN: [id number]  Unit/Bed#: -01 Encounter: [id number]      Assessment/Plan     Shu Castro  is a 81y o  year old male who with a history of laryngeal SCC s/p recent laryngectomy presenting from nursing facility w/ sepsis w/ CXR infiltrates and deep sacral decubitus ulcer  Cultures  Blood cx x2: pending  Sputum: pending  MRSA: pending     Antibiotics   Cefepime      1  Sepsis POA: tachycardia, fevers  Lactic acid 1 4, WBC 13 13  Unclear source, PNA v  sacral decub infection  CXR w/ new right basilar patchy consolidation  · Currently afebrile, SBP trending 100s  Non-toxic appearing  · Follow-up blood cx   · Continue broad-spectrum abx for now  Would continue Rdscndif7t q12H, consider adding flagyl for aspiration and/or vancomycin for concern infected decub  2  HCAP PNA w/ risk factors pseudomonas: CXR w/ new right basilar patchy consolidation suspsisous for PNA  · HCAP risk factors: resident nursing facility, recent hospitalization, wound care  · MDR risk factors: non-ambulatory, antibiotics (3 days cipro)   · Would continue broad-spectrum treatment with Cefepime for now, consider adding flagyl for concern aspiration     3  Decubitus ulcer of sacral region   · Per wound care complete yellow sloughing of open wound  · Follow-up surgery consult     4  UTI - treated outpatient with cipro x4 days    · UA negative of 4 days cipro  No flank tenderness/other suggestion pyelo currently        History of Present Illness   Physician Requesting Consult: Yogesh Slade DO  Reason for Consult / Principal Problem:   Hx and PE limited by: Non-vocal  HPI: Shu Castro  is a 81y o  year old male who with a history of laryngeal cancer s/p laryngectomy presenting from nursing facility w/ fever to 102  Per EMS became hypotensive to SBP 70s, on arrival to ED BP SBP 100s  Temp on admission 100 5,        Patient is on antibiotics for e  Coli UTI, bacteria cultured from sacral ulcer  The patient reports no cough/SOB/diarrhea/abdominal pain/dysuria before admission  May 2017: Chemoradiation w/ Erbitux  Tracheostomy in July  Laryngectomy a few weeks ago    Inpatient consult to Infectious Diseases  Performed by: SARAH GOLDEN  Authorized by: Lucas Mccullough           Review of Systems    Historical Information   Past Medical History:   Diagnosis Date    Asthma     Hypertension     Larynx cancer [facility])      Past Surgical History:   Procedure Laterality Date    EGD AND COLONOSCOPY      TRACHEOSTOMY N/A 8/25/2017    Procedure: TRACHEOSTOMY (POSSIBLE AWAKE) , MICRO DIRECT LARYNGOSCOPY, Biopsy;  Surgeon: Bonifacio King MD;  [facility];  Service: ENT     Social History   History   Alcohol Use No     History   Drug Use No     History   Smoking Status    Former Smoker   Smokeless Tobacco    Not on file         Meds/Allergies       Allergies   Allergen Reactions    Aspirin     Cleocin [Clindamycin]        Objective       Intake/Output Summary (Last 24 hours) at 11/20/17 1138  Last data filed at 11/20/17 1024   Gross per 24 hour   Intake          1591 67 ml   Output              750 ml   Net           841 67 ml       Invasive Devices:   Peripheral IV 11/19/17 Left Forearm (Active)   Site Assessment Clean;Dry; Intact 11/20/2017  1:30 AM   Dressing Type Transparent 11/20/2017  1:30 AM   Line Status Flushed;Saline locked 11/20/2017  1:30 AM   Dressing Status Clean;Dry; Intact 11/20/2017  1:30 AM       Peripheral IV 11/20/17 Right Antecubital (Active)   Site Assessment Clean;Dry; Intact 11/20/2017  1:30 AM   Dressing Type Transparent 11/20/2017  1:30 AM   Line Status Flushed;Saline locked 11/20/2017  1:30 AM   Dressing Status Clean;Dry; Intact 11/20/2017  1:30 AM       Surgical Airway (Active)   Status Capped 11/20/2017  1:00 AM   Site Assessment Clean;Dry 11/20/2017  1:00 AM   Inner Cannula Care No inner cannula 11/20/2017  1:00 AM Ties Assessment Clean;Dry; Intact; Secure 11/20/2017  1:00 AM       Physical Exam   Constitutional: No distress  HENT:   Head: Normocephalic and atraumatic  Neck:   Capped trach collar   Pulmonary/Chest: Effort normal and breath sounds normal  No respiratory distress  Abdominal: Soft  Bowel sounds are normal    Musculoskeletal: He exhibits no edema  Skin: Skin is warm and dry  He is not diaphoretic  Sacral decub ulcer with yellow sloughing, some surrounding erythema        Lab Results: I have personally reviewed pertinent labs  Imaging Studies: I have personally reviewed pertinent reports  EKG, Pathology, and Other Studies: I have personally reviewed pertinent reports  Counseling / Coordination of Care  Total floor / unit time spent today 45 minutes  Greater than 50% of total time was spent with the patient and / or family counseling and / or coordination of care   A description of the counseling / coordination of care: 45min

## 2017-11-20 NOTE — ED ATTENDING ATTESTATION
Gila Shah DO, saw and evaluated the patient  I have discussed the patient with the resident/non-physician practitioner and agree with the resident's/non-physician practitioner's findings, Plan of Care, and MDM as documented in the resident's/non-physician practitioner's note, except where noted  All available labs and Radiology studies were reviewed  At this point I agree with the current assessment done in the Emergency Department  I have conducted an independent evaluation of this patient a history and physical is as follows:      81-year-old male brought in by ambulance from [facility] for evaluation of sepsis  Patient is currently being treated for urinary tract infection cultures are growing pansensitive E coli  Patient offers no other complaints at this time  EN route to the ED EMS noted that his blood pressure dropped into the 69H systolic however upon arrival it was greater than 100  Patient noted to have a fever with a T-max a 100 5°  Impression:  Sepsis due to urinary tract infection plan:  Septic eval, IV fluids, the broad-spectrum antibiotics  Will require admission for further evaluation and treatment        Critical Care Time  CritCare Time

## 2017-11-20 NOTE — PLAN OF CARE

## 2017-11-20 NOTE — SPEECH THERAPY NOTE
Speech Language/Pathology  Speech/Language Pathology  Assessment    Patient Name: Germaine Wu  Today's Date: 11/20/2017     Problem List  Patient Active Problem List   Diagnosis    Asthma    [facility]    Asthma    Depression    Laryngeal stenosis    Sepsis [facility] [address] [facility]    Decubitus ulcer of sacral region, stage 4 ([facility] [address] [facility]    HCAP (healthcare-associated pneumonia)    Continuous opioid dependence ([facility] [address] [facility]    Anemia     Past Medical History  Past Medical History:   Diagnosis Date    Asthma     Hypertension     Larynx cancer ([facility] [address] [facility]      Past Surgical History  Past Surgical History:   Procedure Laterality Date    EGD AND COLONOSCOPY      TRACHEOSTOMY N/A 8/25/2017    Procedure: TRACHEOSTOMY (POSSIBLE AWAKE) , MICRO DIRECT LARYNGOSCOPY, Biopsy;  Surgeon: Sonia Chadwick MD;  [facility];  Service: ENT     Patient indicates that he has pain in his sacral decubitus ulcer by pointing when asked  Nursing has not reported any respiratory distress but did report to me earlier this morning that he was not being given the appropriate dose of oxycodone, which I immediately remedied remotely prior to evaluating him  I spoke with his daughter at bedside who was very helpful in providing information  She states that he had been very physically active and healthy and independent until about 6 months ago  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 he had chemo and radiation and then subsequently the total laryngectomy a [facility] where he remained for 3 weeks  After that he was [place] [facility] [place]  He was already noted to have increasing temperatures sometime around November 13th and she states she was told that he had a urinary tract infection and also an infection in his sacral decubitus ulcer based on a wound culture  He was given antibiotics at that time but his temperature was getting worse at which point they brought him over    His daughter also states she did not realize the severity of his sacral ulcer until she got here  She reports they have also been struggling with him and depression and have not found of physician who has been willing to treat him for such  She states at times he does refuse to comply with treatment but she believes that that all circles back to the depression  Patient also was noted to eat food orally and does not have any artificial feeding tube  Reason for consult:  Determine safest and least restrictive diet  current pna    Current diet:  dysphagai level 3 c HTL  Premorbid diet[de-identified]  Full liquid per daughter  Previous VBS:  Yes, @ [facility] following laryngectomy  10/30/17: Speech pathology study without evidence of leak, penetration    or aspiration in this patient status post total laryngectomy  O2 requirement:  RA  Voice/Speech:  No voicing attempts noted today  Social:  Currently at [facility] for rehab  Follows commands: Yes                        Cognitive Status:  Intact  Oral [facility] exam:  Adequate dentition  Full symmetry  Pt noted to have edematous lips affecting lip seal    Items administered:  Puree, thin liquids  Liquids were taken by straw/cup  Oral stage:  Lip closure: decreased due to edema  Mastication: n/a  Bolus formation: adequate  Bolus control: adequate, mildly decreased c cup sips of thin  Transfer: prompt  Oral residue: no  Pocketing: no    Pharyngeal stage:  Swallow promptness: prompt  Wet voice: n/a  Throat clear: no  Cough: no  Secondary swallows: no  Audible swallows: no  No s/s aspiration    Esophageal stage:  No s/s reported    Summary:  Pt presents w/ functional oropharyngeal swallow skills for liquids with LIMITED sample  Pt c mild anterior spillage of thin liquids via cups sips, eliminated c use of straw  Pt given chocolate pudding and hot cocoa c no material noted at laryngectomy site  Pt also noted to have HME for improved moisture of secretions   Given minimal intake and reported history of liquid diet only, will recommend initiating full liquid diet c thin liquids, will follow up tomorrow to determine appropriateness for solid food initiation  Also rec Ensure supplements to ensure adequate nutrition  Recommendations:  Diet: full liquid  Liquid: thin  Meds: as tolerated  Positioning:Upright  Strategies: use straws, slow rate  Aspiration precautions  Reflux precautions      Results d/w:  RN, physician    Consider consult w/:  Nutrition    Goal(s):    Pt will tolerate least restrictive diet w/out s/s aspiration or oral/pharyngeal difficulties

## 2017-11-20 NOTE — SEPSIS NOTE
Sepsis Note   Fer Sommer  81 y o  male MRN: [id number]  Unit/Bed#: ED 20 Encounter: [id number]            Initial Sepsis Screening     Row Name 11/19/17 4044 11/19/17 2150             Is the patient's history suggestive of a new or worsening infection? (!)  Yes (Proceed)  -DR Mercedes Hong  Yes (Proceed)  -       Suspected source of infection pneumonia;urinary tract infection  - urinary tract infection  -DR       Are two or more of the following signs & symptoms of infection both present and new to the patient? (!)  Yes (Proceed)  -DR Richard Weber       Indicate SIRS criteria Hyperthemia > 38 3C (100 9F); Leukocytosis (WBC > 60078 IJL)  - Tachycardia > 90 bpm;Hyperthemia > 38 3C (100 9F)  -       If the answer is yes to both questions, suspicion of sepsis is present  Richard Weber         If severe sepsis is present AND tissue hypoperfusion perists in the hour after fluid resuscitation or lactate > 4, the patient meets criteria for SEPTIC SHOCK           Are any of the following organ dysfunction criteria present within 6 hours of suspected infection and SIRS criteria that are NOT considered to be chronic conditions? (!)  Yes  -DR Mercedes Hong  Yes  -DR       Organ dysfunction SBP < 90 mmHg  -DR Richard Weber       Date of presentation of severe sepsis           Time of presentation of severe sepsis           Tissue hypoperfusion persists in the hour after crystalloid fluid administration, evidenced, by either:           Was hypotension present within one hour of the conclusion of crystalloid fluid administration?   Martin Rosado       Date of presentation of septic shock           Time of presentation of septic shock             User Key  (r) = Recorded By, (t) = Taken By, (c) = Cosigned By    [address] Name Provider Type    DR Martin Sears, DO Resident

## 2017-11-21 LAB
ANION GAP SERPL CALCULATED.3IONS-SCNC: 5 MMOL/L (ref 4–13)
BASOPHILS # BLD AUTO: 0.01 THOUSANDS/ΜL (ref 0–0.1)
BASOPHILS NFR BLD AUTO: 0 % (ref 0–1)
BUN SERPL-MCNC: 14 MG/DL (ref 5–25)
CALCIUM SERPL-MCNC: 8.2 MG/DL (ref 8.3–10.1)
CHLORIDE SERPL-SCNC: 104 MMOL/L (ref 100–108)
CO2 SERPL-SCNC: 29 MMOL/L (ref 21–32)
CREAT SERPL-MCNC: 0.75 MG/DL (ref 0.6–1.3)
EOSINOPHIL # BLD AUTO: 0.13 THOUSAND/ΜL (ref 0–0.61)
EOSINOPHIL NFR BLD AUTO: 3 % (ref 0–6)
ERYTHROCYTE [DISTWIDTH] IN BLOOD BY AUTOMATED COUNT: 16.5 % (ref 11.6–15.1)
GFR SERPL CREATININE-BSD FRML MDRD: 86 ML/MIN/1.73SQ M
GLUCOSE SERPL-MCNC: 128 MG/DL (ref 65–140)
HCT VFR BLD AUTO: 34 % (ref 36.5–49.3)
HGB BLD-MCNC: 10.7 G/DL (ref 12–17)
LYMPHOCYTES # BLD AUTO: 0.39 THOUSANDS/ΜL (ref 0.6–4.47)
LYMPHOCYTES NFR BLD AUTO: 8 % (ref 14–44)
MCH RBC QN AUTO: 31.3 PG (ref 26.8–34.3)
MCHC RBC AUTO-ENTMCNC: 31.5 G/DL (ref 31.4–37.4)
MCV RBC AUTO: 99 FL (ref 82–98)
MONOCYTES # BLD AUTO: 0.2 THOUSAND/ΜL (ref 0.17–1.22)
MONOCYTES NFR BLD AUTO: 4 % (ref 4–12)
MRSA NOSE QL CULT: NORMAL
NEUTROPHILS # BLD AUTO: 4.2 THOUSANDS/ΜL (ref 1.85–7.62)
NEUTS SEG NFR BLD AUTO: 85 % (ref 43–75)
NRBC BLD AUTO-RTO: 0 /100 WBCS
PLATELET # BLD AUTO: 280 THOUSANDS/UL (ref 149–390)
PMV BLD AUTO: 8.5 FL (ref 8.9–12.7)
POTASSIUM SERPL-SCNC: 4 MMOL/L (ref 3.5–5.3)
RBC # BLD AUTO: 3.42 MILLION/UL (ref 3.88–5.62)
SODIUM SERPL-SCNC: 138 MMOL/L (ref 136–145)
WBC # BLD AUTO: 4.95 THOUSAND/UL (ref 4.31–10.16)

## 2017-11-21 PROCEDURE — G8988 SELF CARE GOAL STATUS: HCPCS

## 2017-11-21 PROCEDURE — G8987 SELF CARE CURRENT STATUS: HCPCS

## 2017-11-21 PROCEDURE — G8979 MOBILITY GOAL STATUS: HCPCS

## 2017-11-21 PROCEDURE — 85025 COMPLETE CBC W/AUTO DIFF WBC: CPT | Performed by: PHYSICIAN ASSISTANT

## 2017-11-21 PROCEDURE — 97167 OT EVAL HIGH COMPLEX 60 MIN: CPT

## 2017-11-21 PROCEDURE — 97162 PT EVAL MOD COMPLEX 30 MIN: CPT

## 2017-11-21 PROCEDURE — 80048 BASIC METABOLIC PNL TOTAL CA: CPT | Performed by: PHYSICIAN ASSISTANT

## 2017-11-21 PROCEDURE — G8978 MOBILITY CURRENT STATUS: HCPCS

## 2017-11-21 RX ORDER — OXYCODONE HYDROCHLORIDE 10 MG/1
10 TABLET ORAL
Status: DISCONTINUED | OUTPATIENT
Start: 2017-11-21 | End: 2017-11-24

## 2017-11-21 RX ORDER — OXYCODONE HYDROCHLORIDE 10 MG/1
10 TABLET ORAL EVERY 2 HOUR PRN
Status: DISCONTINUED | OUTPATIENT
Start: 2017-11-21 | End: 2017-11-27 | Stop reason: HOSPADM

## 2017-11-21 RX ADMIN — OXYCODONE HYDROCHLORIDE 10 MG: 10 TABLET ORAL at 08:59

## 2017-11-21 RX ADMIN — OXYCODONE HYDROCHLORIDE 10 MG: 10 TABLET ORAL at 12:58

## 2017-11-21 RX ADMIN — OXYCODONE HYDROCHLORIDE 10 MG: 10 TABLET ORAL at 16:09

## 2017-11-21 RX ADMIN — OXYCODONE HYDROCHLORIDE 10 MG: 10 TABLET ORAL at 03:57

## 2017-11-21 RX ADMIN — CEFEPIME HYDROCHLORIDE 2000 MG: 2 INJECTION, POWDER, FOR SOLUTION INTRAVENOUS at 23:59

## 2017-11-21 RX ADMIN — HEPARIN SODIUM 5000 UNITS: 5000 INJECTION, SOLUTION INTRAVENOUS; SUBCUTANEOUS at 22:10

## 2017-11-21 RX ADMIN — ACETAMINOPHEN 975 MG: 325 TABLET ORAL at 06:32

## 2017-11-21 RX ADMIN — CEFEPIME HYDROCHLORIDE 2000 MG: 2 INJECTION, POWDER, FOR SOLUTION INTRAVENOUS at 12:41

## 2017-11-21 RX ADMIN — HEPARIN SODIUM 5000 UNITS: 5000 INJECTION, SOLUTION INTRAVENOUS; SUBCUTANEOUS at 09:07

## 2017-11-21 RX ADMIN — FLUTICASONE PROPIONATE 2 PUFF: 110 AEROSOL, METERED RESPIRATORY (INHALATION) at 22:04

## 2017-11-21 RX ADMIN — OXYCODONE HYDROCHLORIDE 10 MG: 10 TABLET ORAL at 22:10

## 2017-11-21 RX ADMIN — PANTOPRAZOLE SODIUM 40 MG: 40 TABLET, DELAYED RELEASE ORAL at 06:32

## 2017-11-21 RX ADMIN — Medication 1 TABLET: at 22:10

## 2017-11-21 RX ADMIN — ACETAMINOPHEN 975 MG: 325 TABLET ORAL at 16:08

## 2017-11-21 RX ADMIN — SODIUM CHLORIDE 100 ML/HR: 0.9 INJECTION, SOLUTION INTRAVENOUS at 20:21

## 2017-11-21 RX ADMIN — SODIUM CHLORIDE 100 ML/HR: 0.9 INJECTION, SOLUTION INTRAVENOUS at 08:59

## 2017-11-21 RX ADMIN — GUAIFENESIN 600 MG: 600 TABLET, EXTENDED RELEASE ORAL at 08:59

## 2017-11-21 RX ADMIN — GUAIFENESIN 600 MG: 600 TABLET, EXTENDED RELEASE ORAL at 22:10

## 2017-11-21 RX ADMIN — CALCIUM CARBONATE 500 MG (1,250 MG)-VITAMIN D3 200 UNIT TABLET 1 TABLET: at 06:33

## 2017-11-21 RX ADMIN — MIRTAZAPINE 15 MG: 15 TABLET, FILM COATED ORAL at 22:10

## 2017-11-21 RX ADMIN — COLLAGENASE SANTYL: 250 OINTMENT TOPICAL at 09:07

## 2017-11-21 RX ADMIN — MELATONIN TAB 3 MG 3 MG: 3 TAB at 22:10

## 2017-11-21 RX ADMIN — CEFEPIME HYDROCHLORIDE 2000 MG: 2 INJECTION, POWDER, FOR SOLUTION INTRAVENOUS at 00:31

## 2017-11-21 RX ADMIN — BUDESONIDE AND FORMOTEROL FUMARATE DIHYDRATE 2 PUFF: 160; 4.5 AEROSOL RESPIRATORY (INHALATION) at 22:04

## 2017-11-21 RX ADMIN — ACETAMINOPHEN 975 MG: 325 TABLET ORAL at 22:10

## 2017-11-21 NOTE — CONSULTS
Consultation - General Surgery   Densi Baires  81 y o  male MRN: [id number]  Unit/Bed#: -01 Encounter: [id number]    Assessment/Plan     Assessment:  80 yo M with sacral wound, r/o sacral abscess    Plan:  - continue local wound care with maxorb, mepilex dressings per wound care  - favor pneumonia over sacral wound as source of sepsis  - plan for OR for washout and debridement  - frequent turning and repositioning  - continue antibiotics per ID  - no need for imaging at this time    History of Present Illness     HPI:  Denis Baires  is a 81 y o  male who presents with decubitus ulcer  He presented to [facility] on 11/19 for fevers from [employer]  Patient is non verbal 2/2 resection for SCCa of larynx  Patient was recently treated at outside facility for UTI  He reports that he is ambulatory at facility  Denies pain in sacrum currently  Denies cough, fevers, chills, burning on urination  Inpatient consult to Acute Care Surgery  Performed by: Michael Sauceda  Authorized by: Junie Sarah           Review of Systems   Unable to perform ROS: Patient nonverbal       Historical Information   Past Medical History:   Diagnosis Date    Asthma     Hypertension     Larynx cancer [facility])      Past Surgical History:   Procedure Laterality Date    EGD AND COLONOSCOPY      TRACHEOSTOMY N/A 8/25/2017    Procedure: TRACHEOSTOMY (POSSIBLE AWAKE) , MICRO DIRECT LARYNGOSCOPY, Biopsy;  Surgeon: Sangita Lopez MD;  [facility];  Service: ENT     Social History   History   Alcohol Use No     History   Drug Use No     History   Smoking Status    Former Smoker   Smokeless Tobacco    Not on file     Family History: History reviewed  No pertinent family history      Meds/Allergies   all current active meds have been reviewed, current meds:   Current Facility-Administered Medications   Medication Dose Route Frequency    acetaminophen (TYLENOL) tablet 975 mg  975 mg Oral Q8H [address]    albuterol (PROVENTIL HFA,VENTOLIN HFA) inhaler 2 puff  2 puff Inhalation Q4H PRN    budesonide-formoterol (SYMBICORT) 160-4 5 mcg/act inhaler 2 puff  2 puff Inhalation BID    calcium carbonate-vitamin D (OSCAL-D) 500 mg-200 units per tablet 1 tablet  1 tablet Oral Daily With Breakfast    cefepime (MAXIPIME) 2,000 mg in dextrose 5 % 50 mL IVPB  2,000 mg Intravenous Q12H    collagenase (SANTYL) ointment   Topical Daily    fluticasone (FLONASE) 50 mcg/act nasal spray 1 spray  1 spray Each Nare BID    fluticasone (FLOVENT HFA) 110 MCG/ACT inhaler 2 puff  2 puff Inhalation BID    guaiFENesin (MUCINEX) 12 hr tablet 600 mg  600 mg Oral Q12H JAMES    heparin (porcine) subcutaneous injection 5,000 Units  5,000 Units Subcutaneous Q12H [facility]    melatonin tablet 3 mg  3 mg Oral HS    mirtazapine (REMERON) tablet 15 mg  15 mg Oral HS    oxyCODONE (ROXICODONE) immediate release tablet 10 mg  10 mg Oral Q4H PRN    pantoprazole (PROTONIX) EC tablet 40 mg  40 mg Oral Early Morning    polyethylene glycol (MIRALAX) packet 17 g  17 g Oral Daily PRN    senna-docusate sodium (SENOKOT S) 8 6-50 mg per tablet 1 tablet  1 tablet Oral HS    sodium chloride 0 9 % infusion  100 mL/hr Intravenous Continuous    and PTA meds:   Prior to Admission Medications   Prescriptions Last Dose Informant Patient Reported? Taking?    Calcium 600-200 MG-UNIT per tablet   Yes Yes   Sig: Take 1 tablet by mouth daily   Mometasone Furo-Formoterol Fum (DULERA) 200-5 MCG/ACT AERO   Yes No   Sig: Inhale 2 puffs 2 (two) times a day   Sennosides-Docusate Sodium (SENEXON-S PO)   Yes Yes   Sig: Take 1 tablet by mouth   acetaminophen (TYLENOL) 325 mg tablet Unknown at Unknown time  No No   Sig: Take 2 tablets by mouth every 6 (six) hours as needed for mild pain, headaches or fever   albuterol (PROVENTIL HFA) 90 mcg/act inhaler Unknown at Unknown time  Yes No   Sig: Inhale 2 puffs as needed   collagenase (SANTYL) ointment   Yes Yes   Sig: Apply topically daily   doxazosin (CARDURA) 2 mg tablet 11/19/2017 at Unknown time  Yes Yes   Sig: Take 2 mg by mouth daily at bedtime   flunisolide (NASALIDE) 25 MCG/ACT (0 025%) SOLN   Yes No   Sig: into each nostril   lisinopril (ZESTRIL) 2 5 mg tablet 11/19/2017 at Unknown time  Yes Yes   Sig: Take 2 5 mg by mouth daily   melatonin 3 mg 11/18/2017 at Unknown time  No Yes   Sig: Take 1 tablet by mouth daily at bedtime   mometasone (ASMANEX 14 METERED DOSES) 220 MCG/INH inhaler   Yes No   Sig: Inhale 1 puff daily   omeprazole (PriLOSEC) 40 MG capsule   Yes Yes   Sig: Take 40 mg by mouth daily   oxyCODONE (OXY-IR) 5 MG capsule   Yes Yes   Sig: Take 5 mg by mouth every 4 (four) hours as needed for moderate pain 5 mg for mod pain q4 10 mg for severe pain q4   polyethylene glycol (MIRALAX) 17 g packet   No No   Sig: Take 17 g by mouth daily as needed (CONSTIPATION)   sodium chloride 0 9 % nebulizer solution   No No   Sig: Take 3 mL by nebulization every 6 (six) hours as needed for wheezing      Facility-Administered Medications: None     Allergies   Allergen Reactions    Aspirin     Cleocin [Clindamycin]        Objective   First Vitals:   Blood Pressure: 106/58 (11/19/17 2145)  Pulse: 101 (11/19/17 2132)  Temperature: 100 5 °F (38 1 °C) (11/19/17 2136)  Temp Source: Rectal (11/19/17 2136)  Respirations: 15 (11/19/17 2145)  Height: 5' 7" (170 2 cm) (11/19/17 2132)  Weight - Scale: 61 7 kg (136 lb) (11/20/17 0100)  SpO2: 92 % (11/19/17 2132)    Current Vitals:   Blood Pressure: 93/51 (11/20/17 1543)  Pulse: 64 (11/20/17 1543)  Temperature: 98 3 °F (36 8 °C) (11/20/17 1543)  Temp Source: Axillary (11/20/17 1543)  Respirations: 18 (11/20/17 1543)  Height: 5' 7" (170 2 cm) (11/20/17 0100)  Weight - Scale: 61 7 kg (136 lb) (11/20/17 0100)  SpO2: 97 % (11/20/17 1543)      Intake/Output Summary (Last 24 hours) at 11/21/17 1403  Last data filed at 11/21/17 0550   Gross per 24 hour   Intake              480 ml   Output              900 ml   Net             -420 ml Invasive Devices     Peripheral Intravenous Line            Peripheral IV 11/19/17 Left Forearm 1 day    Peripheral IV 11/20/17 Right Antecubital 1 day          Airway            Surgical Airway 87 days                Physical Exam   Constitutional: He is oriented to person, place, and time  He appears well-developed and well-nourished  HENT:   Head: Normocephalic and atraumatic  Eyes: Pupils are equal, round, and reactive to light  Neck: Normal range of motion  Cardiovascular: Normal rate and regular rhythm  Pulmonary/Chest: Effort normal and breath sounds normal    Abdominal: Soft  He exhibits no distension and no mass  There is no tenderness  There is no rebound and no guarding  Musculoskeletal: Normal range of motion  Neurological: He is alert and oriented to person, place, and time  Skin: Skin is warm and dry  Lab Results:   CBC:   Lab Results   Component Value Date    WBC 4 95 11/21/2017    HGB 10 7 (L) 11/21/2017    HCT 34 0 (L) 11/21/2017    MCV 99 (H) 11/21/2017     11/21/2017    MCH 31 3 11/21/2017    MCHC 31 5 11/21/2017    RDW 16 5 (H) 11/21/2017    MPV 8 5 (L) 11/21/2017    NRBC 0 11/21/2017   , CMP:   Lab Results   Component Value Date     11/21/2017    K 4 0 11/21/2017     11/21/2017    CO2 29 11/21/2017    ANIONGAP 5 11/21/2017    BUN 14 11/21/2017    CREATININE 0 75 11/21/2017    GLUCOSE 128 11/21/2017    CALCIUM 8 2 (L) 11/21/2017    EGFR 86 11/21/2017   , Coagulation: No results found for: PT, INR, APTT, Urinalysis: No results found for: Inetta Hench, SPECGRAV, PHUR, LEUKOCYTESUR, NITRITE, PROTEINUA, GLUCOSEU, KETONESU, BILIRUBINUR, BLOODU  Imaging: I have personally reviewed pertinent reports  and I have personally reviewed pertinent films in PACS   Xr Chest 1 View    Result Date: 11/20/2017  Impression: New right basilar patchy consolidation is suspicious for pneumonia    Workstation performed: [id number]     EKG, Pathology, and Other Studies: I have personally reviewed pertinent reports  and I have personally reviewed pertinent films in PACS    Counseling / Coordination of Care  Total floor / unit time spent today 30 minutes  Greater than 50% of total time was spent with the patient and / or family counseling and / or coordination of care

## 2017-11-21 NOTE — ASSESSMENT & PLAN NOTE
· Status post total laryngectomy at [facility]  · Surgical site appears to be healing well  · Trach suctioning, wound care

## 2017-11-21 NOTE — ASSESSMENT & PLAN NOTE
· Clearly patient with very deep sacral decubitus ulcer--will ask for ID and wound care to assess, appreciate input   · Continue to turn and reposition frequently to off load weight  · Continue medication for pain  · Surgery c/s to evaluate

## 2017-11-21 NOTE — PLAN OF CARE
Problem: OCCUPATIONAL THERAPY ADULT  Goal: Performs self-care activities at highest level of function for planned discharge setting  See evaluation for individualized goals  Treatment Interventions: ADL retraining, Functional transfer training, Endurance training, Patient/family training, Equipment evaluation/education, Compensatory technique education, Activityengagement          See flowsheet documentation for full assessment, interventions and recommendations  Limitation: Decreased ADL status, Decreased endurance, Decreased self-care trans, Decreased high-level ADLs  Prognosis: Good  Assessment: Pt is a 81 y o  male who was admitted to [facility] on 11/19/2017 with Sepsis ([address] ), uti, malnutrition and stg IV sacral wound   Pt's problem list also includes PMH of HTN, previous surgery, cancer history and depression, laryngeal stenosis, continuous opioid dependence, anemia, asthma  At baseline pt was completing adls with assist from family/facility staff  Pt lives with family however has been receiving rehab at [facility]  Currently pt requires min assist for overall ADLS and min assist for functional mobility/transfers  Pt currently presents with impairments in the following categories -difficulty performing ADLS, difficulty performing IADLS , flat affect, decreased initiation and engagement  and health management  activity tolerance, endurance and standing balance/tolerance  These impairments, as well as pt's fatigue, pain and risk for falls  limit pt's ability to safely engage in all baseline areas of occupation, includingbathing, dressing, toileting, functional mobility/transfers, community mobility, house maintenance, social participation  and leisure activities  From OT standpoint, recommend return to inpt rehab upon D/C  OT will continue to follow to address the below stated goals        OT Discharge Recommendation: Short Term Rehab

## 2017-11-21 NOTE — PROGRESS NOTES
Progress note - Palliative and Supportive Care   Shu Castro  81 y o  male [id number]    Assessment:   Asthma    [facility] ([facility] )    Asthma    Depression    Laryngeal stenosis    Sepsis ([facility] )    Decubitus ulcer of sacral region, stage 4 ([facility] )    HCAP (healthcare-associated pneumonia)    Continuous opioid dependence ([facility] )    Anemia        Plan:  1  Schedule oxyIR 10 mg q4H ATC with hold parameters  2  Continue ATC tylenol  3  Increase availability of PRN oxycodone to q2h PRN, suspect he will ultimately need ER opioid  4  Continue Remeron  5  Goals - Spoke with daughter, confirmed that she is POA and verified paperwork  Spent time reviewing his clinical course and providing updates  Questions/concerns were addressed to her satisfaction  Continue disease directed cares  Code Status: Full - Level 1   Power of :  presumed to be daughter by [place] Act 169   Advance Directive / Living Will: no   POLST:  no      Interval history:       Patient having a lot of pain issues today  Spoke to his daughter today and discussed plans for depression and pain  Patient not really engaging in conversation but does note that he has ongoing and uncontrolled pain on his backside       MEDICATIONS / ALLERGIES:    all current active meds have been reviewed and current meds:   Current Facility-Administered Medications   Medication Dose Route Frequency    acetaminophen (TYLENOL) tablet 975 mg  975 mg Oral Q8H [address]    albuterol (PROVENTIL HFA,VENTOLIN HFA) inhaler 2 puff  2 puff Inhalation Q4H PRN    budesonide-formoterol (SYMBICORT) 160-4 5 mcg/act inhaler 2 puff  2 puff Inhalation BID    calcium carbonate-vitamin D (OSCAL-D) 500 mg-200 units per tablet 1 tablet  1 tablet Oral Daily With Breakfast    cefepime (MAXIPIME) 2,000 mg in dextrose 5 % 50 mL IVPB  2,000 mg Intravenous Q12H    collagenase (SANTYL) ointment   Topical Daily    fluticasone (FLONASE) 50 mcg/act nasal spray 1 spray  1 spray Each Nare BID  fluticasone (FLOVENT HFA) 110 MCG/ACT inhaler 2 puff  2 puff Inhalation BID    guaiFENesin (MUCINEX) 12 hr tablet 600 mg  600 mg Oral Q12H [address]    heparin (porcine) subcutaneous injection 5,000 Units  5,000 Units Subcutaneous Q12H [address]    melatonin tablet 3 mg  3 mg Oral HS    mirtazapine (REMERON) tablet 15 mg  15 mg Oral HS    oxyCODONE (ROXICODONE) immediate release tablet 10 mg  10 mg Oral Q2H PRN    oxyCODONE (ROXICODONE) immediate release tablet 10 mg  10 mg Oral Q4H While Awake    pantoprazole (PROTONIX) EC tablet 40 mg  40 mg Oral Early Morning    polyethylene glycol (MIRALAX) packet 17 g  17 g Oral Daily PRN    senna-docusate sodium (SENOKOT S) 8 6-50 mg per tablet 1 tablet  1 tablet Oral HS    sodium chloride 0 9 % infusion  100 mL/hr Intravenous Continuous       Allergies   Allergen Reactions    Aspirin     Cleocin [Clindamycin]        OBJECTIVE:    Physical Exam  Physical Exam   Constitutional: He is oriented to person, place, and time  Ill appearing    HENT:   Head: Normocephalic and atraumatic  Lips are swollen   Neck:   Trach in place   Cardiovascular: Intact distal pulses  Pulmonary/Chest: Effort normal  No respiratory distress  Abdominal: Soft  Bowel sounds are normal    Musculoskeletal: He exhibits no edema  Neurological: He is alert and oriented to person, place, and time  Skin: Skin is warm and dry  Did not assess sacral decubitus ulcer   Psychiatric:   Depressed mood and blunted affect   Nursing note and vitals reviewed  Lab Results:   I have personally reviewed pertinent labs  , CBC:   Lab Results   Component Value Date    WBC 4 95 11/21/2017    HGB 10 7 (L) 11/21/2017    HCT 34 0 (L) 11/21/2017    MCV 99 (H) 11/21/2017     11/21/2017    MCH 31 3 11/21/2017    MCHC 31 5 11/21/2017    RDW 16 5 (H) 11/21/2017    MPV 8 5 (L) 11/21/2017    NRBC 0 11/21/2017   , CMP:   Lab Results   Component Value Date     11/21/2017    K 4 0 11/21/2017     11/21/2017    CO2 29 11/21/2017    ANIONGAP 5 11/21/2017    BUN 14 11/21/2017    CREATININE 0 75 11/21/2017    GLUCOSE 128 11/21/2017    CALCIUM 8 2 (L) 11/21/2017    EGFR 86 11/21/2017     Imaging Studies: reviewed  EKG, Pathology, and Other Studies: reviewed    Counseling / Coordination of Care  Total floor / unit time spent today 35+ minutes  Greater than 50% of total time was spent with the patient and / or family counseling and / or coordination of care   A description of the counseling / coordination of care: symptom assessment and discussions with family

## 2017-11-21 NOTE — PROGRESS NOTES
Pt reported to staff that he hadn't received dinner earlier today, not telling staff prior  Pt offered juice and milk per diet order  Pt was encouraged to inform staff if his tray does not come

## 2017-11-21 NOTE — ASSESSMENT & PLAN NOTE
· Continue p r n  oxycodone for pain      · Consider changing to an around the clock dose if we are not able to adequately address his pain or if palliative medicine team feels that is the preferred method

## 2017-11-21 NOTE — PROGRESS NOTES
Progress Note - Infectious Disease   Germaine Wu  81 y o  male MRN: [id number]  Unit/Bed#: -01 Encounter: [id number]    Assessment/Plan:     Germaine Wu  is a 81y o  year old male who with a history of laryngeal SCC s/p recent laryngectomy presenting from nursing facility w/ sepsis w/ CXR infiltrates and deep sacral decubitus ulcer       Cultures  Blood cx x2: NGx24H  Sputum: Rare polys, no bacteria   MRSA nares: pending      Antibiotics   Cefepime      1  Sepsis POA: tachycardia, fevers  Lactic acid 1 4, WBC 13 13  Unclear source, PNA v  sacral decub infection  CXR w/ new right basilar patchy consolidation  · Currently afebrile, SBP soft trending 90s-100s   Non-toxic appearing  · Blood culture from admission w/ NGx24H x2  · Continue Rxhdhato5p q12H     2  HCAP PNA w/ risk factors pseudomonas: CXR w/ new right basilar patchy consolidation suspsisous for PNA  · HCAP risk factors: resident nursing facility, recent hospitalization, wound care  · MDR risk factors: non-ambulatory, antibiotics (3 days cipro)   · Sputum w/ rare polys, rare bacteria  Legionella, strep PNA negative  · Per speech/swallow eval no signs/symptoms of aspiration   · Continue treatment with Cefepime for Group 2 HCAP/concern gram-negative PNA      3  Decubitus ulcer of sacral region   · Per wound care complete yellow sloughing of open wound  · Follow-up surgery consult      4  UTI - treated outpatient with cipro x4 days    · UA negative after 4 days cipro  No flank tenderness/other suggestion pyelo currently         Subjective/Objective   Chief Complaint: Back pain     Subjective: Patient continues to report back pain, 8/10, unchanged from prior  He denies fever/chills/nausea/CP/SOB/abdominal pain   When asked if he's hungry says yes and writes "do wake me"     Objective:     HR:  [64] 64  Resp:  [18] 18  BP: (93)/(51) 93/51  SpO2:  [97 %] 97 %  Temp (24hrs), Av 3 °F (36 8 °C), Min:98 3 °F (36 8 °C), Max:98 3 °F (36 8 °C)  Current: Temperature: 98 3 °F (36 8 °C)    Physical Exam:   Physical Exam   Constitutional: No distress  HENT:   Head: Normocephalic and atraumatic  Cardiovascular: Normal rate and regular rhythm  Pulmonary/Chest: Effort normal and breath sounds normal    Trach capped    Abdominal: Soft  Bowel sounds are normal    Musculoskeletal: He exhibits no edema  Skin: Skin is warm and dry  He is not diaphoretic  Invasive Devices     Peripheral Intravenous Line            Peripheral IV 11/19/17 Left Forearm 1 day    Peripheral IV 11/20/17 Right Antecubital 1 day          Airway            Surgical Airway 87 days                Lab, Imaging and other studies: I have personally reviewed pertinent reports

## 2017-11-21 NOTE — OCCUPATIONAL THERAPY NOTE
[address] Evaluation     Patient Name: Page Mail  Today's Date: 11/21/2017  Problem List  Patient Active Problem List   Diagnosis    Asthma    [facility] )    Asthma    Depression    Laryngeal stenosis    Sepsis ([facility] )    [facility], [facility]    HCAP (healthcare-associated pneumonia)    Continuous opioid dependence ([facility] )    Anemia     Past Medical History  Past Medical History:   Diagnosis Date    Asthma     Hypertension     [facility]      Past Surgical History  Past Surgical History:   Procedure Laterality Date    EGD AND COLONOSCOPY      TRACHEOSTOMY N/A 8/25/2017    Procedure: TRACHEOSTOMY (POSSIBLE AWAKE) , MICRO DIRECT LARYNGOSCOPY, Biopsy;  Surgeon: Tomasz Power MD;  [facility];  Service: ENT           11/21/17 0915   Note Type   Note type Eval/Treat   Restrictions/Precautions   Weight Bearing Precautions Per Order No   Other Precautions Fall Risk;Pain;Multiple lines   Pain Assessment   Pain Assessment 0-10   Pain Score 5   Pain Type Acute pain   [facility]   Hospital Pain Intervention(s) Repositioned; Ambulation/increased activity; Emotional support   Home Living   Type of Home Other (Comment)  (WAS AT [facility] FOR REHAB)   Prior Function   Level of [place] Needs assistance with IADLs; Needs assistance with ADLs and functional mobility   Lives With Family   Receives Help From Family   ADL Assistance Needs assistance   IADLs Needs assistance   Vocational Retired   Comments WAS AT [address] [place] [address] PTA   Lifestyle   Autonomy REQUIRES ASSIST FOR ADLS AND MOBILITY - WAS RECEIVING REHAB AT [facility] PTA   Reciprocal Relationships SUPPORTIVE FAMILY   Service to Others RETIRED   Intrinsic Gratification MOSTLY SEDENTARY   Subjective   Subjective OFFERS NO C/O    ADL   Eating Assistance 5  Supervision/Setup   Grooming Assistance 4  Minimal Assistance   UB Bathing Assistance 4  Minimal Assistance   LB Bathing Assistance 4  Minimal Assistance   UB Dressing Assistance 4  Minimal Assistance   LB Dressing Assistance 4  Minimal Assistance   Bed Mobility   Rolling R 5  Supervision   Rolling L 5  Supervision   Supine to Sit 5  Supervision   Sit to Supine 5  Supervision   Transfers   Sit to Stand 4  Minimal assistance   Stand to Sit 4  Minimal assistance   Stand pivot 4  Minimal assistance   Functional Mobility   Functional Mobility 4  Minimal assistance   Additional items Rolling walker   Balance   Static Sitting Good   Dynamic Sitting Fair +   Static Standing Fair   Dynamic Standing Fair   Ambulatory Fair   Activity Tolerance   Activity Tolerance Patient limited by fatigue;Patient limited by pain;Treatment limited secondary to medical complications (Comment)   RUE Assessment   RUE Assessment WFL   LUE Assessment   LUE Assessment WFL   Cognition   Overall Cognitive Status WFL   Following Commands (NEEDS ENCOURAGEMENT TO PARTICIPATE)   Assessment   Limitation Decreased ADL status; Decreased endurance;Decreased self-care trans;Decreased high-level ADLs   Prognosis Good   Assessment Pt is a 81 y o  male who was admitted to [facility] on 11/19/2017 with Sepsis ([facility] [address] ), uti, malnutrition and stg IV sacral wound   Pt's problem list also includes PMH of HTN, previous surgery, cancer history and depression, laryngeal stenosis, continuous opioid dependence, anemia, asthma  At baseline pt was completing adls with assist from family/facility staff  Pt lives with family however has been receiving rehab at [facility]  Currently pt requires min assist for overall ADLS and min assist for functional mobility/transfers  Pt currently presents with impairments in the following categories -difficulty performing ADLS, difficulty performing IADLS , flat affect, decreased initiation and engagement  and health management  activity tolerance, endurance and standing balance/tolerance   These impairments, as well as pt's fatigue, pain and risk for falls  limit pt's ability to safely engage in all baseline areas of occupation, includingbathing, dressing, toileting, functional mobility/transfers, community mobility, house maintenance, social participation  and leisure activities  From OT standpoint, recommend return to inpt rehab upon D/C  OT will continue to follow to address the below stated goals  Goals   Patient Goals none stated   LTG Time Frame 10-14   Long Term Goal #1 refer to established goals below   Plan   Treatment Interventions ADL retraining;Functional transfer training; Endurance training;Patient/family training;Equipment evaluation/education; Compensatory technique education; Activityengagement   Goal Expiration Date 12/05/17   OT Frequency 3-5x/wk   Recommendation   OT Discharge Recommendation Short Term Rehab   Barthel Index   Feeding 10   Bathing 0   Grooming Score 0   Dressing Score 5   Bladder Score 0   Bowels Score 10   Toilet Use Score 5   Transfers (Bed/Chair) Score 10   Mobility (Level Surface) Score 10   Stairs Score 0   Barthel Index Score 50         OCCUPATIONAL THERAPY GOALS:    *SBA ADLS AFTER SETUP WITH USE OF ADAPTIVE DEVICES PRN  *SBA TOILETING AND CLOTHING MANAGEMENT   *SBA FUNCTIONAL MOB AND TRANSFERS TO ALL SURFACES WITH FAIR+ TO GOOD BALANCE/SAFETY FOR PARTICIPATION IN DYNAMIC ADLS   *DEMONSTRATE GOOD CARRYOVER WITH SAFE USE OF RW DURING FUNCTIONAL TASKS  *ASSESS DME NEEDS  *INCREASE ACTIVITY TOLERANCE TO 40-45 MIN FOR PARTICIPATION IN ADLS AND ENJOYABLE ACTIVITIES     * ASSIST WITH SAFE D/C RECOMMENDATIONS     Aguilar Taylor, OT

## 2017-11-21 NOTE — PLAN OF CARE
CHANGE IN BODY IMAGE     Pt/Family communicate acceptance of loss or change in body image and expresses psychological comfort and peace [address] Discharge to home or other facility with appropriate resources Progressing        INFECTION - ADULT     Absence or prevention of progression during hospitalization Progressing     Absence of fever/infection during neutropenic period Progressing        Nutrition/Hydration-ADULT     Nutrient/Hydration intake appropriate for improving, restoring or maintaining nutritional needs Progressing        PAIN - ADULT     Verbalizes/displays adequate comfort level or baseline comfort level Progressing        Potential for Falls     Patient will remain free of falls Progressing        Prexisting or High Potential for Compromised Skin Integrity     Skin integrity is maintained or improved Progressing        RESPIRATORY - ADULT     Achieves optimal ventilation and oxygenation Progressing        SAFETY ADULT     Maintain or return to baseline ADL function Progressing     Maintain or return mobility status to optimal level Progressing     Patient will remain free of falls Progressing

## 2017-11-21 NOTE — PHYSICAL THERAPY NOTE
Physical Therapy Evaluation    Patient's Name: Sol Kerns  Admitting Diagnosis  UTI (urinary tract infection) [N39 0]  Severe protein-calorie malnutrition ([email or web address] [E43]  Sepsis ([facility] ) [A41 9]    Problem List  Patient Active Problem List   Diagnosis    Asthma    Laryngeal cancer ([email or web address]    Asthma    Depression    Laryngeal stenosis    Sepsis ([email or web address]    Decubitus ulcer of sacral region, stage 4 ([place] [address] )    HCAP (healthcare-associated pneumonia)    Continuous opioid dependence ([place] [address] )    Anemia       Past Medical History  Past Medical History:   Diagnosis Date    Asthma     Hypertension     Larynx cancer ([email or web address]        Past Surgical History  Past Surgical History:   Procedure Laterality Date    EGD AND COLONOSCOPY      TRACHEOSTOMY N/A 8/25/2017    Procedure: TRACHEOSTOMY (POSSIBLE AWAKE) , MICRO DIRECT LARYNGOSCOPY, Biopsy;  Surgeon: Donnie Valentine MD;  [facility];  Service: ENT        11/21/17 0900   Note Type   Note type Eval only   Pain Assessment   Pain Assessment 0-10   Pain Score 8   Pain Type Acute pain   Pain Location Buttocks   Patient's Stated Pain Goal No pain   Hospital Pain Intervention(s) Ambulation/increased activity;Repositioned   Response to Interventions unchanged   Home Living   Type of Home Other (Comment)   Additional Comments Per pt- [facility] resides in [facility], presently at [facility] for rehab   reports ambulatory w/ RW and assist      Prior Function   Level of [place] Needs assistance with ADLs and functional mobility   Falls in the last 6 months 1 to 4   Restrictions/Precautions   Weight Bearing Precautions Per Order No   Other Precautions Impulsive;Cognitive; Chair Alarm; Bed Alarm;Multiple lines; Fall Risk;Pain   General   Family/Caregiver Present No   Cognition   Overall Cognitive Status Impaired   Arousal/Participation Responsive   Orientation Level Oriented to person;Oriented to place;Oriented to situation   Memory Unable to assess   Following Commands Follows one step commands without difficulty   RLE Assessment   RLE Assessment (strength 4-/5)   LLE Assessment   LLE Assessment (strength 4-/5)   Bed Mobility   Supine to Sit 5  Supervision   Additional items Assist x 1   Sit to Supine 5  Supervision   Additional items Assist x 1   Transfers   Sit to Stand 4  Minimal assistance   Additional items Assist x 1   Stand to Sit 4  Minimal assistance   Additional items Assist x 1   Ambulation/Elevation   Gait pattern (ataxia, lateral sway w/ LOB)   Gait Assistance 4  Minimal assist   Additional items Assist x 1   Assistive Device Rolling walker   Distance 120'   Balance   Static Sitting Good   Dynamic Sitting Fair   Static Standing Fair -   Dynamic Standing Fair -   Ambulatory Fair -   Endurance Deficit   Endurance Deficit Yes   Activity Tolerance   Activity Tolerance (fatigue, weakness, pain)   Nurse Made Aware yes   Assessment   Prognosis Fair   Problem List Decreased strength;Decreased endurance; Impaired balance;Decreased mobility; Decreased cognition; Impaired judgement;Decreased safety awareness;Pain   Assessment Pt seen for moderate complexity physical therapy evaluation  Pt is an 82 y/o male w/ history/comorbidities of laryngeal CA w/ trach, depression, HTN who is now admitted from [facility] rehab w/ fever, tachycardia  Dx include sepsis, HCAP  Pt also w/ sacral decub  Due to fall, impulsivity, and pain, note evolving clinical picture  PT consulted to assess mobility, d/c needs  Pt presents w/ decreased functional mob, standing balance, endurance, B LE strength, barriers at home  will benefit from skilled PT to correct for the above problems    Recommend return to [facility] rehab once stable   Goals   Patient Goals none stated   STG Expiration Date 12/05/17   Short Term Goal #1 1-2 wks: bed mob and transfers w/ S/indep, standing balance to good w/ device, ambulate 200 ft w/ RW and S/mod I, increase B LE strength by 1/2 -1 grade   Treatment Day 0   Plan   Treatment/Interventions LE strengthening/ROM; Functional transfer training; Endurance training; Therapeutic exercise;Equipment eval/education;Patient/family training;Bed mobility;Gait training   PT Frequency 2-3x/wk   Recommendation   Recommendation (recommend return to sn[facility] rehab when stable)   Equipment Recommended Walker   PT - OK to Discharge Yes  (back to [facility] rehab when stable)   Modified [place] Scale   Modified [place] Scale 4   Barthel Index   Feeding 10   Bathing 0   Grooming Score 0   Dressing Score 5   Bladder Score 0   Bowels Score 10   Toilet Use Score 5   Transfers (Bed/Chair) Score 5   Mobility (Level Surface) Score 0   Stairs Score 0   Barthel Index Score 35         Luz Muñoz PT, DPT, CSRS

## 2017-11-21 NOTE — PROGRESS NOTES
Progress Note - Eric Going  81 y o  male MRN: [id number]    Unit/Bed#: -01 Encounter: [id number]    * Sepsis [facility])   Assessment & Plan    · Patient with leukocytosis, fever, tachycardia, hypotension present on admission  · Recently hospitalized for 3 weeks at [facility] and [place] [facility] [place]  Reportedly already started developing fevers [place] [facility] [place] a week ago  Family tells me that patient was treated with antibiotics (not sure which one) for UTI and also to cover for bacteria cultured from the sacral ulcer recently  · Noted to have significant stage IV sacral ulcer  · Chest x-ray shows right lower lobe pneumonia, qualifies as healthcare acquired pneumonia/possible gram-negative phu pneumonia, need to question possibility of aspiration  · Continue IV cefepime and IV fluids at this time  · Blood cultures neg @ 24 hours   · UA negative after 4 days of cipro         HCAP (healthcare-associated pneumonia)   Assessment & Plan    · Continue IV cefepime, appreciate ID input   · appreciate speech input, recommend thin liquid diet         Decubitus ulcer of sacral region, stage 4 (HCC)   Assessment & Plan    · Clearly patient with very deep sacral decubitus ulcer--will ask for ID and wound care to assess, appreciate input   · Continue to turn and reposition frequently to off load weight  · Continue medication for pain  · Surgery c/s to evaluate        Laryngeal cancer [facility])   Assessment & Plan    · Status post total laryngectomy at [facility]  · Surgical site appears to be healing well  · Trach suctioning, wound care         Continuous opioid dependence ([facility] [address] )   Assessment & Plan    · Continue p r n  oxycodone for pain      · Consider changing to an around the clock dose if we are not able to adequately address his pain or if palliative medicine team feels that is the preferred method        Anemia   Assessment & Plan    · Suspected anemia of chronic disease, will monitor hgb Depression   Assessment & Plan    · Per family, unable to get situational depression controlled   · Appreciate pall med team input, Remeron added   · Am also concerned that he is underweight, albumin is low, will ask for nutrition evaluation but suspect he has protein calorie malnutrition overall, c/w supplements as patient prefers vanilla          Discussed with nutrition, consider temporary tube feeds if not candidate for solid diet  Per speech note from yesterday, will re-eval today to see if we can advance from full liq diet to solid  If not, consider tube feeding for 12 hours with possible oral diet  Has stage IV wound and hypoalbuminemia, requiring additional protein supplementation and nutritional needs  VTE Pharmacologic Prophylaxis:   Pharmacologic: Heparin  Mechanical VTE Prophylaxis in Place: Yes    Patient Centered Rounds: I have performed bedside rounds with nursing staff today  Discussions with Specialists or Other Care Team Provider: nursing     Education and Discussions with Family / Patient: patient, son in law at bedside     Time Spent for Care: 30 minutes  More than 50% of total time spent on counseling and coordination of care as described above  Current Length of Stay: 2 day(s)    Current Patient Status: Inpatient   Certification Statement: The patient will continue to require additional inpatient hospital stay due to IV abx, surgery eval, monitor PO intake     Discharge Plan: pending, will likely go back to rehab at [facility] care     Code Status: Level 1 - Full Code      Subjective:   Patient seen and examined at bedside  Patient frustrated, rude at times  No complains expect that he wants vanilla ensure not chocolate  He is using oxycodone around the clock  His son in law says he should have been home by now  He still points to right hip with pain   Son in law states facial swelling has been chronic since surgery and that it is worse when he lays down, typically getting better at the end of the day  Objective:     Vitals:   Temp (24hrs), Av 3 °F (36 8 °C), Min:98 3 °F (36 8 °C), Max:98 3 °F (36 8 °C)    HR:  [64] 64  Resp:  [18] 18  BP: (93)/(51) 93/51  SpO2:  [97 %] 97 %  Body mass index is 21 3 kg/m²  Input and Output Summary (last 24 hours): Intake/Output Summary (Last 24 hours) at 17 1023  Last data filed at 17 0550   Gross per 24 hour   Intake             1115 ml   Output             1300 ml   Net             -185 ml       Physical Exam:     Physical Exam   Constitutional: He is oriented to person, place, and time  He appears well-developed and well-nourished  No distress  HENT:   Edematous lips and chin/jaw  Trach in place  Eyes: EOM are normal  No scleral icterus  Neck: Normal range of motion  Neck supple  Cardiovascular: Normal rate, regular rhythm and normal heart sounds  No murmur heard  Pulmonary/Chest: Effort normal and breath sounds normal  No respiratory distress  He has no wheezes  He has no rales  Abdominal: Soft  Bowel sounds are normal    Musculoskeletal: Normal range of motion  He exhibits no edema  Neurological: He is alert and oriented to person, place, and time  Skin: Skin is warm and dry  Psychiatric: He has a normal mood and affect   His behavior is normal        Additional Data:     Labs:      Results from last 7 days  Lab Units 17  0435 17  2144   WBC Thousand/uL 12 86* 13 13*   HEMOGLOBIN g/dL 9 7* 9 4*   HEMATOCRIT % 29 9* 29 1*   PLATELETS Thousands/uL 252 257   LYMPHO PCT %  --  1*   MONO PCT MAN %  --  3*   EOSINO PCT MANUAL %  --  0       Results from last 7 days  Lab Units 17  0435   SODIUM mmol/L 138   POTASSIUM mmol/L 4 0   CHLORIDE mmol/L 103   CO2 mmol/L 30   BUN mg/dL 13   CREATININE mg/dL 0 68   CALCIUM mg/dL 8 0*   TOTAL PROTEIN g/dL 5 7*   BILIRUBIN TOTAL mg/dL 0 49   ALK PHOS U/L 66   ALT U/L 35   AST U/L 65*   GLUCOSE RANDOM mg/dL 95       Results from last 7 days  Lab Units 11/19/17  2143   INR  1 16       * I Have Reviewed All Lab Data Listed Above  * Additional Pertinent Lab Tests Reviewed: Franky 66 Admission Reviewed    Imaging:    Imaging Reports Reviewed Today Include: cxr   Imaging Personally Reviewed by Myself Includes:  none    Recent Cultures (last 7 days):       Results from last 7 days  Lab Units 11/20/17  1738 11/20/17  1314 11/19/17  2147 11/19/17 2143   BLOOD CULTURE   --   --  No Growth at 24 hrs  No Growth at 24 hrs  SPUTUM CULTURE  Culture too young- will reincubate  --   --   --    GRAM STAIN RESULT  Rare Polys  No bacteria seen  --   --   --    LEGIONELLA URINARY ANTIGEN   --  Negative  --   --        Last 24 Hours Medication List:     acetaminophen 975 mg Oral Q8H [address]   budesonide-formoterol 2 puff Inhalation BID   calcium carbonate-vitamin D 1 tablet Oral Daily With Breakfast   cefepime 2,000 mg Intravenous Q12H   collagenase  Topical Daily   fluticasone 1 spray Each Nare BID   fluticasone 2 puff Inhalation BID   guaiFENesin 600 mg Oral Q12H JAMES   heparin (porcine) 5,000 Units Subcutaneous Q12H [address]   melatonin 3 mg Oral HS   mirtazapine 15 mg Oral HS   pantoprazole 40 mg Oral Early Morning   senna-docusate sodium 1 tablet Oral HS        Today, Patient Was Seen By: Satish Moore PA-C    ** Please Note: Dragon 360 Dictation voice to text software may have been used in the creation of this document   **

## 2017-11-21 NOTE — CASE MANAGEMENT
[address] [facility] in the [facility] by Gerry Jacobson for 2017  Network Utilization Review Department  Phone: [phone]; Fax [phone]  ATTENTION: The Network Utilization Review Department is now centralized for our 7 Facilities  Make a note that we have a new phone and fax numbers for our Department  Please call with any questions or concerns to [phone] and carefully follow the prompts so that you are directed to the right person  All voicemails are confidential  Fax any determinations, approvals, denials, and requests for initial or continue stay review clinical to [phone]  Due to HIGH CALL volume, it would be easier if you could please send faxed requests to expedite your requests and in part, help us provide discharge notifications faster  Continued Stay Review    Date: 11/21/17 Tuesday ACUTE MED SURG LEVEL OF CARE    Vital Signs: BP 93/51   Pulse 64   Temp 98 3 °F (36 8 °C) (Axillary)   Resp 18   Ht 5' 7" (1 702 m)   Wt 61 7 kg (136 lb)   SpO2 97%   BMI 21 30 kg/m²     Diet Surgical; Full Liquid;  Full Liquid     Dietary nutrition supplements Ensure Enlive TID with Meals + HS      Continuous IV Infusions:   sodium chloride 100 mL/hr Last Rate: 100 mL/hr (11/21/17 0859)       Medications:   Scheduled Meds:   acetaminophen 975 mg Oral Q8H [address]   budesonide-formoterol 2 puff Inhalation BID   calcium carbonate-vitamin D 1 tablet Oral Daily With Breakfast   cefepime 2,000 mg Intravenous Q12H   collagenase  Topical Daily   fluticasone 1 spray Each Nare BID   fluticasone 2 puff Inhalation BID   guaiFENesin 600 mg Oral Q12H [address]   heparin (porcine) 5,000 Units Subcutaneous Q12H [address]   melatonin 3 mg Oral HS   mirtazapine 15 mg Oral HS   pantoprazole 40 mg Oral Early Morning   senna-docusate sodium 1 tablet Oral HS     PRN Meds:      albuterol    oxyCODONE 10 mg q4hrs prn given x 3/ 24 hrs    polyethylene glycol      LABS/Diagnostic Results:   CBC Results from last 7 days  Lab Units 11/20/17  0435 11/19/17  2144   WBC Thousand/uL 12 86* 13 13*   HEMOGLOBIN g/dL 9 7* 9 4*   HEMATOCRIT % 29 9* 29 1*   PLATELETS Thousands/uL 252 257   LYMPHO PCT %  --  1*   MONO PCT MAN %  --  3*   EOSINO PCT MANUAL %  --  0      CMP  Results from last 7 days  Lab Units 11/20/17  0435   SODIUM mmol/L 138   POTASSIUM mmol/L 4 0   CHLORIDE mmol/L 103   CO2 mmol/L 30   BUN mg/dL 13   CREATININE mg/dL 0 68   CALCIUM mg/dL 8 0*   TOTAL PROTEIN g/dL 5 7*   BILIRUBIN TOTAL mg/dL 0 49   ALK PHOS U/L 66   ALT U/L 35   AST U/L 65*   GLUCOSE RANDOM mg/dL 95         Results from last 7 days  Lab Units 11/19/17  2143   INR   1 16     Results from last 7 days  Lab Units 11/20/17  1738 11/20/17  1314 11/19/17  2147 11/19/17  2143   BLOOD CULTURE    --   --  No Growth at 24 hrs  No Growth at 24 hrs  SPUTUM CULTURE   Culture too young- will reincubate  --   --   --    GRAM STAIN RESULT   Rare Polys  No bacteria seen  --   --   --    LEGIONELLA URINARY ANTIGEN    --  Negative  --           Age/Sex: 81 y o  male     Assessment/Plan:       * Sepsis ([facility] [address] )   Assessment & Plan     · Patient with leukocytosis, fever, tachycardia, hypotension present on admission  · Recently hospitalized for 3 weeks at [facility] and then was at [facility] [place]hab  Reportedly already started developing fevers at [facility] more than a week ago    Family tells me that patient was treated with antibiotics (not sure which one) for UTI and also to cover for bacteria cultured from the sacral ulcer recently  · Noted to have significant stage IV sacral ulcer  · Chest x-ray shows right lower lobe pneumonia, qualifies as healthcare acquired pneumonia/possible gram-negative phu pneumonia, need to question possibility of aspiration  · Continue IV cefepime and IV fluids at this time  · Blood cultures neg @ 24 hours   · UA negative after 4 days of cipro        HCAP (healthcare-associated pneumonia)   Assessment & Plan     · Continue IV cefepime, appreciate ID input   · appreciate speech input, recommend thin liquid diet        Decubitus ulcer of sacral region, stage 4 (HCC)   Assessment & Plan     · Clearly patient with very deep sacral decubitus ulcer--will ask for ID and wound care to assess, appreciate input   · Continue to turn and reposition frequently to off load weight  · Continue medication for pain  · Surgery c/s to evaluate       Laryngeal cancer [facility])   Assessment & Plan     · Status post total laryngectomy at [facility]  · Surgical site appears to be healing well  · Trach suctioning, wound care        Continuous opioid dependence ([facility] [address] )   Assessment & Plan     · Continue p r n  oxycodone for pain  · Consider changing to an around the clock dose if we are not able to adequately address his pain or if palliative medicine team feels that is the preferred method       Anemia   Assessment & Plan     · Suspected anemia of chronic disease, will monitor hgb       Depression   Assessment & Plan     · Per family, unable to get situational depression controlled   · Appreciate pall med team input, Remeron added   · Am also concerned that he is underweight, albumin is low, will ask for nutrition evaluation but suspect he has protein calorie malnutrition overall, c/w supplements as patient prefers vanilla          Discussed with nutrition, consider temporary tube feeds if not candidate for solid diet  Per speech note from yesterday, will re-eval today to see if we can advance from full liq diet to solid  If not, consider tube feeding for 12 hours with possible oral diet  Has stage IV wound and hypoalbuminemia, requiring additional protein supplementation and nutritional needs       VTE Pharmacologic Prophylaxis:   Pharmacologic: Heparin  Mechanical VTE Prophylaxis in Place:  Yes     Current Length of Stay: 2 day(s)     Current Patient Status: Inpatient   Certification Statement: The patient will continue to require additional inpatient hospital stay due to IV abx, surgery eval, monitor PO intake          Discharge Plan:   ANTICIPATE DISCHARGE BACK TO SKILLED REHAB FACILITY  WHEN MEDICALLY CLEARED    PER PT TODAY 11/21/17  Plan   Treatment/Interventions LE strengthening/ROM; Functional transfer training; Endurance training; Therapeutic exercise;Equipment eval/education;Patient/family training;Bed mobility;Gait training   PT Frequency 2-3x/wk   Recommendation   Recommendation (recommend return to snf rehab when stable)     CASE MANAGEMENT FOLLOWING CLOSELY FOR ALL DISCHARGE NEEDS

## 2017-11-21 NOTE — ASSESSMENT & PLAN NOTE
· Per family, unable to get situational depression controlled   · Appreciate pall med team input, Remeron added   · Am also concerned that he is underweight, albumin is low, will ask for nutrition evaluation but suspect he has protein calorie malnutrition overall, c/w supplements as patient prefers vanilla

## 2017-11-21 NOTE — ASSESSMENT & PLAN NOTE
· Patient with leukocytosis, fever, tachycardia, hypotension present on admission  · Recently hospitalized for 3 weeks at [facility] and then [place] [facility] [place]  Reportedly already started developing fevers [place] [facility] [place] ago    Family tells me that patient was treated with antibiotics (not sure which one) for UTI and also to cover for bacteria cultured from the sacral ulcer recently  · Noted to have significant stage IV sacral ulcer  · Chest x-ray shows right lower lobe pneumonia, qualifies as healthcare acquired pneumonia/possible gram-negative phu pneumonia, need to question possibility of aspiration  · Continue IV cefepime and IV fluids at this time  · Blood cultures neg @ 24 hours   · UA negative after 4 days of cipro

## 2017-11-21 NOTE — PLAN OF CARE
Problem: PHYSICAL THERAPY ADULT  Goal: Performs mobility at highest level of function for planned discharge setting  See evaluation for individualized goals  Treatment/Interventions: LE strengthening/ROM, Functional transfer training, Endurance training, Therapeutic exercise, Equipment eval/education, Patient/family training, Bed mobility, Gait training  Equipment Recommended: Lesly Calix       See flowsheet documentation for full assessment, interventions and recommendations  Prognosis: Fair  Problem List: Decreased strength, Decreased endurance, Impaired balance, Decreased mobility, Decreased cognition, Impaired judgement, Decreased safety awareness, Pain  Assessment: Pt seen for moderate complexity physical therapy evaluation  Pt is an 82 y/o male w/ history/comorbidities of laryngeal CA w/ trach, depression, HTN who is now admitted from snf rehab w/ fever, tachycardia  Dx include sepsis, HCAP  Pt also w/ sacral decub  Due to fall, impulsivity, and pain, note evolving clinical picture  PT consulted to assess mobility, d/c needs  Pt presents w/ decreased functional mob, standing balance, endurance, B LE strength, barriers at home  will benefit from skilled PT to correct for the above problems  Recommend return to snf rehab once stable        Recommendation:  (recommend return to snf rehab when stable)     PT - OK to Discharge: (S) Yes (back to snf rehab when stable)    See flowsheet documentation for full assessment

## 2017-11-22 ENCOUNTER — ANESTHESIA (INPATIENT)
Dept: PERIOP | Facility: HOSPITAL | Age: 82
DRG: 853 | End: 2017-11-22
Payer: COMMERCIAL

## 2017-11-22 ENCOUNTER — ANESTHESIA EVENT (INPATIENT)
Dept: PERIOP | Facility: HOSPITAL | Age: 82
DRG: 853 | End: 2017-11-22
Payer: COMMERCIAL

## 2017-11-22 PROBLEM — E43 SEVERE PROTEIN-CALORIE MALNUTRITION (HCC): Status: ACTIVE | Noted: 2017-11-22

## 2017-11-22 PROCEDURE — 87176 TISSUE HOMOGENIZATION CULTR: CPT | Performed by: SURGERY

## 2017-11-22 PROCEDURE — 0JB70ZZ EXCISION OF BACK SUBCUTANEOUS TISSUE AND FASCIA, OPEN APPROACH: ICD-10-PCS | Performed by: SURGERY

## 2017-11-22 PROCEDURE — 87070 CULTURE OTHR SPECIMN AEROBIC: CPT | Performed by: SURGERY

## 2017-11-22 PROCEDURE — 87077 CULTURE AEROBIC IDENTIFY: CPT | Performed by: SURGERY

## 2017-11-22 PROCEDURE — 87075 CULTR BACTERIA EXCEPT BLOOD: CPT | Performed by: SURGERY

## 2017-11-22 PROCEDURE — 87205 SMEAR GRAM STAIN: CPT | Performed by: SURGERY

## 2017-11-22 PROCEDURE — 87147 CULTURE TYPE IMMUNOLOGIC: CPT | Performed by: SURGERY

## 2017-11-22 PROCEDURE — 87186 SC STD MICRODIL/AGAR DIL: CPT | Performed by: SURGERY

## 2017-11-22 RX ORDER — ONDANSETRON 2 MG/ML
4 INJECTION INTRAMUSCULAR; INTRAVENOUS ONCE AS NEEDED
Status: DISCONTINUED | OUTPATIENT
Start: 2017-11-22 | End: 2017-11-22 | Stop reason: HOSPADM

## 2017-11-22 RX ORDER — FENTANYL CITRATE 50 UG/ML
INJECTION, SOLUTION INTRAMUSCULAR; INTRAVENOUS AS NEEDED
Status: DISCONTINUED | OUTPATIENT
Start: 2017-11-22 | End: 2017-11-22 | Stop reason: SURG

## 2017-11-22 RX ORDER — PROPOFOL 10 MG/ML
INJECTION, EMULSION INTRAVENOUS AS NEEDED
Status: DISCONTINUED | OUTPATIENT
Start: 2017-11-22 | End: 2017-11-22 | Stop reason: SURG

## 2017-11-22 RX ORDER — LIDOCAINE HYDROCHLORIDE 10 MG/ML
INJECTION, SOLUTION INFILTRATION; PERINEURAL AS NEEDED
Status: DISCONTINUED | OUTPATIENT
Start: 2017-11-22 | End: 2017-11-22 | Stop reason: SURG

## 2017-11-22 RX ORDER — ONDANSETRON 2 MG/ML
INJECTION INTRAMUSCULAR; INTRAVENOUS AS NEEDED
Status: DISCONTINUED | OUTPATIENT
Start: 2017-11-22 | End: 2017-11-22 | Stop reason: SURG

## 2017-11-22 RX ORDER — METRONIDAZOLE 500 MG/1
500 TABLET ORAL EVERY 8 HOURS SCHEDULED
Status: DISCONTINUED | OUTPATIENT
Start: 2017-11-22 | End: 2017-11-24

## 2017-11-22 RX ORDER — SODIUM CHLORIDE, SODIUM LACTATE, POTASSIUM CHLORIDE, CALCIUM CHLORIDE 600; 310; 30; 20 MG/100ML; MG/100ML; MG/100ML; MG/100ML
INJECTION, SOLUTION INTRAVENOUS CONTINUOUS PRN
Status: DISCONTINUED | OUTPATIENT
Start: 2017-11-22 | End: 2017-11-22 | Stop reason: SURG

## 2017-11-22 RX ORDER — EPHEDRINE SULFATE 50 MG/ML
INJECTION, SOLUTION INTRAVENOUS AS NEEDED
Status: DISCONTINUED | OUTPATIENT
Start: 2017-11-22 | End: 2017-11-22 | Stop reason: SURG

## 2017-11-22 RX ORDER — SODIUM CHLORIDE, SODIUM LACTATE, POTASSIUM CHLORIDE, CALCIUM CHLORIDE 600; 310; 30; 20 MG/100ML; MG/100ML; MG/100ML; MG/100ML
50 INJECTION, SOLUTION INTRAVENOUS CONTINUOUS
Status: DISCONTINUED | OUTPATIENT
Start: 2017-11-22 | End: 2017-11-23

## 2017-11-22 RX ORDER — MAGNESIUM HYDROXIDE 1200 MG/15ML
LIQUID ORAL AS NEEDED
Status: DISCONTINUED | OUTPATIENT
Start: 2017-11-22 | End: 2017-11-22 | Stop reason: HOSPADM

## 2017-11-22 RX ADMIN — GUAIFENESIN 600 MG: 600 TABLET, EXTENDED RELEASE ORAL at 20:22

## 2017-11-22 RX ADMIN — PROPOFOL 50 MG: 10 INJECTION, EMULSION INTRAVENOUS at 12:43

## 2017-11-22 RX ADMIN — METRONIDAZOLE 500 MG: 500 TABLET ORAL at 18:02

## 2017-11-22 RX ADMIN — LIDOCAINE HYDROCHLORIDE 50 MG: 10 INJECTION, SOLUTION INFILTRATION; PERINEURAL at 12:42

## 2017-11-22 RX ADMIN — OXYCODONE HYDROCHLORIDE 10 MG: 10 TABLET ORAL at 19:51

## 2017-11-22 RX ADMIN — OXYCODONE HYDROCHLORIDE 10 MG: 10 TABLET ORAL at 21:12

## 2017-11-22 RX ADMIN — Medication 1 TABLET: at 21:21

## 2017-11-22 RX ADMIN — PANTOPRAZOLE SODIUM 40 MG: 40 TABLET, DELAYED RELEASE ORAL at 06:29

## 2017-11-22 RX ADMIN — ACETAMINOPHEN 975 MG: 325 TABLET ORAL at 06:29

## 2017-11-22 RX ADMIN — FLUTICASONE PROPIONATE 1 SPRAY: 50 SPRAY, METERED NASAL at 17:16

## 2017-11-22 RX ADMIN — OXYCODONE HYDROCHLORIDE 10 MG: 10 TABLET ORAL at 17:16

## 2017-11-22 RX ADMIN — SODIUM CHLORIDE, SODIUM LACTATE, POTASSIUM CHLORIDE, AND CALCIUM CHLORIDE: .6; .31; .03; .02 INJECTION, SOLUTION INTRAVENOUS at 12:30

## 2017-11-22 RX ADMIN — METRONIDAZOLE 500 MG: 500 TABLET ORAL at 21:21

## 2017-11-22 RX ADMIN — CALCIUM CARBONATE 500 MG (1,250 MG)-VITAMIN D3 200 UNIT TABLET 1 TABLET: at 06:30

## 2017-11-22 RX ADMIN — OXYCODONE HYDROCHLORIDE 10 MG: 10 TABLET ORAL at 06:29

## 2017-11-22 RX ADMIN — PROPOFOL 50 MG: 10 INJECTION, EMULSION INTRAVENOUS at 12:42

## 2017-11-22 RX ADMIN — BUDESONIDE AND FORMOTEROL FUMARATE DIHYDRATE 2 PUFF: 160; 4.5 AEROSOL RESPIRATORY (INHALATION) at 19:52

## 2017-11-22 RX ADMIN — MIRTAZAPINE 15 MG: 15 TABLET, FILM COATED ORAL at 21:21

## 2017-11-22 RX ADMIN — ONDANSETRON 4 MG: 2 INJECTION INTRAMUSCULAR; INTRAVENOUS at 13:04

## 2017-11-22 RX ADMIN — EPHEDRINE SULFATE 10 MG: 50 INJECTION, SOLUTION INTRAMUSCULAR; INTRAVENOUS; SUBCUTANEOUS at 12:42

## 2017-11-22 RX ADMIN — FLUTICASONE PROPIONATE 2 PUFF: 110 AEROSOL, METERED RESPIRATORY (INHALATION) at 19:55

## 2017-11-22 RX ADMIN — OXYCODONE HYDROCHLORIDE 10 MG: 10 TABLET ORAL at 10:46

## 2017-11-22 RX ADMIN — SODIUM CHLORIDE 100 ML/HR: 0.9 INJECTION, SOLUTION INTRAVENOUS at 06:45

## 2017-11-22 RX ADMIN — FENTANYL CITRATE 50 MCG: 50 INJECTION, SOLUTION INTRAMUSCULAR; INTRAVENOUS at 12:42

## 2017-11-22 NOTE — PLAN OF CARE
Problem: DISCHARGE PLANNING - CARE MANAGEMENT  Goal: Discharge to post-acute care or home with appropriate resources  INTERVENTIONS:  - Conduct assessment to determine patient/family and health care team treatment goals, and need for post-acute services based on payer coverage, community resources, and patient preferences, and barriers to discharge  - Address psychosocial, clinical, and financial barriers to discharge as identified in assessment in conjunction with the patient/family and health care team  - Arrange appropriate level of post-acute services according to patient's   needs and preference and payer coverage in collaboration with the physician and health care team  - Communicate with and update the patient/family, physician, and health care team regarding progress on the discharge plan  - Arrange appropriate transportation to post-acute venues  -Assist patient and family with making referrals for DME, potential wound vac, inpatient rehab    Outcome: Progressing

## 2017-11-22 NOTE — ANESTHESIA POSTPROCEDURE EVALUATION
Post-Op Assessment Note      CV Status:  Stable    Mental Status:  Alert and awake    Hydration Status:  Euvolemic    PONV Controlled:  Controlled    Airway Patency:  Patent    Post Op Vitals Reviewed: Yes          Staff: CRNA, Anesthesiologist           /66 (11/22/17 1320)    Temp 98 5 °F (36 9 °C) (11/22/17 1320)    Pulse 71 (11/22/17 1320)   Resp 14 (11/22/17 1320)    SpO2 100 % (11/22/17 1320)

## 2017-11-22 NOTE — ASSESSMENT & PLAN NOTE
· Per family, unable to get situational depression controlled   · Appreciate pall med team input, Remeron added

## 2017-11-22 NOTE — ASSESSMENT & PLAN NOTE
· Patient with leukocytosis, fever, tachycardia, POA  · Suspected secondary to PNA vs sacral ulcer infx  · Chest x-ray shows right lower lobe pneumonia  · Blood cultures negative  · ID following  · Continue cefepime, day 4

## 2017-11-22 NOTE — PLAN OF CARE

## 2017-11-22 NOTE — ASSESSMENT & PLAN NOTE
· Evidenced by 25% weight loss x 6 months, <75% energy intake, muscle loss  · If unable to meet calorie needs po, will have to consider enteral nutrition  · Goals of care discussion ongoing

## 2017-11-22 NOTE — ANESTHESIA PREPROCEDURE EVALUATION
Review of Systems/Medical History  Patient summary reviewed  Chart reviewed  No history of anesthetic complications     Cardiovascular  EKG reviewed, Exercise tolerance: poor,  Hypertension controlled,    Pulmonary  Pneumonia, Asthma: poorly controlled Last rescue: < 1 week ago Asthma type of rescue: daily nebulizer, ,        GI/Hepatic            Endo/Other     GYN       Hematology  Anemia ,     Musculoskeletal       Neurology   Psychology   Depression ,            Physical Exam    Airway       Dental       Cardiovascular      Pulmonary  Decreased breath sounds,     Other Findings        Anesthesia Plan  ASA Score- 3       Anesthesia Type- general with ASA Monitors  Additional Monitors:   Airway Plan:     Comment: Plan to replace existing tracheostomy device with 6 cuffed Shiley in OR to allow machine ventilation and GA          Induction- intravenous  Informed Consent- Anesthetic plan and risks discussed with patient

## 2017-11-22 NOTE — PROGRESS NOTES
Progress Note - General Surgery   Don Ríos  81 y o  male MRN: [id number]  Unit/Bed#: MS Mac-01 Encounter: [id number]  11/22/17  5:35 AM          Assessment:  80 yo M with sacral wound, r/o sacral abscess, OR today     Plan:  - NPO for procedure  - continue local wound care with maxorb, mepilex dressings per wound care  - frequent turning and repositioning  - continue antibiotics per ID  - no need for imaging at this time      Subjective: patient seen and examined at his bed, patient is poor historian, answer questions with only yes or no, denies any events overnight  Objective:     Blood pressure 124/61, pulse 70, temperature 98 5 °F (36 9 °C), temperature source Oral, resp  rate 18, height 5' 7" (1 702 m), weight 61 7 kg (136 lb), SpO2 96 %  ,Body mass index is 21 3 kg/m²  Intake/Output Summary (Last 24 hours) at 11/22/17 0535  Last data filed at 11/22/17 0522   Gross per 24 hour   Intake              300 ml   Output             2000 ml   Net            -1700 ml       Invasive Devices     Peripheral Intravenous Line            Peripheral IV 11/19/17 Left Forearm 2 days    Peripheral IV 11/20/17 Right Antecubital 2 days          Airway            Surgical Airway 88 days                Physical Exam: /61   Pulse 70   Temp 98 5 °F (36 9 °C) (Oral)   Resp 18   Ht 5' 7" (1 702 m)   Wt 61 7 kg (136 lb)   SpO2 96%   BMI 21 30 kg/m²     General Appearance:    cooperative, no distress, appears stated age   Lungs:     Clear to auscultation bilaterally, respirations unlabored   Heart:    Regular rate and rhythm, S1 and S2 normal, no murmur, rub   or gallop   Abdomen:     Soft, non-tender, bowel sounds active all four quadrants,     no masses, no organomegaly     Back:   Decubitus ulcer   Neurologic:   CNII-XII intact  Normal strength, sensation and reflexes       throughout       Lab, Imaging and other studies:  I have personally reviewed pertinent lab results    , CBC:   Lab Results   Component Value Date    WBC 4 95 11/21/2017    HGB 10 7 (L) 11/21/2017    HCT 34 0 (L) 11/21/2017    MCV 99 (H) 11/21/2017     11/21/2017    MCH 31 3 11/21/2017    MCHC 31 5 11/21/2017    RDW 16 5 (H) 11/21/2017    MPV 8 5 (L) 11/21/2017    NRBC 0 11/21/2017   , CMP:   Lab Results   Component Value Date     11/21/2017    K 4 0 11/21/2017     11/21/2017    CO2 29 11/21/2017    ANIONGAP 5 11/21/2017    BUN 14 11/21/2017    CREATININE 0 75 11/21/2017    GLUCOSE 128 11/21/2017    CALCIUM 8 2 (L) 11/21/2017    EGFR 86 11/21/2017   , Coagulation: No results found for: PT, INR, APTT, Urinalysis: No results found for: COLORU, CLARITYU, SPECGRAV, PHUR, LEUKOCYTESUR, NITRITE, PROTEINUA, GLUCOSEU, KETONESU, BILIRUBINUR, BLOODU, Amylase: No results found for: AMYLASE, Lipase: No results found for: LIPASE  VTE Pharmacologic Prophylaxis: Heparin  VTE Mechanical Prophylaxis: sequential compression device    Current Facility-Administered Medications:     acetaminophen (TYLENOL) tablet 975 mg, 975 mg, Oral, Q8H [address], Yamil Coreas MD, 975 mg at 11/21/17 2210    albuterol (PROVENTIL HFA,VENTOLIN HFA) inhaler 2 puff, 2 puff, Inhalation, Q4H PRN, Gilmer Sharyn, DO    budesonide-formoterol (SYMBICORT) 160-4 5 mcg/act inhaler 2 puff, 2 puff, Inhalation, BID, Sandi Amin MD, 2 puff at 11/21/17 2204    calcium carbonate-vitamin D (OSCAL-D) 500 mg-200 units per tablet 1 tablet, 1 tablet, Oral, Daily With Breakfast, Sandi Amin MD, 1 tablet at 11/21/17 2702    cefepime (MAXIPIME) 2,000 mg in dextrose 5 % 50 mL IVPB, 2,000 mg, Intravenous, Q12H, Sandi Amin MD, Last Rate: 100 mL/hr at 11/21/17 2359, 2,000 mg at 11/21/17 2359    collagenase (SANTYL) ointment, , Topical, Daily, Sandi Amin MD    fluticasone (FLONASE) 50 mcg/act nasal spray 1 spray, 1 spray, Each Nare, BID, Sandi Amin MD, 1 spray at 11/20/17 2125    fluticasone (FLOVENT HFA) 110 MCG/ACT inhaler 2 puff, 2 puff, Inhalation, BID, Roxana Vidya Miranda MD, 2 puff at 11/21/17 2204    guaiFENesin (MUCINEX) 12 hr tablet 600 mg, 600 mg, Oral, Q12H [address], Mone Garcia MD, 600 mg at 11/21/17 2210    heparin (porcine) subcutaneous injection 5,000 Units, 5,000 Units, Subcutaneous, Q12H [address], 5,000 Units at 11/21/17 2210 **AND** Platelet count, , , Once, Mone Garcia MD    melatonin tablet 3 mg, 3 mg, Oral, HS, Mone Garcia MD, 3 mg at 11/21/17 2210    mirtazapine (REMERON) tablet 15 mg, 15 mg, Oral, HS, Williams Briceno MD, 15 mg at 11/21/17 2210    oxyCODONE (ROXICODONE) immediate release tablet 10 mg, 10 mg, Oral, Q2H PRN, Laura Giraldo DO    oxyCODONE (ROXICODONE) immediate release tablet 10 mg, 10 mg, Oral, Q4H While Awake, Laura Giraldo DO, 10 mg at 11/21/17 2210    pantoprazole (PROTONIX) EC tablet 40 mg, 40 mg, Oral, Early Morning, Mone Garcia MD, 40 mg at 11/21/17 5989    polyethylene glycol (MIRALAX) packet 17 g, 17 g, Oral, Daily PRN, Mone Garcia MD    senna-docusate sodium (SENOKOT S) 8 6-50 mg per tablet 1 tablet, 1 tablet, Oral, HS, Mone Garcia MD, 1 tablet at 11/21/17 2210    sodium chloride 0 9 % infusion, 100 mL/hr, Intravenous, Continuous, Mone Garcia MD, Last Rate: 100 mL/hr at 11/21/17 2021, 100 mL/hr at 11/21/17 [address]

## 2017-11-22 NOTE — PROGRESS NOTES
Unable to obtain IV access at this time  Patient's prior IV access has infiltrated  SLIM aware  Multiple attempts made my multiple RNs  Blood return noted on insertion however, IV catheter cannot be advanced fully, nor flushed  Patient supposed to go to the OR later this afternoon

## 2017-11-22 NOTE — OP NOTE
OPERATIVE REPORT  PATIENT NAME: Sourav Wang  :  1935  MRN: [id number]  Pt Location:  OR ROOM 06    SURGERY DATE: 2017    Surgeon(s) and Role:     * Eva Curtis DO - Primary     * Jewell Krueger MD - Assisting    Preop Diagnosis:  Decubitus ulcer of sacral region, stage 4 ([address] ) [L89 154]    Post-Op Diagnosis Codes:     * Decubitus ulcer of sacral region, stage 4 ([address] ) [L89 154]    Procedure(s) (LRB):  DEBRIDEMENT WOUND ([address] OUT), sacrum (N/A)    Specimen(s):  * No specimens in log *    Estimated Blood Loss:   Minimal    Drains:       Anesthesia Type:   General    Operative Indications:  Decubitus ulcer of sacral region, stage 4 ([address] ) [L89 154]      Operative Findings:  Stage 4 sacral decubitus ulcer     Complications:   None    Procedure and Technique:  The patient was seen in the holding area using armband and consent  The patient was taken to the operating room  The patient underwent general routine endotracheal anesthesia  The patient was placed in the lateral decubitus position left side down  The patient was prepped and draped in a sterile routine fashion  A preoperative pause was performed to identify patient and procedure  The sacral wound was examined and was noted to be stage IV with purulent fluid in the wound  The wound base was full purulent debris   Sharp dissection was performed with DeBakey and Metzenbaum scissors  Dissection was continued all the way to healthy bleeding tissue  Hemostasis was assured  The wound measured 2 x 1 x 1 5 cm  Half-inch Nu Gauze was placed in the wound  All counts or instruments were correct  The patient was extubated and transferred to the PACU in stable conditions  The Maday Barrientos was present throughout the entire portion of the case      Patient Disposition:  PACU     SIGNATURE: Neha Murillo MD  DATE: 2017  TIME: 1:05 PM

## 2017-11-22 NOTE — PROGRESS NOTES
Progress Note - Infectious Disease   Saba Pack  81 y o  male MRN: [id number]  Unit/Bed#: -01 Encounter: [id number]    Assessment/Plan:     Saba Pack  is a 81y o  year old male who with a history of laryngeal SCC s/p recent laryngectomy presenting from nursing facility w/ sepsis w/ CXR infiltrates and deep sacral decubitus ulcer       Cultures  Blood cx x2: NGx48H  Sputum: Rare polys, no bacteria  Follow-up culture  MRSA nares: negative      Antibiotics   Cefepime  - current      1  Sepsis POA: tachycardia, fevers, leukocytosis  Lactic acid 1 4, WBC initially 13 13  Unclear source, PNA v  sacral decub infection  CXR w/ new right basilar patchy consolidation  · Afebrile x48H SBP soft trending 90s-100s   Non-toxic appearing  · WBC 4 95 () < 12 86 ()   · Blood culture from admission w/ NGx48H x2  · Continue Vksmtdas0x q12H     2  HCAP PNA w/ risk factors pseudomonas: CXR w/ new right basilar patchy consolidation suspsisous for PNA  · HCAP risk factors: resident nursing facility, recent hospitalization, wound care  · MDR risk factors: non-ambulatory, recent hospitalization/antibiotics    · Sputum w/ rare polys, rare bacteria  Legionella, strep PNA negative  · Per speech/swallow eval no signs/symptoms of aspiration   · Continue treatment with Cefepime for Group 2 HCAP/concern gram-negative PNA      3  Decubitus ulcer of sacral region   · Per wound care complete yellow sloughing of open wound  · Per surgery plan for OR washout/debridement today     4  UTI - treated outpatient with cipro x4 days    · UA negative after 4 days cipro  No flank tenderness/other suggestion pyelo currently         Subjective/Objective   Chief Complaint: Back pain     Subjective: Patient NPO for OR today  He denies fever/chills/nausea/CP/SOB/abdominal pain       Objective:     HR:  [70] 70  Resp:  [18] 18  BP: (112-124)/(60-61) 112/60  SpO2:  [96 %] 96 %  Temp (24hrs), Av 3 °F (36 8 °C), Min:98 1 °F (36 7 °C), Max:98 5 °F (36 9 °C)  Current: Temperature: 98 1 °F (36 7 °C)    Physical Exam:   Physical Exam   Constitutional: No distress  HENT:   Head: Normocephalic and atraumatic  Cardiovascular: Normal rate and regular rhythm  Pulmonary/Chest: Effort normal and breath sounds normal    Trach capped    Abdominal: Soft  Bowel sounds are normal    Musculoskeletal: He exhibits no edema  Skin: Skin is warm and dry  He is not diaphoretic  Invasive Devices     Peripheral Intravenous Line            Peripheral IV 11/19/17 Left Forearm 2 days    Peripheral IV 11/20/17 Right Antecubital 2 days          Airway            Surgical Airway 88 days                Lab, Imaging and other studies: I have personally reviewed pertinent reports

## 2017-11-22 NOTE — PROGRESS NOTES
Spoke with charge MICAH Gottlieb in the [address] regarding patient's IV access  Stated that they will attempt downstairs

## 2017-11-22 NOTE — ASSESSMENT & PLAN NOTE
· Chest x-ray with evidence for RLL pneumonia  · At risk for gram negative given recent hospitalization, SNF  · Continue cefepime day 4  · ID following

## 2017-11-22 NOTE — PROGRESS NOTES
Progress Note - Nacho Loveless  81 y o  male MRN: [id number]    Unit/Bed#: OR POOL Encounter: [id number]    * Sepsis [facility])   Assessment & Plan    · Patient with leukocytosis, fever, tachycardia, POA  · Suspected secondary to PNA vs sacral ulcer infx  · Chest x-ray shows right lower lobe pneumonia  · Blood cultures negative  · ID following  · Continue cefepime, day 4        HCAP (healthcare-associated pneumonia)   Assessment & Plan    · Chest x-ray with evidence for RLL pneumonia  · At risk for gram negative given recent hospitalization, SNF  · Continue cefepime day 4  · ID following        Decubitus ulcer of sacral region, stage 4 (HCC)   Assessment & Plan    · Surgery following  · To OR today for washout/debridement        Severe protein-calorie malnutrition (HCC)   Assessment & Plan    · Evidenced by 25% weight loss x 6 months, <75% energy intake, muscle loss  · If unable to meet calorie needs po, will have to consider enteral nutrition  · Goals of care discussion ongoing        Anemia   Assessment & Plan    · Hgb stable        Continuous opioid dependence (HCC)   Assessment & Plan    · Palliative managing pain, appreciate input        Depression   Assessment & Plan    · Per family, unable to get situational depression controlled   · Appreciate pall med team input, Remeron added         Laryngeal cancer ([facility] [address] )   Assessment & Plan    · Status post [facility]  · Continue trach care          VTE Pharmacologic Prophylaxis:   Pharmacologic: Heparin  Mechanical VTE Prophylaxis in Place: Yes    Patient Centered Rounds: I have performed bedside rounds with nursing staff today  Discussions with Specialists or Other Care Team Provider: None    Education and Discussions with Family / Patient: Patient  Also discussed with artem Flaherty  Time Spent for Care: 45 minutes  More than 50% of total time spent on counseling and coordination of care as described above      Current Length of Stay: 3 day(s)    Current Patient Status: Inpatient   Certification Statement: The patient will continue to require additional inpatient hospital stay due to sepsis, IV antibiotics  Discharge Plan: None yet  Likely not for a few days  Code Status: Level 1 - Full Code      Subjective:   Denies cough, SOB  No current complaints  Objective:     Vitals:   Temp (24hrs), Av 3 °F (36 8 °C), Min:98 1 °F (36 7 °C), Max:98 5 °F (36 9 °C)    HR:  [70] 70  Resp:  [18] 18  BP: (112-124)/(60-61) 112/60  SpO2:  [96 %] 96 %  Body mass index is 21 3 kg/m²  Input and Output Summary (last 24 hours): Intake/Output Summary (Last 24 hours) at 17 1230  Last data filed at 17 0902   Gross per 24 hour   Intake          4141 67 ml   Output             2450 ml   Net          1691 67 ml       Physical Exam:     Physical Exam   Constitutional: He is oriented to person, place, and time  No distress  HENT:   Head: Normocephalic and atraumatic  Eyes: No scleral icterus  Neck:   Trach in place   Cardiovascular: Normal rate, regular rhythm and normal heart sounds  Pulmonary/Chest: Effort normal and breath sounds normal  No respiratory distress  He has no wheezes  He has no rales  Musculoskeletal: He exhibits no edema  Neurological: He is alert and oriented to person, place, and time  Communicates by writing   Skin: Skin is warm and dry  He is not diaphoretic  Psychiatric: He has a normal mood and affect   His behavior is normal        Additional Data:     Labs:      Results from last 7 days  Lab Units 17  1253   WBC Thousand/uL 4 95   HEMOGLOBIN g/dL 10 7*   HEMATOCRIT % 34 0*   PLATELETS Thousands/uL 280   NEUTROS PCT % 85*   LYMPHS PCT % 8*   MONOS PCT % 4   EOS PCT % 3       Results from last 7 days  Lab Units 17  1253 17  0435   SODIUM mmol/L 138 138   POTASSIUM mmol/L 4 0 4 0   CHLORIDE mmol/L 104 103   CO2 mmol/L 29 30   BUN mg/dL 14 13   CREATININE mg/dL 0 75 0 68   CALCIUM mg/dL 8 2* 8 0*   TOTAL PROTEIN g/dL  --  5 7*   BILIRUBIN TOTAL mg/dL  --  0 49   ALK PHOS U/L  --  66   ALT U/L  --  35   AST U/L  --  65*   GLUCOSE RANDOM mg/dL 128 95       Results from last 7 days  Lab Units 11/19/17  2143   INR  1 16       * I Have Reviewed All Lab Data Listed Above  * Additional Pertinent Lab Tests Reviewed: All Labs Within Last 24 Hours Reviewed    Imaging:    Imaging Reports Reviewed Today Include: None  Imaging Personally Reviewed by Myself Includes:  None    Recent Cultures (last 7 days):       Results from last 7 days  Lab Units 11/20/17  1738 11/20/17  1314 11/19/17  2147 11/19/17 2143   BLOOD CULTURE   --   --  No Growth at 48 hrs  No Growth at 48 hrs  SPUTUM CULTURE  1+ Growth of Staphylococcus aureus*  --   --   --    GRAM STAIN RESULT  Rare Polys  No bacteria seen  --   --   --    LEGIONELLA URINARY ANTIGEN   --  Negative  --   --        Last 24 Hours Medication List:     [MAR Hold] acetaminophen 975 mg Oral Q8H [address]   [MAR Hold] budesonide-formoterol 2 puff Inhalation BID   [MAR Hold] calcium carbonate-vitamin D 1 tablet Oral Daily With Breakfast   [MAR Hold] cefepime 2,000 mg Intravenous Q12H   [MAR Hold] collagenase  Topical Daily   [MAR Hold] fluticasone 1 spray Each Nare BID   [MAR Hold] fluticasone 2 puff Inhalation BID   [MAR Hold] guaiFENesin 600 mg Oral Q12H [address]   [MAR Hold] heparin (porcine) 5,000 Units Subcutaneous Q12H [address]   [MAR Hold] melatonin 3 mg Oral HS   [MAR Hold] mirtazapine 15 mg Oral HS   [MAR Hold] oxyCODONE 10 mg Oral Q4H While Awake   [MAR Hold] pantoprazole 40 mg Oral Early Morning   [MAR Hold] senna-docusate sodium 1 tablet Oral HS        Today, Patient Was Seen By: Maddy Hewitt PA-C    ** Please Note: Dragon 360 Dictation voice to text software may have been used in the creation of this document   **

## 2017-11-22 NOTE — SOCIAL WORK
Patient not in room, in OR, CM called patient's daughter Krystina,explained CM role with introduction  6months prior, patient was independent, with ADL's trach care, chores, driving, since October 18th, after total Larynjectomy, patient's functioning ability has declined  Patient was at [facility]   For 3 weeks, then went to [facility] for the past 1 5 weeks  Daughter reports patient uses [facility] on [email or web address], gets most of his meds from the [place]  PCP is Dr Sam Elias, daughter reporting this is an allergist, he may need a new PCP  [facility], paperwork has been brought in on a previous admission to be copied and scanned into the computer chart  Daughter stated patient  was started on medication fro depression, was refusing meds in the past   CM called [facility], left message requesting return phone call  At 12:20 PM    CM received a return phone call from Aniceto at [facility], reporting patient had fallen twice recently at the facility, and was then afraid to ambulate without someone close by  Aniceto reports he requires assist with bathing, independent with dressing, and was unmotivated and depressed most recently  Primary  is patient's daughter [facility] [phone]  PRUDENCIO tejada follow for discharge needs

## 2017-11-22 NOTE — NUTRITION
11/22/17 1506   Recommendations/Interventions   Summary Patient NPO for OR today for washout and debridement of sacral ulcer  Patient continued with poor po intake over the past few weeks, weight loss noted  Calorie count results poor, patient meeting <25% nutritional needs  ? initiate EN to provide 75% nutritional needs  Nutrition Recommendations Other (specify); Tube Feeding Recommendation provided  (After procedure completed sugg advance diet to full liquid w/ ensure enlive TID  If pt agreeable sugg PEG placement w/ cyclic EN   Rec: Jevity 1 2 kcal @ a goal rate of 95 ml/hr with 150 ml free h2o flush every 4 hrs while EN running from 7p-7a  )

## 2017-11-23 PROBLEM — K59.00 CONSTIPATION: Status: ACTIVE | Noted: 2017-11-23

## 2017-11-23 LAB
ANION GAP SERPL CALCULATED.3IONS-SCNC: 4 MMOL/L (ref 4–13)
BACTERIA SPT RESP CULT: ABNORMAL
BUN SERPL-MCNC: 16 MG/DL (ref 5–25)
CALCIUM SERPL-MCNC: 8.3 MG/DL (ref 8.3–10.1)
CHLORIDE SERPL-SCNC: 102 MMOL/L (ref 100–108)
CO2 SERPL-SCNC: 33 MMOL/L (ref 21–32)
CREAT SERPL-MCNC: 0.67 MG/DL (ref 0.6–1.3)
ERYTHROCYTE [DISTWIDTH] IN BLOOD BY AUTOMATED COUNT: 16.5 % (ref 11.6–15.1)
GFR SERPL CREATININE-BSD FRML MDRD: 90 ML/MIN/1.73SQ M
GLUCOSE SERPL-MCNC: 92 MG/DL (ref 65–140)
GRAM STN SPEC: ABNORMAL
GRAM STN SPEC: ABNORMAL
HCT VFR BLD AUTO: 30.4 % (ref 36.5–49.3)
HGB BLD-MCNC: 9.7 G/DL (ref 12–17)
MCH RBC QN AUTO: 31.8 PG (ref 26.8–34.3)
MCHC RBC AUTO-ENTMCNC: 31.9 G/DL (ref 31.4–37.4)
MCV RBC AUTO: 100 FL (ref 82–98)
PLATELET # BLD AUTO: 329 THOUSANDS/UL (ref 149–390)
PMV BLD AUTO: 8.4 FL (ref 8.9–12.7)
POTASSIUM SERPL-SCNC: 4.5 MMOL/L (ref 3.5–5.3)
RBC # BLD AUTO: 3.05 MILLION/UL (ref 3.88–5.62)
SODIUM SERPL-SCNC: 139 MMOL/L (ref 136–145)
WBC # BLD AUTO: 5.28 THOUSAND/UL (ref 4.31–10.16)

## 2017-11-23 PROCEDURE — 92526 ORAL FUNCTION THERAPY: CPT

## 2017-11-23 PROCEDURE — 80048 BASIC METABOLIC PNL TOTAL CA: CPT | Performed by: INTERNAL MEDICINE

## 2017-11-23 PROCEDURE — 85027 COMPLETE CBC AUTOMATED: CPT | Performed by: INTERNAL MEDICINE

## 2017-11-23 RX ORDER — SENNOSIDES 8.6 MG
1 TABLET ORAL 2 TIMES DAILY
Status: DISCONTINUED | OUTPATIENT
Start: 2017-11-23 | End: 2017-11-27 | Stop reason: HOSPADM

## 2017-11-23 RX ORDER — DOCUSATE SODIUM 100 MG/1
100 CAPSULE, LIQUID FILLED ORAL 2 TIMES DAILY
Status: DISCONTINUED | OUTPATIENT
Start: 2017-11-23 | End: 2017-11-27 | Stop reason: HOSPADM

## 2017-11-23 RX ADMIN — PANTOPRAZOLE SODIUM 40 MG: 40 TABLET, DELAYED RELEASE ORAL at 06:07

## 2017-11-23 RX ADMIN — METRONIDAZOLE 500 MG: 500 TABLET ORAL at 06:08

## 2017-11-23 RX ADMIN — FLUTICASONE PROPIONATE 2 PUFF: 110 AEROSOL, METERED RESPIRATORY (INHALATION) at 11:17

## 2017-11-23 RX ADMIN — DOCUSATE SODIUM 100 MG: 100 CAPSULE, LIQUID FILLED ORAL at 11:46

## 2017-11-23 RX ADMIN — SENNOSIDES 8.6 MG: 8.6 TABLET, FILM COATED ORAL at 11:46

## 2017-11-23 RX ADMIN — OXYCODONE HYDROCHLORIDE 10 MG: 10 TABLET ORAL at 09:53

## 2017-11-23 RX ADMIN — FLUTICASONE PROPIONATE 1 SPRAY: 50 SPRAY, METERED NASAL at 09:54

## 2017-11-23 RX ADMIN — ACETAMINOPHEN 975 MG: 325 TABLET ORAL at 06:07

## 2017-11-23 RX ADMIN — OXYCODONE HYDROCHLORIDE 10 MG: 10 TABLET ORAL at 14:58

## 2017-11-23 RX ADMIN — OXYCODONE HYDROCHLORIDE 10 MG: 10 TABLET ORAL at 03:18

## 2017-11-23 RX ADMIN — OXYCODONE HYDROCHLORIDE 10 MG: 10 TABLET ORAL at 17:30

## 2017-11-23 RX ADMIN — METRONIDAZOLE 500 MG: 500 TABLET ORAL at 14:58

## 2017-11-23 RX ADMIN — ACETAMINOPHEN 975 MG: 325 TABLET ORAL at 21:14

## 2017-11-23 RX ADMIN — CALCIUM CARBONATE 500 MG (1,250 MG)-VITAMIN D3 200 UNIT TABLET 1 TABLET: at 06:37

## 2017-11-23 RX ADMIN — HEPARIN SODIUM 5000 UNITS: 5000 INJECTION, SOLUTION INTRAVENOUS; SUBCUTANEOUS at 21:17

## 2017-11-23 RX ADMIN — OXYCODONE HYDROCHLORIDE 10 MG: 10 TABLET ORAL at 21:15

## 2017-11-23 RX ADMIN — CEFEPIME HYDROCHLORIDE 2000 MG: 2 INJECTION, POWDER, FOR SOLUTION INTRAVENOUS at 11:46

## 2017-11-23 RX ADMIN — ACETAMINOPHEN 975 MG: 325 TABLET ORAL at 14:58

## 2017-11-23 RX ADMIN — MELATONIN TAB 3 MG 3 MG: 3 TAB at 21:14

## 2017-11-23 RX ADMIN — OXYCODONE HYDROCHLORIDE 10 MG: 10 TABLET ORAL at 06:07

## 2017-11-23 RX ADMIN — CEFEPIME HYDROCHLORIDE 2000 MG: 2 INJECTION, POWDER, FOR SOLUTION INTRAVENOUS at 00:56

## 2017-11-23 RX ADMIN — GUAIFENESIN 600 MG: 600 TABLET, EXTENDED RELEASE ORAL at 09:53

## 2017-11-23 RX ADMIN — FLUTICASONE PROPIONATE 2 PUFF: 110 AEROSOL, METERED RESPIRATORY (INHALATION) at 21:16

## 2017-11-23 RX ADMIN — GUAIFENESIN 600 MG: 600 TABLET, EXTENDED RELEASE ORAL at 21:15

## 2017-11-23 RX ADMIN — METRONIDAZOLE 500 MG: 500 TABLET ORAL at 21:16

## 2017-11-23 RX ADMIN — BUDESONIDE AND FORMOTEROL FUMARATE DIHYDRATE 2 PUFF: 160; 4.5 AEROSOL RESPIRATORY (INHALATION) at 09:53

## 2017-11-23 RX ADMIN — HEPARIN SODIUM 5000 UNITS: 5000 INJECTION, SOLUTION INTRAVENOUS; SUBCUTANEOUS at 09:53

## 2017-11-23 RX ADMIN — BUDESONIDE AND FORMOTEROL FUMARATE DIHYDRATE 2 PUFF: 160; 4.5 AEROSOL RESPIRATORY (INHALATION) at 21:16

## 2017-11-23 RX ADMIN — MIRTAZAPINE 15 MG: 15 TABLET, FILM COATED ORAL at 21:14

## 2017-11-23 NOTE — ASSESSMENT & PLAN NOTE
· Status post total laryngectomy at [facility]  · Continue trach care  · Appreciate speech input, advance diet to dysphagia 3 with thins

## 2017-11-23 NOTE — PROGRESS NOTES
Progress Note - General Surgery   Elijah Mobley  81 y o  male MRN: [id number]  Unit/Bed#: MS Mac-01 Encounter: [id number]  11/23/17  6:00 AM          Assessment:  80 yo M with sacral wound, r/o sacral abscess, debridement on 11/22     Plan:    - continue local wound care with maxorb, mepilex dressings per wound care  - frequent turning and repositioning  - continue antibiotics per ID      Subjective: patient seen and examined at his bed, patient is poor historian, does not talk due to tracheostomy, denies any events overnight, has been tolerating poorly the PO route  Objective:     Blood pressure 131/70, pulse 78, temperature 98 5 °F (36 9 °C), temperature source Oral, resp  rate 18, height 5' 7" (1 702 m), weight 61 7 kg (136 lb), SpO2 95 %  ,Body mass index is 21 3 kg/m²  Intake/Output Summary (Last 24 hours) at 11/23/17 0600  Last data filed at 11/23/17 0251   Gross per 24 hour   Intake          5421 67 ml   Output             2150 ml   Net          3271 67 ml       Invasive Devices     Peripheral Intravenous Line            Peripheral IV 11/22/17 Left Hand less than 1 day          Airway            Surgical Airway 89 days                Physical Exam: /70   Pulse 78   Temp 98 5 °F (36 9 °C) (Oral)   Resp 18   Ht 5' 7" (1 702 m)   Wt 61 7 kg (136 lb)   SpO2 95%   BMI 21 30 kg/m²     General Appearance:    cooperative, no distress, appears stated age   Lungs:     Clear to auscultation bilaterally, respirations unlabored   Heart:    Regular rate and rhythm, S1 and S2 normal, no murmur, rub   or gallop   Abdomen:     Soft, non-tender, bowel sounds active all four quadrants,     no masses, no organomegaly     Back:   Decubitus ulcer   Neurologic:   CNII-XII intact  Normal strength, sensation and reflexes       throughout       Lab, Imaging and other studies:  I have personally reviewed pertinent lab results    , CBC:   No results found for: WBC, HGB, HCT, MCV, PLT, ADJUSTEDWBC, MCH, MCHC, RDW, MPV, NRBC, CMP:   No results found for: NA, K, CL, CO2, ANIONGAP, BUN, CREATININE, GLUCOSE, CALCIUM, AST, ALT, ALKPHOS, PROT, ALBUMIN, BILITOT, EGFR, Coagulation: No results found for: PT, INR, APTT, Urinalysis: No results found for: COLORU, CLARITYU, SPECGRAV, PHUR, LEUKOCYTESUR, NITRITE, PROTEINUA, GLUCOSEU, KETONESU, BILIRUBINUR, BLOODU, Amylase: No results found for: AMYLASE, Lipase: No results found for: LIPASE  VTE Pharmacologic Prophylaxis: Heparin  VTE Mechanical Prophylaxis: sequential compression device    Current Facility-Administered Medications:     acetaminophen (TYLENOL) tablet 975 mg, 975 mg, Oral, Q8H [facility], Kelli Mcneil MD, 975 mg at 11/22/17 0629    albuterol (PROVENTIL HFA,VENTOLIN HFA) inhaler 2 puff, 2 puff, Inhalation, Q4H PRN, Kristyn Kang DO    budesonide-formoterol (SYMBICORT) 160-4 5 mcg/act inhaler 2 puff, 2 puff, Inhalation, BID, Maryuri Mccabe MD, 2 puff at 11/22/17 1952    calcium carbonate-vitamin D (OSCAL-D) 500 mg-200 units per tablet 1 tablet, 1 tablet, Oral, Daily With Breakfast, Maryuri Mccabe MD, 1 tablet at 11/22/17 0630    cefepime (MAXIPIME) 2,000 mg in dextrose 5 % 50 mL IVPB, 2,000 mg, Intravenous, Q12H, Maryuri Mccabe MD, Last Rate: 100 mL/hr at 11/23/17 0056, 2,000 mg at 11/23/17 0056    collagenase (SANTYL) ointment, , Topical, Daily, Maryuri Mccabe MD, Stopped at 11/22/17 0915    fluticasone (FLONASE) 50 mcg/act nasal spray 1 spray, 1 spray, Each Nare, BID, Maryuri Mccabe MD, 1 spray at 11/22/17 1716    fluticasone (FLOVENT HFA) 110 MCG/ACT inhaler 2 puff, 2 puff, Inhalation, BID, Maryuri Mccabe MD, 2 puff at 11/22/17 1955    guaiFENesin (MUCINEX) 12 hr tablet 600 mg, 600 mg, Oral, Q12H [facility], Maryuri Mccabe MD, 600 mg at 11/22/17 2022    heparin (porcine) subcutaneous injection 5,000 Units, 5,000 Units, Subcutaneous, Q12H [facility], Stopped at 11/22/17 0905 **AND** Platelet count, , , Once, Maryuri Mccabe MD    lactated ringers infusion, 50 mL/hr, Intravenous, Continuous, Shirley Vazquez CRNA, Stopped at 11/22/17 1410    melatonin tablet 3 mg, 3 mg, Oral, HS, Drake Nixon MD, 3 mg at 11/21/17 2210    metroNIDAZOLE (FLAGYL) tablet 500 mg, 500 mg, Oral, Q8H [address], Rhonda Jose MD, 500 mg at 11/22/17 2121    mirtazapine (REMERON) tablet 15 mg, 15 mg, Oral, HS, Anastasia Haddad MD, 15 mg at 11/22/17 2121    oxyCODONE (ROXICODONE) immediate release tablet 10 mg, 10 mg, Oral, Q2H PRN, Laura Giraldo DO, 10 mg at 11/23/17 0318    oxyCODONE (ROXICODONE) immediate release tablet 10 mg, 10 mg, Oral, Q4H While Awake, Laura Giraldo DO, 10 mg at 11/22/17 2112    pantoprazole (PROTONIX) EC tablet 40 mg, 40 mg, Oral, Early Morning, Drake Nixon MD, 40 mg at 11/22/17 0257    polyethylene glycol (MIRALAX) packet 17 g, 17 g, Oral, Daily PRN, Drake Nixon MD    senna-docusate sodium (SENOKOT S) 8 6-50 mg per tablet 1 tablet, 1 tablet, Oral, HS, Drake Nixon MD, 1 tablet at 11/22/17 2121      [facility]

## 2017-11-23 NOTE — PLAN OF CARE
Problem: SLP ADULT - SWALLOWING, IMPAIRED  Goal: Initial SLP swallow eval performed  Outcome: Completed Date Met: 11/23/17    Goal: Advance to least restrictive diet without signs or symptoms of aspiration for planned discharge setting  See evaluation for individualized goals    Outcome: Progressing

## 2017-11-23 NOTE — PROGRESS NOTES
Progress Note - Nacho Loveless  81 y o  male MRN: [id number]    Unit/Bed#: -01 DOS: 11/23/17 Encounter: [id number]      * Sepsis ([address] )   Assessment & Plan    · Patient with leukocytosis, fever, tachycardia, POA  · Suspected secondary to PNA vs sacral ulcer infx  · Chest x-ray shows right lower lobe pneumonia  · Blood cultures negative  · ID following  · Continue cefepime, day 5  · Also on flagyl         HCAP (healthcare-associated pneumonia)   Assessment & Plan    · Chest x-ray with evidence for RLL pneumonia  · At risk for gram negative given recent hospitalization, SNF  · Continue cefepime day 5  · ID following, appreciate input         Decubitus ulcer of sacral region, stage 4 (HCC)   Assessment & Plan    · Surgery following  · S/p OR washout/debridement POD #1  · F/u         Severe protein-calorie malnutrition (HCC)   Assessment & Plan    · Evidenced by 25% weight loss x 6 months, <75% energy intake, muscle loss  · If unable to meet calorie needs po, will have to consider enteral nutrition  · Goals of care discussion ongoing  · Monitor PO intake closely         Laryngeal cancer (HCC)   Assessment & Plan    · Status post total laryngectomy at [facility]  · Continue trach care  · Appreciate speech input, advance diet to dysphagia 3 with thins         Continuous opioid dependence (HCC)   Assessment & Plan    · Palliative managing pain, appreciate input        Anemia   Assessment & Plan    · Hgb stable 9 7        Depression   Assessment & Plan    · Per family, unable to get situational depression controlled   · Appreciate pall med team input, Remeron added         Constipation   Assessment & Plan    · Continue with bowel regimen   · Avoid THAIS          VTE Pharmacologic Prophylaxis:   Pharmacologic: Heparin  Mechanical VTE Prophylaxis in Place: Yes    Patient Centered Rounds: I have performed bedside rounds with nursing staff today      Discussions with Specialists or Other Care Team Provider: nursing    Education and Discussions with Family / Patient: patient, called daughter Vicente Sanchez to update    Time Spent for Care: 30 minutes  More than 50% of total time spent on counseling and coordination of care as described above  Current Length of Stay: 4 day(s)    Current Patient Status: Inpatient   Certification Statement: The patient will continue to require additional inpatient hospital stay due to IV abx, monitor PO intake, pain control     Discharge Plan: pending, likely back to [facility]     Code Status: Level 1 - Full Code      Subjective:   Patient seen and examined at bedside  Tells me his  Sacrum is "sore" but d/w nursing patient not using prn pain meds  Patient would like to have diet changed  No chest pain or SOB  Normal urination  No BM  Objective:     Vitals:   Temp (24hrs), Av 4 °F (36 9 °C), Min:98 2 °F (36 8 °C), Max:98 5 °F (36 9 °C)    HR:  [59-78] 59  Resp:  [14-25] 18  BP: (108-148)/(51-80) 108/51  SpO2:  [95 %-100 %] 95 %  Body mass index is 21 3 kg/m²  Input and Output Summary (last 24 hours): Intake/Output Summary (Last 24 hours) at 17 1102  Last data filed at 17 1001   Gross per 24 hour   Intake             1280 ml   Output             2050 ml   Net             -770 ml       Physical Exam:     Physical Exam   Constitutional: He is oriented to person, place, and time  He appears well-developed and well-nourished  No distress  HENT:   Head: Normocephalic and atraumatic  Mouth/Throat: Oropharynx is clear and moist    Trach in place    Eyes: EOM are normal  No scleral icterus  Neck: Neck supple  Cardiovascular: Normal rate, regular rhythm and normal heart sounds  No murmur heard  Pulmonary/Chest: Effort normal and breath sounds normal  No respiratory distress  He has no wheezes  He has no rales  On room air    Abdominal: Soft  Bowel sounds are normal    Musculoskeletal: Normal range of motion  He exhibits no edema     Neurological: He is alert and oriented to person, place, and time  Skin: Skin is warm and dry  Psychiatric: He has a normal mood and affect  His behavior is normal    Frustrated at times    Nursing note and vitals reviewed  Additional Data:     Labs:      Results from last 7 days  Lab Units 11/23/17  0621 11/21/17  1253   WBC Thousand/uL 5 28 4 95   HEMOGLOBIN g/dL 9 7* 10 7*   HEMATOCRIT % 30 4* 34 0*   PLATELETS Thousands/uL 329 280   NEUTROS PCT %  --  85*   LYMPHS PCT %  --  8*   MONOS PCT %  --  4   EOS PCT %  --  3       Results from last 7 days  Lab Units 11/23/17  0621  11/20/17  0435   SODIUM mmol/L 139  < > 138   POTASSIUM mmol/L 4 5  < > 4 0   CHLORIDE mmol/L 102  < > 103   CO2 mmol/L 33*  < > 30   BUN mg/dL 16  < > 13   CREATININE mg/dL 0 67  < > 0 68   CALCIUM mg/dL 8 3  < > 8 0*   TOTAL PROTEIN g/dL  --   --  5 7*   BILIRUBIN TOTAL mg/dL  --   --  0 49   ALK PHOS U/L  --   --  66   ALT U/L  --   --  35   AST U/L  --   --  65*   GLUCOSE RANDOM mg/dL 92  < > 95   < > = values in this interval not displayed  Results from last 7 days  Lab Units 11/19/17  2143   INR  1 16       * I Have Reviewed All Lab Data Listed Above  * Additional Pertinent Lab Tests Reviewed: [address] Admission Reviewed    Imaging:    Imaging Reports Reviewed Today Include: none  Imaging Personally Reviewed by Myself Includes:  none    Recent Cultures (last 7 days):       Results from last 7 days  Lab Units 11/22/17  1304 11/20/17  1738 11/20/17  1314 11/19/17  2147 11/19/17  2143   BLOOD CULTURE   --   --   --  No Growth at 72 hrs  No Growth at 72 hrs     SPUTUM CULTURE   --  1+ Growth of Staphylococcus aureus*  --   --   --    GRAM STAIN RESULT  Rare Polys  1+ Gram positive cocci in pairs Rare Polys  No bacteria seen  --   --   --    LEGIONELLA URINARY ANTIGEN   --   --  Negative  --   --        Last 24 Hours Medication List:     acetaminophen 975 mg Oral Q8H [address]   budesonide-formoterol 2 puff Inhalation BID   calcium carbonate-vitamin D 1 tablet Oral Daily With Breakfast   cefepime 2,000 mg Intravenous Q12H   collagenase  Topical Daily   fluticasone 1 spray Each Nare BID   fluticasone 2 puff Inhalation BID   guaiFENesin 600 mg Oral Q12H JAMES   heparin (porcine) 5,000 Units Subcutaneous Q12H [address]   melatonin 3 mg Oral HS   metroNIDAZOLE 500 mg Oral Q8H JAMES   mirtazapine 15 mg Oral HS   oxyCODONE 10 mg Oral Q4H While Awake   pantoprazole 40 mg Oral Early Morning   senna-docusate sodium 1 tablet Oral HS        Today, Patient Was Seen By: Claudio Rose PA-C    ** Please Note: Dragon 360 Dictation voice to text software may have been used in the creation of this document   **

## 2017-11-23 NOTE — ASSESSMENT & PLAN NOTE
· Evidenced by 25% weight loss x 6 months, <75% energy intake, muscle loss  · If unable to meet calorie needs po, will have to consider enteral nutrition  · Goals of care discussion ongoing  · Monitor PO intake closely

## 2017-11-23 NOTE — SPEECH THERAPY NOTE
Speech Language/Pathology                             SLP  Note  Patient Name: Megan Núñez  Today's Date: 11/23/2017     Problem List  Patient Active Problem List   Diagnosis    Asthma    [facility]    Asthma    Depression    Laryngeal stenosis    Sepsis [facility]    Decubitus ulcer of sacral region, stage 4 ([facility] [facility] )    HCAP (healthcare-associated pneumonia)    Continuous opioid dependence ([facility] [facility]    Anemia    Severe protein-calorie malnutrition ([facility] [facility] )     Past Medical History  Past Medical History:   Diagnosis Date    Asthma     Hypertension     Larynx cancer ([facility] [address] )      Past Surgical History  Past Surgical History:   Procedure Laterality Date    EGD AND COLONOSCOPY      TRACHEOSTOMY N/A 8/25/2017    Procedure: TRACHEOSTOMY (POSSIBLE AWAKE) , MICRO DIRECT LARYNGOSCOPY, Biopsy;  Surgeon: Raul Pink MD;  [facility]: [facility];  Service: ENT         Subjective:  Pt sleeping but easily aroused and able to sit up at the edge of the bed  Objective:  Pt seen for ongoing dysphagia therapy  Per nursing, pt is requesting solid foods  Pt seen c level 2 collin crackers and level 3 hard cookies c sips of Boost  Pt demonstrated mildly prolonged but functional mastication of level 3 cookies c good bolus formation and control  AP transfer appeared prompt  Pt c trace oral residue p the swallow  Assessment:  Pt tolerated trials up to dysphagia level 3 c functional mastication/bolus control       Plan/Recommendations:  Upgrade to dysphagi level 3 c thin liquids  F/U x1 to ensure diet tolerance

## 2017-11-23 NOTE — PROGRESS NOTES
Progress Note - Infectious Disease   Page Mail  81 y o  male MRN: [id number]  Unit/Bed#: -01 Encounter: [id number]      Impression/Plan:  1  Stevan Thorpe   Tachycardia, fever, leukocytosis  Possibly secondary to pneumonia versus an infected decubitus ulcer with abscess as below  The leukocytosis has resolved as has the tachycardia   Decreased temperature curve  Thus far the blood cultures remain negative   -continue cefepime and Flagyl for now at current dose  -follow-up blood cultures, sputum culture and wound culture and adjust antibiotics as needed  -monitor CBC with diff and creatinine  -supportive care     2   Probable pneumonia-based upon the patient's clinical and radiographic presentation  He is certainly at reasonable risk of having more resistant organisms such as Pseudomonas aeruginosa   Thus far he seems to be tolerating the antibiotics well without difficulty   His respiratory status has remained stable  Thus far the sputum cultures only revealing methicillin sensitive Staph aureus  -antibiotics as above  -follow-up final sputum culture  -discussed with micro lab in detail  -monitor respiratory status  -pulmonary toilet     3   Infected sacral decubitus ulcer/abscess-patient is now status post incision and drainage  Probably polymicrobial   Clinically stable   -continue cefepime and Flagyl for now  -follow up wound cultures and adjust antibiotics as needed  -local wound care  -close surgical follow-up     4   Laryngeal carcinoma-status post laryngectomy, radiation, and chemotherapy  -monitor respiratory status  -additional management as per the primary service    5  Severe protein calorie malnutrition    Antibiotics:  Cefepime 5   Flagyl 2  Subjective:  Patient has no fever, chills, sweats; no nausea, vomiting, diarrhea; no cough, shortness of breath; no increased pain  No new symptoms      Objective:  Vitals:  HR:  [59-78] 59  Resp:  [14-25] 18  BP: (108-148)/(51-80) 108/51  SpO2: [95 %-100 %] 95 %  Temp (24hrs), Av 4 °F (36 9 °C), Min:98 2 °F (36 8 °C), Max:98 5 °F (36 9 °C)  Current: Temperature: 98 5 °F (36 9 °C)    Physical Exam:   General Appearance:  Alert, nontoxic, no acute distress  Neck:   Trach site without erythema or drainage   Throat: Oropharynx moist without lesions  Lungs:   Decreased breath sounds bilaterally; respirations unlabored   Heart:  RRR; no murmur, rub or gallop   Abdomen:   Soft, non-tender, non-distended, positive bowel sounds  Sacral wound dressed without spreading erythema  Extremities: No clubbing, cyanosis or edema   Skin: No new rashes or lesions  No draining wounds noted  Labs, Imaging, & Other studies:   All pertinent labs and imaging studies were personally reviewed    Results from last 7 days  Lab Units 17  0621 17  1253 17  0435   WBC Thousand/uL 5 28 4 95 12 86*   HEMOGLOBIN g/dL 9 7* 10 7* 9 7*   PLATELETS Thousands/uL 329 280 252       Results from last 7 days  Lab Units 17  0621 17  1253 17  0435 17  2143   SODIUM mmol/L 139 138 138 136   POTASSIUM mmol/L 4 5 4 0 4 0 4 0   CHLORIDE mmol/L 102 104 103 100   CO2 mmol/L 33* 29 30 31   ANION GAP mmol/L 4 5 5 5   BUN mg/dL 16 14 13 15   CREATININE mg/dL 0 67 0 75 0 68 0 74   EGFR ml/min/1 73sq m 90 86 90 87   GLUCOSE RANDOM mg/dL 92 128 95 105   CALCIUM mg/dL 8 3 8 2* 8 0* 8 0*   AST U/L  --   --  65* 35   ALT U/L  --   --  35 36   ALK PHOS U/L  --   --  66 67   TOTAL PROTEIN g/dL  --   --  5 7* 5 7*   ALBUMIN g/dL  --   --  1 9* 2 0*   BILIRUBIN TOTAL mg/dL  --   --  0 49 0 51       Results from last 7 days  Lab Units 17  1304 17  1738 17  1314 17  0138 17  2147 17  2143   BLOOD CULTURE   --   --   --   --  No Growth at 72 hrs  No Growth at 72 hrs     SPUTUM CULTURE   --  1+ Growth of Staphylococcus aureus*  --   --   --   --    GRAM STAIN RESULT  Rare Polys  1+ Gram positive cocci in pairs Rare Polys  No bacteria seen  --   --   --   --    MRSA CULTURE ONLY   --   --   --  No Methicillin Resistant Staphlyococcus aureus (MRSA) isolated  --   --    LEGIONELLA URINARY ANTIGEN   --   --  Negative  --   --   --

## 2017-11-23 NOTE — ASSESSMENT & PLAN NOTE
· Chest x-ray with evidence for RLL pneumonia  · At risk for gram negative given recent hospitalization, SNF  · Continue cefepime day 5  · ID following, appreciate input

## 2017-11-23 NOTE — PROGRESS NOTES
Progress note - Palliative and Supportive Care   Marzena Rosales  81 y o  male [id number]    Assessment:   Asthma    [facility] [address] [facility]    Asthma    Depression    Laryngeal stenosis    [facility]    Decubitus ulcer of sacral region, stage 4 ([facility] )    HCAP (healthcare-associated pneumonia)    Continuous opioid dependence ([facility] )    Anemia    - post op pain- sacral wound debridement 11/22    Plan:  1  Continue scheduled oxyIR 10 mg q4H ATC with hold parameters  2  Continue ATC tylenol  3  Continue PRN oxycodone q2h PRN  4  Continue Remeron  5  Bowel regimen to prevent OIC  6  Nutritional status continues to be sub-par, if goals continue to be all life-prolonging procedures, may need to discuss PEG tube  Continue supplements, appreciate nutrition assistance   7  Goals - disease directed     Code Status: Full - Level 1   Power of :  presumed to be daughter by [place] Act 169   Advance Directive / Living Will: no   POLST:  no      Interval history:       Patient continues to not be very engaging in conversation  Pain seems controlled at this time  Patient offers no complaints       MEDICATIONS / ALLERGIES:    all current active meds have been reviewed and current meds:   Current Facility-Administered Medications   Medication Dose Route Frequency    acetaminophen (TYLENOL) tablet 975 mg  975 mg Oral Q8H [address]    albuterol (PROVENTIL HFA,VENTOLIN HFA) inhaler 2 puff  2 puff Inhalation Q4H PRN    budesonide-formoterol (SYMBICORT) 160-4 5 mcg/act inhaler 2 puff  2 puff Inhalation BID    calcium carbonate-vitamin D (OSCAL-D) 500 mg-200 units per tablet 1 tablet  1 tablet Oral Daily With Breakfast    cefepime (MAXIPIME) 2,000 mg in dextrose 5 % 50 mL IVPB  2,000 mg Intravenous Q12H    collagenase (SANTYL) ointment   Topical Daily    fluticasone (FLONASE) 50 mcg/act nasal spray 1 spray  1 spray Each Nare BID    fluticasone (FLOVENT HFA) 110 MCG/ACT inhaler 2 puff  2 puff Inhalation BID    guaiFENesin (MUCINEX) 12 hr tablet 600 mg  600 mg Oral Q12H [address]    heparin (porcine) subcutaneous injection 5,000 Units  5,000 Units Subcutaneous Q12H [address]    lactated ringers infusion  50 mL/hr Intravenous Continuous    melatonin tablet 3 mg  3 mg Oral HS    metroNIDAZOLE (FLAGYL) tablet 500 mg  500 mg Oral Q8H [address]    mirtazapine (REMERON) tablet 15 mg  15 mg Oral HS    oxyCODONE (ROXICODONE) immediate release tablet 10 mg  10 mg Oral Q2H PRN    oxyCODONE (ROXICODONE) immediate release tablet 10 mg  10 mg Oral Q4H While Awake    pantoprazole (PROTONIX) EC tablet 40 mg  40 mg Oral Early Morning    polyethylene glycol (MIRALAX) packet 17 g  17 g Oral Daily PRN    senna-docusate sodium (SENOKOT S) 8 6-50 mg per tablet 1 tablet  1 tablet Oral HS       Allergies   Allergen Reactions    Aspirin     Cleocin [Clindamycin]        OBJECTIVE:    Physical Exam  Physical Exam   Constitutional: No distress  HENT:   Head: Normocephalic and atraumatic  Eyes: EOM are normal    Cardiovascular: Normal rate and intact distal pulses  Pulmonary/Chest: Effort normal  No respiratory distress  Abdominal: Soft  Musculoskeletal: He exhibits no edema  Skin:   Did not assess sacral decub   Nursing note and vitals reviewed  Lab Results:   I have personally reviewed pertinent labs  , CBC:   Lab Results   Component Value Date    WBC 5 28 11/23/2017    HGB 9 7 (L) 11/23/2017    HCT 30 4 (L) 11/23/2017     (H) 11/23/2017     11/23/2017    MCH 31 8 11/23/2017    MCHC 31 9 11/23/2017    RDW 16 5 (H) 11/23/2017    MPV 8 4 (L) 11/23/2017   , CMP:   Lab Results   Component Value Date     11/23/2017    K 4 5 11/23/2017     11/23/2017    CO2 33 (H) 11/23/2017    ANIONGAP 4 11/23/2017    BUN 16 11/23/2017    CREATININE 0 67 11/23/2017    GLUCOSE 92 11/23/2017    CALCIUM 8 3 11/23/2017    EGFR 90 11/23/2017     Imaging Studies: reviewed  EKG, Pathology, and Other Studies: reviewed    Counseling / Coordination of Care  Total floor / unit time spent today 25+ minutes  Greater than 50% of total time was spent with the patient and / or family counseling and / or coordination of care   A description of the counseling / coordination of care: symptom assessment and discussions with family

## 2017-11-24 RX ORDER — OXYCODONE HYDROCHLORIDE 10 MG/1
10 TABLET ORAL
Status: DISPENSED | OUTPATIENT
Start: 2017-11-24 | End: 2017-11-25

## 2017-11-24 RX ORDER — ONDANSETRON 4 MG/1
4 TABLET, ORALLY DISINTEGRATING ORAL
Status: DISCONTINUED | OUTPATIENT
Start: 2017-11-24 | End: 2017-11-27 | Stop reason: HOSPADM

## 2017-11-24 RX ORDER — FENTANYL 25 UG/H
25 PATCH TRANSDERMAL
Status: DISCONTINUED | OUTPATIENT
Start: 2017-11-24 | End: 2017-11-27 | Stop reason: HOSPADM

## 2017-11-24 RX ADMIN — CALCIUM CARBONATE 500 MG (1,250 MG)-VITAMIN D3 200 UNIT TABLET 1 TABLET: at 10:11

## 2017-11-24 RX ADMIN — PANTOPRAZOLE SODIUM 40 MG: 40 TABLET, DELAYED RELEASE ORAL at 05:20

## 2017-11-24 RX ADMIN — FENTANYL 25 MCG: 25 PATCH, EXTENDED RELEASE TRANSDERMAL at 16:27

## 2017-11-24 RX ADMIN — CEFEPIME HYDROCHLORIDE 2000 MG: 2 INJECTION, SOLUTION INTRAVENOUS at 00:31

## 2017-11-24 RX ADMIN — SENNOSIDES 8.6 MG: 8.6 TABLET, FILM COATED ORAL at 10:10

## 2017-11-24 RX ADMIN — AMPICILLIN SODIUM AND SULBACTAM SODIUM 3 G: 2; 1 INJECTION, POWDER, FOR SOLUTION INTRAMUSCULAR; INTRAVENOUS at 22:09

## 2017-11-24 RX ADMIN — HEPARIN SODIUM 5000 UNITS: 5000 INJECTION, SOLUTION INTRAVENOUS; SUBCUTANEOUS at 21:11

## 2017-11-24 RX ADMIN — OXYCODONE HYDROCHLORIDE 10 MG: 10 TABLET ORAL at 18:57

## 2017-11-24 RX ADMIN — BUDESONIDE AND FORMOTEROL FUMARATE DIHYDRATE 2 PUFF: 160; 4.5 AEROSOL RESPIRATORY (INHALATION) at 10:16

## 2017-11-24 RX ADMIN — ACETAMINOPHEN 975 MG: 325 TABLET ORAL at 14:11

## 2017-11-24 RX ADMIN — ACETAMINOPHEN 975 MG: 325 TABLET ORAL at 05:20

## 2017-11-24 RX ADMIN — OXYCODONE HYDROCHLORIDE 10 MG: 10 TABLET ORAL at 10:11

## 2017-11-24 RX ADMIN — GUAIFENESIN 600 MG: 600 TABLET, EXTENDED RELEASE ORAL at 21:11

## 2017-11-24 RX ADMIN — HEPARIN SODIUM 5000 UNITS: 5000 INJECTION, SOLUTION INTRAVENOUS; SUBCUTANEOUS at 10:11

## 2017-11-24 RX ADMIN — SENNOSIDES 8.6 MG: 8.6 TABLET, FILM COATED ORAL at 18:57

## 2017-11-24 RX ADMIN — AMPICILLIN SODIUM AND SULBACTAM SODIUM 3 G: 2; 1 INJECTION, POWDER, FOR SOLUTION INTRAMUSCULAR; INTRAVENOUS at 16:25

## 2017-11-24 RX ADMIN — DOCUSATE SODIUM 100 MG: 100 CAPSULE, LIQUID FILLED ORAL at 10:11

## 2017-11-24 RX ADMIN — MIRTAZAPINE 15 MG: 15 TABLET, FILM COATED ORAL at 21:10

## 2017-11-24 RX ADMIN — MELATONIN TAB 3 MG 3 MG: 3 TAB at 21:10

## 2017-11-24 RX ADMIN — CEFEPIME HYDROCHLORIDE 2000 MG: 2 INJECTION, SOLUTION INTRAVENOUS at 10:20

## 2017-11-24 RX ADMIN — COLLAGENASE SANTYL: 250 OINTMENT TOPICAL at 10:16

## 2017-11-24 RX ADMIN — FLUTICASONE PROPIONATE 1 SPRAY: 50 SPRAY, METERED NASAL at 10:17

## 2017-11-24 RX ADMIN — GUAIFENESIN 600 MG: 600 TABLET, EXTENDED RELEASE ORAL at 10:11

## 2017-11-24 RX ADMIN — OXYCODONE HYDROCHLORIDE 10 MG: 10 TABLET ORAL at 05:20

## 2017-11-24 RX ADMIN — DOCUSATE SODIUM 100 MG: 100 CAPSULE, LIQUID FILLED ORAL at 18:57

## 2017-11-24 RX ADMIN — ACETAMINOPHEN 975 MG: 325 TABLET ORAL at 21:10

## 2017-11-24 RX ADMIN — METRONIDAZOLE 500 MG: 500 TABLET ORAL at 05:20

## 2017-11-24 NOTE — CASE MANAGEMENT
Continued Stay Review    Date:   11/24/2017    Vital Signs: /66   Pulse 73   Temp 98 2 °F (36 8 °C) (Oral)   Resp 20   Ht 5' 7" (1 702 m)   Wt 61 7 kg (136 lb)   SpO2 94%   BMI 21 30 kg/m²     Medications:   Scheduled Meds:   acetaminophen 975 mg Oral Q8H [address]   ampicillin-sulbactam 3 g Intravenous Q6H   budesonide-formoterol 2 puff Inhalation BID   calcium carbonate-vitamin D 1 tablet Oral Daily With Breakfast   collagenase  Topical Daily   docusate sodium 100 mg Oral BID   fentaNYL 25 mcg Transdermal Q72H   fluticasone 1 spray Each Nare BID   fluticasone 2 puff Inhalation BID   guaiFENesin 600 mg Oral Q12H JAMES   heparin (porcine) 5,000 Units Subcutaneous Q12H [address]   melatonin 3 mg Oral HS   mirtazapine 15 mg Oral HS   ondansetron 4 mg Oral TID AC   oxyCODONE 10 mg Oral Q4H While Awake   pantoprazole 40 mg Oral Early Morning   senna 1 tablet Oral BID     Continuous Infusions:    PRN Meds: albuterol    oxyCODONE    polyethylene glycol    Abnormal Labs/Diagnostic Results:    HMGB    9 7  ( 11/23)    Age/Sex: 81 y o  male     Assessment/Plan:     Sepsis-POA   Tachycardia, fever, leukocytosis   Possibly secondary to pneumonia versus an infected decubitus ulcer with abscess as below   The leukocytosis has resolved as has the tachycardia   Decreased temperature curve  Thus far the blood cultures remain negative   -continue cefepime and Flagyl for now at current dose  -follow-up blood cultures, sputum culture and wound culture and adjust antibiotics as needed  -monitor CBC with diff and creatinine  -supportive care     2   Probable pneumonia-based upon the patient's clinical and radiographic presentation   He is certainly at reasonable risk of having more resistant organisms such as Pseudomonas aeruginosa   Thus far he seems to be tolerating the antibiotics well without difficulty   His respiratory status has remained stable   Thus far the sputum cultures only revealing methicillin sensitive Staph aureus  -antibiotics as above  -follow-up final sputum culture  -discussed with micro lab in detail  -monitor respiratory status  -pulmonary toilet     3   Infected sacral decubitus ulcer/abscess-patient is now status post incision and drainage  Probably polymicrobial   Clinically stable  No gram-negative rods isolated  -discontinue cefepime and Flagyl  -Unasyn 3 g IV q 6 hours  -follow up wound cultures and adjust antibiotics as needed  if patient to be discharged over the weekend, and no resistant organisms are isolated, could transition to Augmentin 875 mg p o  q 12 hours through 12/2/2017  -local wound care  -close surgical follow-up     4   Laryngeal carcinoma-status post laryngectomy, radiation, and chemotherapy  -monitor respiratory status  -additional management as per the primary service  -palliative care follow-up     5   Severe protein calorie malnutrition  -may need feeding tube    SURGERY DATE: 11/22/2017     Surgeon(s) and Role:     * Tiarra Fofana DO - Primary     * Edna Leonardo MD - Assisting     Preop Diagnosis:  Decubitus ulcer of sacral region, stage 4 ([facility]) [L89 154]     Post-Op Diagnosis Codes:     * Decubitus ulcer of sacral region, stage 4 ([facility]) [L89 154]     Procedure(s) (LRB):  DEBRIDEMENT WOUND ([address] OUT), sacrum (N/A)    Discharge Plan:    D    [facility] in the [facility] by Gerry Jacboson for 2017  Network Utilization Review Department  Phone: [phone]; Fax [phone]  ATTENTION: The Network Utilization Review Department is now centralized for our 7 Facilities  Make a note that we have a new phone and fax numbers for our Department  Please call with any questions or concerns to [phone] and carefully follow the prompts so that you are directed to the right person   All voicemails are confidential  Fax any determinations, approvals, denials, and requests for initial or continue stay review clinical to [phone]  Due to HIGH CALL volume, it would be easier if you could please send faxed requests to expedite your requests and in part, help us provide discharge notifications faster

## 2017-11-24 NOTE — PALLIATIVE CARE CONFERENCE
[facility] LSW met with pt  To introduce self and role of palliative social work  Spoke with pt's primary RN before entering room  Pt  Apparently does not use an electrolarynx or device to amplify voice  He was writing up until a few days ago and now will only mouth words  RN concerned pt  Is refusing care and some medications  LSW spoke with pt  At bedside  His shades were drawn and refused to let daylight in  He mouthed words/simple yes and no answers when asked  Pt  Lives alone, but has a daughter with whom he has a good relationship  Pt  Endorses nausea and some pain in his sacral wound  Updated Dr Alvaro Schaffer of [facility]  Anticipate further discussions will be needed regarding GOC and plan  Pt  Closed his eyes during conversation and disengaged  LSW will continue to follow and assist with support  AddendumAnna Salehjose SHANKAR is his daughter Maria Del Carmen Yancey) and copy is scanned into EPIC dated 09/27/17

## 2017-11-24 NOTE — ASSESSMENT & PLAN NOTE
· Status post total laryngectomy at [facility]  · Continue trach care  · Appreciate speech input, c/w diet to dysphagia 3 with thins

## 2017-11-24 NOTE — ASSESSMENT & PLAN NOTE
· Surgery following  · S/p OR washout/debridement POD #2   · Local wound care per wound mgmt   · F/u final wound cultures   · Pain control

## 2017-11-24 NOTE — DISCHARGE SUMMARY
Discharge Summary - [facility]    Patient Information: Sol Kerns  81 y o  male MRN: [id number]  Unit/Bed#: -01 Encounter: [id number]    Discharging Physician / Practitioner: ***  PCP: Caryn Bernal MD  Admission Date: 11/19/2017  Discharge Date: 11/***/17    Reason for Admission: sepsis     Discharge Diagnoses:     Principal Problem:    Sepsis ([facility] )  Active Problems:    HCAP (healthcare-associated pneumonia)    Decubitus ulcer of sacral region, stage 4 ([facility] )    Laryngeal cancer ([facility] )    Severe protein-calorie malnutrition ([facility] )    Continuous opioid dependence ([facility] )    Anemia    Depression    Constipation  Resolved Problems:    * No resolved hospital problems   *      Consultations During Hospital Stay:  · Infectious disease   · General surgery   · Palliative medicine   · Psychiatry   · Speech therapy / physical therapy / occupational therapy   · Wound care     Procedures Performed:     · Stage IV decubitus ulcer of sacral region status post surgical washout and debridement, I&D 11/22/2017    Significant Findings / Test Results:     · Sepsis POA evidenced by leukocytosis, fever, tachycardia  · Chest x-ray 11/19 new right basilar patchy consolidations suspicious for pneumonia  · Infected stage IV decubitus ulcer   · Continuous opioid dependence  · Anemia  · Depression  · Severe protein calorie malnutrition    Incidental Findings:   · none     Test Results Pending at Discharge (will require follow up):   · ***     Outpatient Tests Requested:  · Repeat chest x-ray 4-6 weeks to ensure resolution of right lower lobe pneumonia  · Complete course of p o  antibiotics through 12/02/2017  · Follow-up with primary care provider in 2 weeks  · Follow-up with wound care/surgery     Complications:  ***    Hospital Course:     Sol Kerns  is a 81 y o  male patient with past medical history significant for laryngeal cancer status post laryngectomy at [facility] who originally presented to the hospital on 11/19/2017 due to fever  Patient residing at [facility] for rehabilitation, and was brought in due to a few day history of fever, severe low back pain, and nonproductive cough  Patient was found to be septic on admission which was thought to be due to either Hcap vs aspiration pneumonia vs infected sacral decubitus ulcer  Of note, patient was recently hospitalized for 3 weeks at [facility] and was then at [facility] for rehab  He was reported to have been developing fevers at [facility] for more than a week prior to admission and that family stated he was treated with a antibiotic for a urinary tract infection  Infectious Disease was consulted as well as wound care and general surgery  Given history of trach collar it is possible that patient had aspiration pneumonia and speech therapy was consulted  Patient was maintained on IV cefepime and Flagyl  Patient throughout the hospitalization remained increasingly more depressed  Per family, patient has depression started when he was diagnosed with laryngeal cancer about 6 months ago and reach its highest point when he was planned for laryngectomy  During hospitalization patient became secluded, slept most of the day with the shade down and door closed and would participate minimally during daily rounding  Patient would not use speaking valve but would write to communicate with staff and would get frustrated easily  Psychiatry was consulted, ***  Patient's pain was also uncontrolled and palliative care was consulted  Patient was started on p r n  pain medications but eventually transition to more long-acting pain control including a fentanyl patch  Goals of care discussion was ongoing with patient and family  Surgery was consulted to evaluate patient's stage IV decubitus ulcer in the sacral region  He went for washout and debridement on 11/22/2017    According to operative note, purulent fluid was noted in the wound and the wound base was full of purulent debris  Abscess was dissected and healthy bleeding tissue was visualized and the wound measured 2 x 1 x 1 5 cm  Patient will likely need follow-up with [address] [place] [address] upon discharge  Patient was maintained on IV cefepime and Flagyl per Infectious Disease  Blood cultures remained negative throughout hospital stay  Sputum culture revealed Staph aureus growth  Wound culture intraoperatively was polymicrobial and revealed, ***  Patient was transitioned to IV Unasyn to eventually be discharged home on Augmentin 875 mg twice daily through 12/02/2017  Speech therapy followed patient throughout hospitalization  Patient was eventually advanced from full liquid diet to a dysphagia 3 diet with thin liquids which is to be continued upon discharge at [facility]  Patient was also seen by new nasreen who recommends protein supplementation with Ensure is while in-house, however patient preferred boost which family provided  Due to severe protein calorie malnutrition patient may require long-term feeding tube however this is not indicated at this time  Patient medically stable for discharge to complete rehab at [facility]  Patient expressed understanding and agrees with plan  Condition at Discharge: stable     Discharge Day Visit / Exam:     Subjective:  ***    Vitals: Blood Pressure: 126/66 (11/24/17 0700)  Pulse: 73 (11/24/17 0700)  Temperature: 98 2 °F (36 8 °C) (11/24/17 0700)  Temp Source: Oral (11/24/17 0700)  Respirations: 20 (11/24/17 0700)  Height: 5' 7" (170 2 cm) (11/20/17 0100)  Weight - Scale: 61 7 kg (136 lb) (11/20/17 0100)  SpO2: 94 % (11/24/17 0700)    Exam:   Physical Exam  ( *** Be Sure to Include Physical Exam: Delete this entire line when you have entered your exam)  Discussion with Family: ***    Discharge instructions/Information to patient and family:   See after visit summary for information provided to patient and family        Provisions for Follow-Up Care:  See after visit summary for information related to follow-up care and any pertinent home health orders  Disposition:     Katharina [address] (see below)    For Discharges to [address]:   · [facility] - Mamadou Duke MD - {Affiliated SNF Phone Call:[phone]}    Planned Readmission: none     Discharge Statement:  I spent 40 minutes discharging the patient  This time was spent on the day of discharge  I had direct contact with the patient on the day of discharge  Greater than 50% of the total time was spent examining patient, answering all patient questions, arranging and discussing plan of care with patient as well as directly providing post-discharge instructions  Additional time then spent on discharge activities  Discharge Medications:  See after visit summary for reconciled discharge medications provided to patient and family        ** Please Note: This note has been constructed using a voice recognition system **

## 2017-11-24 NOTE — ASSESSMENT & PLAN NOTE
· Chest x-ray with evidence for RLL pneumonia  · At risk for gram negative given recent hospitalization, SNF  · S/p 5 days IV cefepime, flagyl   · ID following, appreciate input   · Sputum culture with staph aureus    · Repeat CXR 4-6 weeks to ensure resolution

## 2017-11-24 NOTE — PROGRESS NOTES
Progress Note - Marzena Rosales  81 y o  male MRN: [id number]    Unit/Bed#: MS Mac-01 DOS 11/24/17 Encounter: [id number]    * Sepsis ([address] )   Assessment & Plan    · Patient with leukocytosis, fever, tachycardia, POA  · Suspected secondary to PNA vs sacral ulcer infx  · Chest x-ray shows right lower lobe pneumonia  · Blood cultures negative to date   · ID following  · S/p 5 days IV cefepime, flagyl --> both d/c and patient started on unasyn   · Likely transition to oral augmentin tomorrow per ID recommendations          HCAP (healthcare-associated pneumonia)   Assessment & Plan    · Chest x-ray with evidence for RLL pneumonia  · At risk for gram negative given recent hospitalization, SNF  · S/p 5 days IV cefepime, flagyl   · ID following, appreciate input   · Sputum culture with staph aureus    · Repeat CXR 4-6 weeks to ensure resolution         Decubitus ulcer of sacral region, stage 4 (HCC)   Assessment & Plan    · Surgery following  · S/p OR washout/debridement POD #2   · Local wound care per wound mgmt   · F/u final wound cultures   · Pain control         Severe protein-calorie malnutrition (HCC)   Assessment & Plan    · Evidenced by 25% weight loss x 6 months, <75% energy intake, muscle loss  · If unable to meet calorie needs po, will have to consider enteral nutrition  · Goals of care discussion ongoing  · Monitor PO intake closely         Laryngeal cancer (HCC)   Assessment & Plan    · Status post total laryngectomy at [facility]  · Continue trach care  · Appreciate speech input, c/w diet to dysphagia 3 with thins         Continuous opioid dependence (HCC)   Assessment & Plan    · Palliative managing pain, appreciate input  · Started on fentanyl patch 25mcg        Anemia   Assessment & Plan    · Hgb stable        Depression   Assessment & Plan    · Per family, unable to get situational depression controlled   · Appreciate pall med team input, Remeron added   · Patient secluded in room at times with shades down, minimal participation w/ conversation   · D/w Dr Israel Fay, patient and family, will c/s psychiatry to optimize depression sx        Constipation   Assessment & Plan    · Continue with bowel regimen   · Avoid THAIS          VTE Pharmacologic Prophylaxis:   Pharmacologic: Heparin  Mechanical VTE Prophylaxis in Place: Yes    Patient Centered Rounds: I have performed bedside rounds with nursing staff today  Discussions with Specialists or Other Care Team Provider: nursing, CM, Dr Israel Fay     Education and Discussions with Family / Patient: patient, spoke with daughter and son in law at bedside     Time Spent for Care: 45 minutes  More than 50% of total time spent on counseling and coordination of care as described above  Current Length of Stay: 5 day(s)    Current Patient Status: Inpatient   Certification Statement: The patient will continue to require additional inpatient hospital stay due to IV to PO abx, follow up final wound cultures, monitor PO intake, psych eval    Discharge Plan: pending, possible d/c tomorrow 11/25    Code Status: Level 1 - Full Code      Subjective:   Notified by nursing staff and case management that patient is very withdrawn, no longer communicating with pendant pencil and not using his voice box materials  He responds minimally just wants to sleep  He is otherwise eating and drinking but otherwise secluded himself during the day  I spoke with patient at bedside in detail, he was just washed up with nursing staff who also cleaned his trach, com disc tear and help to brush his teeth  He was walking in the hallways  We discussed his worsening depression, he finds that it is related to his disease and not being at home  I spoke with family in detail also, and they state that his depression has worsened over the last 6 months, and they wonder why he would just wants to sleep all the time  They tell me that his depression noted to be worse right before he went for his surgery  He did not want to talk to his family today asking them to leave because he was tired  His girlfriend was at bedside yesterday evening and he was noted to be conversant and happy at that time  Daughter Darshan Bravo states she told the [facility] to address his depression but relays that they told her they don't deal with that  Upon my encounter with the patient today he seemed to be in better spirits after being washed up, and pleasant  Tells me the food is better than the full liquid diet but wants to eat lunch after his nap  He asks me to have staff encouraged him to walk every 3 hours  When family arrived he wanted to sleep instead of visiting  Objective:     Vitals:   Temp (24hrs), Av 2 °F (36 8 °C), Min:97 4 °F (36 3 °C), Max:98 9 °F (37 2 °C)    HR:  [67-73] 73  Resp:  [17-20] 20  BP: ()/(53-68) 126/66  SpO2:  [94 %-97 %] 94 %  Body mass index is 21 3 kg/m²  Input and Output Summary (last 24 hours): Intake/Output Summary (Last 24 hours) at 17 1252  Last data filed at 17 8172   Gross per 24 hour   Intake              560 ml   Output             2150 ml   Net            -1590 ml       Physical Exam:     Physical Exam   Constitutional: He is oriented to person, place, and time  He appears well-developed and well-nourished  No distress  HENT:   Head: Normocephalic and atraumatic  Trach in place  Edema noted of left side of jaw and lips although seems improved    Eyes: EOM are normal  No scleral icterus  Neck: Normal range of motion  Neck supple  Cardiovascular: Normal rate, regular rhythm and normal heart sounds  No murmur heard  Pulmonary/Chest: Effort normal and breath sounds normal    On room air    Abdominal: Soft  Bowel sounds are normal    Musculoskeletal: Normal range of motion  He exhibits no edema  Neurological: He is alert and oriented to person, place, and time  Skin: Skin is warm and dry  Psychiatric: He has a normal mood and affect     Pleasant at times   Nursing note and vitals reviewed  Additional Data:     Labs:      Results from last 7 days  Lab Units 11/23/17  0621 11/21/17  1253   WBC Thousand/uL 5 28 4 95   HEMOGLOBIN g/dL 9 7* 10 7*   HEMATOCRIT % 30 4* 34 0*   PLATELETS Thousands/uL 329 280   NEUTROS PCT %  --  85*   LYMPHS PCT %  --  8*   MONOS PCT %  --  4   EOS PCT %  --  3       Results from last 7 days  Lab Units 11/23/17  0621  11/20/17  0435   SODIUM mmol/L 139  < > 138   POTASSIUM mmol/L 4 5  < > 4 0   CHLORIDE mmol/L 102  < > 103   CO2 mmol/L 33*  < > 30   BUN mg/dL 16  < > 13   CREATININE mg/dL 0 67  < > 0 68   CALCIUM mg/dL 8 3  < > 8 0*   TOTAL PROTEIN g/dL  --   --  5 7*   BILIRUBIN TOTAL mg/dL  --   --  0 49   ALK PHOS U/L  --   --  66   ALT U/L  --   --  35   AST U/L  --   --  65*   GLUCOSE RANDOM mg/dL 92  < > 95   < > = values in this interval not displayed  Results from last 7 days  Lab Units 11/19/17  2143   INR  1 16       * I Have Reviewed All Lab Data Listed Above  * Additional Pertinent Lab Tests Reviewed: [address] [facility] Admission Reviewed    Imaging:    Imaging Reports Reviewed Today Include: none  Imaging Personally Reviewed by Myself Includes:  none    Recent Cultures (last 7 days):       Results from last 7 days  Lab Units 11/22/17  1304 11/20/17  1738 11/20/17  1314 11/19/17  2147 11/19/17  2143   BLOOD CULTURE   --   --   --  No Growth After 4 Days  No Growth After 4 Days     SPUTUM CULTURE   --  1+ Growth of Staphylococcus aureus*  --   --   --    GRAM STAIN RESULT  Rare Polys  1+ Gram positive cocci in pairs Rare Polys  No bacteria seen  --   --   --    LEGIONELLA URINARY ANTIGEN   --   --  Negative  --   --        Last 24 Hours Medication List:     acetaminophen 975 mg Oral Q8H [address]   ampicillin-sulbactam 3 g Intravenous Q6H   budesonide-formoterol 2 puff Inhalation BID   calcium carbonate-vitamin D 1 tablet Oral Daily With Breakfast   collagenase  Topical Daily   docusate sodium 100 mg Oral BID   fentaNYL 25 mcg Transdermal Q72H   fluticasone 1 spray Each Nare BID   fluticasone 2 puff Inhalation BID   guaiFENesin 600 mg Oral Q12H JAMES   heparin (porcine) 5,000 Units Subcutaneous Q12H [facility]   melatonin 3 mg Oral HS   mirtazapine 15 mg Oral HS   ondansetron 4 mg Oral TID AC   oxyCODONE 10 mg Oral Q4H While Awake   pantoprazole 40 mg Oral Early Morning   senna 1 tablet Oral BID        Today, Patient Was Seen By: Kathy Marsh PA-C    ** Please Note: Dragon 360 Dictation voice to text software may have been used in the creation of this document   **

## 2017-11-24 NOTE — PROGRESS NOTES
Progress Note - Infectious Disease   Fer Kemal  81 y o  male MRN: [id number]  Unit/Bed#: -01 Encounter: [id number]      Impression/Plan:  1  Marybel Tania   Tachycardia, fever, leukocytosis   Possibly secondary to pneumonia versus an infected decubitus ulcer with abscess as below   The leukocytosis has resolved as has the tachycardia   Decreased temperature curve  Thus far the blood cultures remain negative   -continue cefepime and Flagyl for now at current dose  -follow-up blood cultures, sputum culture and wound culture and adjust antibiotics as needed  -monitor CBC with diff and creatinine  -supportive care     2   Probable pneumonia-based upon the patient's clinical and radiographic presentation  He is certainly at reasonable risk of having more resistant organisms such as Pseudomonas aeruginosa   Thus far he seems to be tolerating the antibiotics well without difficulty   His respiratory status has remained stable  Thus far the sputum cultures only revealing methicillin sensitive Staph aureus  -antibiotics as above  -follow-up final sputum culture  -discussed with micro lab in detail  -monitor respiratory status  -pulmonary toilet     3   Infected sacral decubitus ulcer/abscess-patient is now status post incision and drainage  Probably polymicrobial   Clinically stable  No gram-negative rods isolated  -discontinue cefepime and Flagyl  -Unasyn 3 g IV q 6 hours  -follow up wound cultures and adjust antibiotics as needed  -if patient to be discharged over the weekend, and no resistant organisms are isolated, could transition to Augmentin 875 mg p o  q 12 hours through 12/2/2017  -local wound care  -close surgical follow-up     4   Laryngeal carcinoma-status post laryngectomy, radiation, and chemotherapy  -monitor respiratory status  -additional management as per the primary service  -palliative care follow-up     5  Severe protein calorie malnutrition  -may need feeding tube    6    Disposition-will see the patient again 2017 if not discharged  Please call if questions  Antibiotics:  Cefepime 6  Flagyl 3  Postop day 2  Subjective:  Patient has no fever, chills, sweats; no nausea, vomiting, diarrhea; no cough, shortness of breath; no pain  No new symptoms  He is not very interactive  Objective:  Vitals:  HR:  [67-73] 73  Resp:  [17-20] 20  BP: ()/(53-68) 126/66  SpO2:  [94 %-97 %] 94 %  Temp (24hrs), Av 2 °F (36 8 °C), Min:97 4 °F (36 3 °C), Max:98 9 °F (37 2 °C)  Current: Temperature: 98 2 °F (36 8 °C)    Physical Exam:   General Appearance:  Alert, nontoxic, no acute distress  Throat: Oropharynx moist without lesions  Lungs:   Decreased breath sounds bilaterally; respirations unlabored   Heart:  RRR; no murmur, rub or gallop   Abdomen:   Soft, non-tender, non-distended, positive bowel sounds  Sacral wound dressed with some drainage noted on the dressing  No spreading erythema     Extremities: No clubbing, cyanosis or edema   Skin: No new rashes or lesions  No draining wounds noted         Labs, Imaging, & Other studies:   All pertinent labs and imaging studies were personally reviewed    Results from last 7 days  Lab Units 17  0617  1253 17  0435   WBC Thousand/uL 5 28 4 95 12 86*   HEMOGLOBIN g/dL 9 7* 10 7* 9 7*   PLATELETS Thousands/uL 329 280 252       Results from last 7 days  Lab Units 17  0621 17  1253 17  0435 17  2143   SODIUM mmol/L 139 138 138 136   POTASSIUM mmol/L 4 5 4 0 4 0 4 0   CHLORIDE mmol/L 102 104 103 100   CO2 mmol/L 33* 29 30 31   ANION GAP mmol/L 4 5 5 5   BUN mg/dL 16 14 13 15   CREATININE mg/dL 0 67 0 75 0 68 0 74   EGFR ml/min/1 73sq m 90 86 90 87   GLUCOSE RANDOM mg/dL 92 128 95 105   CALCIUM mg/dL 8 3 8 2* 8 0* 8 0*   AST U/L  --   --  65* 35   ALT U/L  --   --  35 36   ALK PHOS U/L  --   --  66 67   TOTAL PROTEIN g/dL  --   --  5 7* 5 7*   ALBUMIN g/dL  --   --  1 9* 2 0*   BILIRUBIN TOTAL mg/dL  -- --  0 49 0 51       Results from last 7 days  Lab Units 11/22/17  1304 11/20/17  1738 11/20/17  1314 11/20/17  0138 11/19/17 2147 11/19/17 2143   BLOOD CULTURE   --   --   --   --  No Growth After 4 Days  No Growth After 4 Days     SPUTUM CULTURE   --  1+ Growth of Staphylococcus aureus*  --   --   --   --    GRAM STAIN RESULT  Rare Polys  1+ Gram positive cocci in pairs Rare Polys  No bacteria seen  --   --   --   --    MRSA CULTURE ONLY   --   --   --  No Methicillin Resistant Staphlyococcus aureus (MRSA) isolated  --   --    LEGIONELLA URINARY ANTIGEN   --   --  Negative  --   --   --

## 2017-11-24 NOTE — CONSULTS
I came to evaluate the patient, he is very upset that the psychiatrist was consulted, he states he is depressed but does not want to be seen by us  Contact me back if patient's agree    Dr Nicola Arredondo

## 2017-11-24 NOTE — ASSESSMENT & PLAN NOTE
· Per family, unable to get situational depression controlled   · Appreciate pall med team input, Remeron added   · Patient secluded in room at times with shades down, minimal participation w/ conversation   · D/w Dr Ilia Lovett, patient and family, will c/s psychiatry to optimize depression sx

## 2017-11-24 NOTE — SOCIAL WORK
CM received a phone call from Kimberly Kumar  from [facility] patient may be ready for discharge back to [facility] tomorrow 11/25  Cm contacted [address], who stated they could accept patient  Isa Dub is needed prior to discharge  3:00 PM  CM contacted [facility], spoke with Channing,, who stated if CM faxed clinical info, it may not be read and auth issued for 24 hours  Insurance is not open over the weekend  CM attempting to print up clinical info, no PT/OT notes since 11/21    4:00 PM CM contacted [facility] with PT, who reported Kiah Rendon, who was covering [facility] today, has already left, and the patient's are being seen on priority basis  Patient can be seen tomorrow 11/25  CM notified Kimberly ARVIZU from [address] [place] [address] cannot get an Isa Dub today, insurance is not opened on the weekend, patient cannot go back to [facility] [place] [facility] until Monday  CM notified facility, inquiring since he is returning to the facility, can he return without auth  CM continues to follow

## 2017-11-24 NOTE — ASSESSMENT & PLAN NOTE
· Patient with leukocytosis, fever, tachycardia, POA  · Suspected secondary to PNA vs sacral ulcer infx  · Chest x-ray shows right lower lobe pneumonia  · Blood cultures negative to date   · ID following  · S/p 5 days IV cefepime, flagyl --> both d/c and patient started on unasyn   · Likely transition to oral augmentin tomorrow per ID recommendations

## 2017-11-24 NOTE — PROGRESS NOTES
Progress note - Palliative and Supportive Care   Fer Sommer  81 y o  male [id number]    Assessment:   Asthma    [facility] ([facility] )    Asthma    Depression    Laryngeal stenosis    [facility] )    Decubitus ulcer of sacral region, stage 4 ([facility] )    HCAP (healthcare-associated pneumonia)    Continuous opioid dependence ([place] [address] )    Anemia    - post op pain- sacral wound debridement 11/22    Plan:  1  Start Fentanyl 25 mcg patch and stop scheduled oxycodone after the next 3 doses  2  Continue ATC tylenol  3  Continue PRN oxycodone q2h PRN  4  Continue Remeron  5  Bowel regimen to prevent OIC  6  Schedule Zofran prior to meals  7  Recommend psychiatry consult to optimize anti-depressive medications  8  Goals - disease directed, may need to strongly consider family meeting to ensure his goals are aligned with theirs  D/W SLIM     Code Status: Full - Level 1   Power of :  presumed to be daughter by [place] Act 169   Advance Directive / Living Will: no   POLST:  no      Interval history:       Patient very withdrawn  He does admit to pain and nausea  Occasionally mouthing words but no longer writing to communicate  Prefers the room to be dark       MEDICATIONS / ALLERGIES:    all current active meds have been reviewed and current meds:   Current Facility-Administered Medications   Medication Dose Route Frequency    acetaminophen (TYLENOL) tablet 975 mg  975 mg Oral Q8H [address]    albuterol (PROVENTIL HFA,VENTOLIN HFA) inhaler 2 puff  2 puff Inhalation Q4H PRN    ampicillin-sulbactam (UNASYN) 3 g in sodium chloride 0 9 % 100 mL IVPB  3 g Intravenous Q6H    budesonide-formoterol (SYMBICORT) 160-4 5 mcg/act inhaler 2 puff  2 puff Inhalation BID    calcium carbonate-vitamin D (OSCAL-D) 500 mg-200 units per tablet 1 tablet  1 tablet Oral Daily With Breakfast    collagenase (SANTYL) ointment   Topical Daily    docusate sodium (COLACE) capsule 100 mg  100 mg Oral BID    fluticasone (FLONASE) 50 mcg/act nasal spray 1 spray  1 spray Each Nare BID    fluticasone (FLOVENT HFA) 110 MCG/ACT inhaler 2 puff  2 puff Inhalation BID    guaiFENesin (MUCINEX) 12 hr tablet 600 mg  600 mg Oral Q12H [address]    heparin (porcine) subcutaneous injection 5,000 Units  5,000 Units Subcutaneous Q12H [address]    melatonin tablet 3 mg  3 mg Oral HS    mirtazapine (REMERON) tablet 15 mg  15 mg Oral HS    oxyCODONE (ROXICODONE) immediate release tablet 10 mg  10 mg Oral Q2H PRN    oxyCODONE (ROXICODONE) immediate release tablet 10 mg  10 mg Oral Q4H While Awake    pantoprazole (PROTONIX) EC tablet 40 mg  40 mg Oral Early Morning    polyethylene glycol (MIRALAX) packet 17 g  17 g Oral Daily PRN    senna (SENOKOT) tablet 8 6 mg  1 tablet Oral BID       Allergies   Allergen Reactions    Aspirin     Cleocin [Clindamycin]        OBJECTIVE:    Physical Exam  Physical Exam   Constitutional: He is oriented to person, place, and time  Chronically ill, withdrawn    HENT:   Lips swollen   Eyes: EOM are normal  Right eye exhibits no discharge  Left eye exhibits discharge  Neck: Neck supple  Post-surgical changes noted   Cardiovascular: Normal rate, regular rhythm and intact distal pulses  Pulmonary/Chest: Effort normal  No respiratory distress  Abdominal: Soft  He exhibits no distension  Musculoskeletal: He exhibits no edema  Neurological: He is alert and oriented to person, place, and time  Skin: Skin is warm and dry  Psychiatric:   Depressed mood, flat affect   Nursing note and vitals reviewed  Lab Results:   I have personally reviewed pertinent labs  , CBC:   No results found for: WBC, HGB, HCT, MCV, PLT, ADJUSTEDWBC, MCH, MCHC, RDW, MPV, NRBC, CMP:   No results found for: NA, K, CL, CO2, ANIONGAP, BUN, CREATININE, GLUCOSE, CALCIUM, AST, ALT, ALKPHOS, PROT, ALBUMIN, BILITOT, EGFR  Imaging Studies: reviewed  EKG, Pathology, and Other Studies: reviewed    Counseling / Coordination of Care  Total floor / unit time spent today 25+ minutes  Greater than 50% of total time was spent with the patient and / or family counseling and / or coordination of care   A description of the counseling / coordination of care: symptom assessment and discussions with family

## 2017-11-24 NOTE — OCCUPATIONAL THERAPY NOTE
Patient seated bedside eating dinner and receiving nursing care  OT cancelled  Will continue to follow     MOUNA Wilkins

## 2017-11-25 LAB
BACTERIA BLD CULT: NORMAL
BACTERIA BLD CULT: NORMAL
ERYTHROCYTE [DISTWIDTH] IN BLOOD BY AUTOMATED COUNT: 16.5 % (ref 11.6–15.1)
HCT VFR BLD AUTO: 28.9 % (ref 36.5–49.3)
HGB BLD-MCNC: 9.3 G/DL (ref 12–17)
MCH RBC QN AUTO: 32 PG (ref 26.8–34.3)
MCHC RBC AUTO-ENTMCNC: 32.2 G/DL (ref 31.4–37.4)
MCV RBC AUTO: 99 FL (ref 82–98)
PLATELET # BLD AUTO: 300 THOUSANDS/UL (ref 149–390)
PMV BLD AUTO: 8.7 FL (ref 8.9–12.7)
RBC # BLD AUTO: 2.91 MILLION/UL (ref 3.88–5.62)
WBC # BLD AUTO: 4.42 THOUSAND/UL (ref 4.31–10.16)

## 2017-11-25 PROCEDURE — 94760 N-INVAS EAR/PLS OXIMETRY 1: CPT

## 2017-11-25 PROCEDURE — 85027 COMPLETE CBC AUTOMATED: CPT | Performed by: PHYSICIAN ASSISTANT

## 2017-11-25 PROCEDURE — 97116 GAIT TRAINING THERAPY: CPT

## 2017-11-25 RX ADMIN — DOCUSATE SODIUM 100 MG: 100 CAPSULE, LIQUID FILLED ORAL at 09:15

## 2017-11-25 RX ADMIN — AMPICILLIN SODIUM AND SULBACTAM SODIUM 3 G: 2; 1 INJECTION, POWDER, FOR SOLUTION INTRAMUSCULAR; INTRAVENOUS at 17:04

## 2017-11-25 RX ADMIN — SENNOSIDES 8.6 MG: 8.6 TABLET, FILM COATED ORAL at 17:04

## 2017-11-25 RX ADMIN — ONDANSETRON 4 MG: 4 TABLET, ORALLY DISINTEGRATING ORAL at 09:25

## 2017-11-25 RX ADMIN — HEPARIN SODIUM 5000 UNITS: 5000 INJECTION, SOLUTION INTRAVENOUS; SUBCUTANEOUS at 09:19

## 2017-11-25 RX ADMIN — GUAIFENESIN 600 MG: 600 TABLET, EXTENDED RELEASE ORAL at 21:22

## 2017-11-25 RX ADMIN — PANTOPRAZOLE SODIUM 40 MG: 40 TABLET, DELAYED RELEASE ORAL at 05:25

## 2017-11-25 RX ADMIN — AMPICILLIN SODIUM AND SULBACTAM SODIUM 3 G: 2; 1 INJECTION, POWDER, FOR SOLUTION INTRAMUSCULAR; INTRAVENOUS at 12:17

## 2017-11-25 RX ADMIN — OXYCODONE HYDROCHLORIDE 10 MG: 10 TABLET ORAL at 23:57

## 2017-11-25 RX ADMIN — SENNOSIDES 8.6 MG: 8.6 TABLET, FILM COATED ORAL at 09:15

## 2017-11-25 RX ADMIN — AMPICILLIN SODIUM AND SULBACTAM SODIUM 3 G: 2; 1 INJECTION, POWDER, FOR SOLUTION INTRAMUSCULAR; INTRAVENOUS at 05:25

## 2017-11-25 RX ADMIN — OXYCODONE HYDROCHLORIDE 10 MG: 10 TABLET ORAL at 03:32

## 2017-11-25 RX ADMIN — FLUTICASONE PROPIONATE 2 PUFF: 110 AEROSOL, METERED RESPIRATORY (INHALATION) at 23:31

## 2017-11-25 RX ADMIN — HEPARIN SODIUM 5000 UNITS: 5000 INJECTION, SOLUTION INTRAVENOUS; SUBCUTANEOUS at 21:22

## 2017-11-25 RX ADMIN — BUDESONIDE AND FORMOTEROL FUMARATE DIHYDRATE 2 PUFF: 160; 4.5 AEROSOL RESPIRATORY (INHALATION) at 09:17

## 2017-11-25 RX ADMIN — OXYCODONE HYDROCHLORIDE 10 MG: 10 TABLET ORAL at 09:15

## 2017-11-25 RX ADMIN — OXYCODONE HYDROCHLORIDE 10 MG: 10 TABLET ORAL at 15:31

## 2017-11-25 RX ADMIN — AMPICILLIN SODIUM AND SULBACTAM SODIUM 3 G: 2; 1 INJECTION, POWDER, FOR SOLUTION INTRAMUSCULAR; INTRAVENOUS at 23:40

## 2017-11-25 RX ADMIN — ACETAMINOPHEN 975 MG: 325 TABLET ORAL at 21:22

## 2017-11-25 RX ADMIN — FLUTICASONE PROPIONATE 2 PUFF: 110 AEROSOL, METERED RESPIRATORY (INHALATION) at 09:16

## 2017-11-25 RX ADMIN — MIRTAZAPINE 15 MG: 15 TABLET, FILM COATED ORAL at 21:23

## 2017-11-25 RX ADMIN — FLUTICASONE PROPIONATE 1 SPRAY: 50 SPRAY, METERED NASAL at 17:05

## 2017-11-25 RX ADMIN — FLUTICASONE PROPIONATE 1 SPRAY: 50 SPRAY, METERED NASAL at 09:22

## 2017-11-25 RX ADMIN — GUAIFENESIN 600 MG: 600 TABLET, EXTENDED RELEASE ORAL at 09:15

## 2017-11-25 RX ADMIN — OXYCODONE HYDROCHLORIDE 10 MG: 10 TABLET ORAL at 18:11

## 2017-11-25 RX ADMIN — MELATONIN TAB 3 MG 3 MG: 3 TAB at 21:23

## 2017-11-25 RX ADMIN — BUDESONIDE AND FORMOTEROL FUMARATE DIHYDRATE 2 PUFF: 160; 4.5 AEROSOL RESPIRATORY (INHALATION) at 23:30

## 2017-11-25 RX ADMIN — ACETAMINOPHEN 975 MG: 325 TABLET ORAL at 05:25

## 2017-11-25 RX ADMIN — DOCUSATE SODIUM 100 MG: 100 CAPSULE, LIQUID FILLED ORAL at 17:04

## 2017-11-25 RX ADMIN — CALCIUM CARBONATE 500 MG (1,250 MG)-VITAMIN D3 200 UNIT TABLET 1 TABLET: at 09:16

## 2017-11-25 RX ADMIN — OXYCODONE HYDROCHLORIDE 10 MG: 10 TABLET ORAL at 12:45

## 2017-11-25 NOTE — PLAN OF CARE
Problem: PHYSICAL THERAPY ADULT  Goal: Performs mobility at highest level of function for planned discharge setting  See evaluation for individualized goals  Treatment/Interventions: LE strengthening/ROM, Functional transfer training, Endurance training, Therapeutic exercise, Equipment eval/education, Patient/family training, Bed mobility, Gait training  Equipment Recommended: Ingris Luciano       See flowsheet documentation for full assessment, interventions and recommendations  Outcome: Progressing  Prognosis: Fair  Problem List: Decreased strength, Decreased endurance, Impaired balance, Decreased mobility, Decreased cognition, Impaired judgement, Decreased safety awareness  Assessment: Pt seen for session, gait training x 25 min  awake, alert and cooperative  able to ambulate an increased distance  continued fatigue w/ same  as pt continues to function below baseline, reocmmend return to snf rehab at NH        Recommendation:  (recommend return to snf rehab when stable)     PT - OK to Discharge: (S) Yes (recommend return to snf rehab when stable)    See flowsheet documentation for full assessment

## 2017-11-25 NOTE — PROGRESS NOTES
[facility] Internal Medicine Progress Note  Patient: Juan Antonio Addison  81 y o  male   MRN: [id number]  PCP: Rhett Hamm MD  Unit/Bed#: [facility] Encounter: [id number]  Date Of Visit: 11/25/17    Assessment:    Principal Problem:    Sepsis ([facility] )  Active Problems:    Laryngeal cancer ([facility] )    Depression    Dec[facility] )    HCAP (healthcare-associated pneumonia)    Continuous opioid dependence ([facility] )    Anemia    Severe protein-calorie malnutrition ([facility] )    Constipation      Plan:    · Sepsis, POA  · As evidenced by fever, tachycardia, and leukocytosis  The Infectious Disease team is following -their recommendations are appreciated  Suspect secondary to pneumonia vs  sacral ulcer/abscess or both  Patient has now remained afebrile for greater than 48 hours  He is no longer tachycardic  Leukocytosis has resolved  Repeat CBC in the morning  Continue IV antibiotics as per below  Blood cultures negative x2 after 5 days  No MRSA isolated  · Right basilar MSSA Pneumonia  · Chest x-ray revealed new right basilar patchy consolidation suspicious for pneumonia  The patient was initially treated with IV cefepime and vancomycin due to concern for gram-negative organisms given recent hospitalization and SNF  However, sputum culture now growing MSSA  Repeat CXR in 4-6 weeks and follow up with pulm as outpatient for resolution  · Sacral decubitus ulcer/abscess  · ID and general surgery following  Patient is now POD#3 washout/debridement  Continue IV Unasyn for now  Per ID recommendations, if no resistant organisms isolated, transition to Augmentin 875 mg p o  q 12 hours through 12/2/17 to complete antibiotic course at discharge  · Laryngeal cancer  · Status post laryngectomy at [facility]  Continue trach care  SLP following -continue level 3 with thin liquids  · Severe protein calorie malnutrition  · Nutrition following  Continue diet and encourage PO   If not meeting nutritional needs, consider enteral nutrition  · Chronic opioid dependence  · Palliative care following for pain management   · Anemia  · Hemoglobin stable  Continue to monitor  · Depression  · Patient on Remeron  Psych was consulted yesterday however he refused psych eval  · Constipation  · Continue colace and Senna  Per nursing, patient had 1 BM yesterday      VTE Pharmacologic Prophylaxis:   Pharmacologic: Heparin  Mechanical VTE Prophylaxis in Place: Yes    Patient Centered Rounds: I have performed bedside rounds with nursing staff today  Caty     Discussions with Specialists or Other Care Team Provider: Nursing     Education and Discussions with Family / Patient:   Patient at bedside; I attempted to call the patient's daughter, Trudi Franco, however no one answered  Time Spent for Care: 30 minutes  More than 50% of total time spent on counseling and coordination of care as described above  Current Length of Stay: 6 day(s)    Current Patient Status: Inpatient   Certification Statement: The patient will continue to require additional inpatient hospital stay due to safe discharge planning, IV abx, monitor nutritional needs    Discharge Plan: Pending auth    Code Status: Level 1 - Full Code      Subjective:   Mr Barbie Kirkpatrick does not use his Electrolarynx to speak with me but write with a pen and paper and nods his head to indicate yes and no to questions  He writes that he "needs training" to use his electrolarynx  He reports discomfort on his sacrum  He otherwise denies complaint and writes that he has no questions for me  He denies CP, SOB, abdominal pain, pain/swelling in his legs     Objective:     Vitals:   Temp (24hrs), Av 4 °F (36 9 °C), Min:98 °F (36 7 °C), Max:98 8 °F (37 1 °C)    HR:  [62-63] 62  Resp:  [19-20] 20  BP: (109-110)/(54-57) 110/54  SpO2:  [95 %-98 %] 98 %  Body mass index is 21 3 kg/m²  Input and Output Summary (last 24 hours):        Intake/Output Summary (Last 24 hours) at 17 [id number]  Last data filed at 11/25/17 1207   Gross per 24 hour   Intake             1194 ml   Output             2075 ml   Net             -881 ml       Physical Exam:     Physical Exam   Constitutional: He is oriented to person, place, and time  No distress  Patient seen lying on his left side in bed with the lights out resting  He does not speak but does write with pen/paper   Neck:   trach   Cardiovascular: Normal rate and regular rhythm  Pulmonary/Chest: Effort normal and breath sounds normal  No respiratory distress  He has no wheezes  Abdominal: Soft  Bowel sounds are normal  There is no tenderness  Musculoskeletal: He exhibits no edema  Neurological: He is alert and oriented to person, place, and time  Skin: Skin is warm  Psychiatric:   Flat affect   Vitals reviewed  Additional Data:     Labs:      Results from last 7 days  Lab Units 11/25/17  0523  11/21/17  1253   WBC Thousand/uL 4 42  < > 4 95   HEMOGLOBIN g/dL 9 3*  < > 10 7*   HEMATOCRIT % 28 9*  < > 34 0*   PLATELETS Thousands/uL 300  < > 280   NEUTROS PCT %  --   --  85*   LYMPHS PCT %  --   --  8*   MONOS PCT %  --   --  4   EOS PCT %  --   --  3   < > = values in this interval not displayed  Results from last 7 days  Lab Units 11/23/17  0621  11/20/17  0435   SODIUM mmol/L 139  < > 138   POTASSIUM mmol/L 4 5  < > 4 0   CHLORIDE mmol/L 102  < > 103   CO2 mmol/L 33*  < > 30   BUN mg/dL 16  < > 13   CREATININE mg/dL 0 67  < > 0 68   CALCIUM mg/dL 8 3  < > 8 0*   TOTAL PROTEIN g/dL  --   --  5 7*   BILIRUBIN TOTAL mg/dL  --   --  0 49   ALK PHOS U/L  --   --  66   ALT U/L  --   --  35   AST U/L  --   --  65*   GLUCOSE RANDOM mg/dL 92  < > 95   < > = values in this interval not displayed  Results from last 7 days  Lab Units 11/19/17  2143   INR  1 16       * I Have Reviewed All Lab Data Listed Above  * Additional Pertinent Lab Tests Reviewed:  All Labs Within Last 24 Hours Reviewed    Imaging:    Imaging Reports Reviewed Today Include: CXR  Imaging Personally Reviewed by Myself Includes:  none    Recent Cultures (last 7 days):       Results from last 7 days  Lab Units 11/22/17  1304 11/20/17  1738 11/20/17  1314 11/19/17  2147 11/19/17  2143   BLOOD CULTURE   --   --   --  No Growth After 5 Days  No Growth After 5 Days  SPUTUM CULTURE   --  1+ Growth of Staphylococcus aureus*  --   --   --    GRAM STAIN RESULT  Rare Polys  1+ Gram positive cocci in pairs Rare Polys  No bacteria seen  --   --   --    LEGIONELLA URINARY ANTIGEN   --   --  Negative  --   --        Last 24 Hours Medication List:     acetaminophen 975 mg Oral Q8H [address]   ampicillin-sulbactam 3 g Intravenous Q6H   budesonide-formoterol 2 puff Inhalation BID   calcium carbonate-vitamin D 1 tablet Oral Daily With Breakfast   collagenase  Topical Daily   docusate sodium 100 mg Oral BID   fentaNYL 25 mcg Transdermal Q72H   fluticasone 1 spray Each Nare BID   fluticasone 2 puff Inhalation BID   guaiFENesin 600 mg Oral Q12H JAMES   heparin (porcine) 5,000 Units Subcutaneous Q12H [address]   melatonin 3 mg Oral HS   mirtazapine 15 mg Oral HS   ondansetron 4 mg Oral TID AC   pantoprazole 40 mg Oral Early Morning   senna 1 tablet Oral BID        Today, Patient Was Seen By: Erik Floyd PA-C    ** Please Note: Dragon 360 Dictation voice to text software may have been used in the creation of this document   **

## 2017-11-25 NOTE — PHYSICAL THERAPY NOTE
Physical Therapy Treatment Note     11/25/17 0950   Pain Assessment   Pain Assessment No/denies pain   Restrictions/Precautions   Weight Bearing Precautions Per Order No   Other Precautions Fall Risk   General   Chart Reviewed Yes   Family/Caregiver Present No   Cognition   Overall Cognitive Status WFL   Arousal/Participation Arousable;Responsive   Attention Attends with cues to redirect   Orientation Level Oriented X4   Memory Unable to assess   Following Commands Follows one step commands without difficulty   Subjective   Subjective willing to mobilize   Bed Mobility   Sit to Supine 5  Supervision   Additional items Assist x 1   Transfers   Sit to Stand 5  Supervision   Additional items Assist x 1   Stand to Sit 5  Supervision   Additional items Assist x 1   Ambulation/Elevation   Gait pattern (mild ataxia, lateral sway, LOB x 2)   Gait Assistance 4  Minimal assist   Additional items Assist x 1   Assistive Device Rolling walker   Distance 130'x2, standing rest x 5 min 1/2 way   Balance   Static Sitting Good   Dynamic Sitting Good   Static Standing Fair   Dynamic Standing Fair   Ambulatory Fair   Endurance Deficit   Endurance Deficit Yes   Endurance Deficit Description fatigue, weakness   Activity Tolerance   Activity Tolerance Patient limited by fatigue;Treatment limited secondary to medical complications (Comment)   Nurse Made Aware yes   Assessment   Prognosis Fair   Problem List Decreased strength;Decreased endurance; Impaired balance;Decreased mobility; Decreased cognition; Impaired judgement;Decreased safety awareness   Assessment Pt seen for session, gait training x 25 min  awake, alert and cooperative  able to ambulate an increased distance  continued fatigue w/ same    as pt continues to function below baseline, reocmmend return to snf rehab at dc   Goals   Patient Goals none stated   STG Expiration Date 12/05/17   Treatment Day 1   Plan   Treatment/Interventions Functional transfer training;LE strengthening/ROM; Endurance training; Therapeutic exercise;Patient/family training;Gait training;Bed mobility; Equipment eval/education   Progress Progressing toward goals   PT Frequency 2-3x/wk   Recommendation   Recommendation (recommend return to snf rehab when stable)   Equipment Recommended Walker   PT - OK to Discharge Yes  (recommend return to snf rehab when stable)   Cr Tamayo PT, DPT CSRS

## 2017-11-25 NOTE — PROGRESS NOTES
Progress note - Palliative and Supportive Care   Ladramos Metzger  81 y o  male [id number]    Assessment:   Asthma    [facility]    Asthma    Depression    Laryngeal stenosis    Sepsis ([facility]    Decubitus ulcer of sacral region, stage 4 ([facility] )    HCAP (healthcare-associated pneumonia)    [facility] opioid dependence ([facility] )    Anemia    - post op pain- sacral wound debridement 11/22    Plan:  1  Continue Duragesic 25 mcg  2  Continue ATC tylenol  3  Continue PRN oxycodone- change to q4H PRN  4  Continue Remeron  5  Bowel regimen to prevent OIC  6  Schedule Zofran prior to meals  7  Goals - disease directed, possible d/c on Monday  Code Status: Full - Level 1   Power of :  presumed to be daughter by [place] Act 169   Advance Directive / Living Will: no   POLST:  no      Interval history:       Patient a little more interactive with me today  He feels some improvement with his pain since initiation of pain patch  Still refuses to use electrolarynx or write for me  He refused psychiatry evaluation       MEDICATIONS / ALLERGIES:    all current active meds have been reviewed and current meds:   Current Facility-Administered Medications   Medication Dose Route Frequency    acetaminophen (TYLENOL) tablet 975 mg  975 mg Oral Q8H [address]    albuterol (PROVENTIL HFA,VENTOLIN HFA) inhaler 2 puff  2 puff Inhalation Q4H PRN    ampicillin-sulbactam (UNASYN) 3 g in sodium chloride 0 9 % 100 mL IVPB  3 g Intravenous Q6H    budesonide-formoterol (SYMBICORT) 160-4 5 mcg/act inhaler 2 puff  2 puff Inhalation BID    calcium carbonate-vitamin D (OSCAL-D) 500 mg-200 units per tablet 1 tablet  1 tablet Oral Daily With Breakfast    collagenase (SANTYL) ointment   Topical Daily    docusate sodium (COLACE) capsule 100 mg  100 mg Oral BID    fentaNYL (DURAGESIC) 25 mcg/hr TD 72 hr patch 25 mcg  25 mcg Transdermal Q72H    fluticasone (FLONASE) 50 mcg/act nasal spray 1 spray  1 spray Each Nare BID    fluticasone (FLOVENT HFA) 110 MCG/ACT inhaler 2 puff  2 puff Inhalation BID    guaiFENesin (MUCINEX) 12 hr tablet 600 mg  600 mg Oral Q12H JAMES    heparin (porcine) subcutaneous injection 5,000 Units  5,000 Units Subcutaneous Q12H [address]    melatonin tablet 3 mg  3 mg Oral HS    mirtazapine (REMERON) tablet 15 mg  15 mg Oral HS    ondansetron (ZOFRAN-ODT) dispersible tablet 4 mg  4 mg Oral TID AC    oxyCODONE (ROXICODONE) immediate release tablet 10 mg  10 mg Oral Q2H PRN    pantoprazole (PROTONIX) EC tablet 40 mg  40 mg Oral Early Morning    polyethylene glycol (MIRALAX) packet 17 g  17 g Oral Daily PRN    senna (SENOKOT) tablet 8 6 mg  1 tablet Oral BID       Allergies   Allergen Reactions    Aspirin     Cleocin [Clindamycin]        OBJECTIVE:    Physical Exam  Physical Exam   Constitutional: He is oriented to person, place, and time  No distress  Chronically ill appearing    HENT:   Swollen lips    Eyes: EOM are normal    Neck:   Post surgical changes noted    Cardiovascular: Normal rate, regular rhythm and intact distal pulses  Pulmonary/Chest: Effort normal  No respiratory distress  Abdominal: Soft  Bowel sounds are normal    Neurological: He is alert and oriented to person, place, and time  Skin:   Did not assess decubitus ulcer    Psychiatric:   Depressed mood, affect somewhat better then the past few days   Nursing note and vitals reviewed  Lab Results:   I have personally reviewed pertinent labs  , CBC:   Lab Results   Component Value Date    WBC 4 42 11/25/2017    HGB 9 3 (L) 11/25/2017    HCT 28 9 (L) 11/25/2017    MCV 99 (H) 11/25/2017     11/25/2017    MCH 32 0 11/25/2017    MCHC 32 2 11/25/2017    RDW 16 5 (H) 11/25/2017    MPV 8 7 (L) 11/25/2017   , CMP:   No results found for: NA, K, CL, CO2, ANIONGAP, BUN, CREATININE, GLUCOSE, CALCIUM, AST, ALT, ALKPHOS, PROT, ALBUMIN, BILITOT, EGFR  Imaging Studies: reviewed  EKG, Pathology, and Other Studies: reviewed    Counseling / Coordination of Care  Total floor / unit time spent today 25+ minutes  Greater than 50% of total time was spent with the patient and / or family counseling and / or coordination of care   A description of the counseling / coordination of care: symptom assessment and discussions with family

## 2017-11-26 LAB
ANION GAP SERPL CALCULATED.3IONS-SCNC: 0 MMOL/L (ref 4–13)
BACTERIA SPEC ANAEROBE CULT: NORMAL
BACTERIA TISS AEROBE CULT: ABNORMAL
BACTERIA TISS AEROBE CULT: ABNORMAL
BUN SERPL-MCNC: 22 MG/DL (ref 5–25)
CALCIUM SERPL-MCNC: 8.1 MG/DL (ref 8.3–10.1)
CHLORIDE SERPL-SCNC: 98 MMOL/L (ref 100–108)
CO2 SERPL-SCNC: 37 MMOL/L (ref 21–32)
CREAT SERPL-MCNC: 0.71 MG/DL (ref 0.6–1.3)
ERYTHROCYTE [DISTWIDTH] IN BLOOD BY AUTOMATED COUNT: 16.7 % (ref 11.6–15.1)
GFR SERPL CREATININE-BSD FRML MDRD: 88 ML/MIN/1.73SQ M
GLUCOSE SERPL-MCNC: 94 MG/DL (ref 65–140)
GRAM STN SPEC: ABNORMAL
GRAM STN SPEC: ABNORMAL
HCT VFR BLD AUTO: 31.1 % (ref 36.5–49.3)
HGB BLD-MCNC: 9.7 G/DL (ref 12–17)
MCH RBC QN AUTO: 31.1 PG (ref 26.8–34.3)
MCHC RBC AUTO-ENTMCNC: 31.2 G/DL (ref 31.4–37.4)
MCV RBC AUTO: 100 FL (ref 82–98)
PLATELET # BLD AUTO: 271 THOUSANDS/UL (ref 149–390)
PMV BLD AUTO: 8.6 FL (ref 8.9–12.7)
POTASSIUM SERPL-SCNC: 4.9 MMOL/L (ref 3.5–5.3)
RBC # BLD AUTO: 3.12 MILLION/UL (ref 3.88–5.62)
SODIUM SERPL-SCNC: 135 MMOL/L (ref 136–145)
WBC # BLD AUTO: 4.21 THOUSAND/UL (ref 4.31–10.16)

## 2017-11-26 PROCEDURE — 85027 COMPLETE CBC AUTOMATED: CPT | Performed by: PHYSICIAN ASSISTANT

## 2017-11-26 PROCEDURE — 80048 BASIC METABOLIC PNL TOTAL CA: CPT | Performed by: PHYSICIAN ASSISTANT

## 2017-11-26 RX ORDER — POLYETHYLENE GLYCOL 3350 17 G/17G
17 POWDER, FOR SOLUTION ORAL DAILY
Status: DISCONTINUED | OUTPATIENT
Start: 2017-11-26 | End: 2017-11-27 | Stop reason: HOSPADM

## 2017-11-26 RX ADMIN — MELATONIN TAB 3 MG 3 MG: 3 TAB at 21:16

## 2017-11-26 RX ADMIN — AMPICILLIN SODIUM AND SULBACTAM SODIUM 3 G: 2; 1 INJECTION, POWDER, FOR SOLUTION INTRAMUSCULAR; INTRAVENOUS at 23:42

## 2017-11-26 RX ADMIN — ACETAMINOPHEN 975 MG: 325 TABLET ORAL at 21:16

## 2017-11-26 RX ADMIN — OXYCODONE HYDROCHLORIDE 10 MG: 10 TABLET ORAL at 09:24

## 2017-11-26 RX ADMIN — POLYETHYLENE GLYCOL 3350 17 G: 17 POWDER, FOR SOLUTION ORAL at 17:01

## 2017-11-26 RX ADMIN — GUAIFENESIN 600 MG: 600 TABLET, EXTENDED RELEASE ORAL at 21:16

## 2017-11-26 RX ADMIN — HEPARIN SODIUM 5000 UNITS: 5000 INJECTION, SOLUTION INTRAVENOUS; SUBCUTANEOUS at 21:16

## 2017-11-26 RX ADMIN — PANTOPRAZOLE SODIUM 40 MG: 40 TABLET, DELAYED RELEASE ORAL at 05:17

## 2017-11-26 RX ADMIN — OXYCODONE HYDROCHLORIDE 10 MG: 10 TABLET ORAL at 14:55

## 2017-11-26 RX ADMIN — FLUTICASONE PROPIONATE 2 PUFF: 110 AEROSOL, METERED RESPIRATORY (INHALATION) at 21:15

## 2017-11-26 RX ADMIN — DOCUSATE SODIUM 100 MG: 100 CAPSULE, LIQUID FILLED ORAL at 09:14

## 2017-11-26 RX ADMIN — DOCUSATE SODIUM 100 MG: 100 CAPSULE, LIQUID FILLED ORAL at 17:01

## 2017-11-26 RX ADMIN — AMPICILLIN SODIUM AND SULBACTAM SODIUM 3 G: 2; 1 INJECTION, POWDER, FOR SOLUTION INTRAMUSCULAR; INTRAVENOUS at 05:13

## 2017-11-26 RX ADMIN — HEPARIN SODIUM 5000 UNITS: 5000 INJECTION, SOLUTION INTRAVENOUS; SUBCUTANEOUS at 09:14

## 2017-11-26 RX ADMIN — FLUTICASONE PROPIONATE 2 PUFF: 110 AEROSOL, METERED RESPIRATORY (INHALATION) at 09:15

## 2017-11-26 RX ADMIN — BUDESONIDE AND FORMOTEROL FUMARATE DIHYDRATE 2 PUFF: 160; 4.5 AEROSOL RESPIRATORY (INHALATION) at 21:15

## 2017-11-26 RX ADMIN — CALCIUM CARBONATE 500 MG (1,250 MG)-VITAMIN D3 200 UNIT TABLET 1 TABLET: at 09:14

## 2017-11-26 RX ADMIN — MIRTAZAPINE 15 MG: 15 TABLET, FILM COATED ORAL at 21:16

## 2017-11-26 RX ADMIN — OXYCODONE HYDROCHLORIDE 10 MG: 10 TABLET ORAL at 06:53

## 2017-11-26 RX ADMIN — BUDESONIDE AND FORMOTEROL FUMARATE DIHYDRATE 2 PUFF: 160; 4.5 AEROSOL RESPIRATORY (INHALATION) at 09:19

## 2017-11-26 RX ADMIN — SENNOSIDES 8.6 MG: 8.6 TABLET, FILM COATED ORAL at 09:14

## 2017-11-26 RX ADMIN — ACETAMINOPHEN 975 MG: 325 TABLET ORAL at 14:55

## 2017-11-26 RX ADMIN — AMPICILLIN SODIUM AND SULBACTAM SODIUM 3 G: 2; 1 INJECTION, POWDER, FOR SOLUTION INTRAMUSCULAR; INTRAVENOUS at 17:04

## 2017-11-26 RX ADMIN — OXYCODONE HYDROCHLORIDE 10 MG: 10 TABLET ORAL at 17:02

## 2017-11-26 RX ADMIN — GUAIFENESIN 600 MG: 600 TABLET, EXTENDED RELEASE ORAL at 09:14

## 2017-11-26 RX ADMIN — FLUTICASONE PROPIONATE 1 SPRAY: 50 SPRAY, METERED NASAL at 17:02

## 2017-11-26 RX ADMIN — AMPICILLIN SODIUM AND SULBACTAM SODIUM 3 G: 2; 1 INJECTION, POWDER, FOR SOLUTION INTRAMUSCULAR; INTRAVENOUS at 11:45

## 2017-11-26 RX ADMIN — ACETAMINOPHEN 975 MG: 325 TABLET ORAL at 05:17

## 2017-11-26 RX ADMIN — SENNOSIDES 8.6 MG: 8.6 TABLET, FILM COATED ORAL at 17:01

## 2017-11-26 NOTE — PROGRESS NOTES
[facility] Internal [facility] Progress Note  Patient: Codey Gibson  81 y o  male   MRN: [id number]  PCP: Linette Rosario MD  Unit/Bed#: [facility] Encounter: [id number]  Date Of Visit: 11/26/17    Assessment:    Principal Problem:    Sepsis ([facility] )  Active Problems:    Laryngeal cancer ([facility] )    Depression    Decubitus ulcer of sacral region, stage 4 ([facility] )    HCAP (healthcare-associated pneumonia)    Continuous opioid dependence ([facility] )    Anemia    Severe protein-calorie malnutrition ([facility] )    Constipation      Plan:    · Sepsis, present on admission  · As evidenced by fever, tachycardia, and leukocytosis  The Infectious Disease team is following, their recommendations are appreciated  Suspect secondary to pneumonia versus sacral ulcer/abscess or both  Patient has now remained afebrile for 72 hours  His pulse is no longer tachycardic  Leukocytosis has resolved  Blood cultures negative x2 after 5 days  No MRSA isolated  · Right basilar MSSA pneumonia  · Chest x-ray revealed right basilar patchy consolidation suspicious for pneumonia  The patient was initially treated with IV cefepime and vancomycin due to concern for gram-negative organisms given recent hospitalization and SNF  However, sputum culture growing MSSA  Repeat chest x-ray in 4-6 weeks and follow up with pulmonology as an outpatient to resolution  · Sacral decubitus ulcer/abscess  · Id and General surgery following  Patient is now postop day 4  Washout and debridement  Continue IV Unasyn for now  Per ID recommendations, transition to Augmentin 875 mg p o  q 12 hours through 12/2/17 to complete antibiotic course at discharge  Of note, tissue culture revealed 2+ growth of Streptococcus anginosus which is susceptible to ampicillin  · Laryngeal cancer  · Status post laryngectomy at [facility]  Continue trach care  SLP following continue level 3 with thin liquids per most recent SLP recommendations    · Severe protein calorie malnutrition  · Nutrition following  Continue diet and encourage p o  Per my discussion with the patient's nurse today, patient has greatly increased his p o  intake from yesterday  Consider enteral nutrition if not meeting nutritional needs  Would appreciate nutrition follow-up  · Chronic opioid dependence  · Palliative care following for pain management  · Anemia  · Hemoglobin stable  Continue to monitor  · Depression  · Patient on Remeron  Of note, the psychiatry team was consulted and attempted to see the patient on 11/24/17 however per their note, the patient became very upset and did not want to be seen by them  I discussed with the patient today, he does report depressed mood  When asked if he would like to be seen by the psychiatry team, the patient acknowledges yes  I will re-consult the psych team  He denies SI or HI  · Constipation  · Patient reports that he has not had a BM in 7 days, however per my discussion with the patient's nurse, the patient has a BM charted on 11/24  He is on Colace and senna  We will add miralax  Consider suppository       VTE Pharmacologic Prophylaxis:   Pharmacologic: Heparin  Mechanical VTE Prophylaxis in Place: Yes    Patient Centered Rounds: I have performed bedside rounds with nursing staff today  Caty     Discussions with Specialists or Other Care Team Provider: Nursing,      Education and Discussions with Family / Patient: Patient    Time Spent for Care: 20 minutes  More than 50% of total time spent on counseling and coordination of care as described above      Current Length of Stay: 7 day(s)    Current Patient Status: Inpatient   Certification Statement: The patient will continue to require additional inpatient hospital stay due to safe discharge planning, continue IV abx, psych consult, monitor nutritional needs    Discharge Plan: Pending auth - CM following    Code Status: Level 1 - Full Code      Subjective:   Mr Polina Higgins is non-verbal and does not use an electrolarynx  He writes using pen and paper and shakes his head to indicate yes/no when asked questions  He reports no BM in 7 days  He reports discomfort about his wound  He denies CP or SOB  He indicates that his lips have been swollen since being at [facility] and assures me this is not new    Objective:     Vitals:   Temp (24hrs), Av 8 °F (37 1 °C), Min:98 6 °F (37 °C), Max:99 °F (37 2 °C)    HR:  [59-67] 67  Resp:  [18] 18  BP: ()/(50-57) 94/53  SpO2:  [97 %-98 %] 98 %  Body mass index is 21 3 kg/m²  Input and Output Summary (last 24 hours): Intake/Output Summary (Last 24 hours) at 17 1459  Last data filed at 17 1448   Gross per 24 hour   Intake             1010 ml   Output             2925 ml   Net            -1915 ml       Physical Exam:     Physical Exam   Constitutional:   Patient seen lying in bed; he is able to sit up on his own power  He is more cooperative and interactive today  He is pleasant  He communicates by writing   HENT:   Lips edematous - per patient this is not new  Left facial swelling not noted on exam yesterday as patient was seen lying on his left side and did not sit up  Patient assures me this is not new   Neck:   Trach in place   Cardiovascular: Normal rate and regular rhythm  Pulmonary/Chest: Effort normal and breath sounds normal  No respiratory distress  He has no wheezes  Abdominal: Soft  Bowel sounds are normal  There is no tenderness  Musculoskeletal: He exhibits edema  Neurological: He is alert  Non-verbal  When asked to smile, patient's edges of mouth raise equal b/l   Skin: Skin is warm  Psychiatric:   Reports depressed mood  Denies SI or HI  Agreeable to being seen by the psych team   Vitals reviewed        Additional Data:     Labs:      Results from last 7 days  Lab Units 17  0544  17  1253   WBC Thousand/uL 4 21*  < > 4 95   HEMOGLOBIN g/dL 9 7*  < > 10 7*   HEMATOCRIT % 31 1*  < > 34 0*   PLATELETS Thousands/uL 271  < > 280   NEUTROS PCT %  --   --  85*   LYMPHS PCT %  --   --  8*   MONOS PCT %  --   --  4   EOS PCT %  --   --  3   < > = values in this interval not displayed  Results from last 7 days  Lab Units 11/26/17  0544  11/20/17  0435   SODIUM mmol/L 135*  < > 138   POTASSIUM mmol/L 4 9  < > 4 0   CHLORIDE mmol/L 98*  < > 103   CO2 mmol/L 37*  < > 30   BUN mg/dL 22  < > 13   CREATININE mg/dL 0 71  < > 0 68   CALCIUM mg/dL 8 1*  < > 8 0*   TOTAL PROTEIN g/dL  --   --  5 7*   BILIRUBIN TOTAL mg/dL  --   --  0 49   ALK PHOS U/L  --   --  66   ALT U/L  --   --  35   AST U/L  --   --  65*   GLUCOSE RANDOM mg/dL 94  < > 95   < > = values in this interval not displayed  Results from last 7 days  Lab Units 11/19/17  2143   INR  1 16       * I Have Reviewed All Lab Data Listed Above  * Additional Pertinent Lab Tests Reviewed: All Labs Within Last 24 Hours Reviewed    Imaging:    Imaging Reports Reviewed Today Include: None new  Imaging Personally Reviewed by Myself Includes:  none    Recent Cultures (last 7 days):       Results from last 7 days  Lab Units 11/22/17  1304 11/20/17  1738 11/20/17  1314 11/19/17  2147 11/19/17  2143   BLOOD CULTURE   --   --   --  No Growth After 5 Days  No Growth After 5 Days     SPUTUM CULTURE   --  1+ Growth of Staphylococcus aureus*  --   --   --    GRAM STAIN RESULT  Rare Polys  1+ Gram positive cocci in pairs Rare Polys  No bacteria seen  --   --   --    LEGIONELLA URINARY ANTIGEN   --   --  Negative  --   --        Last 24 Hours Medication List:     acetaminophen 975 mg Oral Q8H [address]   ampicillin-sulbactam 3 g Intravenous Q6H   budesonide-formoterol 2 puff Inhalation BID   calcium carbonate-vitamin D 1 tablet Oral Daily With Breakfast   collagenase  Topical Daily   docusate sodium 100 mg Oral BID   fentaNYL 25 mcg Transdermal Q72H   fluticasone 1 spray Each Nare BID   fluticasone 2 puff Inhalation BID   guaiFENesin 600 mg Oral Q12H JAMES   heparin (porcine) 5,000 Units Subcutaneous Q12H [address]   melatonin 3 mg Oral HS   mirtazapine 15 mg Oral HS   ondansetron 4 mg Oral TID AC   pantoprazole 40 mg Oral Early Morning   senna 1 tablet Oral BID        Today, Patient Was Seen By: Rizwana Tomlinson PA-C    ** Please Note: Dragon 360 Dictation voice to text software may have been used in the creation of this document   **

## 2017-11-27 VITALS
DIASTOLIC BLOOD PRESSURE: 57 MMHG | SYSTOLIC BLOOD PRESSURE: 109 MMHG | OXYGEN SATURATION: 96 % | WEIGHT: 136 LBS | BODY MASS INDEX: 21.35 KG/M2 | TEMPERATURE: 98.5 F | RESPIRATION RATE: 18 BRPM | HEIGHT: 67 IN | HEART RATE: 69 BPM

## 2017-11-27 PROBLEM — F11.20 CONTINUOUS OPIOID DEPENDENCE (HCC): Status: RESOLVED | Noted: 2017-11-20 | Resolved: 2017-11-27

## 2017-11-27 PROBLEM — C32.9 LARYNGEAL CANCER (HCC): Status: RESOLVED | Noted: 2017-08-21 | Resolved: 2017-11-27

## 2017-11-27 PROBLEM — J18.9 HCAP (HEALTHCARE-ASSOCIATED PNEUMONIA): Status: RESOLVED | Noted: 2017-11-20 | Resolved: 2017-11-27

## 2017-11-27 PROBLEM — A41.9 SEPSIS (HCC): Status: RESOLVED | Noted: 2017-11-19 | Resolved: 2017-11-27

## 2017-11-27 PROCEDURE — 97110 THERAPEUTIC EXERCISES: CPT

## 2017-11-27 PROCEDURE — 97530 THERAPEUTIC ACTIVITIES: CPT

## 2017-11-27 RX ORDER — PANTOPRAZOLE SODIUM 40 MG/1
40 TABLET, DELAYED RELEASE ORAL
Qty: 30 TABLET | Refills: 0
Start: 2017-11-28 | End: 2017-12-14

## 2017-11-27 RX ORDER — CEFDINIR 300 MG/1
300 CAPSULE ORAL EVERY 12 HOURS SCHEDULED
Status: DISCONTINUED | OUTPATIENT
Start: 2017-11-27 | End: 2017-11-27 | Stop reason: HOSPADM

## 2017-11-27 RX ORDER — AMOXICILLIN AND CLAVULANATE POTASSIUM 875; 125 MG/1; MG/1
1 TABLET, FILM COATED ORAL EVERY 12 HOURS SCHEDULED
Status: DISCONTINUED | OUTPATIENT
Start: 2017-11-27 | End: 2017-11-27 | Stop reason: HOSPADM

## 2017-11-27 RX ORDER — CEFDINIR 300 MG/1
300 CAPSULE ORAL EVERY 12 HOURS SCHEDULED
Qty: 20 CAPSULE | Refills: 0 | Status: SHIPPED | OUTPATIENT
Start: 2017-11-27 | End: 2017-12-07

## 2017-11-27 RX ORDER — IPRATROPIUM BROMIDE AND ALBUTEROL SULFATE 2.5; .5 MG/3ML; MG/3ML
3 SOLUTION RESPIRATORY (INHALATION)
Status: DISCONTINUED | OUTPATIENT
Start: 2017-11-27 | End: 2017-11-27

## 2017-11-27 RX ORDER — MIRTAZAPINE 15 MG/1
15 TABLET, FILM COATED ORAL
Qty: 30 TABLET | Refills: 0
Start: 2017-11-27

## 2017-11-27 RX ORDER — OXYCODONE HYDROCHLORIDE 10 MG/1
10 TABLET ORAL EVERY 6 HOURS PRN
Qty: 16 TABLET | Refills: 0 | Status: SHIPPED | OUTPATIENT
Start: 2017-11-27 | End: 2017-12-07

## 2017-11-27 RX ORDER — AMOXICILLIN AND CLAVULANATE POTASSIUM 875; 125 MG/1; MG/1
1 TABLET, FILM COATED ORAL EVERY 12 HOURS SCHEDULED
Qty: 20 TABLET | Refills: 0 | Status: SHIPPED | OUTPATIENT
Start: 2017-11-27 | End: 2017-12-07

## 2017-11-27 RX ORDER — GUAIFENESIN 600 MG
600 TABLET, EXTENDED RELEASE 12 HR ORAL EVERY 12 HOURS SCHEDULED
Qty: 30 TABLET | Refills: 0 | Status: SHIPPED | OUTPATIENT
Start: 2017-11-27 | End: 2017-12-13

## 2017-11-27 RX ADMIN — BUDESONIDE AND FORMOTEROL FUMARATE DIHYDRATE 2 PUFF: 160; 4.5 AEROSOL RESPIRATORY (INHALATION) at 09:15

## 2017-11-27 RX ADMIN — FLUTICASONE PROPIONATE 2 PUFF: 110 AEROSOL, METERED RESPIRATORY (INHALATION) at 09:13

## 2017-11-27 RX ADMIN — FLUTICASONE PROPIONATE 1 SPRAY: 50 SPRAY, METERED NASAL at 09:13

## 2017-11-27 RX ADMIN — SENNOSIDES 8.6 MG: 8.6 TABLET, FILM COATED ORAL at 19:09

## 2017-11-27 RX ADMIN — HEPARIN SODIUM 5000 UNITS: 5000 INJECTION, SOLUTION INTRAVENOUS; SUBCUTANEOUS at 09:13

## 2017-11-27 RX ADMIN — ONDANSETRON 4 MG: 4 TABLET, ORALLY DISINTEGRATING ORAL at 10:34

## 2017-11-27 RX ADMIN — AMPICILLIN SODIUM AND SULBACTAM SODIUM 3 G: 2; 1 INJECTION, POWDER, FOR SOLUTION INTRAMUSCULAR; INTRAVENOUS at 05:53

## 2017-11-27 RX ADMIN — FENTANYL 25 MCG: 25 PATCH, EXTENDED RELEASE TRANSDERMAL at 10:36

## 2017-11-27 RX ADMIN — ONDANSETRON 4 MG: 4 TABLET, ORALLY DISINTEGRATING ORAL at 06:23

## 2017-11-27 RX ADMIN — COLLAGENASE SANTYL: 250 OINTMENT TOPICAL at 09:14

## 2017-11-27 RX ADMIN — CALCIUM CARBONATE 500 MG (1,250 MG)-VITAMIN D3 200 UNIT TABLET 1 TABLET: at 06:31

## 2017-11-27 RX ADMIN — SENNOSIDES 8.6 MG: 8.6 TABLET, FILM COATED ORAL at 09:12

## 2017-11-27 RX ADMIN — ACETAMINOPHEN 975 MG: 325 TABLET ORAL at 06:23

## 2017-11-27 RX ADMIN — OXYCODONE HYDROCHLORIDE 10 MG: 10 TABLET ORAL at 09:13

## 2017-11-27 RX ADMIN — ACETAMINOPHEN 975 MG: 325 TABLET ORAL at 14:54

## 2017-11-27 RX ADMIN — DOCUSATE SODIUM 100 MG: 100 CAPSULE, LIQUID FILLED ORAL at 09:12

## 2017-11-27 RX ADMIN — PANTOPRAZOLE SODIUM 40 MG: 40 TABLET, DELAYED RELEASE ORAL at 06:23

## 2017-11-27 RX ADMIN — DOCUSATE SODIUM 100 MG: 100 CAPSULE, LIQUID FILLED ORAL at 19:08

## 2017-11-27 RX ADMIN — FLUTICASONE PROPIONATE 2 PUFF: 110 AEROSOL, METERED RESPIRATORY (INHALATION) at 19:29

## 2017-11-27 RX ADMIN — AMPICILLIN SODIUM AND SULBACTAM SODIUM 3 G: 2; 1 INJECTION, POWDER, FOR SOLUTION INTRAMUSCULAR; INTRAVENOUS at 10:34

## 2017-11-27 RX ADMIN — POLYETHYLENE GLYCOL 3350 17 G: 17 POWDER, FOR SOLUTION ORAL at 09:12

## 2017-11-27 RX ADMIN — BUDESONIDE AND FORMOTEROL FUMARATE DIHYDRATE 2 PUFF: 160; 4.5 AEROSOL RESPIRATORY (INHALATION) at 19:29

## 2017-11-27 RX ADMIN — OXYCODONE HYDROCHLORIDE 10 MG: 10 TABLET ORAL at 04:16

## 2017-11-27 RX ADMIN — GUAIFENESIN 600 MG: 600 TABLET, EXTENDED RELEASE ORAL at 09:13

## 2017-11-27 RX ADMIN — OXYCODONE HYDROCHLORIDE 10 MG: 10 TABLET ORAL at 00:47

## 2017-11-27 NOTE — OCCUPATIONAL THERAPY NOTE
Occupational Therapy Progress Note      Patient Name: Aretha Queen  Today's Date: 11/27/2017    Problem List:   Patient Active Problem List   Diagnosis    Asthma    L[facility] cancer ([facility] )    Asthma    Depression    Laryngeal stenosis    Sepsis ([facility] )    Decubitus ulcer of sacral region, stage 4 ([facility] )    HCAP (healthcare-associated pneumonia)    Continuous opioid dependence ([facility] )    Anemia    Severe protein-calorie malnutrition ([facility] )    Constipation        11/27/17 1530   Restrictions/Precautions   Weight Bearing Precautions Per Order No   Other Precautions Impulsive;Cognitive; Fall Risk  (bed alarm set at end of session)   Lifestyle   Autonomy REQUIRES ASSIST FOR ADLS AND MOBILITY - WAS RECEIVING REHAB AT [facility]   Reciprocal Relationships supportive family   Service to Others reired   Intrinsic Gratification enjoys resting   Pain Assessment   Pain Assessment 0-10   Pain Score Worst Possible Pain  (positioned comfortable in bed on side s/p session)   Pain Type Acute pain;Chronic pain   Pain Location Sacrum   Pain Orientation Mid   Pain Frequency Constant/continuous   Pain Onset Ongoing   Clinical Progression Not changed   Hospital Pain Intervention(s) Ambulation/increased activity; Distraction; Emotional support  (positioned for comfort)   ADL   Where Assessed Edge of bed   LB Dressing Assistance 6  Modified independent   LB Dressing Deficit Don/doff R sock; Don/doff L sock   LB Dressing Comments increased time required pt  declined all other dressing   Bed Mobility   Rolling R 5  Supervision   Rolling L 5  Supervision   Supine to Sit 5  Supervision   Sit to Supine 4  Minimal assistance   Additional items Assist x 1;LE management   Transfers   Sit to Stand 5  Supervision   Additional items Assist x 1; Impulsive   Stand to Sit 5  Supervision   Additional items Assist x 1; Impulsive   Functional Mobility   Functional Mobility 4  Minimal assistance   Additional items (RW, ~80', 1 LOB)   Cognition Overall Cognitive Status Impaired   Arousal/Participation Alert; Uncooperative   Attention Attends with cues to redirect   Following Commands Follows one step commands without difficulty   Activity Tolerance   Activity Tolerance Patient limited by fatigue;Patient limited by pain   Assessment   Assessment Pt  seen for OT treatment session to address ADLs, functional activity tolerance, strengthening, dynamic standing/seated balance  Pt  agreeable to participate and overall, pt  tolerated session fair  Pt  functioning at a min A level for functional mobility and a (s) level transfers with the use of RW  Min A was needed as pt  Experienced one episode of LOB and required min A to recorrect  mod A was also needed during LB dressing d/t decreased functional activity tolerance, decreased dynamic sitting balance, decreased strength, generalized deconditioning, and decreased initiation of activity  Pt  Declined further ADL training 2/2 fatigue and pain  Pt  will continue to benefit from OT services to address noted deficits and maximize functional gains  Continue to rec  STR s/p d c from acute care setting  Plan   Treatment Interventions ADL retraining;Functional transfer training; Endurance training;Patient/family training;Equipment evaluation/education; Compensatory technique education; Activityengagement   Goal Expiration Date 12/05/17   OT Frequency 3-5x/wk   Recommendation   OT Discharge Recommendation Short Term Rehab   OT - OK to Discharge Yes   Barthel Index   Feeding 10   Bathing 0   Grooming Score 0   Dressing Score 5   Bladder Score 10   Bowels Score 10   Toilet Use Score 5   Transfers (Bed/Chair) Score 10   Mobility (Level Surface) Score 10   Stairs Score 5   Barthel Index Score 65   Modified [place] Scale   Modified Sabrina Scale 4     *please note flow sheet should state MOD A for LB Dressing  Pt  Required MOD A to don/doff socks  Unable to change value in flow sheet       FABRIZIO Vazquez, OTR/L

## 2017-11-27 NOTE — PHYSICAL THERAPY NOTE
PHYSICAL THERAPY NOTE          Patient Name: Kulwinder Zheng  Today's Date: 11/27/2017 11/27/17 1403   Pain Assessment   Pain Assessment FLACC   Pain Type Acute pain;Chronic pain   Pain Location Generalized   Pain Descriptors Patient unable to describe   Pain Frequency Intermittent   Pain Onset Ongoing   Clinical Progression Not changed   Effect of Pain on Daily Activities no increased discomfort expressed during the session   Patient's Stated Pain Goal No pain   Hospital Pain Intervention(s) Ambulation/increased activity; Distraction; Emotional support   Response to Interventions appears to be comfortable post session   Pain Rating: FLACC (Rest) - Face 0   Pain Rating: FLACC (Rest) - Legs 0   Pain Rating: FLACC (Rest) - Activity 0   Pain Rating: FLACC (Rest) - Cry 0   Pain Rating: FLACC (Rest) - Consolability 0   Score: FLACC (Rest) 0   Pain Rating: FLACC (Activity) - Face 0   Pain Rating: FLACC (Activity) - Legs 0   Pain Rating: FLACC (Activity) - Activity 0   Pain Rating: FLACC (Activity) - Cry 0   Pain Rating: FLACC (Activity) - Consolability 0   Score: FLACC (Activity) 0   Restrictions/Precautions   Other Precautions Impulsive;Cognitive; Fall Risk  (bed alarm activated at the end of session)   General   Chart Reviewed Yes   Additional Pertinent History cleared for Tx session (spoke to nsg)   Response to Previous Treatment Patient with no complaints from previous session     Cognition   Overall Cognitive Status WFL   Arousal/Participation Responsive   Attention Attends with cues to redirect   Orientation Level (responds to his name)   Memory Unable to assess   Following Commands Follows one step commands without difficulty   Subjective   Subjective Pt is resting bed; arousable; flat affect; reluctantly agreeable to mobilize; somewhat irritable at times; declines LE therex after getting back to bed   Bed Mobility   Supine to Sit 5  Supervision   Additional items Assist x 1;Verbal cues; Impulsive   Sit to Supine 4  Minimal assistance   Additional items Assist x 1;Verbal cues;LE management   Transfers   Sit to Stand 5  Supervision   Additional items Assist x 1;Verbal cues; Impulsive   Stand to Sit 5  Supervision   Additional items Assist x 1;Verbal cues   Ambulation/Elevation   Gait pattern Inconsistent bharat   Gait Assistance 5  Supervision  (occasional close guarding/min (A))   Additional items Assist x 1   Assistive Device Rolling walker   Distance 2 x 130 ft w/ brief standing stop in between   Balance   Static Sitting Good   Static Standing Fair   Ambulatory Fair -   Activity Tolerance   Activity Tolerance Patient limited by fatigue   [facility]urse Made [facility]   Assessment   Prognosis Fair   Problem List Decreased strength;Decreased endurance; Impaired balance;Decreased mobility; Decreased safety awareness; Impaired judgement   Assessment Pt cont to gradually improve in functional mobility skills w/ min less (A) required for amb w/ rw; pt still exhibits occasional impulsivity w/ mobilization and requires verbal instructions for pacing and safety; pt returned back to bed post session; pt declined LE therex at that time; currently, based on overall status, cont to recommend rehab upon D/C; will follow  Goals   Patient Goals to get back to bed   STG Expiration Date 12/05/17   Treatment Day 2   Plan   Treatment/Interventions Functional transfer training;LE strengthening/ROM; Therapeutic exercise; Endurance training;Bed mobility;Gait training;Spoke to nursing;Spoke to case management   Progress Progressing toward goals   PT Frequency 2-3x/wk   Recommendation   Recommendation Post acute IP rehab   Equipment Recommended Chelsi Garcia PT

## 2017-11-27 NOTE — CONSULTS
I came to see the patient again  The patient states that he does not need a psychiatrist at this moment  Unable to assess     Dr Mercedes Ferris

## 2017-11-27 NOTE — RESPIRATORY THERAPY NOTE
RT Protocol Note  Reyes Going  81 y o  male MRN: [id number]  Unit/Bed#: -01 Encounter: [id number]    Assessment    Principal Problem:    Sepsis ([facility] )  Active Problems:    Laryngeal cancer ([place] [address] )    Depression    Decubitus ulcer of sacral region, stage 4 ([place] [address] )    HCAP (healthcare-associated pneumonia)    Continuous opioid dependence ([place] [address] )    Anemia    Severe protein-calorie malnutrition (HCC)    Constipation      Home Pulmonary Medications:  Albuterol, flovent, symbicort, asmanex    Past Medical History:   Diagnosis Date    Asthma     Hypertension     Larynx cancer ([place] [address] )      Social History     Social History    Marital status: Single     Spouse name: N/A    Number of children: N/A    Years of education: N/A     Social History Main Topics    Smoking status: Former Smoker    Smokeless tobacco: None    Alcohol use No    Drug use: No    Sexual activity: Not Asked     Other Topics Concern    None     Social History Narrative    None       Subjective    Subjective Data: currently pt in no resp distress  BS diminished/clear, SpO2 96% on room air  Pt has a "Provox" trach, which is capped with an HME  Will continue pt's home MDI's  No neb treatments needed at this time    Objective    Physical Exam:   Assessment Type: (P) Assess only  General Appearance: (P) Alert, Awake  Respiratory Pattern: (P) Normal  Chest Assessment: (P) Chest expansion symmetrical  Bilateral Breath Sounds: (P) Clear, Diminished  Cough: (P) None  O2 Device: (P) room air    Vitals:  Blood pressure 104/58, pulse 64, temperature 98 6 °F (37 °C), temperature source Oral, resp  rate 18, height 5' 7" (1 702 m), weight 61 7 kg (136 lb), SpO2 97 %  Imaging and other studies: I have personally reviewed pertinent reports        O2 Device: (P) room air     Plan    Respiratory Plan: (P) Home Bronchodilator Patient pathway        Resp Comments: (P) per pt, quit smoking 35 yrs ago, pt takes albuterol, flovent, symbicort, asmanex mdi's a huma, pmhx of asthma, cxr shows poss pneumonia, no indication for udn tx's, pt already ordered on an albuterol mdi q 4 prn for sob/wheezing, breath sounds have been clear and diminished since admission

## 2017-11-27 NOTE — PROGRESS NOTES
[facility] Internal Medicine Progress Note  Patient: Andreea Hayden  81 y o  male   MRN: [id number]  PCP: Mayte Fraser MD  Unit/Bed#: [facility] Encounter: [id number]  Date Of Visit: 11/27/17    Assessment/Plan:  ·   Principal Problem:    Sepsis ([facility] )  Active Problems:    Laryngeal cancer ([facility] )    Depression    Decubitus ulcer of sacral region, stage 4 ([place] [address] )    HCAP (healthcare-associated pneumonia)    Continuous opioid dependence ([place] [address] )    Anemia    Severe protein-calorie malnutrition ([place] [address] )    Constipation    Sepsis secondary to pneumonia (right basilar opacification)/ sacral decubitus ulcer-  Received cefepime 11/18- 11/24, Flagyl 11/21- 11/22, and unasyn 11/24 - to now  Patient is afebrile, with controlled heart rate, Bp, saturating 97% on room air  Labs unremarkable for Leukocytosis  Anemia of chronic disease present with Low H/H- which remains stable when compared to prior days, c/w f/u CBC  Tissue culture: 2+ growth of S anginosus susceptible to current medication  Sputum Culture: S aureus also susceptible to current medication    Laryngeal cancer- s/p laryngectomy- continue with Dysphagia 3 diet, aspiration precautions  Severe protein calorie malnutrition- continue protein supplementation tid  Chronic opioid dependence- palliative care  Depression- refused psychiatric consult- continue remeron  Chronic constipation- opioid induced; bowel regimen daily  Reactive airway disease- continue duonebs, symbicort      VTE Pharmacologic Prophylaxis:   Pharmacologic: Heparin  Mechanical VTE Prophylaxis in Place: Yes    Patient Centered Rounds: I have performed bedside rounds with nursing staff today      Discussions with Specialists or Other Care Team Provider: yes    Education and Discussions with Family / Patient:yes  Current Length of Stay: 8 day(s)    Current Patient Status: Inpatient   Certification Statement: The patient will continue to require additional inpatient hospital stay due to medical mgt, stable to be discharged    Discharge Plan: SNF/REhab    Code Status: Level 1 - Full Code      Subjective:   No new complaints  Patient is non-verbal but can write down his concerns  ROS negative otherwise  Objective:     Vitals:   Temp (24hrs), Av 8 °F (37 1 °C), Min:98 6 °F (37 °C), Max:99 2 °F (37 3 °C)    HR:  [61-64] 64  Resp:  [18] 18  BP: (102-107)/(55-58) 104/58  SpO2:  [97 %] 97 %  Body mass index is 21 3 kg/m²  Input and Output Summary (last 24 hours): Intake/Output Summary (Last 24 hours) at 17 1328  Last data filed at 17 1200   Gross per 24 hour   Intake             1080 ml   Output             2225 ml   Net            -1145 ml       Physical Exam:  General Appearance:   emaciated poorly nourished elderly man   Head:    Normocephalic, without obvious abnormality, atraumatic, mucous membranes moist    Eyes:    Conjunctival pallor noted/corneas clear, EOM's intact   Neck:   Supple   Lungs:     Clear to auscultation bilaterally, respirations unlabored, no crackles or wheeze heard    Heart:    Regular rate and rhythm, S1 and S2 no murmur heard   Abdomen:     Soft, non-tender, bowel sounds active all four quadrants,     no masses, no organomegaly  Sacral decubiti with recent debridement, dressings clean and dry and intact  Extremities:   Extremities normal, atraumatic, no cyanosis or edema   Neurologic:  nonfocal           Additional Data:     Labs:      Results from last 7 days  Lab Units 17  0544  17  1253   WBC Thousand/uL 4 21*  < > 4 95   HEMOGLOBIN g/dL 9 7*  < > 10 7*   HEMATOCRIT % 31 1*  < > 34 0*   PLATELETS Thousands/uL 271  < > 280   NEUTROS PCT %  --   --  85*   LYMPHS PCT %  --   --  8*   MONOS PCT %  --   --  4   EOS PCT %  --   --  3   < > = values in this interval not displayed      Results from last 7 days  Lab Units 17  0544   SODIUM mmol/L 135*   POTASSIUM mmol/L 4 9   CHLORIDE mmol/L 98*   CO2 mmol/L 37*   BUN mg/dL 22   CREATININE mg/dL 0 71 CALCIUM mg/dL 8 1*   GLUCOSE RANDOM mg/dL 94           * I Have Reviewed All Lab Data Listed Above  * Additional Pertinent Lab Tests Reviewed: [facility] Admission Reviewed    Cultures:   Blood Culture:   Lab Results   Component Value Date    BLOODCX No Growth After 5 Days  11/19/2017    BLOODCX No Growth After 5 Days  11/19/2017     Urine Culture: No results found for: URINECX  Sputum Culture: No components found for: SPUTUMCX  Wound Culture: No results found for: WOUNDCULT    Last 24 Hours Medication List:     acetaminophen 975 mg Oral Q8H [address]   ampicillin-sulbactam 3 g Intravenous Q6H   budesonide-formoterol 2 puff Inhalation BID   calcium carbonate-vitamin D 1 tablet Oral Daily With Breakfast   collagenase  Topical Daily   docusate sodium 100 mg Oral BID   fentaNYL 25 mcg Transdermal Q72H   fluticasone 1 spray Each Nare BID   fluticasone 2 puff Inhalation BID   guaiFENesin 600 mg Oral Q12H [address]   heparin (porcine) 5,000 Units Subcutaneous Q12H [address]   melatonin 3 mg Oral HS   mirtazapine 15 mg Oral HS   ondansetron 4 mg Oral TID AC   pantoprazole 40 mg Oral Early Morning   polyethylene glycol 17 g Oral Daily   senna 1 tablet Oral BID        Today, Patient Was Seen By: Remy Chambers MD    ** Please Note: Dragon 360 Dictation voice to text software may have been used in the creation of this document   **

## 2017-11-27 NOTE — PROGRESS NOTES
Progress Note - Infectious Disease   Jose Guadalupe Palmer  81 y o  male MRN: [id number]  Unit/Bed#: -01 Encounter: [id number]    Assessment/Plan:     Jose Guadalupe Palmer  is a 81y o  year old male who with a history of laryngeal SCC s/p recent laryngectomy presenting from nursing facility w/ sepsis w/ CXR infiltrates and deep sacral decubitus ulcer       Cultures  Blood cx x2: NGx5D  Sputum: 1+ MSSA  MRSA nares: negative   Wound Cx: 2+ strep anginosus (suspectible ceftriaxone; intermediate penicllin), few colonies enterococcus faecalis (susceptible amp/vanc), 4+ aerobic/anaerobic hiral  Antibiotics   Cefepime 11/18 - 11/24  Flagyl 11/21 - 11/22   Unasyn 11/24 - current      1  Sepsis POA: tachycardia, fevers, leukocytosis  Lactic acid 1 4, WBC initially 13 13  Unclear source, PNA v  sacral decub infection  Blood culture from admission w/ NGx5D  Sputum w/ MSSA, decub w/ strep anguinosus  (int pencillin)/enterococcus   · Persistently afebrile,  SBP soft trending 90s-100s , leukocytosis resolved Non-toxic appearing  · As wound cx with 1) intermediate-pen resistant strep anguinosus 2) enterococcus, 3) anaerobic 4+ cx, switch from Unasyn 3g IV q6H to Cefdinir 300 mg PO q12H + Augmentin 875mg PO q12H thorugh 12/2      2  HCAP PNA w/ risk factors pseudomonas: CXR w/ new right basilar patchy consolidation suspsisous for PNA  · Sputum culture w/ MSSA   · Antibiotics as above      3  Decubitus ulcer of sacral region w/ yellow sloughing of open wound on presentation  S/p I&D, per op note purulent fluid in wound, wound cx w/ strep anginosus, few colonies enterococcus faecalis  ·  Antibiotics as above      4  UTI - treated outpatient with cipro x4 days    · UA negative after 4 days cipro  No flank tenderness/other suggestion pyelo currently              Subjective/Objective   Chief Complaint: Back pain     Subjective:  Back pain unchanged from prior  He denies fever/chills/nausea/CP/SOB/abdominal pain       Objective: HR:  [61-64] 64  Resp:  [18] 18  BP: (102-107)/(55-58) 104/58  SpO2:  [97 %] 97 %  Temp (24hrs), Av 8 °F (37 1 °C), Min:98 6 °F (37 °C), Max:99 2 °F (37 3 °C)  Current: Temperature: 98 6 °F (37 °C)    Physical Exam:   Physical Exam   Constitutional: No distress  HENT:   Head: Normocephalic and atraumatic  Cardiovascular: Normal rate and regular rhythm  Pulmonary/Chest: Effort normal and breath sounds normal    Trach capped    Abdominal: Soft  Bowel sounds are normal    Musculoskeletal: He exhibits no edema  Skin: Skin is warm and dry  He is not diaphoretic  Invasive Devices     Peripheral Intravenous Line            Peripheral IV 17 Left Forearm less than 1 day          Airway            Surgical Airway 93 days                Lab, Imaging and other studies: I have personally reviewed pertinent reports

## 2017-11-27 NOTE — DISCHARGE SUMMARY
Discharge Summary - [facility] Internal Medicine    Patient Information: Roderick Dasilva  81 y o  male MRN: [id number]  Unit/Bed#: -01 Encounter: [id number]    Discharging Physician / Practitioner: Reshma Olivas MD  PCP: Mikael Moore MD  Admission Date: 11/19/2017  Discharge Date: 11/27/17    Reason for Admission: fever    Discharge Diagnoses:     Principal Problem (Resolved):    Sepsis ([facility] [address] )  Active Problems:    Depression    Decubitus ulcer of sacral region, stage 4 ([facility] [address] )    Anemia    Severe protein-calorie malnutrition ([facility] [address] )    Constipation  Resolved Problems:    Laryngeal cancer ([facility] [address] )    HCAP (healthcare-associated pneumonia)    Continuous opioid dependence ([facility] )      Consultations During Hospital Stay:  · Psychiatry  · Surgery general  · Palliative care  · Infectious Disease    Procedures Performed:     Debridement of sacral ulcer  Significant Findings / Test Results:     · Sacral wound positive for strep anginosus    Incidental Findings:   · none     Test Results Pending at Discharge (will require follow up): · None     Outpatient Tests Requested:  · Nine    Complications:  None  Hospital Course:     Roderick Dasilva  is a 81 y o  male patient who originally presented to the hospital on 11/19/2017 due to for further evaluation of fever and low blood pressure  Patient was found to have new right basilar patchy consolidation suspicious of pneumonia  Patient was evaluated by Infectious Disease, continued on cefepime for some time then switch to oral antibiotics  Sepsis resolved with treatment as per Infectious Disease  Patient has region swallow evaluation with a dysphagia level 3 diet full liquid  Patient was see physical therapy and is recommended to return to rehab upon discharge  Psychiatry was consulted and patient, patient refused to be evaluated by his psychiatrist   Anemia of chronic disease followed continuously throughout hospitalization, hemoglobin stable at this point    Severe protein calorie malnutrition addressed during hospitalization visit, continued additional protein supplementation needed outpatient  Patient had stage IV sacral ulcer debrided    Condition at Discharge: good     Discharge Day Visit / Exam:     * Please refer to separate progress note for these details * 11/27/2017    Discussion with Family: yes      Discharge instructions/Information to patient and family:   See after visit summary for information provided to patient and family  Provisions for Follow-Up Care:  See after visit summary for information related to follow-up care and any pertinent home health orders  Disposition:     Other [facility]    For Discharges to [facility] SNF:   · Not Applicable to this Patient - Not Applicable to this Patient    Planned Readmission: none    Discharge Statement:  I spent 30 minutes discharging the patient  This time was spent on the day of discharge  I had direct contact with the patient on the day of discharge  Greater than 50% of the total time was spent examining patient, answering all patient questions, arranging and discussing plan of care with patient as well as directly providing post-discharge instructions  Additional time then spent on discharge activities  Discharge Medications:  See after visit summary for reconciled discharge medications provided to patient and family  ** Please Note: This note has been constructed using a voice recognition system   **

## 2017-11-27 NOTE — PROGRESS NOTES
11/27/17 1500   Plan of Care   Comments Atempted to visited pt     Assessment Completed by: Unit visit

## 2017-11-27 NOTE — PLAN OF CARE
Problem: OCCUPATIONAL THERAPY ADULT  Goal: Performs self-care activities at highest level of function for planned discharge setting  See evaluation for individualized goals  Treatment Interventions: ADL retraining, Functional transfer training, Endurance training, Patient/family training, Equipment evaluation/education, Compensatory technique education, Activityengagement          See flowsheet documentation for full assessment, interventions and recommendations  Outcome: Progressing  Limitation: Decreased ADL status, Decreased endurance, Decreased self-care trans, Decreased high-level ADLs  Prognosis: Good  Assessment: Pt  seen for OT treatment session to address ADLs, functional activity tolerance, strengthening, dynamic standing/seated balance  Pt  agreeable to participate and overall, pt  tolerated session fair  Pt  functioning at a min A level for functional mobility and a (s) level transfers with the use of RW  Min A was needed as pt  Experienced one episode of LOB and required min A to recorrect  mod A was also needed during LB dressing d/t decreased functional activity tolerance, decreased dynamic sitting balance, decreased strength, generalized deconditioning, and decreased initiation of activity  Pt  Declined further ADL training 2/2 fatigue and pain  Pt  will continue to benefit from OT services to address noted deficits and maximize functional gains  Continue to rec  STR s/p d c from acute care setting       OT Discharge Recommendation: Short Term Rehab  OT - OK to Discharge: Yes    Ammon Mohs, FABRIZIO, OTR/L

## 2017-11-27 NOTE — PROGRESS NOTES
Nataliya Kay from Wound on proper dressing changes to sacral wound  Kimmy Hutchison at bedside dressing changed

## 2017-11-27 NOTE — SOCIAL WORK
Pt medically cleared  CM spoke to Dr Brandee Maria asked if they could put in d/c order, physician agreed  Transport arranged back to [facility] via S at 8:30pm on 11/27/2017  Cm notified pts daughter Dorina Ernandez [phone], nursing and facility  Report# [phone], Fax# [phone]

## 2017-11-27 NOTE — PLAN OF CARE
Problem: PHYSICAL THERAPY ADULT  Goal: Performs mobility at highest level of function for planned discharge setting  See evaluation for individualized goals  Treatment/Interventions: LE strengthening/ROM, Functional transfer training, Endurance training, Therapeutic exercise, Equipment eval/education, Patient/family training, Bed mobility, Gait training  Equipment Recommended: Mir Coyle       See flowsheet documentation for full assessment, interventions and recommendations  Outcome: Progressing  Prognosis: Fair  Problem List: Decreased strength, Decreased endurance, Impaired balance, Decreased mobility, Decreased safety awareness, Impaired judgement  Assessment: Pt cont to gradually improve in functional mobility skills w/ min less (A) required for amb w/ rw; pt still exhibits occasional impulsivity w/ mobilization and requires verbal instructions for pacing and safety; pt returned back to bed post session; pt declined LE therex at that time; currently, based on overall status, cont to recommend rehab upon D/C; will follow  Recommendation: Post acute IP rehab     PT - OK to Discharge: (S) Yes (recommend return to snf rehab when stable)    See flowsheet documentation for full assessment

## 2017-11-28 NOTE — DISCHARGE INSTRUCTIONS
Anemia   WHAT YOU NEED TO KNOW:   Anemia is a low number of red blood cells or a low amount of hemoglobin in your red blood cells  Hemoglobin is a protein that helps carry oxygen throughout your body  Red blood cells use iron to create hemoglobin  Anemia may develop if your body does not have enough iron  It may also develop if your body does not make enough red blood cells or they die faster than your body can make them  DISCHARGE INSTRUCTIONS:   Call 911 or have someone call 911 for any of the following:   · You lose consciousness  · You have severe chest pain  Return to the emergency department if:   · You have dark or bloody bowel movements  Contact your healthcare provider if:   · Your symptoms are worse, even after treatment  · You have questions or concerns about your condition or care  Medicines:   · Iron or folic acid supplements  help increase your red blood cell and hemoglobin levels  · Vitamin B12 injections  may help boost your red blood cell level and decrease your symptoms  Ask your healthcare provider how to inject B12  · Take your medicine as directed  Contact your healthcare provider if you think your medicine is not helping or if you have side effects  Tell him of her if you are allergic to any medicine  Keep a list of the medicines, vitamins, and herbs you take  Include the amounts, and when and why you take them  Bring the list or the pill bottles to follow-up visits  Carry your medicine list with you in case of an emergency  Prevent anemia:  Eat healthy foods rich in iron and vitamin C  Nuts, meat, dark leafy green vegetables, and beans are high in iron and protein  Vitamin C helps your body absorb iron  Foods rich in vitamin C include oranges and other citrus fruits  Ask your healthcare provider for a list of other foods that are high in iron or vitamin C  Ask if you need to be on a special diet     Follow up with your healthcare provider as directed:  Write down your questions so you remember to ask them during your visits  © 2017 [address] [employer] Information is for End User's use only and may not be sold, redistributed or otherwise used for commercial purposes  All illustrations and images included in CareNotes® are the copyrighted property of [employer] M [employer]  or Gerry Jacobson  The above information is an  only  It is not intended as medical advice for individual conditions or treatments  Talk to your doctor, nurse or pharmacist before following any medical regimen to see if it is safe and effective for you  Acinetobacter baumannii Infection   WHAT YOU NEED TO KNOW:   An Acinetobacter baumannii infection is caused by the Acinetobacter baumannii bacteria (germ)  Acinetobacter baumannii can cause serious infections in the lungs, blood, and brain  It may also cause urinary tract and wound infections  This germ may be found on skin or in food, water, or soil  It may also be found in hospitals  Acinetobacter baumannii is highly contagious  DISCHARGE INSTRUCTIONS:   Medicines:   · Antibiotics: This medicine will help fight or prevent an infection  Take your antibiotics until they are gone, even if you feel better  · Take your medicine as directed  Contact your healthcare provider if you think your medicine is not helping or if you have side effects  Tell him of her if you are allergic to any medicine  Keep a list of the medicines, vitamins, and herbs you take  Include the amounts, and when and why you take them  Bring the list or the pill bottles to follow-up visits  Carry your medicine list with you in case of an emergency  Follow up with your healthcare provider as directed:  Write down your questions so you remember to ask them during your visits  Prevent Acinetobacter baumannii from spreading:   · Keep wounds clean and covered:  Do not touch any wounds or items that were used to clean wounds   Place used bandages in a sealed plastic bag when you throw them away  · Wash your hands:  Use soap and water to wash your hands after you use the toilet or change a child's diaper  Wash your hands before you touch your face, nose, and mouth and before you prepare or serve food  Germ-killing hand lotion or gel may be used to clean your hands if you do not have water  · Cover your mouth when you cough:  Cough into a tissue or your shirtsleeve, rather than into your hand  · Avoid sharing personal items: This includes eating and drinking utensils, towels, or other personal items  Contact your healthcare provider if:   · You have a fever  · You have chills or a cough or feel weak and achy  · You have new or more pain, redness, or swelling in the area of a wound  · Your urine is dark in color, or you are urinating less or less often than usual      · You have questions or concerns about your infection, treatment, or care  Return to the emergency department if:   · You have sudden or new chest pain or a fast heartbeat  · You have sudden trouble breathing  · Your lips and fingernails turn blue  · You are sleepy, confused, and have trouble answering simple questions  · You have sudden numbness or weakness in your arms or legs  © 2017 [facility] [facility] Information is for End User's use only and may not be sold, redistributed or otherwise used for commercial purposes  All illustrations and images included in CareNotes® are the copyrighted property of [email or web address]  or Gerry Jacobson  The above information is an  only  It is not intended as medical advice for individual conditions or treatments  Talk to your doctor, nurse or pharmacist before following any medical regimen to see if it is safe and effective for you

## 2017-12-08 ENCOUNTER — APPOINTMENT (OUTPATIENT)
Dept: WOUND CARE | Facility: HOSPITAL | Age: 82
End: 2017-12-08
Payer: COMMERCIAL

## 2017-12-08 PROCEDURE — 99213 OFFICE O/P EST LOW 20 MIN: CPT | Performed by: SURGERY

## 2017-12-08 PROCEDURE — 11042 DBRDMT SUBQ TIS 1ST 20SQCM/<: CPT | Performed by: SURGERY

## 2017-12-13 ENCOUNTER — APPOINTMENT (EMERGENCY)
Dept: RADIOLOGY | Facility: HOSPITAL | Age: 82
DRG: 853 | End: 2017-12-13
Payer: COMMERCIAL

## 2017-12-13 ENCOUNTER — HOSPITAL ENCOUNTER (INPATIENT)
Facility: HOSPITAL | Age: 82
LOS: 7 days | Discharge: HOME HEALTH | DRG: 853 | End: 2017-12-21
Attending: EMERGENCY MEDICINE | Admitting: HOSPITALIST
Payer: COMMERCIAL

## 2017-12-13 DIAGNOSIS — J18.9 PNEUMONIA INVOLVING LEFT LUNG: Primary | ICD-10-CM

## 2017-12-13 DIAGNOSIS — R59.1 LYMPHADENOPATHY OF HEAD AND NECK: ICD-10-CM

## 2017-12-13 DIAGNOSIS — F32.A DEPRESSION: ICD-10-CM

## 2017-12-13 DIAGNOSIS — L89.154 DECUBITUS ULCER OF SACRAL REGION, STAGE 4 (HCC): Chronic | ICD-10-CM

## 2017-12-13 DIAGNOSIS — R74.8 ELEVATED CREATINE KINASE: ICD-10-CM

## 2017-12-13 DIAGNOSIS — E43 SEVERE PROTEIN-CALORIE MALNUTRITION (HCC): Chronic | ICD-10-CM

## 2017-12-13 DIAGNOSIS — R65.20 SEVERE SEPSIS (HCC): ICD-10-CM

## 2017-12-13 DIAGNOSIS — Z85.21 HISTORY OF LARYNGEAL CANCER: ICD-10-CM

## 2017-12-13 DIAGNOSIS — A41.9 SEVERE SEPSIS (HCC): ICD-10-CM

## 2017-12-13 DIAGNOSIS — J18.9 HCAP (HEALTHCARE-ASSOCIATED PNEUMONIA): ICD-10-CM

## 2017-12-13 DIAGNOSIS — J38.6 LARYNGEAL STENOSIS: Chronic | ICD-10-CM

## 2017-12-13 LAB
ALBUMIN SERPL BCP-MCNC: 2.7 G/DL (ref 3.5–5)
ALP SERPL-CCNC: 62 U/L (ref 46–116)
ALT SERPL W P-5'-P-CCNC: 27 U/L (ref 12–78)
ANION GAP SERPL CALCULATED.3IONS-SCNC: 10 MMOL/L (ref 4–13)
ANISOCYTOSIS BLD QL SMEAR: PRESENT
APTT PPP: 31 SECONDS (ref 23–35)
AST SERPL W P-5'-P-CCNC: 46 U/L (ref 5–45)
BASOPHILS # BLD MANUAL: 0 THOUSAND/UL (ref 0–0.1)
BASOPHILS NFR MAR MANUAL: 0 % (ref 0–1)
BILIRUB SERPL-MCNC: 0.73 MG/DL (ref 0.2–1)
BUN SERPL-MCNC: 24 MG/DL (ref 5–25)
CALCIUM SERPL-MCNC: 8.7 MG/DL (ref 8.3–10.1)
CHLORIDE SERPL-SCNC: 102 MMOL/L (ref 100–108)
CK SERPL-CCNC: 566 U/L (ref 39–308)
CO2 SERPL-SCNC: 27 MMOL/L (ref 21–32)
CREAT SERPL-MCNC: 0.99 MG/DL (ref 0.6–1.3)
EOSINOPHIL # BLD MANUAL: 0 THOUSAND/UL (ref 0–0.4)
EOSINOPHIL NFR BLD MANUAL: 0 % (ref 0–6)
ERYTHROCYTE [DISTWIDTH] IN BLOOD BY AUTOMATED COUNT: 17.2 % (ref 11.6–15.1)
GFR SERPL CREATININE-BSD FRML MDRD: 71 ML/MIN/1.73SQ M
GLUCOSE SERPL-MCNC: 91 MG/DL (ref 65–140)
HCT VFR BLD AUTO: 33.7 % (ref 36.5–49.3)
HGB BLD-MCNC: 10.9 G/DL (ref 12–17)
INR PPP: 1.22 (ref 0.86–1.16)
LACTATE SERPL-SCNC: 2.3 MMOL/L (ref 0.5–2)
LYMPHOCYTES # BLD AUTO: 0.12 THOUSAND/UL (ref 0.6–4.47)
LYMPHOCYTES # BLD AUTO: 1 % (ref 14–44)
MCH RBC QN AUTO: 32.7 PG (ref 26.8–34.3)
MCHC RBC AUTO-ENTMCNC: 32.3 G/DL (ref 31.4–37.4)
MCV RBC AUTO: 101 FL (ref 82–98)
MONOCYTES # BLD AUTO: 0.25 THOUSAND/UL (ref 0–1.22)
MONOCYTES NFR BLD: 2 % (ref 4–12)
NEUTROPHILS # BLD MANUAL: 11.93 THOUSAND/UL (ref 1.85–7.62)
NEUTS BAND NFR BLD MANUAL: 3 % (ref 0–8)
NEUTS SEG NFR BLD AUTO: 94 % (ref 43–75)
NRBC BLD AUTO-RTO: 0 /100 WBCS
PLATELET # BLD AUTO: 187 THOUSANDS/UL (ref 149–390)
PLATELET BLD QL SMEAR: ADEQUATE
PMV BLD AUTO: 9.6 FL (ref 8.9–12.7)
POTASSIUM SERPL-SCNC: 4.3 MMOL/L (ref 3.5–5.3)
PROT SERPL-MCNC: 6.8 G/DL (ref 6.4–8.2)
PROTHROMBIN TIME: 15.5 SECONDS (ref 12.1–14.4)
RBC # BLD AUTO: 3.33 MILLION/UL (ref 3.88–5.62)
RBC MORPH BLD: PRESENT
SODIUM SERPL-SCNC: 139 MMOL/L (ref 136–145)
SPECIMEN SOURCE: NORMAL
TROPONIN I BLD-MCNC: 0.04 NG/ML (ref 0–0.08)
WBC # BLD AUTO: 12.3 THOUSAND/UL (ref 4.31–10.16)

## 2017-12-13 PROCEDURE — 96360 HYDRATION IV INFUSION INIT: CPT

## 2017-12-13 PROCEDURE — 93005 ELECTROCARDIOGRAM TRACING: CPT | Performed by: EMERGENCY MEDICINE

## 2017-12-13 PROCEDURE — 85730 THROMBOPLASTIN TIME PARTIAL: CPT | Performed by: EMERGENCY MEDICINE

## 2017-12-13 PROCEDURE — 96361 HYDRATE IV INFUSION ADD-ON: CPT

## 2017-12-13 PROCEDURE — 85027 COMPLETE CBC AUTOMATED: CPT | Performed by: EMERGENCY MEDICINE

## 2017-12-13 PROCEDURE — 82550 ASSAY OF CK (CPK): CPT | Performed by: EMERGENCY MEDICINE

## 2017-12-13 PROCEDURE — 87040 BLOOD CULTURE FOR BACTERIA: CPT | Performed by: EMERGENCY MEDICINE

## 2017-12-13 PROCEDURE — 81002 URINALYSIS NONAUTO W/O SCOPE: CPT | Performed by: EMERGENCY MEDICINE

## 2017-12-13 PROCEDURE — 72125 CT NECK SPINE W/O DYE: CPT

## 2017-12-13 PROCEDURE — 85610 PROTHROMBIN TIME: CPT | Performed by: EMERGENCY MEDICINE

## 2017-12-13 PROCEDURE — 85007 BL SMEAR W/DIFF WBC COUNT: CPT | Performed by: EMERGENCY MEDICINE

## 2017-12-13 PROCEDURE — 84484 ASSAY OF TROPONIN QUANT: CPT

## 2017-12-13 PROCEDURE — 83605 ASSAY OF LACTIC ACID: CPT | Performed by: EMERGENCY MEDICINE

## 2017-12-13 PROCEDURE — 71020 HB CHEST X-RAY 2VW FRONTAL&LATL: CPT

## 2017-12-13 PROCEDURE — 80053 COMPREHEN METABOLIC PANEL: CPT | Performed by: EMERGENCY MEDICINE

## 2017-12-13 PROCEDURE — 70450 CT HEAD/BRAIN W/O DYE: CPT

## 2017-12-13 PROCEDURE — 36415 COLL VENOUS BLD VENIPUNCTURE: CPT

## 2017-12-13 RX ORDER — ACETAMINOPHEN 325 MG/1
650 TABLET ORAL ONCE
Status: COMPLETED | OUTPATIENT
Start: 2017-12-13 | End: 2017-12-13

## 2017-12-13 RX ADMIN — SODIUM CHLORIDE 1000 ML: 0.9 INJECTION, SOLUTION INTRAVENOUS at 21:00

## 2017-12-13 RX ADMIN — ACETAMINOPHEN 650 MG: 325 TABLET, FILM COATED ORAL at 21:20
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
## 2017-12-14 PROBLEM — K59.00 CONSTIPATION: Chronic | Status: RESOLVED | Noted: 2017-11-23 | Resolved: 2017-12-14

## 2017-12-14 PROBLEM — R74.8 ELEVATED CPK: Status: RESOLVED | Noted: 2017-12-14 | Resolved: 2017-12-14

## 2017-12-14 PROBLEM — K59.00 CONSTIPATION: Chronic | Status: ACTIVE | Noted: 2017-11-23

## 2017-12-14 PROBLEM — N17.9 AKI (ACUTE KIDNEY INJURY) (HCC): Status: RESOLVED | Noted: 2017-12-14 | Resolved: 2017-12-14

## 2017-12-14 PROBLEM — R74.8 ELEVATED CPK: Status: ACTIVE | Noted: 2017-12-14

## 2017-12-14 PROBLEM — L89.154 DECUBITUS ULCER OF SACRAL REGION, STAGE 4 (HCC): Chronic | Status: ACTIVE | Noted: 2017-11-20

## 2017-12-14 PROBLEM — Z85.21 HISTORY OF LARYNGEAL CANCER: Status: ACTIVE | Noted: 2017-12-14

## 2017-12-14 PROBLEM — R65.20 SEVERE SEPSIS (HCC): Status: ACTIVE | Noted: 2017-11-19

## 2017-12-14 PROBLEM — J45.909 ASTHMA: Status: RESOLVED | Noted: 2017-08-29 | Resolved: 2017-12-14

## 2017-12-14 PROBLEM — J38.6 LARYNGEAL STENOSIS: Chronic | Status: ACTIVE | Noted: 2017-08-29

## 2017-12-14 PROBLEM — E43 SEVERE PROTEIN-CALORIE MALNUTRITION (HCC): Chronic | Status: ACTIVE | Noted: 2017-11-22

## 2017-12-14 PROBLEM — E43 SEVERE PROTEIN-CALORIE MALNUTRITION (HCC): Chronic | Status: RESOLVED | Noted: 2017-11-22 | Resolved: 2017-12-14

## 2017-12-14 PROBLEM — J45.909 ASTHMA: Chronic | Status: ACTIVE | Noted: 2017-08-19

## 2017-12-14 PROBLEM — N17.9 AKI (ACUTE KIDNEY INJURY) (HCC): Status: ACTIVE | Noted: 2017-12-14

## 2017-12-14 LAB
ANION GAP SERPL CALCULATED.3IONS-SCNC: 7 MMOL/L (ref 4–13)
ATRIAL RATE: 75 BPM
BACTERIA UR QL AUTO: ABNORMAL /HPF
BILIRUB UR QL STRIP: NEGATIVE
BUN SERPL-MCNC: 21 MG/DL (ref 5–25)
CALCIUM SERPL-MCNC: 8.4 MG/DL (ref 8.3–10.1)
CHLORIDE SERPL-SCNC: 102 MMOL/L (ref 100–108)
CK MB SERPL-MCNC: <1 % (ref 0–2.5)
CK MB SERPL-MCNC: <1 NG/ML (ref 0–5)
CK SERPL-CCNC: 547 U/L (ref 39–308)
CLARITY UR: CLEAR
CO2 SERPL-SCNC: 29 MMOL/L (ref 21–32)
COLOR UR: ABNORMAL
COLOR, POC: NORMAL
CREAT SERPL-MCNC: 0.93 MG/DL (ref 0.6–1.3)
ERYTHROCYTE [DISTWIDTH] IN BLOOD BY AUTOMATED COUNT: 17.1 % (ref 11.6–15.1)
FLUAV AG SPEC QL: NORMAL
FLUBV AG SPEC QL: NORMAL
GFR SERPL CREATININE-BSD FRML MDRD: 77 ML/MIN/1.73SQ M
GLUCOSE SERPL-MCNC: 87 MG/DL (ref 65–140)
GLUCOSE UR STRIP-MCNC: NEGATIVE MG/DL
HCT VFR BLD AUTO: 33.1 % (ref 36.5–49.3)
HGB BLD-MCNC: 10.6 G/DL (ref 12–17)
HGB UR QL STRIP.AUTO: NEGATIVE
HYALINE CASTS #/AREA URNS LPF: ABNORMAL /LPF
KETONES UR STRIP-MCNC: NEGATIVE MG/DL
LACTATE SERPL-SCNC: 1.2 MMOL/L (ref 0.5–2)
LEUKOCYTE ESTERASE UR QL STRIP: NEGATIVE
MCH RBC QN AUTO: 32.1 PG (ref 26.8–34.3)
MCHC RBC AUTO-ENTMCNC: 32 G/DL (ref 31.4–37.4)
MCV RBC AUTO: 100 FL (ref 82–98)
NITRITE UR QL STRIP: NEGATIVE
NON-SQ EPI CELLS URNS QL MICRO: ABNORMAL /HPF
P AXIS: 94 DEGREES
PH UR STRIP.AUTO: 8.5 [PH] (ref 4.5–8)
PLATELET # BLD AUTO: 145 THOUSANDS/UL (ref 149–390)
PMV BLD AUTO: 8.9 FL (ref 8.9–12.7)
POTASSIUM SERPL-SCNC: 3.5 MMOL/L (ref 3.5–5.3)
PR INTERVAL: 168 MS
PROT UR STRIP-MCNC: ABNORMAL MG/DL
QRS AXIS: -45 DEGREES
QRSD INTERVAL: 78 MS
QT INTERVAL: 380 MS
QTC INTERVAL: 424 MS
RBC # BLD AUTO: 3.3 MILLION/UL (ref 3.88–5.62)
RBC #/AREA URNS AUTO: ABNORMAL /HPF
RSV B RNA SPEC QL NAA+PROBE: NORMAL
SODIUM SERPL-SCNC: 138 MMOL/L (ref 136–145)
SP GR UR STRIP.AUTO: 1.02 (ref 1–1.03)
T WAVE AXIS: 40 DEGREES
UROBILINOGEN UR QL STRIP.AUTO: 1 E.U./DL
VENTRICULAR RATE: 75 BPM
WBC # BLD AUTO: 8.18 THOUSAND/UL (ref 4.31–10.16)
WBC #/AREA URNS AUTO: ABNORMAL /HPF

## 2017-12-14 PROCEDURE — 87205 SMEAR GRAM STAIN: CPT | Performed by: HOSPITALIST

## 2017-12-14 PROCEDURE — 87070 CULTURE OTHR SPECIMN AEROBIC: CPT | Performed by: HOSPITALIST

## 2017-12-14 PROCEDURE — G8988 SELF CARE GOAL STATUS: HCPCS

## 2017-12-14 PROCEDURE — 82550 ASSAY OF CK (CPK): CPT | Performed by: HOSPITALIST

## 2017-12-14 PROCEDURE — 97162 PT EVAL MOD COMPLEX 30 MIN: CPT

## 2017-12-14 PROCEDURE — G8979 MOBILITY GOAL STATUS: HCPCS

## 2017-12-14 PROCEDURE — 87186 SC STD MICRODIL/AGAR DIL: CPT | Performed by: HOSPITALIST

## 2017-12-14 PROCEDURE — 82553 CREATINE MB FRACTION: CPT | Performed by: HOSPITALIST

## 2017-12-14 PROCEDURE — 99285 EMERGENCY DEPT VISIT HI MDM: CPT

## 2017-12-14 PROCEDURE — 87147 CULTURE TYPE IMMUNOLOGIC: CPT | Performed by: HOSPITALIST

## 2017-12-14 PROCEDURE — G8978 MOBILITY CURRENT STATUS: HCPCS

## 2017-12-14 PROCEDURE — G8987 SELF CARE CURRENT STATUS: HCPCS

## 2017-12-14 PROCEDURE — 94760 N-INVAS EAR/PLS OXIMETRY 1: CPT

## 2017-12-14 PROCEDURE — 81001 URINALYSIS AUTO W/SCOPE: CPT

## 2017-12-14 PROCEDURE — 80048 BASIC METABOLIC PNL TOTAL CA: CPT | Performed by: HOSPITALIST

## 2017-12-14 PROCEDURE — 85027 COMPLETE CBC AUTOMATED: CPT | Performed by: HOSPITALIST

## 2017-12-14 PROCEDURE — 87798 DETECT AGENT NOS DNA AMP: CPT | Performed by: HOSPITALIST

## 2017-12-14 PROCEDURE — 97167 OT EVAL HIGH COMPLEX 60 MIN: CPT

## 2017-12-14 RX ORDER — ACETAMINOPHEN 325 MG/1
650 TABLET ORAL EVERY 6 HOURS PRN
Status: DISCONTINUED | OUTPATIENT
Start: 2017-12-14 | End: 2017-12-14

## 2017-12-14 RX ORDER — MIRTAZAPINE 15 MG/1
15 TABLET, FILM COATED ORAL
Status: DISCONTINUED | OUTPATIENT
Start: 2017-12-14 | End: 2017-12-17

## 2017-12-14 RX ORDER — ALBUTEROL SULFATE 90 UG/1
2 AEROSOL, METERED RESPIRATORY (INHALATION) EVERY 6 HOURS PRN
Status: DISCONTINUED | OUTPATIENT
Start: 2017-12-14 | End: 2017-12-21 | Stop reason: HOSPADM

## 2017-12-14 RX ORDER — B-COMPLEX WITH VITAMIN C
1 TABLET ORAL DAILY
Status: DISCONTINUED | OUTPATIENT
Start: 2017-12-14 | End: 2017-12-21 | Stop reason: HOSPADM

## 2017-12-14 RX ORDER — SODIUM CHLORIDE 9 MG/ML
75 INJECTION, SOLUTION INTRAVENOUS CONTINUOUS
Status: DISPENSED | OUTPATIENT
Start: 2017-12-14 | End: 2017-12-14

## 2017-12-14 RX ORDER — POLYETHYLENE GLYCOL 3350 17 G/17G
17 POWDER, FOR SOLUTION ORAL DAILY PRN
Status: DISCONTINUED | OUTPATIENT
Start: 2017-12-14 | End: 2017-12-21 | Stop reason: HOSPADM

## 2017-12-14 RX ORDER — AMOXICILLIN 250 MG
1 CAPSULE ORAL 2 TIMES DAILY
Status: DISCONTINUED | OUTPATIENT
Start: 2017-12-14 | End: 2017-12-21 | Stop reason: HOSPADM

## 2017-12-14 RX ORDER — OXYCODONE HYDROCHLORIDE 5 MG/1
5 TABLET ORAL EVERY 6 HOURS PRN
Status: DISCONTINUED | OUTPATIENT
Start: 2017-12-14 | End: 2017-12-21 | Stop reason: HOSPADM

## 2017-12-14 RX ORDER — FENTANYL 25 UG/H
1 PATCH TRANSDERMAL
COMMUNITY
End: 2017-12-21 | Stop reason: HOSPADM

## 2017-12-14 RX ORDER — FLUTICASONE PROPIONATE 220 UG/1
2 AEROSOL, METERED RESPIRATORY (INHALATION)
Status: DISCONTINUED | OUTPATIENT
Start: 2017-12-14 | End: 2017-12-21 | Stop reason: HOSPADM

## 2017-12-14 RX ORDER — VANCOMYCIN HYDROCHLORIDE 1 G/200ML
15 INJECTION, SOLUTION INTRAVENOUS ONCE
Status: COMPLETED | OUTPATIENT
Start: 2017-12-14 | End: 2017-12-14

## 2017-12-14 RX ORDER — DOXAZOSIN 2 MG/1
2 TABLET ORAL
Status: DISCONTINUED | OUTPATIENT
Start: 2017-12-14 | End: 2017-12-21 | Stop reason: HOSPADM

## 2017-12-14 RX ORDER — PANTOPRAZOLE SODIUM 40 MG/1
40 TABLET, DELAYED RELEASE ORAL
Status: DISCONTINUED | OUTPATIENT
Start: 2017-12-14 | End: 2017-12-21 | Stop reason: HOSPADM

## 2017-12-14 RX ORDER — FENTANYL 25 UG/H
25 PATCH TRANSDERMAL
Status: DISCONTINUED | OUTPATIENT
Start: 2017-12-14 | End: 2017-12-21 | Stop reason: HOSPADM

## 2017-12-14 RX ORDER — ACETAMINOPHEN 650 MG/1
650 SUPPOSITORY RECTAL EVERY 4 HOURS PRN
Status: DISCONTINUED | OUTPATIENT
Start: 2017-12-14 | End: 2017-12-19

## 2017-12-14 RX ORDER — VANCOMYCIN HYDROCHLORIDE 1 G/200ML
15 INJECTION, SOLUTION INTRAVENOUS EVERY 12 HOURS
Status: DISCONTINUED | OUTPATIENT
Start: 2017-12-14 | End: 2017-12-14

## 2017-12-14 RX ORDER — SODIUM CHLORIDE 9 MG/ML
75 INJECTION, SOLUTION INTRAVENOUS CONTINUOUS
Status: DISCONTINUED | OUTPATIENT
Start: 2017-12-14 | End: 2017-12-16

## 2017-12-14 RX ADMIN — PIPERACILLIN SODIUM,TAZOBACTAM SODIUM 4.5 G: 4; .5 INJECTION, POWDER, FOR SOLUTION INTRAVENOUS at 11:35

## 2017-12-14 RX ADMIN — CALCIUM CARBONATE-VITAMIN D TAB 500 MG-200 UNIT 1 TABLET: 500-200 TAB at 08:21

## 2017-12-14 RX ADMIN — PIPERACILLIN SODIUM,TAZOBACTAM SODIUM 4.5 G: 4; .5 INJECTION, POWDER, FOR SOLUTION INTRAVENOUS at 17:41

## 2017-12-14 RX ADMIN — ENOXAPARIN SODIUM 40 MG: 40 INJECTION SUBCUTANEOUS at 08:23

## 2017-12-14 RX ADMIN — FLUTICASONE PROPIONATE AND SALMETEROL 1 PUFF: 50; 250 POWDER RESPIRATORY (INHALATION) at 03:02

## 2017-12-14 RX ADMIN — VANCOMYCIN HYDROCHLORIDE 1000 MG: 1 INJECTION, SOLUTION INTRAVENOUS at 01:47

## 2017-12-14 RX ADMIN — ACETAMINOPHEN 650 MG: 650 SUPPOSITORY RECTAL at 05:57

## 2017-12-14 RX ADMIN — MIRTAZAPINE 15 MG: 15 TABLET, FILM COATED ORAL at 03:06

## 2017-12-14 RX ADMIN — FLUTICASONE PROPIONATE 2 PUFF: 220 AEROSOL, METERED RESPIRATORY (INHALATION) at 21:58

## 2017-12-14 RX ADMIN — FLUTICASONE PROPIONATE AND SALMETEROL 1 PUFF: 50; 250 POWDER RESPIRATORY (INHALATION) at 21:58

## 2017-12-14 RX ADMIN — Medication 4.5 G: at 00:49

## 2017-12-14 RX ADMIN — SODIUM CHLORIDE 75 ML/HR: 0.9 INJECTION, SOLUTION INTRAVENOUS at 02:53

## 2017-12-14 RX ADMIN — DOXAZOSIN 2 MG: 2 TABLET ORAL at 03:05

## 2017-12-14 RX ADMIN — FLUTICASONE PROPIONATE AND SALMETEROL 1 PUFF: 50; 250 POWDER RESPIRATORY (INHALATION) at 08:40

## 2017-12-14 RX ADMIN — FENTANYL 25 MCG: 25 PATCH, EXTENDED RELEASE TRANSDERMAL at 17:35

## 2017-12-14 RX ADMIN — PANTOPRAZOLE SODIUM 40 MG: 40 TABLET, DELAYED RELEASE ORAL at 06:01

## 2017-12-14 RX ADMIN — DOXAZOSIN 2 MG: 2 TABLET ORAL at 21:59

## 2017-12-14 RX ADMIN — SODIUM CHLORIDE 75 ML/HR: 0.9 INJECTION, SOLUTION INTRAVENOUS at 17:40

## 2017-12-14 RX ADMIN — FLUTICASONE PROPIONATE 2 PUFF: 220 AEROSOL, METERED RESPIRATORY (INHALATION) at 03:03

## 2017-12-14 RX ADMIN — FLUTICASONE PROPIONATE 2 PUFF: 220 AEROSOL, METERED RESPIRATORY (INHALATION) at 08:41

## 2017-12-14 RX ADMIN — MIRTAZAPINE 15 MG: 15 TABLET, FILM COATED ORAL at 21:58

## 2017-12-14 RX ADMIN — METRONIDAZOLE 500 MG: 500 INJECTION, SOLUTION INTRAVENOUS at 07:50

## 2017-12-14 NOTE — MALNUTRITION/BMI
This medical record reflects one or more clinical indicators suggestive of malnutrition  Malnutrition in the context of:   chronic illness/severe protein calorie malnutrition evidenced by 31% wt loss > 6 mos,    prolonged poor po intake, protuding clavicle/muscle loss, depleted body fat  Malnutrition Findings:   chronic illness  other severe protein calorie malnutrition    BMI Findings:  19-24 9    Body mass index is 19 61 kg/m²  See Nutrition note dated 12/14/2017  for additional details  Completed nutrition assessment is viewable in the nutrition documentation

## 2017-12-14 NOTE — CONSULTS
Progress Note - Wound   Lady Arrow  81 y o  male MRN: [id number]  Unit/Bed#: -01 Encounter: [id number]      Assessment:  Patient is 81year old male who presents generalized weakness, found down at home  Admitted with severe sepsis  Patent has a history of - laryngeal cancer  Wound care consulted for stage 4 pressure injury on admission  Patient seen in bed, cooperative with assessment  Patient is thin and frail appearing  Incontinent of bowel and bladder  Patient is dependent for care  Nutrition is following along with this patient  B/l heels are intact blanchable redness, dry skin noted  Recommend offloading with pillows and hydraguard cream      Dry scabbed abrasions noted to b/l lower extremities and R hip  These appear from friction / rather than pressure  Areas are clean dry and intact / scabbed  Recommend skin nourishing cream daily  B/l upper extremities with scattered ecchymosis / dry skin  Skin the to the back is intact with no wounds  Recommend skin nourishing cream daily  Stage 4 pressure injury noted to sacrum  This wound is present on admission  On previous admission patient went to OR for debridement  Assessed wound with SLIM PA Donold Neither  Full thickness wound noted to sacrum  No foul odor  Moderation drainage noted on dressing  No packing noted in wound  Unable to full visualize wound bed  Probed with moist cotton tip applicator, able to feel bone beneath tissue  Thin moist slough noted in the wound bed approx  40%  Beefy red tissue = 60%  Undermining noted around entire wound measuring approx  0 4cm  Overall depth = 2 0cm  Tunnel/tract noted at 6 o'clock toward the anus = 3 1cm  Edge is intact fragile no maceration, and unattached  Kelly-wound is intact with blanchable pink skin  No induration, redness, or fluctuance noted  Recommend to pack the wound with mesalt packing gently & Cover with mepliex foam for (cushioning and absorption) daily  New dressing applied       B/l buttocks are intact with no wounds or redness noted  Recommend hydraguard cream for prevention and protection due to incontinence  Discussed case with Carrillo Regalado with SLIM - order placed for acute care surgery consult for follow up of this wound  Recommend speciality mattress for stage 4 pressure injury  Plan:   1  P-500 mattress   2  Cleanse stage 4 pressure injury with NSS, pat dry, gently pack wound with mesalt ribbon and cover with mepilex foam dressing daily until seen by surgery  3  Apply hydraguard b/l buttocks and heels BID and PRN for prevention and protecion  4  Apply skin nourishing cream to the entire skin daily for moisture   5  Soft care cushion to chair when OOB   6  Turn and reposition patient every 2 hours   7  Consider acute care surgery consult   8  Follow along with patient weekly  Objective:    Vitals: Blood pressure 107/52, pulse 80, temperature 100 °F (37 8 °C), temperature source Oral, resp  rate 20, height 5' 7" (1 702 m), weight 56 8 kg (125 lb 3 5 oz), SpO2 95 %  ,Body mass index is 19 61 kg/m²  Pressure Ulcer 11/20/17 Sacrum Mid Unstageable - POA  (Active)   Staging Stage IV 12/14/2017 11:00 AM   Wound Description Beefy red;Slough; Yellow 12/14/2017 11:00 AM   Kelly-wound Assessment Dry; Intact; Pink 12/14/2017 11:00 AM   Wound Length (cm) 2 1 cm 12/14/2017 11:00 AM   Wound Width (cm) 1 1 cm 12/14/2017 11:00 AM   Wound Depth (cm) 2 12/14/2017 11:00 AM   Calculated Wound Area (cm^2) 2 31 cm^2 12/14/2017 11:00 AM   Calculated Wound Volume (cm^3) 4 62 cm^3 12/14/2017 11:00 AM   Change in Wound Size % -2 67 12/14/2017 11:00 AM   Tunneling 3 1 cm 12/14/2017 11:00 AM   Drainage Amount Moderate 12/14/2017 11:00 AM   Drainage Description Serosanguineous 12/14/2017 11:00 AM   Treatment Cleansed;Elevated with pillow(s); Offload; Turn & reposition; Softcare cushion 12/14/2017 11:00 AM   Dressing Mesalt; Foam 12/14/2017 11:00 AM   Dressing Changed New dressing applied 12/14/2017 11:00 AM   Wound packed? Yes 12/14/2017 11:00 AM   Packing- # removed 0 12/14/2017 11:00 AM   Packing- # inserted 1 12/14/2017 11:00 AM   Patient Tolerance Tolerated well 12/14/2017 11:00 AM   Dressing Status Clean;Dry; Intact 12/14/2017 11:00 AM       Recommendations written as orders    Bj Petersen RN BSN

## 2017-12-14 NOTE — PLAN OF CARE
Problem: OCCUPATIONAL THERAPY ADULT  Goal: Performs self-care activities at highest level of function for planned discharge setting  See evaluation for individualized goals  Treatment Interventions: ADL retraining, Functional transfer training, Endurance training          See flowsheet documentation for full assessment, interventions and recommendations  Limitation: Decreased ADL status, Decreased endurance  Prognosis: Fair  Assessment: Pt is a 81 y o  male seen for OT evaluation s/p admit to [facility] on 12/13/2017 w/ Severe sepsis ([address] )  pt w/ recent admission and d/c from inpatient rehab  p was only home for a few days before fall at home and unable to get up until found on floor  OT evaluation and assessment of strength, ADL participation, and functional transfers completed  Pt reports I w/ ADLs, IADLs PTA  Personal factors affecting pt at time of IE include:limited home support, behavioral pattern, difficulty performing IADLS  and decreased initiation and engagement   Upon evaluation: Pt requires MIN A for ADLs 2* the following deficits impacting occupational performance,  activity tolerance, endurance and safety   Educated Pt on safety precautions, importance of and how to use using call bell  Educated Pt on role of Occupational Therapy in acute setting  Based on Pt extensive Occupational profile review, Pt benefits from from skilled OT services for I/ADL retraining to support the ability to safely participate in daily occupations safely and independently in the most least restrictive way  Pt's supportive daughter present for session and reports that the goal is for him to return home from this admission  From OT standpoint recommend  D/C home w/ Home OT services and family support when medically stable  Will continue to follow 3-5 x weekly to address the following below stated goals

## 2017-12-14 NOTE — H&P
History and Physical - Located within [facility] Internal Medicine    Patient Information: Randy Jolly  81 y o  male MRN: [id number]  Unit/Bed#: ED 15 Encounter: [id number]  Admitting Physician: Jayy Patel MD  PCP: Moiz Sommers MD  Date of Admission:  12/14/17    Assessment/Plan:    Hospital Problem List:     Principal Problem:    Severe sepsis [facility])  Active Problems:    Asthma    Laryngeal stenosis    Decubitus ulcer of sacral region, stage 4 ([facility] )    Severe protein-calorie malnutrition ([facility] )    Constipation    History of laryngeal cancer    IVORY (acute kidney injury) ([place] [address] )    Elevated CPK      Plan for the Primary Problem(s):  · Severe sepsis most likely secondary to pneumonia, follow-up influenza and culture reports  · Given recent admission, NH staying  and antibiotic treatment patient high risk for pseudomonal infection, continue current antibiotic until infection disease evaluation  · Monitor temperature ,WBC  · Gentle IV hydration, repeat lactic acid  · Tylenol, antiemetic  · NPO  Speech and swallow evaluation  · Will obtain sputum culture  · Previously infected sacral decubitus ulcer seems to be not infected at present  Wound care specialist consult      Plan for Additional Problems:   · Elevated CPK secondary to fall  IV fluids ,repeat in a m  · IVORY secondary to dehydration provide IV fluids repeat in a m  · Constipation  Bowel movement regimen  · Protein calorie malnourishment secondary to poor oral intake due to malignancy, depression and tracheostomy  Nutritionist consult    VTE Prophylaxis: Enoxaparin (Lovenox)  / sequential compression device   Code Status: full  POLST: There is no POLST form on file for this patient (pre-hospital)    Anticipated Length of Stay:  Patient will be admitted on an Inpatient basis with an anticipated length of stay of  >2 midnights     Justification for Hospital Stay:  Severe sepsis treatment,ID consult    Total Time for Visit, including Counseling / Coordination of Care: 45 minutes  Greater than 50% of this total time spent on direct patient counseling and coordination of care  Chief Complaint:  Weakness     History of Present Illness:    Rosangela Peterson  is a 81 y o  male  with history of locally advanced laryngeal cancer, 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, s/p tracheostomy who presented from home for evaluation of generalized weakness,s/p fall on 12/13  No  family members at bedside to provide a history ,patient is not a good historian with tracheostomy in place, according to ER fellow patient 's daughter states that caretaker found the patient behind the door laying on the floor approximately 10 hours  Of notes he was recently admitted on 11/19-11/27 with sepsis deemed to be secondary to right basilar pneumonia and infected sacrum ulcer status post abscess drainage, discharged to [facility] and returning back to home couple of days ago  Temperature max 38 8C, WBC 56346 and an lactic acid 2 3 POA  Creatinine 1 34,  ,clinically patient looks dehydrated  CT of the head did not show acute bleeding, interval development of subdural effusion perhaps sequela of remote trauma  CT of the C-spine unremarkable  Today's chest x-ray impression resolution of right basilar consolidation, patchy infiltrate in the peripheral left lower lobe  Urinalysis negative, influenza test pending    Patient started on broad-spectrum antibiotic(Zosyn and vancomycin)  Remains hemodynamically stable   admitted on an inpatient basis        Review of Systems:    Review of Systems    Past Medical and Surgical History:     Past Medical History:   Diagnosis Date    Asthma     Hypertension     Larynx cancer ([facility] )        Past Surgical History:   Procedure Laterality Date    EGD AND COLONOSCOPY      TRACHEOSTOMY N/A 8/25/2017    Procedure: TRACHEOSTOMY (POSSIBLE AWAKE) , MICRO DIRECT LARYNGOSCOPY, Biopsy;  Surgeon: Mary Ellen Roblero MD;  Location: [facility] MAIN OR; Service: ENT   [facility] N/A 11/22/2017    Procedure: DEBRIDEMENT WOUND [facility] OUT), sacrum;  Surgeon: Neva Corrales DO;  Location: [facility];  Service: General       Meds/Allergies:    Prior to Admission medications    Medication Sig Start Date End Date Taking?  Authorizing Provider   acetaminophen (TYLENOL) 325 mg tablet Take 2 tablets by mouth every 6 (six) hours as needed for mild pain, headaches or fever 8/29/17  Yes Mary Mccarty DO   albuterol (PROVENTIL HFA) 90 mcg/act inhaler Inhale 2 puffs as needed   Yes Historical Provider, MD   melatonin 3 mg Take 1 tablet by mouth daily at bedtime 8/29/17  Yes Mary Mccarty DO   mirtazapine (REMERON) 15 mg tablet Take 1 tablet by mouth daily at bedtime 11/27/17  Yes Myriam Berry MD   Mometasone Furo-Formoterol Fum (DULERA) 200-5 MCG/ACT AERO Inhale 2 puffs 2 (two) times a day   Yes Historical Provider, MD   omeprazole (PriLOSEC) 40 MG capsule Take 40 mg by mouth daily   Yes Historical Provider, MD   pantoprazole (PROTONIX) 40 mg tablet Take 1 tablet by mouth daily in the early morning 11/28/17 12/14/17 Yes Myriam Berry MD   Calcium 600-200 MG-UNIT per tablet Take 1 tablet by mouth daily    Historical Provider, MD   collagenase (SANTYL) ointment Apply topically daily    Historical Provider, MD   doxazosin (CARDURA) 2 mg tablet Take 2 mg by mouth daily at bedtime    Historical Provider, MD   mometasone (ASMANEX 14 METERED DOSES) 220 MCG/INH inhaler Inhale 1 puff daily    Historical Provider, MD   oxyCODONE (OXY-IR) 5 MG capsule Take 5 mg by mouth every 4 (four) hours as needed for moderate pain 5 mg for mod pain q4 10 mg for severe pain q4    Historical Provider, MD   polyethylene glycol (MIRALAX) 17 g packet Take 17 g by mouth daily as needed (CONSTIPATION) 8/29/17   Mary Mccarty DO   Sennosides-Docusate Sodium (SENEXON-S PO) Take 1 tablet by mouth    Historical Provider, MD     I have reviewed home medications with a medical source (PCP, Pharmacy, other)  Allergies: Allergies   Allergen Reactions    Aspirin     Cleocin [Clindamycin]        Social History:     Marital Status: Single   Occupation: none  Patient Pre-hospital Living Situation: home  Patient Pre-hospital Level of Mobility:  Unknown  Patient Pre-hospital Diet Restrictions:  Unknown  Substance Use History:   History   Alcohol Use No     History   Smoking Status    Former Smoker   Smokeless Tobacco    Never Used     History   Drug Use No       Family History:    non-contributory    Physical Exam:     Vitals:   Blood Pressure: 102/57 (12/13/17 2330)  Pulse: 64 (12/13/17 2330)  Temperature: (!) 101 8 °F (38 8 °C) (12/13/17 2104)  Temp Source: Rectal (12/13/17 2104)  Respirations: (!) 10 (12/13/17 2330)  Weight - Scale: 61 2 kg (135 lb) (12/13/17 2104)  SpO2: 95 % (12/13/17 2330)    Physical Exam   Constitutional: He appears well-nourished  HENT:   Head: Normocephalic  Eyes: Right eye exhibits no discharge  Left eye exhibits no discharge  Neck:   Tracheostomy in place   Cardiovascular: Regular rhythm  Pulmonary/Chest: No respiratory distress  He has rales  Abdominal: He exhibits no distension  Neurological: He is alert  Skin: Skin is warm  Sacrum ulcer           Additional Data:     Lab Results: I have personally reviewed pertinent reports          Results from last 7 days  Lab Units 12/13/17 2112   WBC Thousand/uL 12 30*   HEMOGLOBIN g/dL 10 9*   HEMATOCRIT % 33 7*   PLATELETS Thousands/uL 187   LYMPHO PCT % 1*   MONO PCT MAN % 2*   EOSINO PCT MANUAL % 0       Results from last 7 days  Lab Units 12/13/17 2112   SODIUM mmol/L 139   POTASSIUM mmol/L 4 3   CHLORIDE mmol/L 102   CO2 mmol/L 27   BUN mg/dL 24   CREATININE mg/dL 0 99   CALCIUM mg/dL 8 7   TOTAL PROTEIN g/dL 6 8   BILIRUBIN TOTAL mg/dL 0 73   ALK PHOS U/L 62   ALT U/L 27   AST U/L 46*   GLUCOSE RANDOM mg/dL 91       Results from last 7 days  Lab Units 12/13/17 2112   INR  1 22*       Imaging: I have personally reviewed pertinent reports  Xr Chest 2 Views    Result Date: 12/13/2017  Narrative: CHEST INDICATION:  Fever  COMPARISON:  11/19/2017  VIEWS:  Frontal and lateral projections IMAGES:  2 FINDINGS:     Cardiomediastinal silhouette appears unremarkable  The right lung is clear with resolution of basilar consolidation  A patchy infiltrate in the peripheral left lower lobe noted  No pleural effusions or pneumothorax  Visualized osseous structures appear within normal limits for the patient's age  Impression: 1  Patchy infiltrate in the peripheral left lower lobe  2   Resolution of right basilar consolidation  Workstation performed: [id number]     Ct Head Without Contrast    Result Date: 12/13/2017  Narrative: CT BRAIN - WITHOUT CONTRAST INDICATION:  Fall  COMPARISON:  5/22/2017  TECHNIQUE:  CT examination of the brain was performed  In addition to axial images, coronal reformatted images were created and submitted for interpretation  Radiation dose length product (DLP) for this visit:  1058 66 mGy-cm   This examination, like all CT scans performed in the [facility], was performed utilizing techniques to minimize radiation dose exposure, including the use of iterative reconstruction and automated exposure control  IMAGE QUALITY:  Diagnostic  FINDINGS:  PARENCHYMA:  Decreased attenuation is noted in the supratentorial white matter demonstrating an appearance most consistent with mild microangiopathic change  No intracranial mass, mass effect or midline shift  No CT signs of acute infarction  There is no parenchymal hemorrhage  Bilateral subdural effusions now noted which are chronic, perhaps sequela of remote injury  VENTRICLES AND EXTRA-AXIAL SPACES:  Normal for patient's age  VISUALIZED ORBITS AND PARANASAL SINUSES:  Pansinusitis with complete opacification of the left maxillary sinus noted   CALVARIUM AND EXTRACRANIAL SOFT TISSUES:   Normal      Impression: No acute intracranial abnormality  Interval development of subdural effusions perhaps sequela of remote trauma  Workstation performed: [id number]     Ct Spine Cervical Without Contrast    Result Date: 12/13/2017  Narrative: CT CERVICAL SPINE - WITHOUT CONTRAST INDICATION: Trauma  History of laryngeal cancer  COMPARISON: None  TECHNIQUE:  CT examination of the cervical spine was performed without intravenous contrast   Contiguous axial images were obtained  Sagittal and coronal reconstructions were performed  Radiation dose length product (DLP) for this visit:  325 72 mGy-cm   This examination, like all CT scans performed in the [facility], was performed utilizing techniques to minimize radiation dose exposure, including the use of iterative  reconstruction and automated exposure control  IMAGE QUALITY:  Diagnostic  FINDINGS: ALIGNMENT:  Normal alignment of the cervical spine  No subluxation  VERTEBRAL BODIES:  No fracture  DEGENERATIVE CHANGES:  Mild multilevel cervical degenerative changes are noted without critical central canal stenosis  PREVERTEBRAL AND PARASPINAL SOFT TISSUES: Tracheostomy tube noted with ill-definition of the prevertebral soft tissues likely sequela of therapy for laryngeal carcinoma  THORACIC INLET:  Normal      Impression: No cervical spine fracture or traumatic malalignment  Mild multilevel degenerative disc disease and facet arthropathy  Workstation performed: [id number]     Xr Chest 1 View    Result Date: 11/20/2017  Narrative: CHEST INDICATION: Fever  COMPARISON:  Chest x-ray from 8/21/2017 VIEWS: AP view IMAGES:  2 FINDINGS:     Heart size is not well evaluated on these AP views  New right basilar patchy consolidation is suspicious for pneumonia  Left lung is clear  Visualized osseous structures appear within normal limits for the patient's age  Impression: New right basilar patchy consolidation is suspicious for pneumonia    Workstation performed: [id number] [employer] / [facility] Records Reviewed: Yes     ** Please Note: This note has been constructed using a voice recognition system   **

## 2017-12-14 NOTE — PROGRESS NOTES
Progress Note - Brian Richards  1935, 81 y o  male MRN: [id number]    Unit/Bed#: -[place] Encounter: [id number]    Primary Care Provider: Jacoby Branch MD   Date and time admitted to hospital: 12/13/2017  8:55 PM     Addendum: Spoke with patient's daughter in detail via phone  She states patient has a larytube (not a trach) that is non-disposable and is to be washed and cleaned (with sterile saline and peroxide) daily and reinserted  She will bring any supplies she has from home but she does not have cannulas  In addition she states he has been c/o intermittent abdominal pain over past few days along with his cough  She is concerned about his depression  * Severe sepsis ([address] )   Assessment & Plan    · POA, evidenced by hyperthermia, lactic acidosis, leukocytosis and PNA as suspected source  · Continue IVF, follow up blood cultures  Check sputum cx if able  F/U flu/RSV PCR  · Continue IV abx, change to cefepime and flagyl for aspiration concern until seen by ID  · Continue dysphagia level 3 with thins per previous speech recommendations; await new c/s  · Monitor temperatures, cbc closely  Monitor respiratory status         HCAP (healthcare-associated pneumonia)   Assessment & Plan    · With >3 MDR risk factors  LLL; previous R basilar PNA has resolved   · Continue IV abx as above  · ID c/s pending        Decubitus ulcer of sacral region, stage 4 (HCC)   Assessment & Plan    · Was infected during last admission with abscess s/p I&D 11/22 with general surgery and abx course  · Monitor closely for signs of infection  Examined with wound care, about 2 cm x 1 cm with 3 cm deep tracking toward anus  · Wound input appreciated  Recommend general surgery c/s   · Frequent repositioning/offloading         Fall   Assessment & Plan    · With elevated CPK level   Continue IVF and monitor creatinine  · Fall precautions  · PT/OT, just recently went to rehab at [facility] after last admission before returning home History of laryngeal cancer   Assessment & Plan    · S/p laryngectomy at [facility]  Continue trach care  · Follow speech recommendations         Anemia   Assessment & Plan    · hgb relatively stable  Monitor         Laryngeal stenosis   Assessment & Plan    · S/P trach  Routine trach care  Respiratory following         Depression   Assessment & Plan    · Continue Remeron  During last admission, psych attempted to see patient 2x but he refused           VTE Pharmacologic Prophylaxis:   Pharmacologic: Enoxaparin (Lovenox)  Mechanical VTE Prophylaxis in Place: No    Patient Centered Rounds: I have performed bedside rounds with nursing staff today  Discussions with Specialists or Other Care Team Provider: D/W Wound Care RN    Education and Discussions with Family / Patient: patient  Will update family  Time Spent for Care: 45 minutes  More than 50% of total time spent on counseling and coordination of care as described above  Current Length of Stay: 0 day(s)    Current Patient Status: Inpatient   Certification Statement: The patient will continue to require additional inpatient hospital stay due to severe sepsis     Discharge Plan: not yet stable  Was recently at [facility] for rehab but recently dc home  Code Status: Level 1 - Full Code      Subjective:   Pt is very lethargic today  Nonverbal due to trach  Denies any pain but shows pain when wound probed  Objective:     Vitals:   Temp (24hrs), Av 5 °F (39 2 °C), Min:101 3 °F (38 5 °C), Max:104 3 °F (40 2 °C)    HR:  [64-80] 80  Resp:  [10-21] 20  BP: ()/(52-82) 107/52  SpO2:  [92 %-97 %] 95 %  Body mass index is 19 61 kg/m²  Input and Output Summary (last 24 hours): Intake/Output Summary (Last 24 hours) at 17 0907  Last data filed at 17 0820   Gross per 24 hour   Intake             1596 ml   Output              350 ml   Net             1246 ml       Physical Exam:     Physical Exam   Constitutional: No distress  Neck:   Trach noted    Cardiovascular: Normal rate and regular rhythm  Pulmonary/Chest:   Decreased, no wheezes or rales    Abdominal: Soft  Bowel sounds are normal  He exhibits no distension  There is no tenderness  Musculoskeletal: He exhibits no edema  Neurological: He is alert  Skin:   Abrasions noted over legs and hip regions    Nursing note and vitals reviewed  Additional Data:     Labs:      Results from last 7 days  Lab Units 12/14/17 0449 12/13/17  2112   WBC Thousand/uL 8 18 12 30*   HEMOGLOBIN g/dL 10 6* 10 9*   HEMATOCRIT % 33 1* 33 7*   PLATELETS Thousands/uL 145* 187   LYMPHO PCT %  --  1*   MONO PCT MAN %  --  2*   EOSINO PCT MANUAL %  --  0       Results from last 7 days  Lab Units 12/14/17 0449 12/13/17 2112   SODIUM mmol/L 138 139   POTASSIUM mmol/L 3 5 4 3   CHLORIDE mmol/L 102 102   CO2 mmol/L 29 27   BUN mg/dL 21 24   CREATININE mg/dL 0 93 0 99   CALCIUM mg/dL 8 4 8 7   TOTAL PROTEIN g/dL  --  6 8   BILIRUBIN TOTAL mg/dL  --  0 73   ALK PHOS U/L  --  62   ALT U/L  --  27   AST U/L  --  46*   GLUCOSE RANDOM mg/dL 87 91       Results from last 7 days  Lab Units 12/13/17 2112   INR  1 22*       * I Have Reviewed All Lab Data Listed Above  * Additional Pertinent Lab Tests Reviewed:  All Labs Within Last 24 Hours Reviewed    Imaging:    Imaging Reports Reviewed Today Include: all   Imaging Personally Reviewed by Myself Includes:  none    Recent Cultures (last 7 days):           Last 24 Hours Medication List:     calcium carbonate-vitamin D 1 tablet Oral Daily   cefepime 2,000 mg Intravenous Q24H   collagenase  Topical Daily   doxazosin 2 mg Oral HS   enoxaparin 40 mg Subcutaneous Daily   fluticasone 2 puff Inhalation BID   fluticasone-salmeterol 1 puff Inhalation Q12H JAMES   metroNIDAZOLE 500 mg Intravenous Q8H   mirtazapine 15 mg Oral HS   pantoprazole 40 mg Oral Early Morning   senna-docusate sodium 1 tablet Oral BID        Today, Patient Was Seen By: Russell Gordillo PA-C    ** Please Note: Dictation voice to text software may have been used in the creation of this document   **

## 2017-12-14 NOTE — ASSESSMENT & PLAN NOTE
· With elevated CPK level   Continue IVF and monitor creatinine  · Fall precautions  · PT/OT, just recently [place] [facility] [place] before returning home

## 2017-12-14 NOTE — PLAN OF CARE
Present in room with [facility] to discuss plan of care to consult surgery team for wound assessment and change of antibiotics  Questions and concerns addressed from patient at this time

## 2017-12-14 NOTE — PLAN OF CARE
Problem: PHYSICAL THERAPY ADULT  Goal: Performs mobility at highest level of function for planned discharge setting  See evaluation for individualized goals  Treatment/Interventions: Spoke to nursing, Spoke to case management, OT          See flowsheet documentation for full assessment, interventions and recommendations  Outcome: Progressing  Prognosis: Good  Problem List: Decreased endurance, Decreased strength, Impaired balance, Decreased mobility, Decreased skin integrity  Assessment: pt is an 81year old male who was admitted due to severe sepsis  Pt presents with dec LE strength, dec standing balance, generalized deconditioning limiting safety, activity tolerance and functional mobility indep  at this time pt requires mod A for sup to sit transfer while min A for sit to stand & stand pivot transfers using a RW requiring assist with IV pole  Pt was able to amb x 30' using a RW requiring min A and cues to slow down for safety  Pt will benefit from continued skilled PT services to improve functional mobility indep on order for pt to go home safely  Barriers to Discharge: Inaccessible home environment, Decreased caregiver support  Barriers to Discharge Comments: lives alone with no MISTY  fully indep PTA although has 1 local dtr but works  Recommendation: Home independently, Home with family support, Short-term skilled PT          See flowsheet documentation for full assessment

## 2017-12-14 NOTE — ASSESSMENT & PLAN NOTE
· With >3 MDR risk factors   LLL; previous R basilar PNA has resolved   · Continue IV abx as above  · ID c/s pending

## 2017-12-14 NOTE — PLAN OF CARE
DISCHARGE PLANNING     Discharge to home or other facility with appropriate resources Progressing        INFECTION - ADULT     Absence or prevention of progression during hospitalization Progressing     Absence of fever/infection during neutropenic period Progressing        Knowledge Deficit     Patient/family/caregiver demonstrates understanding of disease process, treatment plan, medications, and discharge instructions Progressing        PAIN - ADULT     Verbalizes/displays adequate comfort level or baseline comfort level Progressing        Potential for Falls     Patient will remain free of falls Progressing        Prexisting or High Potential for Compromised Skin Integrity     Skin integrity is maintained or improved Progressing        SAFETY ADULT     Patient will remain free of falls Progressing     Maintain or return to baseline ADL function Progressing     Maintain or return mobility status to optimal level Progressing

## 2017-12-14 NOTE — ASSESSMENT & PLAN NOTE
· POA, evidenced by hyperthermia, lactic acidosis, leukocytosis and PNA as suspected source  · Continue IVF, follow up blood cultures  Check sputum cx if able  F/U flu/RSV PCR  · Continue IV abx, change to cefepime and flagyl for aspiration concern until seen by ID  · Continue dysphagia level 3 with thins per previous speech recommendations; await new c/s  · Monitor temperatures, cbc closely   Monitor respiratory status

## 2017-12-14 NOTE — PHYSICAL THERAPY NOTE
Physical Therapy Screen    Patient Name: Denis Baires  Today's Date: 12/14/2017     Problem List  Patient Active Problem List   Diagnosis    Asthma    Depression    Laryngeal stenosis    Severe sepsis ([facility] )    Decubitus ulcer of sacral region, stage 4 ([facility] )    HCAP (healthcare-associated pneumonia)    Anemia    History of laryngeal cancer    Fall        Past Medical History  Past Medical History:   Diagnosis Date    Asthma     Hypertension     Larynx cancer [facility])         Past Surgical History  Past Surgical History:   Procedure Laterality Date    EGD AND COLONOSCOPY      TRACHEOSTOMY N/A 8/25/2017    Procedure: TRACHEOSTOMY (POSSIBLE AWAKE) , MICRO DIRECT LARYNGOSCOPY, Biopsy;  Surgeon: Sangita Lopez MD;  [facility];  Service: ENT    WOUND DEBRIDEMENT N/A 11/22/2017    Procedure: DEBRIDEMENT WOUND ([address]), sacrum;  Surgeon: Ericka Wise DO;  [facility];  Service: General         12/14/17 0948   Note Type   Note type Eval only   Pain Assessment   Pain Assessment No/denies pain   Pain Score No Pain   Home Living   Type of Home Apartment   Home Layout One level  (no MISTY)   Bathroom Shower/Tub Tub/shower unit   Bathroom Equipment Shower chair   Prior Function   Level of [place] Independent with ADLs and functional mobility   Lives With Alone   Receives Help From Family  (dtr if needed)   ADL Assistance Independent   IADLs Independent  (did not used any AD PTA)   Falls in the last 6 months 1 to 4   Vocational Retired   Restrictions/Precautions   Other Precautions Droplet precautions; Fall Risk; Chair Alarm; Bed Alarm;Multiple lines  (stage 4 on sacral region, stage 1 R hip)   General   Family/Caregiver Present No   Cognition   Overall Cognitive Status WFL   Arousal/Participation Alert   Orientation Level Oriented X4   Memory Within functional limits   Following Commands Follows multistep commands with increased time or repetition   RLE Assessment   RLE Assessment WFL   Strength RLE   RLE Overall Strength 4-/5   LLE Assessment   LLE Assessment WFL   Strength LLE   LLE Overall Strength 4-/5   Coordination   Sensation WFL   Light Touch   RLE Light Touch Grossly intact   LLE Light Touch Grossly intact   Proprioception   RLE Proprioception Grossly intact   LLE Proprioception Grossly Intact   Bed Mobility   Rolling R 5  Supervision   Additional items Bedrails; Increased time required   Rolling L 5  Supervision   Additional items Increased time required; Bedrails   Supine to Sit 3  Moderate assistance   Additional items Bedrails; Increased time required   Transfers   Sit to Stand 4  Minimal assistance   Additional items Increased time required;Verbal cues   Stand to Sit 4  Minimal assistance   Additional items Increased time required;Verbal cues   Stand pivot 4  Minimal assistance   Additional items Increased time required;Verbal cues  (RW)   Ambulation/Elevation   Gait pattern Improper Weight shift; Inconsistent bharat; Foward flexed   Gait Assistance 4  Minimal assist   Additional items Verbal cues   Assistive Device Rolling walker  (assist with IV pole)   Distance 30   Stair Management Assistance Not tested   Wheelchair Activities   Wheelchair Cushion Pressure relieving  (at least Gel cushion but will try to find a ROHO)   Balance   Static Sitting Good   Dynamic Sitting Fair +   Static Standing Fair   Dynamic Standing Fair -   Ambulatory Fair -   Endurance Deficit   Endurance Deficit Yes   Endurance Deficit Description weakness, fatigue   Activity Tolerance   Activity Tolerance Patient limited by fatigue   Nurse Made Aware yes   Assessment   Prognosis Good   Problem List Decreased endurance;Decreased strength; Impaired balance;Decreased mobility; Decreased skin integrity   Assessment pt is an 81year old male who was admitted due to severe sepsis   Pt presents with dec LE strength, dec standing balance, generalized deconditioning limiting safety, activity tolerance and functional mobility indep  at this time pt requires mod A for sup to sit transfer while min A for sit to stand & stand pivot transfers using a RW requiring assist with IV pole  Pt was able to amb x 30' using a RW requiring min A and cues to slow down for safety  Pt will benefit from continued skilled PT services to improve functional mobility indep on order for pt to go home safely  Barriers to Discharge Inaccessible home environment;Decreased caregiver support   Barriers to Discharge Comments lives alone with no MISTY  fully indep PTA although has 1 local dtr but works   Goals   Long Term Goal #1 pt to complete bed/chair/toilet transfers at [facility]  Long Term Goal #2 Pt to increase LE mm strength to 4/5 to facilitate functional mobilities  Plan   Treatment/Interventions Spoke to nursing;Spoke to case management;OT   PT Frequency 5x/wk   Recommendation   Recommendation Home independently;Home with family support; Short-term skilled PT  (acute rehab vs SNF - will continue to reassess)   Barthel Index   Feeding 10   Bathing 0   Grooming Score 0   Dressing Score 5   Bladder Score 10   Bowels Score 10   Toilet Use Score 5   Transfers (Bed/Chair) Score 10   Mobility (Level Surface) Score 10   Stairs Score 0   Barthel Index Score 60

## 2017-12-14 NOTE — CONSULTS
Consultation - Infectious Disease   Fer Sommer  81 y o  male MRN: [id number]  Unit/Bed#: -01 Encounter: [id number]      IMPRESSION & RECOMMENDATIONS:   1  Severe sepsis-POA  Fever, leukocytosis, lactic acidosis  Probably once again secondary to either pneumonia verses an infected sacral decubitus ulcer  While the patient remains hemodynamically stable he is quite lethargic and remains febrile  Thus far he seems to be tolerating the antibiotics well without difficulty  Based upon the previous cultures, adjustment in the antibiotic seem warranted   -discontinue cefepime Flagyl  -Zosyn 4 5 g IV q 6 hours  -follow-up blood cultures  -check sputum culture  -follow-up influenza PCR  -await surgical assessment of the sacral ulcer  -may need CT of the abdomen and pelvis    2  Sacral decubitus ulceration-possibly infected  The ulcer probes quite deeply  No purulence or overt cellulitis  Patient is status post I and D of an abscess in that region recently  The wound cultures had grown enterococcus and strep as well as anaerobes   -antibiotics as above  -await surgical evaluation  -may need additional debridement  -consider CT of the abdomen and pelvis to look for abscess    3  Possible healthcare associated pneumonia-the patient's respiratory status is quite stable despite the radiographic findings  He is certainly at risk of aspiration as he was found down on the ground with possible loss of consciousness  At this point he is not requiring any oxygen support   -antibiotics as above  -monitor respiratory status  -follow-up cultures    4  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  Status post tracheostomy  HISTORY OF PRESENT ILLNESS:  Reason for Consult:  Sepsis  HPI: Fer Sommer  is a 81y o  year old male admitted to [facility] in [place] with high fever and lethargy after being found on the ground who I am asked to assist with antibiotic management    The patient has a history of laryngeal carcinoma status post laryngectomy, radiation, and chemotherapy  He has had a tracheostomy in place  The patient had been admitted to [facility] in [place] on the 19th of November with sepsis felt secondary to pneumonia verses a infected sacral decubitus ulcer  He was initially treated with cefepime and Flagyl with resolution of the sepsis  He underwent incision and drainage of the infected sacral decubitus ulcer, and received local wound care  His antibiotics were eventually de-escalated to Augmentin and the patient was eventually transition to rehab  He completed his stay rehab and apparently was discharged home a few days prior to this admission  He apparently was found down at home behind a door laying on the floor for approximately 10 hours  He was therefore brought to the emergency department for further evaluation  In the emergency department the patient was found to be febrile with a leukocytosis and a mild lactic acidosis  He was thought to be dehydrated  He had blood cultures obtained and was started on vancomycin and Zosyn admitted for further management  Chest x-ray suggested some patchy consolidation  His antibiotics were adjusted to cefepime and Flagyl  He has continued to have fever but his white cell count has come down  He remains quite lethargic but is able to answer simple yes no questions by shaking his head  He was seen by wound care who found a deep probing sacral ulcer that probes to near the rectum  He does not have any spreading erythema noted on exam   He denies any headache or stiff neck, denies any rhinorrhea or nasal congestion, denies any cough or difficulty breathing, denies any chest pain or abdominal pain, denies any nausea vomiting or diarrhea, denies any dysuria or hematuria  REVIEW OF SYSTEMS:  A complete 12 point system-based review of systems is otherwise negative      PAST MEDICAL HISTORY:  Past Medical History: Diagnosis Date    Asthma     Hypertension     Larynx cancer [facility])      Past Surgical History:   Procedure Laterality Date    EGD AND COLONOSCOPY      TRACHEOSTOMY N/A 2017    Procedure: TRACHEOSTOMY (POSSIBLE AWAKE) , MICRO DIRECT LARYNGOSCOPY, Biopsy;  Surgeon: Yohan Bahena MD;  [facility];  Service: ENT   [address] [place] [address] N/A 2017    Procedure: DEBRIDEMENT WOUND [facility] OUT), sacrum;  Surgeon: Ty Check, DO;  [facility];  Service: General       FAMILY HISTORY:  Non-contributory    SOCIAL HISTORY:  Social History   History   Alcohol Use No     History   Drug Use No     History   Smoking Status    Former Smoker   Smokeless Tobacco    Never Used       ALLERGIES:  Allergies   Allergen Reactions    Aspirin     Cleocin [Clindamycin]        MEDICATIONS:  All current active medications have been reviewed  Antibiotics:  Vancomycin x1 dose, cefepime Flagyl, antibiotics 2  PHYSICAL EXAM:  HR:  [64-80] 80  Resp:  [10-21] 20  BP: ()/(52-82) 107/52  SpO2:  [92 %-97 %] 95 %  Temp (24hrs), Av 1 °F (38 9 °C), Min:100 °F (37 8 °C), Max:104 3 °F (40 2 °C)  Current: Temperature: 100 °F (37 8 °C)    Intake/Output Summary (Last 24 hours) at 17 1052  Last data filed at 17 0820   Gross per 24 hour   Intake             1596 ml   Output              350 ml   Net             1246 ml       General Appearance:  Appearing well, nontoxic, and in no distress   Head:  Normocephalic, without obvious abnormality, atraumatic   Eyes:  Conjunctiva pink and sclera anicteric, both eyes   Nose: Nares normal, mucosa normal, no drainage   Throat: Oropharynx moist without lesions   Neck: Supple, symmetrical, no adenopathy, no tenderness/mass/nodules  Trach site without erythema or drainage   Back:   Symmetric, no curvature, ROM normal, no CVA tenderness  Sacral ulcer is dressed but is deep and probing by report  No spreading erythema     Lungs:   Decreased breath sounds bilaterally, respirations unlabored   Chest Wall:  No tenderness or deformity   Heart:  RRR; no murmur, rub or gallop   Abdomen:   Soft, non-tender, non-distended, positive bowel sounds,    Extremities: No cyanosis, clubbing or edema   Skin: No rashes or lesions  No draining wounds noted  Lymph nodes: Cervical, supraclavicular nodes normal   Neurologic: Somnolent, answer simple questions appropriately, moving all 4 extremities       LABS, IMAGING, & OTHER STUDIES:  Lab Results:  I have personally reviewed pertinent labs  Results from last 7 days  Lab Units 12/14/17 0449 12/13/17 2112   WBC Thousand/uL 8 18 12 30*   HEMOGLOBIN g/dL 10 6* 10 9*   PLATELETS Thousands/uL 145* 187       Results from last 7 days  Lab Units 12/14/17 0449 12/13/17 2112   SODIUM mmol/L 138 139   POTASSIUM mmol/L 3 5 4 3   CHLORIDE mmol/L 102 102   CO2 mmol/L 29 27   ANION GAP mmol/L 7 10   BUN mg/dL 21 24   CREATININE mg/dL 0 93 0 99   EGFR ml/min/1 73sq m 77 71   GLUCOSE RANDOM mg/dL 87 91   CALCIUM mg/dL 8 4 8 7   AST U/L  --  46*   ALT U/L  --  27   ALK PHOS U/L  --  62   TOTAL PROTEIN g/dL  --  6 8   ALBUMIN g/dL  --  2 7*   BILIRUBIN TOTAL mg/dL  --  0 73        blood cultures x2 sets pending    Influenza PCR pending    Imaging Studies:   I have personally reviewed pertinent imaging study reports and images in PACS      Chest x-ray-patchy consolidation on the left    CT head and cervical spine-no neck fracture or intracranial abnormality

## 2017-12-14 NOTE — CASE MANAGEMENT
Initial Clinical Review    Admission: Date/Time/Statement: 12/14/17 @ 0029 Inpatient Written     Orders Placed This Encounter   Procedures    Inpatient Admission (expected length of stay for this patient is greater than two midnights)     Standing Status:   Standing     Number of Occurrences:   1     Order Specific Question:   Admitting Physician     Answer:   Adan Coyle     Order Specific Question:   Level of Care     Answer:   Med Surg [16]     Order Specific Question:   Estimated length of stay     Answer:   More than 2 Midnights     Order Specific Question:   Certification     Answer:   I certify that inpatient services are medically necessary for this patient for a duration of greater than two midnights  See H&P and MD Progress Notes for additional information about the patient's course of treatment  ED: Date/Time/Mode of Arrival:   ED Arrival Information     Expected Arrival Acuity Means of Arrival Escorted By Service Admission Type    12/13/2017 20:43 12/13/2017 20:55 Urgent Ambulance [facility] General Medicine Urgent    Arrival Complaint    fever          Chief Complaint:   Chief Complaint   Patient presents with    Fever - 76 years or older     pt has fever, fell this am and didnt want to come to hospital  tonight pt states just doesnt feel good       History of Illness: 26-year-old male presents for a fall on found down at home  Patient he lives alone was recently discharged from the hospital to rehab for pneumonia and failure to thrive and was recently discharged home  Daughter states that the caretaker  found him behind the door he was down for approximately 10 hours  Patient did not initially want to go the hospital but has been feeling weak all day and finally agree to come tonight  Patient has no complaints other than feeling weak  States that he does not been coughing he does not have any burning with urination but has been urinating less         ED Vital Signs:   ED Triage Vitals   Temperature Pulse Respirations Blood Pressure SpO2   12/13/17 2104 12/13/17 2104 12/13/17 2104 12/13/17 2104 12/13/17 2104   (!) 101 8 °F (38 8 °C) 78 18 95/53 94 %      Temp Source Heart Rate Source Patient Position - Orthostatic VS BP Location FiO2 (%)   12/13/17 2104 12/13/17 2104 12/13/17 2104 12/13/17 2104 --   Rectal Monitor Lying Left arm       Pain Score       12/13/17 2130       8        Wt Readings from Last 1 Encounters:   12/14/17 56 8 kg (125 lb 3 5 oz)       Vital Signs (abnormal): temp 104 3, O2 sat 92% on RA    Abnormal Labs/Diagnostic Test Results:   WBC 12 30*   HEMOGLOBIN 10 9*   HEMATOCRIT 33 7*   LYMPHO PCT 1*   MONO PCT MAN 2*     AST 46*     Albumin 2 7       LACTIC ACID 2 3       Total        Protime 15 5     INR 1 22       CXR:  1  Patchy infiltrate in the peripheral left lower lobe  2   Resolution of right basilar consolidation  CT Head:  No acute intracranial abnormality  Interval development of subdural effusions perhaps sequela of remote trauma  CT Cspine:  No cervical spine fracture or traumatic malalignment  Mild multilevel degenerative disc disease and facet arthropathy  CXR:  New right basilar patchy consolidation is suspicious for pneumonia  ED Treatment:   Medication Administration from 12/13/2017 2043 to 12/14/2017 0208       Date/Time Order Dose Route Action     12/13/2017 2120 acetaminophen (TYLENOL) tablet 650 mg 650 mg Oral Given     12/13/2017 2100 sodium chloride 0 9 % bolus 1,000 mL 1,000 mL Intravenous New Bag     12/14/2017 0147 vancomycin (VANCOCIN) IVPB (premix) 1,000 mg 1,000 mg Intravenous New Bag     12/14/2017 0049 piperacillin-tazobactam (ZOSYN) 4 5 g in dextrose 5 % 100 mL IVPB 4 5 g Intravenous New Bag          Past Medical/Surgical History:    Active Ambulatory Problems     Diagnosis Date Noted    Asthma 08/19/2017    Asthma 08/29/2017    Depression 08/29/2017    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    Severe sepsis ([address] ) 11/19/2017    Decubitus ulcer of sacral region, stage 4 (HCC) 11/20/2017    Anemia 11/20/2017    Severe protein-calorie malnutrition ([facility] ) 11/22/2017    Constipation 11/23/2017     Resolved Ambulatory Problems     Diagnosis Date Noted    SOB (shortness of breath) 08/19/2017    Reactive airway disease with acute exacerbation 08/21/2017    [facility] 08/21/2017    HCAP (healthcare-associated pneumonia) 11/20/2017    Continuous opioid dependence ([facility] ) 11/20/2017     Past Medical History:   Diagnosis Date    Asthma     Hypertension     Larynx cancer ([facility] )        Admitting Diagnosis: Laryngeal stenosis [J38 6]  Severe protein-calorie malnutrition (HCC) [E43]  Elevated creatine kinase [R74 8]  Fever [R50 9]  History of laryngeal cancer [Z85 21]  Decubitus ulcer of sacral region, stage 4 ([facility] ) [L89 154]  Pneumonia involving left lung [J18 9]  Severe sepsis ([facility] ) [A41 9, R65 20]    Age/Sex: 81 y o  male    Assessment/Plan:     Hospital Problem List:      Principal Problem:    Severe sepsis ([facility] )  Active Problems:    Asthma    Laryngeal stenosis    Decubitus ulcer of sacral region, stage 4 (HCC)    Severe protein-calorie malnutrition (HCC)    Constipation    History of laryngeal cancer    IVORY (acute kidney injury) ([facility] )    Elevated CPK        Plan for the Primary Problem(s):  · Severe sepsis most likely secondary to pneumonia, follow-up influenza and culture reports  · Given recent admission, NH staying  and antibiotic treatment patient high risk for pseudomonal fraction continue current antibiotic until infection disease evaluation  ? Monitor temperature ,WBC  ? Gentle IV hydration, repeat lactic acid  ? Tylenol, antiemetic  ? NPO  Speech and swallow evaluation  ? Will obtain sputum culture  ? Previously infected sacral decubitus ulcer seems to be not infected at present  Wound care specialist consult        Plan for Additional Problems:   · Elevated CPK secondary to fall    IV fluids ,repeat in a m   · IVORY secondary to dehydration provide IV fluids repeat in a m  · Constipation  Bowel movement regimen  · Protein calorie malnourishment secondary to poor oral intake due to malignancy, depression and tracheostomy  Nutritionist consult     VTE Prophylaxis: Enoxaparin (Lovenox)  / sequential compression device   Code Status: full  POLST: There is no POLST form on file for this patient (pre-hospital)     Anticipated Length of Stay:  Patient will be admitted on an Inpatient basis with an anticipated length of stay of  >2 midnights  Justification for Hospital Stay:  Severe sepsis treatment,ID consult    Admission Orders:  NPO  Blood and sputum cxs pending  Pt, ot  Consult ID, cm, nutr serv, wound care  O2 NC 2L  Sequential compression device  Scheduled Meds:   calcium carbonate-vitamin D 1 tablet Oral Daily   collagenase  Topical Daily   doxazosin 2 mg Oral HS   enoxaparin 40 mg Subcutaneous Daily   fluticasone 2 puff Inhalation BID   fluticasone-salmeterol 1 puff Inhalation Q12H [address]   metroNIDAZOLE 500 mg Intravenous Q8H   mirtazapine 15 mg Oral HS   pantoprazole 40 mg Oral Early Morning   piperacillin-tazobactam 3 375 g Intravenous Q6H   senna-docusate sodium 1 tablet Oral BID   vancomycin 15 mg/kg Intravenous Q12H     Continuous Infusions:   sodium chloride 75 mL/hr    sodium chloride 75 mL/hr Last Rate: 75 mL/hr (12/14/17 0253)     PRN Meds:   Acetaminophen 650mg PO x1 thus far    albuterol    oxyCODONE    polyethylene glycol    [address] [facility] in the [facility] by Gerry Jacobson for 2017  Network Utilization Review Department  Phone: [phone]; Fax [phone]  ATTENTION: The Network Utilization Review Department is now centralized for our 7 Facilities  Make a note that we have a new phone and fax numbers for our Department   Please call with any questions or concerns to [phone] and carefully follow the prompts so that you are directed to the right person  All voicemails are confidential  Fax any determinations, approvals, denials, and requests for initial or continue stay review clinical to [phone]  Due to HIGH CALL volume, it would be easier if you could please send faxed requests to expedite your requests and in part, help us provide discharge notifications faster

## 2017-12-14 NOTE — PROGRESS NOTES
Patient fentanyl patch due to be changed, old patch that patient came to hospital with from home not patient, complete body assessment performed on patient with secondary nurse, Cheryl Valadez RN  Fentanyl patch not on patient or in bed  New patch applied as ordered

## 2017-12-14 NOTE — ED PROVIDER NOTES
History  Chief Complaint   Patient presents with    Fever - 75 years or older     pt has fever, fell this am and rdidnt want to come to hospital  tonight pt states just doesnt feel good     HPI     81-year-old male presents for a fall on found down at home  Patient he lives alone was recently discharged from the hospital to rehab for pneumonia and failure to thrive and was recently discharged home  Daughter states that the caretaker found behind the door he was down for approximately 10 hours  Patient did not initially want to go the hospital but has been feeling weak all day and finally agree to come tonight  Patient has no complaints other than feeling weak  States that he does not been coughing he does not have any burning with urination but has been urinating less  Denies headache or neck pain  Patient has a past medical history of laryngectomy and tracheostomy  No other associated symptoms no other modifying factors  History is limited due the patient being unable to speak but can mouth the words  He has a past medical history of a sacral decubitus ulcer stage IV  Who on exam the patient appears comfortable  His lungs are rhonchorous on the left  Sacral decubitus ulcer appears not infected  He has pressure ulcer stage I on the right hip  Skin tear in the right calf  Abdomen is soft and nontender  Looks somewhat dry  A/P: found down  Check ck  Septic workup  Iv abx  Cxr  Ct head/neck  Prior to Admission Medications   Prescriptions Last Dose Informant Patient Reported? Taking?    Calcium 600-200 MG-UNIT per tablet Unknown at Unknown time  Yes No   Sig: Take 1 tablet by mouth daily   Mometasone Furo-Formoterol Fum (DULERA) 200-5 MCG/ACT AERO 12/12/2017 at 2100  Yes Yes   Sig: Inhale 2 puffs 2 (two) times a day   Sennosides-Docusate Sodium (SENEXON-S PO) Unknown at Unknown time  Yes No   Sig: Take 1 tablet by mouth   acetaminophen (TYLENOL) 325 mg tablet Unknown at Unknown time  No No   Sig: Take 2 tablets by mouth every 6 (six) hours as needed for mild pain, headaches or fever   albuterol (PROVENTIL HFA) 90 mcg/act inhaler Unknown at Unknown time  Yes No   Sig: Inhale 2 puffs as needed   collagenase (SANTYL) ointment Unknown at Unknown time  Yes No   Sig: Apply topically daily   doxazosin (CARDURA) 2 mg tablet Unknown at Unknown time  Yes No   Sig: Take 2 mg by mouth daily at bedtime   melatonin 3 mg 12/12/2017 at 2100  No Yes   Sig: Take 1 tablet by mouth daily at bedtime   mirtazapine (REMERON) 15 mg tablet 12/12/2017 at 2100  No Yes   Sig: Take 1 tablet by mouth daily at bedtime   mometasone (ASMANEX 14 METERED DOSES) 220 MCG/INH inhaler Unknown at Unknown time  Yes No   Sig: Inhale 1 puff daily   omeprazole (PriLOSEC) 40 MG capsule 12/13/2017 at 0900  Yes Yes   Sig: Take 40 mg by mouth daily   oxyCODONE (OXY-IR) 5 MG capsule Unknown at Unknown time  Yes No   Sig: Take 5 mg by mouth every 4 (four) hours as needed for moderate pain 5 mg for mod pain q4 10 mg for severe pain q4   polyethylene glycol (MIRALAX) 17 g packet Unknown at Unknown time  No No   Sig: Take 17 g by mouth daily as needed (CONSTIPATION)      Facility-Administered Medications: None       Past Medical History:   Diagnosis Date    Asthma     Hypertension     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[address] )        Past Surgical History:   Procedure Laterality Date    EGD AND COLONOSCOPY      TRACHEOSTOMY N/A 8/25/2017    Procedure: TRACHEOSTOMY (POSSIBLE AWAKE) , MICRO DIRECT LARYNGOSCOPY, Biopsy;  Surgeon: Genaro Wilkerson MD;  Location: [facility];  Service: ENT    WOUND DEBRIDEMENT N/A 11/22/2017    Procedure: DEBRIDEMENT WOUND [facility], sacrum;  Surgeon: Lynn Louise DO;  Location: [facility];  Service: General       History reviewed  No pertinent family history  I have reviewed and agree with the history as documented      Social History   Substance Use Topics    Smoking status: Former Smoker    Smokeless tobacco: Never Used    Alcohol use No Review of Systems   Constitutional: Positive for fatigue  Negative for chills and fever  Eyes: Negative for photophobia and visual disturbance  Respiratory: Negative for cough and shortness of breath  Cardiovascular: Negative for chest pain, palpitations and leg swelling  Gastrointestinal: Negative for diarrhea, nausea and vomiting  Endocrine: Negative for polydipsia and polyuria  Genitourinary: Negative for decreased urine volume, difficulty urinating, dysuria and frequency  Musculoskeletal: Negative for back pain, neck pain and neck stiffness  Skin: Positive for wound  Negative for color change and rash  Allergic/Immunologic: Negative for environmental allergies and immunocompromised state  Neurological: Negative for dizziness and headaches  Hematological: Negative for adenopathy  Does not bruise/bleed easily  Psychiatric/Behavioral: Negative for dysphoric mood  The patient is not nervous/anxious  Physical Exam  ED Triage Vitals   Temperature Pulse Respirations Blood Pressure SpO2   12/13/17 2104 12/13/17 2104 12/13/17 2104 12/13/17 2104 12/13/17 2104   (!) 101 8 °F (38 8 °C) 78 18 95/53 94 %      Temp Source Heart Rate Source Patient Position - Orthostatic VS BP Location FiO2 (%)   12/13/17 2104 12/13/17 2104 12/13/17 2104 12/13/17 2104 --   Rectal Monitor Lying Left arm       Pain Score       12/13/17 2130       8           Orthostatic Vital Signs  Vitals:    12/13/17 2300 12/13/17 2330 12/14/17 0115 12/14/17 0217   BP: 97/53 102/57 130/82 139/56   Pulse: 66 64 78 78   Patient Position - Orthostatic VS: Lying Lying Lying Lying       Physical Exam   Constitutional: He is oriented to person, place, and time  He appears well-developed  HENT:   Head: Normocephalic and atraumatic  Right Ear: External ear normal    Left Ear: External ear normal    Mouth/Throat: Oropharynx is clear and moist    Eyes: Conjunctivae and EOM are normal  Pupils are equal, round, and reactive to light  Neck: Normal range of motion  Neck supple  No JVD present  No thyromegaly present  tracheostomy   Cardiovascular: Normal rate, regular rhythm and normal heart sounds  Exam reveals no gallop and no friction rub  No murmur heard  Pulmonary/Chest: Effort normal and breath sounds normal  No respiratory distress  He has no wheezes  He has no rales  Abdominal: Soft  Bowel sounds are normal  He exhibits no distension  There is no rebound and no guarding  Musculoskeletal: Normal range of motion  He exhibits no edema  Lymphadenopathy:     He has no cervical adenopathy  Neurological: He is alert and oriented to person, place, and time  No cranial nerve deficit  Skin: Skin is warm  Stage iv sacral decub with dressing, no evidence of infection   Psychiatric: He has a normal mood and affect  His behavior is normal    Nursing note and vitals reviewed        ED Medications  Medications   sodium chloride 0 9 % infusion ( Intravenous Canceled Entry 12/14/17 0254)   albuterol (PROVENTIL HFA,VENTOLIN HFA) inhaler 2 puff (not administered)   calcium carbonate-vitamin D (OSCAL-D) 500 mg-200 units per tablet 1 tablet (not administered)   collagenase (SANTYL) ointment (not administered)   doxazosin (CARDURA) tablet 2 mg (2 mg Oral Given 12/14/17 0305)   mirtazapine (REMERON) tablet 15 mg (15 mg Oral Given 12/14/17 0306)   fluticasone (FLOVENT HFA) 220 mcg/act inhaler 2 puff (2 puffs Inhalation Given 12/14/17 0303)   fluticasone-salmeterol (ADVAIR) 250-50 mcg/dose inhaler 1 puff (1 puff Inhalation Given 12/14/17 0302)   pantoprazole (PROTONIX) EC tablet 40 mg (not administered)   oxyCODONE (ROXICODONE) IR tablet 5 mg (not administered)   polyethylene glycol (MIRALAX) packet 17 g (not administered)   senna-docusate sodium (SENOKOT S) 8 6-50 mg per tablet 1 tablet (not administered)   sodium chloride 0 9 % infusion (75 mL/hr Intravenous New Bag 12/14/17 0253)   enoxaparin (LOVENOX) subcutaneous injection 40 mg (not administered)   acetaminophen (TYLENOL) tablet 650 mg (not administered)   piperacillin-tazobactam (ZOSYN) 3 375 g in dextrose 5 % 50 mL IVPB (not administered)   vancomycin (VANCOCIN) IVPB (premix) 1,000 mg (not administered)   acetaminophen (TYLENOL) tablet 650 mg (650 mg Oral Given 12/13/17 2120)   sodium chloride 0 9 % bolus 1,000 mL (0 mL Intravenous Stopped 12/14/17 0000)   vancomycin (VANCOCIN) IVPB (premix) 1,000 mg (0 mg Intravenous Stopped 12/14/17 0255)   piperacillin-tazobactam (ZOSYN) 4 5 g in dextrose 5 % 100 mL IVPB (0 g Intravenous Stopped 12/14/17 0143)       Diagnostic Studies  Results Reviewed     Procedure Component Value Units Date/Time    Sputum culture and Gram stain [[id number]]     Lab Status:  No result Specimen:  Sputum     Platelet count [[id number]]     Lab Status:  No result Specimen:  Blood     Influenza A/B and RSV by PCR (Indicated for patients > 2 mo of age) [[id number]] Collected:  12/14/17 0052    Lab Status: In process Specimen:  Nasopharyngeal from Nasopharyngeal Swab Updated:  12/14/17 0056    Lactic Acid x2 [[id number]]  (Normal) Collected:  12/13/17 2355    Lab Status:  Final result Specimen:  Blood from Arm, Left Updated:  12/14/17 0024     LACTIC ACID 1 2 mmol/L     Narrative:         Result may be elevated if tourniquet was used during collection      Urine Microscopic [[id number]]  (Abnormal) Collected:  12/14/17 0010    Lab Status:  Final result Specimen:  Urine from Urine, Other Updated:  12/14/17 0020     RBC, UA 4-10 (A) /hpf      WBC, UA 2-4 (A) /hpf      Epithelial Cells None Seen /hpf      Bacteria, UA None Seen /hpf      Hyaline Casts, UA None Seen /lpf     POCT urinalysis dipstick [[id number]]  (Normal) Resulted:  12/14/17 0011    Lab Status:  Final result Specimen:  Urine Updated:  12/14/17 0011     Color, UA garfield    ED Urine Macroscopic [[id number]]  (Abnormal) Collected:  12/14/17 0010    Lab Status:  Final result Specimen:  Urine Updated:  12/14/17 0010     Color, UA Meli     Clarity, UA Clear     pH, UA 8 5 (H)     Leukocytes, UA Negative     Nitrite, UA Negative     Protein,  (2+) (A) mg/dl      Glucose, UA Negative mg/dl      Ketones, UA Negative mg/dl      Urobilinogen, UA 1 0 E U /dl      Bilirubin, UA Negative     Blood, UA Negative     Specific Gravity, UA 1 020    Narrative:       CLINITEK RESULT    CK (with reflex to MB) [[id number]]  (Abnormal) Collected:  12/13/17 2157    Lab Status:  Final result Specimen:  Blood from Arm, Right Updated:  12/13/17 2313     Total  (H) U/L     Lactic Acid x2 [[id number]]  (Abnormal) Collected:  12/13/17 2112    Lab Status:  Final result Specimen:  Blood from Arm, Left Updated:  12/13/17 2244     LACTIC ACID 2 3 (HH) mmol/L     Narrative:         Result may be elevated if tourniquet was used during collection  Comprehensive metabolic panel [[id number]]  (Abnormal) Collected:  12/13/17 2112    Lab Status:  Final result Specimen:  Blood from Arm, Left Updated:  12/13/17 2242     Sodium 139 mmol/L      Potassium 4 3 mmol/L      Chloride 102 mmol/L      CO2 27 mmol/L      Anion Gap 10 mmol/L      BUN 24 mg/dL      Creatinine 0 99 mg/dL      Glucose 91 mg/dL      Calcium 8 7 mg/dL      AST 46 (H) U/L      ALT 27 U/L      Alkaline Phosphatase 62 U/L      Total Protein 6 8 g/dL      Albumin 2 7 (L) g/dL      Total Bilirubin 0 73 mg/dL      eGFR 71 ml/min/1 73sq m     Narrative:         National Kidney Disease Education Program recommendations are as follows:  GFR calculation is accurate only with a steady state creatinine  Chronic Kidney disease less than 60 ml/min/1 73 sq  meters  Kidney failure less than 15 ml/min/1 73 sq  meters      POCT troponin [[id number]]  (Normal) Collected:  12/13/17 2158    Lab Status:  Final result Updated:  12/13/17 2212     POC Troponin I 0 04 ng/ml      Specimen Type VENOUS    Narrative:         [employer] i-Stat handheld analyzer 99% cutoff is > 0 08ng/mL in network Emergency Departments    o cTnI 99% cutoff is useful only when applied to patients in the clinical setting of myocardial ischemia  o cTnI 99% cutoff should be interpreted in the context of clinical history, ECG findings and possibly cardiac imaging to establish correct diagnosis  o cTnI 99% cutoff may be suggestive but clearly not indicative of a coronary event without the clinical setting of myocardial ischemia  CBC and differential [[id number]]  (Abnormal) Collected:  12/13/17 2112    Lab Status:  Final result Specimen:  Blood from Arm, Left Updated:  12/13/17 2211     WBC 12 30 (H) Thousand/uL      RBC 3 33 (L) Million/uL      Hemoglobin 10 9 (L) g/dL      Hematocrit 33 7 (L) %       (H) fL      MCH 32 7 pg      MCHC 32 3 g/dL      RDW 17 2 (H) %      MPV 9 6 fL      Platelets 021 Thousands/uL      nRBC 0 /100 WBCs     Narrative: This is an appended report  These results have been appended to a previously verified report  TSH [[id number]] Collected:  12/13/17 2157    Lab Status:  No result Specimen:  Blood from Arm, Right     Protime-INR [[id number]]  (Abnormal) Collected:  12/13/17 2112    Lab Status:  Final result Specimen:  Blood from Arm, Left Updated:  12/13/17 2137     Protime 15 5 (H) seconds      INR 1 22 (H)    APTT [[id number]]  (Normal) Collected:  12/13/17 2112    Lab Status:  Final result Specimen:  Blood from Arm, Left Updated:  12/13/17 2137     PTT 31 seconds     Narrative: Therapeutic Heparin Range = 60-90 seconds    Blood culture #2 [[id number]] Collected:  12/13/17 2112    Lab Status: In process Specimen:  Blood from Arm, Left Updated:  12/13/17 2121    Blood culture #1 [[id number]] Collected:  12/13/17 2116    Lab Status: In process Specimen:  Blood from Arm, Left Updated:  12/13/17 2121                 XR chest 2 views   Final Result by Matt Granda MD (12/13 2355)         1  Patchy infiltrate in the peripheral left lower lobe  2   Resolution of right basilar consolidation           Workstation performed: [id number]         CT spine cervical without contrast   Final Result by Brigitte Johnson MD (12/13 2246)      No cervical spine fracture or traumatic malalignment  Mild multilevel degenerative disc disease and facet arthropathy  Workstation performed: [id number]         CT head without contrast   Final Result by Brigitte Johnson MD (12/13 2243)      No acute intracranial abnormality  Interval development of subdural effusions perhaps sequela of remote trauma  Workstation performed: [id number]               Procedures  Procedures      Phone Consults  ED Phone Contact    ED Course  ED Course                          Initial Sepsis Screening     [address] Name 12/14/17 0008                Is the patient's history suggestive of a new or worsening infection? (!)  Yes (Proceed)  -BK        Suspected source of infection pneumonia  -BK        Are two or more of the following signs & symptoms of infection both present and new to the patient?         Indicate SIRS criteria Leukocytosis (WBC > 54571 IJL); Hypothermia < 36C (96 8F)  -BK        If the answer is yes to both questions, suspicion of sepsis is present          If severe sepsis is present AND tissue hypoperfusion perists in the hour after fluid resuscitation or lactate > 4, the patient meets criteria for SEPTIC SHOCK          Are any of the following organ dysfunction criteria present within 6 hours of suspected infection and SIRS criteria that are NOT considered to be chronic conditions?         Organ dysfunction Lactate > 2 0 mmol/L  -BK        Date of presentation of severe sepsis 12/14/17  -BK        Time of presentation of severe sepsis 0008  -BK        Tissue hypoperfusion persists in the hour after crystalloid fluid administration, evidenced, by either:          Was hypotension present within one hour of the conclusion of crystalloid fluid administration?           Date of presentation of septic shock          Time of presentation of septic shock            User Key  (r) = Recorded By, (t) = Taken By, (c) = Cosigned By    [address] Name Provider Type    MERARI Ríos DO Physician                  MDM  Number of Diagnoses or Management Options  Elevated creatine kinase: new and requires workup  Pneumonia involving left lung: new and requires workup  Severe sepsis [facility]): new and requires workup  Diagnosis management comments: Left lobe infiltrate, severe sepsis         Amount and/or Complexity of Data Reviewed  Clinical lab tests: ordered and reviewed  Tests in the radiology section of CPT®: reviewed and ordered  Tests in the medicine section of CPT®: reviewed and ordered  Independent visualization of images, tracings, or specimens: yes      CritCare Time    Disposition  Final diagnoses:   Pneumonia involving left lung   Severe sepsis ([facility] [address] )   Elevated creatine kinase     Time reflects when diagnosis was documented in both MDM as applicable and the Disposition within this note     Time User Action Codes Description Comment    12/14/2017 12:08 AM Clerance Pheasant F Add [J18 9] Pneumonia involving left lung     12/14/2017 12:09 AM Clerance Pheasant F Add [A41 9,  R65 20] Severe sepsis ([facility] [address] )     12/14/2017 12:09 AM Antwon Reid Add [R74 8] Elevated creatine kinase     12/14/2017 12:44 AM Joey Dickerson Run Add [L89 154] Decubitus ulcer of sacral region, stage 4 ([facility] [address] )     12/14/2017 12:44 AM Joey Dickerson Run Modify [E62 703] Decubitus ulcer of sacral region, stage 4 ([facility] [address] )     12/14/2017 12:44 AM Joey Dickerson Run Modify [V45 086] Decubitus ulcer of sacral region, stage 4 ([facility] [address] )     12/14/2017 12:45 AM David Ordonez Add [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     12/14/2017 12:45 AM David Pratt Add [E43] Severe protein-calorie malnutrition ([facility] [address] )     12/14/2017 12:45 AM Joey Dickerson Run Add [Z85 21] History of laryngeal cancer       ED Disposition     None      Follow-up Information    None       Current Discharge Medication List      CONTINUE these medications which have NOT CHANGED    Details   melatonin 3 mg Take 1 tablet by mouth daily at bedtime  Refills: 0      mirtazapine (REMERON) 15 mg tablet Take 1 tablet by mouth daily at bedtime  Qty: 30 tablet, Refills: 0      Mometasone Furo-Formoterol Fum (DULERA) 200-5 MCG/ACT AERO Inhale 2 puffs 2 (two) times a day      omeprazole (PriLOSEC) 40 MG capsule Take 40 mg by mouth daily      acetaminophen (TYLENOL) 325 mg tablet Take 2 tablets by mouth every 6 (six) hours as needed for mild pain, headaches or fever  Qty: 30 tablet, Refills: 0      albuterol (PROVENTIL HFA) 90 mcg/act inhaler Inhale 2 puffs as needed      Calcium 600-200 MG-UNIT per tablet Take 1 tablet by mouth daily      collagenase (SANTYL) ointment Apply topically daily      doxazosin (CARDURA) 2 mg tablet Take 2 mg by mouth daily at bedtime      mometasone (ASMANEX 14 METERED DOSES) 220 MCG/INH inhaler Inhale 1 puff daily      oxyCODONE (OXY-IR) 5 MG capsule Take 5 mg by mouth every 4 (four) hours as needed for moderate pain 5 mg for mod pain q4 10 mg for severe pain q4      polyethylene glycol (MIRALAX) 17 g packet Take 17 g by mouth daily as needed (CONSTIPATION)  Qty: 14 each, Refills: 0      Sennosides-Docusate Sodium (SENEXON-S PO) Take 1 tablet by mouth           No discharge procedures on file  ED Provider  Attending physically available and evaluated Elijah Mobley I managed the patient along with the ED Attending      Electronically Signed by         Sridhar Montgomery DO  Resident  12/14/17 5245

## 2017-12-14 NOTE — OCCUPATIONAL THERAPY NOTE
[address] Evaluation     Patient Name: Codey Gibson  Today's Date: 12/14/2017  Problem List  Patient Active Problem List   Diagnosis    Asthma    Depression    Laryngeal stenosis    [facility] sepsis ([facility] )    Decubitus ulcer of sacral region, stage 4 ([facility] )    HCAP (healthcare-associated pneumonia)    Anemia    History of laryngeal cancer    Fall     Past Medical History  Past Medical History:   Diagnosis Date    Asthma     Hypertension     Larynx cancer [facility])      Past Surgical History  Past Surgical History:   Procedure Laterality Date    EGD AND COLONOSCOPY      TRACHEOSTOMY N/A 8/25/2017    Procedure: TRACHEOSTOMY (POSSIBLE AWAKE) , MICRO DIRECT LARYNGOSCOPY, Biopsy;  Surgeon: Adriana Boyce MD;  [facility];  Service: ENT    WOUND DEBRIDEMENT N/A 11/22/2017    Procedure: DEBRIDEMENT WOUND [facility] OUT), sacrum;  Surgeon: Tiarra Andrews DO;  Location[facility];  Service: General        12/14/17 1420   Note Type   Note type Eval only   Restrictions/Precautions   Weight Bearing Precautions Per Order No   Other Precautions Droplet precautions;Multiple lines; Fall Risk   Pain Assessment   Pain Assessment No/denies pain   Pain Score No Pain   Home Living   Type of Home Apartment   Home Layout One level   Bathroom Shower/Tub Tub/shower unit   Bathroom Equipment Shower chair   Prior Function   Level of [place] Independent with ADLs and functional mobility   Lives With Alone   Receives Help From Family   ADL Assistance Independent   IADLs Independent   Falls in the last 6 months 1 to 4   Lifestyle   Autonomy Pt reports that he was I w/ ADls , IADlS, PTA   Pt had been independent 3 months ago, but has had steady decline   Reciprocal Relationships Pt's supportive daughter present for session and reports that she live local but does work and care for [employer] family members   Service to Others Pt reports he is retired   Intrinsic Gratification Pt unable to report at this time   ADL Where Assessed Chair   Eating Assistance 5  Supervision/Setup   Grooming Assistance 5  Supervision/Setup   UB Bathing Assistance 5  Supervision/Setup   LB Bathing Assistance 4  Minimal Assistance   [address] 5  Supervision/Setup   LB Dressing Assistance 5  [address] 5  Supervision/Setup   Additional Comments Pt demo limited endurance, decreased activity tolerance, decreased balance   Balance   Static Sitting Good   Dynamic Sitting Fair +   Static Standing Fair   Dynamic Standing Fair -   Ambulatory Fair -   Activity Tolerance   Activity Tolerance Patient limited by fatigue   RUE Assessment   RUE Assessment WFL   LUE Assessment   LUE Assessment WFL   Sensation   Light Touch No apparent deficits   Sharp/Dull No apparent deficits   Stereognosis No apparent deficits   Proprioception   Proprioception No apparent deficits   Cognition   Overall Cognitive Status WFL   Arousal/Participation Alert; Cooperative   Orientation Level Oriented X4   Memory Within functional limits   Following Commands Follows multistep commands with increased time or repetition   Assessment   Limitation Decreased ADL status; Decreased endurance   Prognosis Fair   Assessment Pt is a 81 y o  male seen for OT evaluation s/p admit to [facility] on 12/13/2017 w/ Severe sepsis ([facility] [address] )  pt w/ recent admission and d/c from inpatient rehab  p was only home for a few days before fall at home and unable to get up until found on floor  OT evaluation and assessment of strength, ADL participation, and functional transfers completed  Pt reports I w/ ADLs, IADLs PTA  Personal factors affecting pt at time of IE include:limited home support, behavioral pattern, difficulty performing IADLS  and decreased initiation and engagement    Upon evaluation: Pt requires MIN A for ADLs 2* the following deficits impacting occupational performance,  activity tolerance, endurance and safety   Educated Pt on safety precautions, importance of and how to use using call bell  Educated Pt on role of Occupational Therapy in acute setting  Based on Pt extensive Occupational profile review, Pt benefits from from skilled OT services for I/ADL retraining to support the ability to safely participate in daily occupations safely and independently in the most least restrictive way  Pt's supportive daughter present for session and reports that the goal is for him to return home from this admission  From OT standpoint recommend  D/C home w/ Home OT services and family support when medically stable  Will continue to follow 3-5 x weekly to address the following below stated goals  Plan   Treatment Interventions ADL retraining;Functional transfer training; Endurance training   Goal Expiration Date 12/24/17   Treatment Day (eval)   OT Frequency 3-5x/wk   Barthel Index   Feeding 10   Bathing 0   Grooming Score 5   Dressing Score 5   Bladder Score 10   Bowels Score 10   Toilet Use Score 5   Transfers (Bed/Chair) Score 10   Mobility (Level Surface) Score 10   Stairs Score 0   Barthel Index Score 65      Goals:    1) Pt will increase bed mobility to MOD I and transfer EOB to participate in functional activity with G tolerance and balance  2) Pt will improve functional transfers to MOD I on/off all surfaces using DME PRN w/ G balance/safety  3)Pt will improve functional transfers to MOD I on/off toilet/bedside commode using DME PRN w/ G balance/safety  4) Pt will complete Shower/bathe routine w/ MOD I sitting/standing as tolerated w/ appropriate use of AE and VC's for safety as needed  5) Pt will complete dressing routine w/ MOD I sitting/standing as tolerated w/ appropriate use of AE and VC's for safety as needed  6) Pt will complete toileting w/ MOD I and  w/ G hygiene/thoroughness using DME PRN  7) Pt will improve activity tolerance to G for min 30 min treatment sessions      8) Pt will participate in grooming activity with SBA using setup standing at sink ~3-5mins with G safety and balance  9)Pt will demo G carryover for safety w/ device use with item retrieval for ADLs  10)Pt will participate in simulated IADL tasks w/ MOD I and G carryover with safety of device

## 2017-12-14 NOTE — SEPSIS NOTE
Sepsis Note   Codey Gibson  81 y o  male MRN: [id number]  Unit/Bed#: -01 Encounter: [id number]            Initial Sepsis Screening     Row Name 12/14/17 0008                Is the patient's history suggestive of a new or worsening infection? (!)  Yes (Proceed)  -BK        Suspected source of infection pneumonia  -BK        Are two or more of the following signs & symptoms of infection both present and new to the patient?         Indicate SIRS criteria Leukocytosis (WBC > 94766 IJL); Hypothermia < 36C (96 8F)  -BK        If the answer is yes to both questions, suspicion of sepsis is present          If severe sepsis is present AND tissue hypoperfusion perists in the hour after fluid resuscitation or lactate > 4, the patient meets criteria for SEPTIC SHOCK          Are any of the following organ dysfunction criteria present within 6 hours of suspected infection and SIRS criteria that are NOT considered to be chronic conditions?         Organ dysfunction Lactate > 2 0 mmol/L  -BK        Date of presentation of severe sepsis 12/14/17  -BK        Time of presentation of severe sepsis 0008  -BK        Tissue hypoperfusion persists in the hour after crystalloid fluid administration, evidenced, by either:          Was hypotension present within one hour of the conclusion of crystalloid fluid administration?           Date of presentation of septic shock          Time of presentation of septic shock            User Key  (r) = Recorded By, (t) = Taken By, (c) = Cosigned By    [address] Name Provider Type    [address] DO Kalyan Physician

## 2017-12-14 NOTE — PHYSICAL THERAPY NOTE
Physical Therapy Evaluation    Patient Name: Dock Games  Today's Date: 12/14/2017     Problem List  Patient Active Problem List   Diagnosis    Asthma    Depression    Laryngeal stenosis    Severe sepsis ([address] )    Decubitus ulcer of sacral region, stage 4 ([address] )    HCAP (healthcare-associated pneumonia)    Anemia    History of laryngeal cancer    Fall        Past Medical History  Past Medical History:   Diagnosis Date    Asthma     Hypertension     Larynx cancer [facility])         Past Surgical History  Past Surgical History:   Procedure Laterality Date    EGD AND COLONOSCOPY      TRACHEOSTOMY N/A 8/25/2017    Procedure: TRACHEOSTOMY (POSSIBLE AWAKE) , MICRO DIRECT LARYNGOSCOPY, Biopsy;  Surgeon: Severiano Spain, MD;  [facility];  Service: ENT    WOUND DEBRIDEMENT N/A 11/22/2017    Procedure: DEBRIDEMENT WOUND ([address]), sacrum;  Surgeon: Sean Ratliff DO;  [facility];  Service: General         12/14/17 0948   Note Type   Note type Eval only   Pain Assessment   Pain Assessment No/denies pain   Pain Score No Pain   Home Living   Type of Home Apartment   Home Layout One level  (no MISTY)   Bathroom Shower/Tub Tub/shower unit   Bathroom Equipment Shower chair   Prior Function   Level of [place] Independent with ADLs and functional mobility   Lives With Alone   Receives Help From Family  (dtr if needed)   ADL Assistance Independent   IADLs Independent  (did not used any AD PTA)   Falls in the last 6 months 1 to 4   Vocational Retired   Restrictions/Precautions   Other Precautions Droplet precautions; Fall Risk; Chair Alarm; Bed Alarm;Multiple lines  (stage 4 on sacral region, stage 1 R hip)   General   Family/Caregiver Present No   Cognition   Overall Cognitive Status WFL   Arousal/Participation Alert   Orientation Level Oriented X4   Memory Within functional limits   Following Commands Follows multistep commands with increased time or repetition   RLE Assessment   RLE Assessment WFL   Strength RLE   RLE Overall Strength 4-/5   LLE Assessment   LLE Assessment WFL   Strength LLE   LLE Overall Strength 4-/5   Coordination   Sensation WFL   Light Touch   RLE Light Touch Grossly intact   LLE Light Touch Grossly intact   Proprioception   RLE Proprioception Grossly intact   LLE Proprioception Grossly Intact   Bed Mobility   Rolling R 5  Supervision   Additional items Bedrails; Increased time required   Rolling L 5  Supervision   Additional items Increased time required; Bedrails   Supine to Sit 3  Moderate assistance   Additional items Bedrails; Increased time required   Transfers   Sit to Stand 4  Minimal assistance   Additional items Increased time required;Verbal cues   Stand to Sit 4  Minimal assistance   Additional items Increased time required;Verbal cues   Stand pivot 4  Minimal assistance   Additional items Increased time required;Verbal cues  (RW)   Ambulation/Elevation   Gait pattern Improper Weight shift; Inconsistent bharat; Foward flexed   Gait Assistance 4  Minimal assist   Additional items Verbal cues   Assistive Device Rolling walker  (assist with IV pole)   Distance 30   Stair Management Assistance Not tested   Wheelchair Activities   Wheelchair Cushion Pressure relieving  (at least Gel cushion but will try to find a ROHO)   Balance   Static Sitting Good   Dynamic Sitting Fair +   Static Standing Fair   Dynamic Standing Fair -   Ambulatory Fair -   Endurance Deficit   Endurance Deficit Yes   Endurance Deficit Description weakness, fatigue   Activity Tolerance   Activity Tolerance Patient limited by fatigue   Nurse Made Aware yes   Assessment   Prognosis Good   Problem List Decreased endurance;Decreased strength; Impaired balance;Decreased mobility; Decreased skin integrity   Assessment pt is an 81year old male who was admitted due to severe sepsis   Pt presents with dec LE strength, dec standing balance, generalized deconditioning limiting safety, activity tolerance and functional mobility indep  at this time pt requires mod A for sup to sit transfer while min A for sit to stand & stand pivot transfers using a RW requiring assist with IV pole  Pt was able to amb x 30' using a RW requiring min A and cues to slow down for safety  Pt will benefit from continued skilled PT services to improve functional mobility indep on order for pt to go home safely  Barriers to Discharge Inaccessible home environment;Decreased caregiver support   Barriers to Discharge Comments lives alone with no MISTY  fully indep PTA although has 1 local dtr but works   Goals   Long Term Goal #1 pt to complete bed/chair/toilet transfers at [facility]  Long Term Goal #2 Pt to increase LE mm strength to 4/5 to facilitate functional mobilities  Plan   Treatment/Interventions Spoke to nursing;Spoke to case management;OT   PT Frequency 5x/wk   Recommendation   Recommendation Home independently;Home with family support; Short-term skilled PT   Barthel Index   Feeding 10   Bathing 0   Grooming Score 0   Dressing Score 5   Bladder Score 10   Bowels Score 10   Toilet Use Score 5   Transfers (Bed/Chair) Score 10   Mobility (Level Surface) Score 10   Stairs Score 0   Barthel Index Score 60

## 2017-12-14 NOTE — CONSULTS
Acute Care Surgery  Consultation  Randy Jolly  81 y o  male MRN: [id number]  Unit/Bed#: -01 Encounter: [id number]    Assessment and Plan:  1  Decubitus Ulcer Stage 4 s/p I&D 11/22 by surgery team    - 2cm by 1cm with 3cm deep tracking towards anus   - No evidence of infection on inspection   - No drainage, would is clean, dry, intact   - Consider CT scan of abdomen/pelvis   - Negative for leukocytosis   - Fevers overnight now trending/ likely 2/2 to Pneumonia   - Will discuss with attending   - Continue with wound consult/ f-up recs   - Monitor for evidence of infection   - Frequent turning and off loading    2  Severe Sepsis POA   - Continue to trend labs   - Likely 2/2 Pneumonia   - Continue care per primary team   - Continue Abx and ID consulted   - Follow-up cultures    3  HCAP   - Continue care per primary team    4  Laryngeal Stenosis   - Continue trach care   - S/p trach    Disposition: Will discuss with attending and team regarding requirements for scan  Patient has minimal to no erythema  There is no drainage and no surrounding breakdown  Will f/up with medicine team       History of Present Illness   HPI:  Randy Jolly  is a 81 y o  male who presents with locally advanced laryngeal cancer, laryngeal stenosis 2/2 to chemoradiation, s/p tracheostomy who presents for eval after generalized weakness s/p fall of 12/13/17  Patient was worked up in the ED and determined to have a pneumonia and severe sepsis  Patient was also noted to have a sacral decubitus ulcer that was related to previous surgery from 11/22/17  Patient can not provide history and can only shake his head yes/no with trach in place  Patient does recall previous surgery of I&D of ulcer  Patient reports today that he does not have pain with his wound  Denies any new drainage  Denies when evaluated by surgery team today any fevers, chills, sweats  Reports no nausea or vomiting   Reports that he has regular bowel movements and urination  Reports that the wound is not bothering him at this time  Inpatient consult to Acute Care Surgery  Consult performed by: Meena Puente ordered by: Wen Goss        Review of Systems   Constitutional: Negative for activity change, appetite change, fatigue and fever  HENT: Positive for sore throat  Negative for congestion, ear discharge, sinus pressure and sneezing  Eyes: Negative for photophobia, pain and redness  Respiratory: Positive for cough  Negative for chest tightness, shortness of breath and wheezing  Cardiovascular: Negative for chest pain and palpitations  Gastrointestinal: Negative for abdominal distention, abdominal pain, diarrhea, nausea and vomiting  Endocrine: Negative for cold intolerance and heat intolerance  Genitourinary: Negative  Negative for difficulty urinating  Musculoskeletal: Negative  Skin: Negative  Neurological: Negative for dizziness, weakness, light-headedness and numbness  Historical Information   Past Medical History:   Diagnosis Date    Asthma     Hypertension     Larynx cancer [facility])      Past Surgical History:   Procedure Laterality Date    EGD AND COLONOSCOPY      TRACHEOSTOMY N/A 8/25/2017    Procedure: TRACHEOSTOMY (POSSIBLE AWAKE) , MICRO DIRECT LARYNGOSCOPY, Biopsy;  Surgeon: Amaury Kapoor MD;  [facility];  Service: ENT    WOUND DEBRIDEMENT N/A 11/22/2017    Procedure: DEBRIDEMENT WOUND [facility]), sacrum;  Surgeon: Jose Fairbanks DO;  [facility];  Service: General     Social History   History   Alcohol Use No     History   Drug Use No     History   Smoking Status    Former Smoker   Smokeless Tobacco    Never Used     History reviewed  No pertinent family history      Meds/Allergies   current meds:   Current Facility-Administered Medications   Medication Dose Route Frequency    acetaminophen (TYLENOL) rectal suppository 650 mg  650 mg Rectal Q4H PRN    albuterol (PROVENTIL HFA,VENTOLIN HFA) inhaler 2 puff  2 puff Inhalation Q6H PRN    calcium carbonate-vitamin D (OSCAL-D) 500 mg-200 units per tablet 1 tablet  1 tablet Oral Daily    collagenase (SANTYL) ointment   Topical Daily    doxazosin (CARDURA) tablet 2 mg  2 mg Oral HS    enoxaparin (LOVENOX) subcutaneous injection 40 mg  40 mg Subcutaneous Daily    fentaNYL (DURAGESIC) 25 mcg/hr TD 72 hr patch 25 mcg  25 mcg Transdermal Q72H    fluticasone (FLOVENT HFA) 220 mcg/act inhaler 2 puff  2 puff Inhalation BID    fluticasone-salmeterol (ADVAIR) 250-50 mcg/dose inhaler 1 puff  1 puff Inhalation Q12H [address]    mirtazapine (REMERON) tablet 15 mg  15 mg Oral HS    oxyCODONE (ROXICODONE) IR tablet 5 mg  5 mg Oral Q6H PRN    pantoprazole (PROTONIX) EC tablet 40 mg  40 mg Oral Early Morning    piperacillin-tazobactam (ZOSYN) 4 5 g in dextrose 5 % 100 mL IVPB  4 5 g Intravenous Q6H    polyethylene glycol (MIRALAX) packet 17 g  17 g Oral Daily PRN    senna-docusate sodium (SENOKOT S) 8 6-50 mg per tablet 1 tablet  1 tablet Oral BID    sodium chloride 0 9 % infusion  75 mL/hr Intravenous Continuous    sodium chloride 0 9 % infusion  75 mL/hr Intravenous Continuous     Allergies   Allergen Reactions    Aspirin     Cleocin [Clindamycin]        Objective   First Vitals:   Blood Pressure: 95/53 (12/13/17 2104)  Pulse: 78 (12/13/17 2104)  Temperature: (!) 101 8 °F (38 8 °C) (12/13/17 2104)  Temp Source: Rectal (12/13/17 2104)  Respirations: 18 (12/13/17 2104)  Height: 5' 7" (170 2 cm) (12/14/17 0217)  Weight - Scale: 61 2 kg (135 lb) (12/13/17 2104)  SpO2: 94 % (12/13/17 2104)    Current Vitals:   Blood Pressure: 107/52 (12/14/17 0734)  Pulse: 80 (12/14/17 0734)  Temperature: 100 °F (37 8 °C) (12/14/17 1015)  Temp Source: Oral (12/14/17 1015)  Respirations: 20 (12/14/17 0734)  Height: 5' 7" (170 2 cm) (12/14/17 0217)  Weight - Scale: 56 8 kg (125 lb 3 5 oz) (12/14/17 0217)  SpO2: 95 % (12/14/17 0734)      Intake/Output Summary (Last 24 hours) at 12/14/17 1410  Last data filed at 12/14/17 1315   Gross per 24 hour   Intake           2277 3 ml   Output              350 ml   Net           1927 3 ml       Invasive Devices     Peripheral Intravenous Line            Peripheral IV 12/13/17 Left Forearm less than 1 day    Peripheral IV 12/13/17 Right Forearm less than 1 day          Airway            Surgical Airway 110 days                Physical Exam   Constitutional: He is oriented to person, place, and time  He appears well-developed and well-nourished  No distress  HENT:   Head: Normocephalic  Eyes: EOM are normal    Neck:       Cardiovascular: Normal rate and regular rhythm  Exam reveals no gallop and no friction rub  No murmur heard  Pulmonary/Chest: He has decreased breath sounds in the right lower field and the left lower field  He has no wheezes  He has no rales  Abdominal: Soft  Bowel sounds are normal  He exhibits no distension  There is no tenderness  There is no rebound  Genitourinary:   Genitourinary Comments: Wound superior to anus, 2 cm by 1cm with tracking about 3cm deep, stage 4 ulcer, no drainage or erythema  Musculoskeletal: Normal range of motion  He exhibits no edema or deformity  Neurological: He is alert and oriented to person, place, and time  No cranial nerve deficit  Skin: He is not diaphoretic  Lab Results:   I have personally reviewed pertinent lab results    , CBC:   Lab Results   Component Value Date    WBC 8 18 12/14/2017    HGB 10 6 (L) 12/14/2017    HCT 33 1 (L) 12/14/2017     (H) 12/14/2017     (L) 12/14/2017    MCH 32 1 12/14/2017    MCHC 32 0 12/14/2017    RDW 17 1 (H) 12/14/2017    MPV 8 9 12/14/2017    NRBC 0 12/13/2017   , CMP:   Lab Results   Component Value Date     12/14/2017    K 3 5 12/14/2017     12/14/2017    CO2 29 12/14/2017    ANIONGAP 7 12/14/2017    BUN 21 12/14/2017    CREATININE 0 93 12/14/2017    GLUCOSE 87 12/14/2017    CALCIUM 8 4 12/14/2017    AST 46 (H) 12/13/2017    ALT 27 12/13/2017    ALKPHOS 62 12/13/2017    PROT 6 8 12/13/2017    ALBUMIN 2 7 (L) 12/13/2017    BILITOT 0 73 12/13/2017    EGFR 77 12/14/2017     Imaging: I have personally reviewed pertinent reports  EKG, Pathology, and Other Studies: I have personally reviewed pertinent reports  Counseling / Coordination of Care  Total floor / unit time spent today 45 minutes  Greater than 50% of total time was spent with the patient and / or family counseling and / or coordination of care      Azul Mascorro PA-C  12/14/2017 2:10 PM

## 2017-12-14 NOTE — RESPIRATORY THERAPY NOTE
RT Protocol Note  Reynold Arana  81 y o  male MRN: [id number]  Unit/Bed#: -01 Encounter: [id number]    Assessment    Principal Problem:    Severe sepsis [facility])  Active Problems:    Asthma    Laryngeal stenosis    Decubitus ulcer of sacral region, stage 4 (HCC)    Severe protein-calorie malnutrition (HCC)    Constipation    History of laryngeal cancer    [facility]) ([facility] )    Elevated CPK      Home Pulmonary Medications:  Albuterol HFA  Dulera  Advair    Past Medical History:   Diagnosis Date    Asthma     Hypertension     Larynx cancer ([facility] )      Social History     Social History    Marital status: Single     Spouse name: N/A    Number of children: N/A    Years of education: N/A     Social History Main Topics    Smoking status: Former Smoker    Smokeless tobacco: Never Used    Alcohol use No    Drug use: No    Sexual activity: Not Asked     Other Topics Concern    None     Social History Narrative    None       Subjective         Objective    Physical Exam:   Assessment Type: Assess only  General Appearance: Awake, Alert  Cough: None    Vitals:  Blood pressure 139/56, pulse 78, temperature (!) 103 °F (39 4 °C), temperature source Oral, resp  rate 20, height 5' 7" (1 702 m), weight 56 8 kg (125 lb 3 5 oz), SpO2 97 %  Imaging and other studies: I have personally reviewed pertinent reports  Plan    Respiratory Plan: Home Bronchodilator Patient pathway        Resp Comments: Pt evaluated per resp protocol  Pt is here due to weakness after been found down at home  He was recently discharged for the hospital for pneumonia  Pt is in not in resp distress, has a trach in place and is capped w/HME, no cool aerosol needed  BS are diminish bilateral, no wheezes or ronchi  Pt takes Albuterol HFA prn and several inhalers at home  Will continue albuterol HFA PRN

## 2017-12-14 NOTE — SOCIAL WORK
CM spoke with patient's daughter Robert Romero, via telephone [phone] to explain the CM role and discuss any D/C needs  Pt lives alone in 1st floor apartment, [address]  Pt's daughter states he was independent with ADL's prior to his diagnosis of throat cancer 6 months ago  Pt uses R/w  No hx of home health, mental health treatment, or alcohol/drug treatment  Pt's daughter reports that Pt has hx  Of outpatient PT/OT through [facility], also has hx of [place] [facility] and at [facility]  Preferred pharmacy is [facility] on [address] in [place], PCP is Dr Tiffany House from [facility]   CM reviewed d/c planning process including the following: identifying help at home, patient preference for d/c planning needs, Discharge Lounge, [facility] Meds to Bed program, availability of treatment team to discuss questions or concerns patient and/or family may have regarding understanding medications and recognizing signs and symptoms once discharged  CM also encouraged patient to follow up with all recommended appointments after discharge   Patient advised of importance for patient and family to participate in managing patients medical well being     ~I have reviewed and agree to the above documentation~  Valery Carmona RN

## 2017-12-14 NOTE — ASSESSMENT & PLAN NOTE
· S/p laryngectomy at [facility]   Continue trach care  · Follow speech recommendations

## 2017-12-14 NOTE — PROGRESS NOTES
Placed pt  On Trach collar humidified oxygen due to sats  In 80s on RA  SpO2 now 96%  No resp  Distress noted  No complaints from pt

## 2017-12-14 NOTE — ED ATTENDING ATTESTATION
I, Alina Foster DO, saw and evaluated the patient  I have discussed the patient with the resident/non-physician practitioner and agree with the resident's/non-physician practitioner's findings, Plan of Care, and MDM as documented in the resident's/non-physician practitioner's note, except where noted  All available labs and Radiology studies were reviewed  At this point I agree with the current assessment done in the Emergency Department  I have conducted an independent evaluation of this patient a history and physical is as follows:      Critical Care Time  CritCare Time      81 yr old male to the ED for weakness and fall while at home  Down for 12 hr   Weak and with fever  Recent tx for pneumonia 10 days ago  Exm: skin tears to right calf and hip  Lungs: decr excursion  Warm  Pressure good  Fever   Pln: fluids, lab, septic [place] and eval

## 2017-12-14 NOTE — NUTRITION
12/14/17 1140   Recommendations/Interventions   Interventions EN initiate  (Suggest initiate EN, RD will provide recommendations for nocturnal feeds, continue diet as tolerated )

## 2017-12-14 NOTE — ASSESSMENT & PLAN NOTE
· Was infected during last admission with abscess s/p I&D 11/22 with general surgery and abx course  · Monitor closely for signs of infection  Examined with wound care, about 2 cm x 1 cm with 3 cm deep tracking toward anus  · Wound input appreciated   Recommend general surgery c/s   · Frequent repositioning/offloading

## 2017-12-15 ENCOUNTER — APPOINTMENT (INPATIENT)
Dept: RADIOLOGY | Facility: HOSPITAL | Age: 82
DRG: 853 | End: 2017-12-15
Payer: COMMERCIAL

## 2017-12-15 LAB
ANION GAP SERPL CALCULATED.3IONS-SCNC: 6 MMOL/L (ref 4–13)
ANISOCYTOSIS BLD QL SMEAR: PRESENT
BASOPHILS # BLD MANUAL: 0 THOUSAND/UL (ref 0–0.1)
BASOPHILS NFR MAR MANUAL: 0 % (ref 0–1)
BUN SERPL-MCNC: 21 MG/DL (ref 5–25)
CALCIUM SERPL-MCNC: 8 MG/DL (ref 8.3–10.1)
CHLORIDE SERPL-SCNC: 106 MMOL/L (ref 100–108)
CO2 SERPL-SCNC: 27 MMOL/L (ref 21–32)
CREAT SERPL-MCNC: 0.88 MG/DL (ref 0.6–1.3)
EOSINOPHIL # BLD MANUAL: 0 THOUSAND/UL (ref 0–0.4)
EOSINOPHIL NFR BLD MANUAL: 0 % (ref 0–6)
ERYTHROCYTE [DISTWIDTH] IN BLOOD BY AUTOMATED COUNT: 16.7 % (ref 11.6–15.1)
GFR SERPL CREATININE-BSD FRML MDRD: 81 ML/MIN/1.73SQ M
GLUCOSE SERPL-MCNC: 92 MG/DL (ref 65–140)
HCT VFR BLD AUTO: 29.8 % (ref 36.5–49.3)
HGB BLD-MCNC: 9.7 G/DL (ref 12–17)
LYMPHOCYTES # BLD AUTO: 0.06 THOUSAND/UL (ref 0.6–4.47)
LYMPHOCYTES # BLD AUTO: 1 % (ref 14–44)
MCH RBC QN AUTO: 32.3 PG (ref 26.8–34.3)
MCHC RBC AUTO-ENTMCNC: 32.6 G/DL (ref 31.4–37.4)
MCV RBC AUTO: 99 FL (ref 82–98)
MONOCYTES # BLD AUTO: 0 THOUSAND/UL (ref 0–1.22)
MONOCYTES NFR BLD: 0 % (ref 4–12)
NEUTROPHILS # BLD MANUAL: 5.96 THOUSAND/UL (ref 1.85–7.62)
NEUTS BAND NFR BLD MANUAL: 1 % (ref 0–8)
NEUTS SEG NFR BLD AUTO: 98 % (ref 43–75)
NRBC BLD AUTO-RTO: 0 /100 WBCS
PLATELET # BLD AUTO: 150 THOUSANDS/UL (ref 149–390)
PLATELET BLD QL SMEAR: ADEQUATE
PMV BLD AUTO: 9.2 FL (ref 8.9–12.7)
POTASSIUM SERPL-SCNC: 3.4 MMOL/L (ref 3.5–5.3)
RBC # BLD AUTO: 3 MILLION/UL (ref 3.88–5.62)
RBC MORPH BLD: PRESENT
SODIUM SERPL-SCNC: 139 MMOL/L (ref 136–145)
WBC # BLD AUTO: 6.02 THOUSAND/UL (ref 4.31–10.16)

## 2017-12-15 PROCEDURE — 85007 BL SMEAR W/DIFF WBC COUNT: CPT | Performed by: INTERNAL MEDICINE

## 2017-12-15 PROCEDURE — 80048 BASIC METABOLIC PNL TOTAL CA: CPT | Performed by: INTERNAL MEDICINE

## 2017-12-15 PROCEDURE — 97116 GAIT TRAINING THERAPY: CPT

## 2017-12-15 PROCEDURE — 85027 COMPLETE CBC AUTOMATED: CPT | Performed by: INTERNAL MEDICINE

## 2017-12-15 PROCEDURE — 94760 N-INVAS EAR/PLS OXIMETRY 1: CPT

## 2017-12-15 PROCEDURE — 76536 US EXAM OF HEAD AND NECK: CPT

## 2017-12-15 PROCEDURE — 97530 THERAPEUTIC ACTIVITIES: CPT

## 2017-12-15 RX ORDER — FENTANYL 12 UG/H
12 PATCH TRANSDERMAL
Status: DISCONTINUED | OUTPATIENT
Start: 2017-12-15 | End: 2017-12-20

## 2017-12-15 RX ORDER — POTASSIUM CHLORIDE 20 MEQ/1
40 TABLET, EXTENDED RELEASE ORAL ONCE
Status: COMPLETED | OUTPATIENT
Start: 2017-12-15 | End: 2017-12-15

## 2017-12-15 RX ORDER — POTASSIUM CHLORIDE 14.9 MG/ML
20 INJECTION INTRAVENOUS ONCE
Status: DISCONTINUED | OUTPATIENT
Start: 2017-12-15 | End: 2017-12-15

## 2017-12-15 RX ADMIN — PIPERACILLIN SODIUM,TAZOBACTAM SODIUM 4.5 G: 4; .5 INJECTION, POWDER, FOR SOLUTION INTRAVENOUS at 01:00

## 2017-12-15 RX ADMIN — SODIUM CHLORIDE 75 ML/HR: 0.9 INJECTION, SOLUTION INTRAVENOUS at 10:44

## 2017-12-15 RX ADMIN — ENOXAPARIN SODIUM 40 MG: 40 INJECTION SUBCUTANEOUS at 09:35

## 2017-12-15 RX ADMIN — CALCIUM CARBONATE-VITAMIN D TAB 500 MG-200 UNIT 1 TABLET: 500-200 TAB at 09:32

## 2017-12-15 RX ADMIN — POTASSIUM CHLORIDE 40 MEQ: 1500 TABLET, EXTENDED RELEASE ORAL at 12:38

## 2017-12-15 RX ADMIN — FENTANYL 12 MCG: 12 PATCH, EXTENDED RELEASE TRANSDERMAL at 17:57

## 2017-12-15 RX ADMIN — FLUTICASONE PROPIONATE AND SALMETEROL 1 PUFF: 50; 250 POWDER RESPIRATORY (INHALATION) at 22:10

## 2017-12-15 RX ADMIN — PIPERACILLIN SODIUM,TAZOBACTAM SODIUM 4.5 G: 4; .5 INJECTION, POWDER, FOR SOLUTION INTRAVENOUS at 12:33

## 2017-12-15 RX ADMIN — PANTOPRAZOLE SODIUM 40 MG: 40 TABLET, DELAYED RELEASE ORAL at 06:20

## 2017-12-15 RX ADMIN — PIPERACILLIN SODIUM,TAZOBACTAM SODIUM 4.5 G: 4; .5 INJECTION, POWDER, FOR SOLUTION INTRAVENOUS at 23:56

## 2017-12-15 RX ADMIN — PIPERACILLIN SODIUM,TAZOBACTAM SODIUM 4.5 G: 4; .5 INJECTION, POWDER, FOR SOLUTION INTRAVENOUS at 06:20

## 2017-12-15 RX ADMIN — FLUTICASONE PROPIONATE 2 PUFF: 220 AEROSOL, METERED RESPIRATORY (INHALATION) at 09:38

## 2017-12-15 RX ADMIN — Medication 1 TABLET: at 09:32

## 2017-12-15 RX ADMIN — Medication 1 TABLET: at 17:58

## 2017-12-15 RX ADMIN — MIRTAZAPINE 15 MG: 15 TABLET, FILM COATED ORAL at 22:09

## 2017-12-15 RX ADMIN — FLUTICASONE PROPIONATE 2 PUFF: 220 AEROSOL, METERED RESPIRATORY (INHALATION) at 22:10

## 2017-12-15 RX ADMIN — DOXAZOSIN 2 MG: 2 TABLET ORAL at 22:09

## 2017-12-15 RX ADMIN — FLUTICASONE PROPIONATE AND SALMETEROL 1 PUFF: 50; 250 POWDER RESPIRATORY (INHALATION) at 09:38

## 2017-12-15 RX ADMIN — PIPERACILLIN SODIUM,TAZOBACTAM SODIUM 4.5 G: 4; .5 INJECTION, POWDER, FOR SOLUTION INTRAVENOUS at 17:58

## 2017-12-15 RX ADMIN — SODIUM CHLORIDE 75 ML/HR: 0.9 INJECTION, SOLUTION INTRAVENOUS at 23:57

## 2017-12-15 RX ADMIN — COLLAGENASE SANTYL: 250 OINTMENT TOPICAL at 18:50

## 2017-12-15 NOTE — PROGRESS NOTES
Progress Note - Infectious Disease   Ladramos Metzger  81 y o  male MRN: [id number]  Unit/Bed#: [facility] Encounter: [id number]      Impression/Plan:  1  Severe sepsis-POA  Fever, leukocytosis, lactic acidosis  Probably once again secondary to either pneumonia verses an infected sacral decubitus ulcer  Possibly all secondary to aspiration pneumonitis  Patient remains hemodynamically stable and his fever and leukocytosis have resolved  Thus far he seems to be tolerating the antibiotics well without difficulty  -continue Zosyn for now  -follow-up blood cultures  -follow-up sputum culture  -no additional ID workup for now  -possibly discontinue all antibiotics if the blood cultures remain negative and the patient remains afebrile over the next 24 hours     2  Sacral decubitus ulceration-Does not appear overtly infected  No purulence or overt cellulitis  Patient is status post I and D of an abscess in that region recently  The wound cultures had recently grown enterococcus and strep as well as anaerobes   -antibiotics as above  -surgery follow-up  -local wound  -consider CT of the abdomen and pelvis to look for abscess if the patient would have recurrent fever     3  Possible healthcare associated pneumonia-verses aspiration pneumonitis  The patient's respiratory status is quite stable despite the radiographic findings  He is certainly at risk of aspiration as he was found down on the ground with possible loss of consciousness  At this point he is not requiring any oxygen support   -antibiotics as above  -monitor respiratory status  -follow-up cultures     4   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  Status post tracheostomy  Antibiotics:  Zosyn 2  Antibiotics 3    Subjective:  Patient has no fever, chills, sweats; no nausea, vomiting, diarrhea; no cough, shortness of breath; no pain  No new symptoms  He is sitting in a chair in seems in better spirits      Objective:  Vitals:  HR:  [58-71] 58  Resp:  [18-20] 20  BP: (102-123)/(57-80) 123/60  SpO2:  [87 %-100 %] 100 %  Temp (24hrs), Av 5 °F (36 9 °C), Min:97 5 °F (36 4 °C), Max:99 2 °F (37 3 °C)  Current: Temperature: 97 5 °F (36 4 °C)    Physical Exam:   General Appearance:  Alert, nontoxic, no acute distress  Neck: Tracheostomy in place without erythema or drainage   Throat: Oropharynx moist without lesions  Lungs:   Decreased breath sounds bilaterally; respirations unlabored   Heart:  RRR; no murmur, rub or gallop   Abdomen:   Soft, non-tender, non-distended, positive bowel sounds  Extremities: No clubbing, cyanosis or edema   Skin: No new rashes or lesions  No draining wounds noted  Labs, Imaging, & Other studies:   All pertinent labs and imaging studies were personally reviewed    Results from last 7 days  Lab Units 12/15/17  0503 17   WBC Thousand/uL 6 02 8 18 12 30*   HEMOGLOBIN g/dL 9 7* 10 6* 10 9*   PLATELETS Thousands/uL 150 145* 187       Results from last 7 days  Lab Units 12/15/17  0503 17   SODIUM mmol/L 139 138 139   POTASSIUM mmol/L 3 4* 3 5 4 3   CHLORIDE mmol/L 106 102 102   CO2 mmol/L 27 29 27   ANION GAP mmol/L 6 7 10   BUN mg/dL 21 21 24   CREATININE mg/dL 0 88 0 93 0 99   EGFR ml/min/1 73sq m 81 77 71   GLUCOSE RANDOM mg/dL 92 87 91   CALCIUM mg/dL 8 0* 8 4 8 7   AST U/L  --   --  46*   ALT U/L  --   --  27   ALK PHOS U/L  --   --  62   TOTAL PROTEIN g/dL  --   --  6 8   ALBUMIN g/dL  --   --  2 7*   BILIRUBIN TOTAL mg/dL  --   --  0 73       Results from last 7 days  Lab Units 17  1454 17  0052 17   BLOOD CULTURE   --   --  No Growth at 24 hrs  No Growth at 24 hrs     SPUTUM CULTURE  Culture too young- will reincubate  --   --   --    GRAM STAIN RESULT  2+ Polys  2+ Gram positive rods  1+ Gram positive cocci in pairs  --   --   --    INFLUENZA A PCR   --  None Detected  --   --    INFLUENZA B PCR   --  None Detected --   --    RSV PCR   --  None Detected  --   --

## 2017-12-15 NOTE — PROGRESS NOTES
Asked to evaluate patient due to his concerns of lump on his neck  He noticed this lump today  He states it is not painful or causing him respiratory distress  He is very anxious and concerned this may be a tumor  On exam, patient has left supraclavicular mass which is soft and freely mobile  It is non-tender to palpation  It is non-pulsatile  Ultrasound will be obtained to evaluate

## 2017-12-15 NOTE — PALLIATIVE CARE CONFERENCE
Met with pt with Dr Hilda Johnson; Telephone call to pt's dgtr, Rocio Whitehead, who is POA, she reports pt was d/c from [facility] on 12/9/17, pt won't accept assistance and is not doing any work to help himself (ie learning to use mechanical voice box), dgtr feels the pt is depressed but again will not accept any treatment, dgtr wants the pt to have a medical alert as he was found down for many hours, pt's dgtr reports she will visit over the weekend but is unsure when due to commitments with her children, requested pt's dgtr call PC attending when she will be at the hospital so they can meet and discuss [address], pt's dgtr in agreement and has PC office #, [employer] brochure left at pt's bedside for dgtr,

## 2017-12-15 NOTE — SPEECH THERAPY NOTE
Speech Language/Pathology  Speech/Language Pathology  Assessment    Patient Name: Rosangela Peterson  Today's Date: 12/15/2017     Received consult  Spoke c RN, reported pt has been doing well with eating  Daughter has brought in [employer] that pt is drinking c meals  Due to laryngectomy, and lack of voice prosthesis, pt is not an aspiration risk  Suspect pt/caregiver will benefit from education on sterile cleaning of laryngectomy tube/site to minimize risk of infection

## 2017-12-15 NOTE — PHYSICAL THERAPY NOTE
Physical Therapy Treat    Patient Name: Germaine Wu  Today's Date: 12/15/2017     Problem List  Patient Active Problem List   Diagnosis    Asthma    Depression    Laryngeal stenosis    Severe sepsis ([address] )    Decubitus ulcer of sacral region, stage 4 ([address] )    HCAP (healthcare-associated pneumonia)    Anemia    History of laryngeal cancer    Fall        Past Medical History  Past Medical History:   Diagnosis Date    Asthma     Hypertension     Larynx cancer [facility])         Past Surgical History  Past Surgical History:   Procedure Laterality Date    EGD AND COLONOSCOPY      TRACHEOSTOMY N/A 8/25/2017    Procedure: TRACHEOSTOMY (POSSIBLE AWAKE) , MICRO DIRECT LARYNGOSCOPY, Biopsy;  Surgeon: Sonia Chadwick MD;  [facility];  Service: ENT    WOUND DEBRIDEMENT N/A 11/22/2017    Procedure: DEBRIDEMENT WOUND ([address]), sacrum;  Surgeon: Cassandra Araujo DO;  [facility];  Service: General           12/15/17 0855   Pain Assessment   Pain Assessment 0-10   Pain Score No Pain   Restrictions/Precautions   Weight Bearing Precautions Per Order No   Other Precautions Fall Risk;Multiple lines   General   Chart Reviewed Yes   Response to Previous Treatment Patient with no complaints from previous session  Family/Caregiver Present No   Cognition   Overall Cognitive Status WFL   Arousal/Participation Alert; Cooperative   Attention Attends with cues to redirect   Orientation Level Oriented X4   Memory Within functional limits   Following Commands Follows multistep commands with increased time or repetition   Subjective   Subjective pt had no complaints and was agreeable to have PT   Transfers   Sit to Stand 5  Supervision   Stand to Sit 5  Supervision   Stand pivot 5  Supervision   Ambulation/Elevation   Gait pattern Inconsistent bharat   Gait Assistance 5  Supervision   Assistive Device Rolling walker   Distance 400   Curbs 6" curb with RW requiring min-CGA   Wheelchair Activities   Wheelchair Cushion Pressure relieving  (ROHO)   Balance   Static Sitting Good   Dynamic Sitting Fair +   Static Standing Fair +   Dynamic Standing Fair   Ambulatory Fair   Activity Tolerance   Activity Tolerance Patient tolerated treatment well   Assessment   Prognosis Good   Problem List Decreased strength;Decreased endurance; Impaired balance;Decreased mobility; Decreased skin integrity   Assessment pt tolerated tx session with improve alertness, safety awareness and activity tolerance noted  Pt able to complete transfers at CS level using a RW with occasional cues for proper hand placement  increased amb distance to 400' using a RW at CS level as well, pt did not need cueing to slow down this session compared to yesterday and was steady without LOB  although required min-CGA with step by step cueing for sequencing and walker management during curb step negotiation training  Pt will benefit from continued skilled PT services to improve functional mobility indep using the least restrictive AD and facilitate a safe d/c     Barriers to Discharge Inaccessible home environment;Decreased caregiver support   Plan   Treatment/Interventions Spoke to nursing;Gait training; Therapeutic exercise; Functional transfer training;OT   Recommendation   Recommendation Home independently;Home PT   PT - OK to Discharge No

## 2017-12-15 NOTE — CONSULTS
Consultation - [id number] [place] [address] 24  81 y o  male MRN: [id number]  Unit/Bed#: -01 Encounter: [id number]      Assessment/Plan     Assessment:   Asthma    [facility]    Asthma    Depression    Laryngeal stenosis    Sepsis ([facility] )    Decubitus ulcer of sacral region, stage 4 ([facility] )    HCAP (healthcare-associated pneumonia)    [facility] opioid dependence [facility]    Anemia   Cancer pain    Plan:  1  Increase Duragesic to 37 mcg  2  Bowel regimen to prevent opioid induced constipation  3  Continue Remeron 15 mg qHS- unfortunately patient declined to meet with psychiatry so far this admission  NANI Casillas spoke with patient's daughter and POA to help facilitate a family meeting  Daughter will be available over the weekend but is unsure when given her own time commitments  She has our contact information to notify our team when she is available  5  Goals- patient is severely malnourished but he is refusing PEG tube  Currently Level 1, Full Code     History of Present Illness   Physician Requesting Consult: Gilmer Rahman MD  Reason for Consult / Principal Problem: goals and pain  Hx and PE limited by: patient won't use electrolarynx   HPI: Elijah Mobley  is a 81y o  year old male who presented on 12/14 with weakness  He is known to our service from a prior admission and has a known history of locally advanced laryngeal cancer who has undergone a laryngectomy and receives his treatment at [facility]  He was apparently discharged from [facility] after rehab from his prior hospitalization and was found down in his home  He had refused a life alert and was down for about 10 hours  He was admitted for concerns for sepsis  He recently had a pneumonia and also has an advanced sacral decubitus ulcer that was debrided  Even though he can swallow safely, he continues to have poor oral intake and PEG tube has been mentioned to patient   He has adamantly refused this to providers and again to me  He has ongoing cancer pain but never asks for breakthrough pain medication  He was placed on Duragesic by our team at last admission in addition to Remeron- he continues with both  He does not engage much in the interview and does not use his electrolarynx instead mouthing words or occasionally writing  He gives us permission to contact his daughter  Inpatient consult to Palliative Care  Consult performed by: Nereyda Gamez  Consult ordered by: Nola Witt          Review of Systems   Constitutional: Positive for appetite change and unexpected weight change  All other systems reviewed and are negative  Historical Information   Past Medical History:   Diagnosis Date    Asthma     Hypertension     Larynx cancer [facility])      Past Surgical History:   Procedure Laterality Date    EGD AND COLONOSCOPY      TRACHEOSTOMY N/A 8/25/2017    Procedure: TRACHEOSTOMY (POSSIBLE AWAKE) , MICRO DIRECT LARYNGOSCOPY, Biopsy;  Surgeon: Angeles Marshall MD;  [facility];  Service: ENT   [address] [place] [address] N/A 11/22/2017    Procedure: DEBRIDEMENT WOUND ([address]), sacrum;  Surgeon: June Damian DO;  [facility];  Service: General     Social History     Social History    Marital status: Single     Spouse name: N/A    Number of children: N/A    Years of education: N/A     Social History Main Topics    Smoking status: Former Smoker    Smokeless tobacco: Never Used    Alcohol use No    Drug use: No    Sexual activity: Not Asked     Other Topics Concern    None     Social History Narrative    None     History reviewed  No pertinent family history      Meds/Allergies   all current active meds have been reviewed and current meds:   Current Facility-Administered Medications   Medication Dose Route Frequency    acetaminophen (TYLENOL) rectal suppository 650 mg  650 mg Rectal Q4H PRN    albuterol (PROVENTIL HFA,VENTOLIN HFA) inhaler 2 puff  2 puff Inhalation Q6H PRN    calcium carbonate-vitamin D (OSCAL-D) 500 mg-200 units per tablet 1 tablet  1 tablet Oral Daily    collagenase (SANTYL) ointment   Topical Daily    doxazosin (CARDURA) tablet 2 mg  2 mg Oral HS    enoxaparin (LOVENOX) subcutaneous injection 40 mg  40 mg Subcutaneous Daily    fentaNYL (DURAGESIC) 12 mcg/hr TD 72 hr patch 12 mcg  12 mcg Transdermal Q72H    fentaNYL (DURAGESIC) 25 mcg/hr TD 72 hr patch 25 mcg  25 mcg Transdermal Q72H    fluticasone (FLOVENT HFA) 220 mcg/act inhaler 2 puff  2 puff Inhalation BID    fluticasone-salmeterol (ADVAIR) 250-50 mcg/dose inhaler 1 puff  1 puff Inhalation Q12H [address]    mirtazapine (REMERON) tablet 15 mg  15 mg Oral HS    oxyCODONE (ROXICODONE) IR tablet 5 mg  5 mg Oral Q6H PRN    pantoprazole (PROTONIX) EC tablet 40 mg  40 mg Oral Early Morning    piperacillin-tazobactam (ZOSYN) 4 5 g in dextrose 5 % 100 mL IVPB  4 5 g Intravenous Q6H    polyethylene glycol (MIRALAX) packet 17 g  17 g Oral Daily PRN    senna-docusate sodium (SENOKOT S) 8 6-50 mg per tablet 1 tablet  1 tablet Oral BID    sodium chloride 0 9 % infusion  75 mL/hr Intravenous Continuous       Palliative Care Medications: see HPI    Allergies   Allergen Reactions    Aspirin     Cleocin [Clindamycin]        Objective     Physical Exam   Constitutional: He is oriented to person, place, and time  Chronically ill appearing elderly male   HENT:   Head: Normocephalic and atraumatic  Eyes: EOM are normal  Right eye exhibits no discharge  Left eye exhibits no discharge  Neck:   +trach   Cardiovascular: Normal rate, regular rhythm and intact distal pulses  Pulmonary/Chest: No respiratory distress  He has no wheezes  Abdominal: Soft  Bowel sounds are normal    Musculoskeletal: He exhibits no edema  Neurological: He is alert and oriented to person, place, and time  Skin: Skin is warm and dry  Psychiatric:   Depressed mood   Nursing note and vitals reviewed        Lab Results:   I have personally reviewed pertinent labs  , CBC:   Lab Results   Component Value Date    WBC 6 02 12/15/2017    HGB 9 7 (L) 12/15/2017    HCT 29 8 (L) 12/15/2017    MCV 99 (H) 12/15/2017     12/15/2017    MCH 32 3 12/15/2017    MCHC 32 6 12/15/2017    RDW 16 7 (H) 12/15/2017    MPV 9 2 12/15/2017    NRBC 0 12/15/2017   , CMP:   Lab Results   Component Value Date     12/15/2017    K 3 4 (L) 12/15/2017     12/15/2017    CO2 27 12/15/2017    ANIONGAP 6 12/15/2017    BUN 21 12/15/2017    CREATININE 0 88 12/15/2017    GLUCOSE 92 12/15/2017    CALCIUM 8 0 (L) 12/15/2017    EGFR 81 12/15/2017     Imaging Studies: I have personally reviewed pertinent reports  EKG, Pathology, and Other Studies: I have personally reviewed pertinent reports  Code Status: Level 1 - Full Code  Advance Directive and Living Will:      Power of : Yes  POLST:      Counseling / Coordination of Care  Total floor / unit time spent today 55+ minutes  Greater than 50% of total time was spent with the patient and / or family counseling and / or coordination of care   A description of the counseling / coordination of care: symptom management

## 2017-12-15 NOTE — PROGRESS NOTES
Patient was offered to have trach care administered, refused, wants trach care to be done in the AM

## 2017-12-15 NOTE — CASE MANAGEMENT
Continued Stay Review    Date: 12/15/2017    Vital Signs: /60   Pulse 58   Temp 97 5 °F (36 4 °C) (Oral)   Resp 20   Ht 5' 7" (1 702 m)   Wt 56 8 kg (125 lb 3 5 oz)   SpO2 100%   BMI 19 61 kg/m²     Medications:   Scheduled Meds:   calcium carbonate-vitamin D 1 tablet Oral Daily   collagenase  Topical Daily   doxazosin 2 mg Oral HS   enoxaparin 40 mg Subcutaneous Daily   fentaNYL 25 mcg Transdermal Q72H   fluticasone 2 puff Inhalation BID   fluticasone-salmeterol 1 puff Inhalation Q12H JAMES   mirtazapine 15 mg Oral HS   pantoprazole 40 mg Oral Early Morning   piperacillin-tazobactam 4 5 g Intravenous Q6H   potassium chloride 20 mEq Intravenous Once   senna-docusate sodium 1 tablet Oral BID     Continuous Infusions:   sodium chloride 75 mL/hr Last Rate: 75 mL/hr (12/15/17 1044)     PRN Meds:   acetaminophen    albuterol    oxyCODONE    polyethylene glycol    Abnormal Labs/Diagnostic Results:rbc 3 00--hgb 9 7/29 8  K+ 3 4--ca 8 0  Ck 547  Age/Sex: 81 y o  male     Assessment/Plan: Assessment and Plan:  1  Decubitus Ulcer Stage 4 s/p I&D 11/22 by surgery team               - 2cm by 1cm with 3cm deep tracking towards anus              - No evidence of infection on inspection              - No drainage, would is clean, dry, intact              - Consider CT scan of abdomen/pelvis              - Negative for leukocytosis              - Fevers overnight now trending/ likely 2/2 to Pneumonia              - Will discuss with attending              - Continue with wound consult/ f-up recs              - Monitor for evidence of infection              - Frequent turning and off loading     2  Severe Sepsis POA              - Continue to trend labs              - Likely 2/2 Pneumonia              - Continue care per primary team              - Continue Abx and ID consulted              - Follow-up cultures     3   HCAP              - Continue care per primary team     4  Laryngeal Stenosis              - Continue trach care              - S/p trach   Principal Problem:    Severe sepsis (HCC)  Active Problems:    Depression    Laryngeal stenosis    Decubitus ulcer of sacral region, stage 4 (HCC)    HCAP (healthcare-associated pneumonia)    Anemia    History of laryngeal cancer    Fall        Plan:--medicine     · Severe sepsis, POA  ? As evidence by fever, leukocytosis, and lactic acidosis  The Infectious Disease team is following and their recommendations are appreciated  Patient had fevers most recently as of yesterday morning  He has been afebrile thus far today  Leukocytosis is resolved  ? Chest x-ray revealed patchy infiltrate in the peripheral left lower lobe  Of note, this patient was recently hospitalized and was discharged to [facility]  He is also on a modified diet and was found down, thus consider aspiration  The patient is on IV Zosyn per ID recommendations  I have consulted speech therapy for swallow eval - patient is on dysphagia level 3 diet currently with thin liquids (this was most recent speech recommendation from 11/23); adjust diet consistency per speech recommendations  ? Blood cultures negative preliminarily x2 at 24hr  Influenza and RSV all negative  ? Of note, this patient also has a stage IV sacral decubitus ulcer which is status post I and D by surgery team on 11/22  This was evaluated by the general surgery team yesterday who felt that there was no evidence of infection and per attending surgeon, his sacral decubitus ulcer is unlikely cause of this patient's sepsis  Wound Care following, appreciate their recommendations regarding care of ulcer  Left lower lobe pneumonia  ? Of note, this patient had recent hospitalization and subsequent discharge to [facility]  Furthermore, he is on a modified diet and was found down, consider aspiration  Antibiotics as per above per ID recommendations  · Stage IV sacral decubitus ulcer  ? POA  S/p I&D by surgery team on 11/22  The general surgery team evaluated the patient yesterday, and felt that this was unlikely the cause of the patient's sepsis  Ulcer care per wound care recommendations  Consider CT of the abdomen and pelvis  · Ambulatory dysfunction with fall  ? PT and OT  Patient was [place] [facility] [place] and subsequently discharged home  · History of laryngeal cancer with larytube   ? Follows at [email or web address]  Per patient's daughter, he has a Larytube, NOT trach  Consulted speech therapy  Larytube care per patient's home regimen: cleaned with sterile saline and peroxide per my discussion with daughter at bedside this AM    · Depression  ? Patient has refused psych consult (twice) during last admission  I discussed with the patient and his daughter present at bedside today and patient is now agreeable to being seen by psychiatrist   Patient on Remeron  · Anemia  ? Baseline hemoglobin appears to be in the 9-10s  Hemoglobin currently at baseline  Monitor CBC  · Severe protein calorie malnutrition  ? Nutrition is following and their recommendations are appreciated  Of note, nutrition recommends to initiate enteral nutrition for nocturnal feeds  I discussed these recommendations with the patient and his daughter present at bedside this morning  The patient adamantly refuses PEG tube placement at this time  The patient's daughter feels her father does have capacity to make his own recommendations and was not interested in capacity evaluation by our neuro psychologist  Encourage PO intake  Continue dietary supplements  Await speech therapy recs regarding diet  · Hypokalemia  ? Replete and monitor   · Goals of care  ? Discussed with patient and his daughter present at bedside as there was an seeming disconnect between their goals - For example, patient refusing PEG tube for enteral nutrition as recommended by nutrition but daughter would be agreeable it to this seems   Furthermore, patient had denied psychiatry eval in the past but daughter concerned about depression - patient was then agreeable  Patient was seen by Dr Charo Nicolas of palliative care in the past  They seemed receptive to this  I have consulted palliative care and discussed with Dr Charo Nicolas today to assist with pain control and goals of care discussions       Disposition: Will discuss with attending and team regarding requirements for scan  Patient has minimal to no erythema  There is no drainage and no surrounding breakdown   Will f/up with medicine team      Discharge Plan: not known at this time--id request follow up cultures and continued

## 2017-12-15 NOTE — PROGRESS NOTES
[facility] Progress Note  Patient: Randy Jolly  81 y o  male   MRN: [id number]  PCP: Moiz Sommers MD  Unit/Bed#: [facility] Encounter: [id number]  Date Of Visit: 12/15/17    Assessment:    Principal Problem:    Severe sepsis ([facility] [address] )  Active Problems:    Depression    Laryngeal stenosis    Decubitus ulcer of sacral region, stage 4 ([facility] [address] )    HCAP (healthcare-associated pneumonia)    Anemia    History of laryngeal cancer    Fall      Plan:    · Severe sepsis, POA  · As evidence by fever, leukocytosis, and lactic acidosis  The Infectious Disease team is following and their recommendations are appreciated  Patient had fevers most recently as of yesterday morning  He has been afebrile thus far today  Leukocytosis is resolved  · Chest x-ray revealed patchy infiltrate in the peripheral left lower lobe  Of note, this patient was recently hospitalized and was discharged to [facility]  He is also on a modified diet and was found down, thus consider aspiration  The patient is on IV Zosyn per ID recommendations  I have consulted speech therapy for swallow eval - patient is on dysphagia level 3 diet currently with thin liquids (this was most recent speech recommendation from 11/23); adjust diet consistency per speech recommendations  · Blood cultures negative preliminarily x2 at 24hr  Influenza and RSV all negative  · Of note, this patient also has a stage IV sacral decubitus ulcer which is status post I and D by surgery team on 11/22  This was evaluated by the general surgery team yesterday who felt that there was no evidence of infection and per attending surgeon, his sacral decubitus ulcer is unlikely cause of this patient's sepsis  Wound Care following, appreciate their recommendations regarding care of ulcer  · Left lower lobe pneumonia  · Of note, this patient had recent hospitalization and subsequent discharge to [facility]    Furthermore, he is on a modified diet and was found down, consider aspiration  Antibiotics as per above per ID recommendations  · Stage IV sacral decubitus ulcer  · POA  S/p I&D by surgery team on 11/22  The general surgery team evaluated the patient yesterday, and felt that this was unlikely the cause of the patient's sepsis  Ulcer care per wound care recommendations  Consider CT of the abdomen and pelvis  · Ambulatory dysfunction with fall  · PT and OT  Patient was recently discharged to [facility] after hospitalization and subsequently discharged home  · History of laryngeal cancer with larytube   · Follows at [facility]  Per patient's daughter, he has a Larytube, NOT trach  Consulted speech therapy  Larytube care per patient's home regimen: cleaned with sterile saline and peroxide per my discussion with daughter at bedside this AM    · Depression  · Patient has refused psych consult (twice) during last admission  I discussed with the patient and his daughter present at bedside today and patient is now agreeable to being seen by psychiatrist   Patient on Remeron  · Anemia  · Baseline hemoglobin appears to be in the 9-10s  Hemoglobin currently at baseline  Monitor CBC  · Severe protein calorie malnutrition  · Nutrition is following and their recommendations are appreciated  Of note, nutrition recommends to initiate enteral nutrition for nocturnal feeds  I discussed these recommendations with the patient and his daughter present at bedside this morning  The patient adamantly refuses PEG tube placement at this time  The patient's daughter feels her father does have capacity to make his own recommendations and was not interested in capacity evaluation by our neuro psychologist  Encourage PO intake  Continue dietary supplements  Await speech therapy recs regarding diet     · Hypokalemia  · Replete and monitor   · Goals of care  · Discussed with patient and his daughter present at bedside as there was an seeming disconnect between their goals - For example, patient refusing PEG tube for enteral nutrition as recommended by nutrition but daughter would be agreeable it to this seems  Furthermore, patient had denied psychiatry eval in the past but daughter concerned about depression - patient was then agreeable  Patient was seen by Dr Chan Reddy of palliative care in the past  They seemed receptive to this  I have consulted palliative care and discussed with Dr Chan Reddy today to assist with pain control and goals of care discussions  VTE Pharmacologic Prophylaxis:   Pharmacologic: Enoxaparin (Lovenox)  Mechanical VTE Prophylaxis in Place: Yes    Patient Centered Rounds: I have performed bedside rounds with nursing staff today  Discussions with Specialists or Other Care Team Provider: Dr Chan Reddy, nursing    Education and Discussions with Family / Patient: Patient and his daughter, Rey Cuenca, present at bedside    Time Spent for Care: 30 minutes  More than 50% of total time spent on counseling and coordination of care as described above  Current Length of Stay: 1 day(s)    Current Patient Status: Inpatient   Certification Statement: The patient will continue to require additional inpatient hospital stay due to continued IV abx, await consults as per above    Discharge Plan: pending clinical course; anticipate will need rehab upon discharge    Code Status: Level 1 - Full Code      Subjective:   Mr Ty Melendez is non-verbal at baseline with larytube  He reports discomfort in his sacral area  He also reports cough  He is able to communicate via writing  He requests that left forearm dressing be changed  Objective:     Vitals:   Temp (24hrs), Av 4 °F (36 9 °C), Min:97 5 °F (36 4 °C), Max:99 2 °F (37 3 °C)    HR:  [58-71] 58  Resp:  [18-20] 20  BP: (102-123)/(57-80) 123/60  SpO2:  [87 %-100 %] 100 %  Body mass index is 19 61 kg/m²  Input and Output Summary (last 24 hours):        Intake/Output Summary (Last 24 hours) at 12/15/17 2807  Last data filed at 12/15/17 1044   Gross per 24 hour   Intake          2035 05 ml   Output              200 ml   Net          1835 05 ml       Physical Exam:     Physical Exam   Constitutional: No distress  Patient seen sitting on his left side in bedside chair with nursing and patient's daughter present at bedside  He is non-verbal at baseline  He communicates largely via writing on notepad  He is cooperative today   Neck:   Larytube   Cardiovascular: Normal rate and regular rhythm  Pulmonary/Chest: Effort normal    Coarse breath sounds at Left base   Abdominal: Soft  Bowel sounds are normal  There is no tenderness  Musculoskeletal: He exhibits no edema  Neurological: He is alert  Skin:   Left forearm with wrapped dressing   Psychiatric:   Flat affect   Vitals reviewed  Additional Data:     Labs:      Results from last 7 days  Lab Units 12/15/17  0503   WBC Thousand/uL 6 02   HEMOGLOBIN g/dL 9 7*   HEMATOCRIT % 29 8*   PLATELETS Thousands/uL 150   LYMPHO PCT % 1*   MONO PCT MAN % 0*   EOSINO PCT MANUAL % 0       Results from last 7 days  Lab Units 12/15/17  0503  12/13/17  2112   SODIUM mmol/L 139  < > 139   POTASSIUM mmol/L 3 4*  < > 4 3   CHLORIDE mmol/L 106  < > 102   CO2 mmol/L 27  < > 27   BUN mg/dL 21  < > 24   CREATININE mg/dL 0 88  < > 0 99   CALCIUM mg/dL 8 0*  < > 8 7   TOTAL PROTEIN g/dL  --   --  6 8   BILIRUBIN TOTAL mg/dL  --   --  0 73   ALK PHOS U/L  --   --  62   ALT U/L  --   --  27   AST U/L  --   --  46*   GLUCOSE RANDOM mg/dL 92  < > 91   < > = values in this interval not displayed  Results from last 7 days  Lab Units 12/13/17  2112   INR  1 22*       * I Have Reviewed All Lab Data Listed Above  * Additional Pertinent Lab Tests Reviewed:  All Labs Within Last 24 Hours Reviewed    Imaging:    Imaging Reports Reviewed Today Include: CXR, CT head and C-spine  Imaging Personally Reviewed by Myself Includes:  none    Recent Cultures (last 7 days):       Results from last 7 days  Lab Units 12/14/17  1454 12/14/17  0052 12/13/17 2116 12/13/17 2112   BLOOD CULTURE   --   --  No Growth at 24 hrs  No Growth at 24 hrs  GRAM STAIN RESULT  2+ Polys  2+ Gram positive rods  1+ Gram positive cocci in pairs  --   --   --    INFLUENZA A PCR   --  None Detected  --   --    INFLUENZA B PCR   --  None Detected  --   --    RSV PCR   --  None Detected  --   --        Last 24 Hours Medication List:     calcium carbonate-vitamin D 1 tablet Oral Daily   collagenase  Topical Daily   doxazosin 2 mg Oral HS   enoxaparin 40 mg Subcutaneous Daily   fentaNYL 25 mcg Transdermal Q72H   fluticasone 2 puff Inhalation BID   fluticasone-salmeterol 1 puff Inhalation Q12H JAMES   mirtazapine 15 mg Oral HS   pantoprazole 40 mg Oral Early Morning   piperacillin-tazobactam 4 5 g Intravenous Q6H   potassium chloride 20 mEq Intravenous Once   senna-docusate sodium 1 tablet Oral BID        Today, Patient Was Seen By: Rossi Terrell PA-C    ** Please Note: Dragon 360 Dictation voice to text software may have been used in the creation of this document   **

## 2017-12-15 NOTE — PLAN OF CARE
Problem: PHYSICAL THERAPY ADULT  Goal: Performs mobility at highest level of function for planned discharge setting  See evaluation for individualized goals  Treatment/Interventions: Spoke to nursing, Gait training, Therapeutic exercise, Functional transfer training, OT          See flowsheet documentation for full assessment, interventions and recommendations  Outcome: Progressing  Prognosis: Good  Problem List: Decreased strength, Decreased endurance, Impaired balance, Decreased mobility, Decreased skin integrity  Assessment: pt tolerated tx session with improve alertness, safety awareness and activity tolerance noted  Pt able to complete transfers at CS level using a RW with occasional cues for proper hand placement  increased amb distance to 400' using a RW at CS level as well, pt did not need cueing to slow down this session compared to yesterday and was steady without LOB  although required min-CGA with step by step cueing for sequencing and walker management during curb step negotiation training  Pt will benefit from continued skilled PT services to improve functional mobility indep using the least restrictive AD and facilitate a safe d/c    Barriers to Discharge: Inaccessible home environment, Decreased caregiver support  Barriers to Discharge Comments: lives alone with no MISTY  fully indep PTA although has 1 local dtr but works  Recommendation: Home independently, Home PT     PT - OK to Discharge: No    See flowsheet documentation for full assessment

## 2017-12-15 NOTE — SOCIAL WORK
Cm reviewed Pt in care coordination rounds, Pt will continue to require IV antbx, Palliative and psych consult today  No likely D/C through the weekend  Home vs Rehab pending Pt progress and family support

## 2017-12-16 ENCOUNTER — APPOINTMENT (INPATIENT)
Dept: RADIOLOGY | Facility: HOSPITAL | Age: 82
DRG: 853 | End: 2017-12-16
Payer: COMMERCIAL

## 2017-12-16 LAB
ANION GAP SERPL CALCULATED.3IONS-SCNC: 5 MMOL/L (ref 4–13)
BUN SERPL-MCNC: 22 MG/DL (ref 5–25)
CALCIUM SERPL-MCNC: 8.1 MG/DL (ref 8.3–10.1)
CHLORIDE SERPL-SCNC: 107 MMOL/L (ref 100–108)
CO2 SERPL-SCNC: 28 MMOL/L (ref 21–32)
CREAT SERPL-MCNC: 0.8 MG/DL (ref 0.6–1.3)
ERYTHROCYTE [DISTWIDTH] IN BLOOD BY AUTOMATED COUNT: 16.3 % (ref 11.6–15.1)
GFR SERPL CREATININE-BSD FRML MDRD: 84 ML/MIN/1.73SQ M
GLUCOSE SERPL-MCNC: 137 MG/DL (ref 65–140)
GLUCOSE SERPL-MCNC: 95 MG/DL (ref 65–140)
HCT VFR BLD AUTO: 28.4 % (ref 36.5–49.3)
HGB BLD-MCNC: 9.1 G/DL (ref 12–17)
MCH RBC QN AUTO: 31.8 PG (ref 26.8–34.3)
MCHC RBC AUTO-ENTMCNC: 32 G/DL (ref 31.4–37.4)
MCV RBC AUTO: 99 FL (ref 82–98)
PLATELET # BLD AUTO: 154 THOUSANDS/UL (ref 149–390)
PMV BLD AUTO: 9.3 FL (ref 8.9–12.7)
POTASSIUM SERPL-SCNC: 3.7 MMOL/L (ref 3.5–5.3)
RBC # BLD AUTO: 2.86 MILLION/UL (ref 3.88–5.62)
SODIUM SERPL-SCNC: 140 MMOL/L (ref 136–145)
WBC # BLD AUTO: 3.47 THOUSAND/UL (ref 4.31–10.16)

## 2017-12-16 PROCEDURE — 82948 REAGENT STRIP/BLOOD GLUCOSE: CPT

## 2017-12-16 PROCEDURE — 71260 CT THORAX DX C+: CPT

## 2017-12-16 PROCEDURE — 97535 SELF CARE MNGMENT TRAINING: CPT

## 2017-12-16 PROCEDURE — 70491 CT SOFT TISSUE NECK W/DYE: CPT

## 2017-12-16 PROCEDURE — 85027 COMPLETE CBC AUTOMATED: CPT | Performed by: PHYSICIAN ASSISTANT

## 2017-12-16 PROCEDURE — 80048 BASIC METABOLIC PNL TOTAL CA: CPT | Performed by: PHYSICIAN ASSISTANT

## 2017-12-16 RX ADMIN — CALCIUM CARBONATE-VITAMIN D TAB 500 MG-200 UNIT 1 TABLET: 500-200 TAB at 09:00

## 2017-12-16 RX ADMIN — COLLAGENASE SANTYL: 250 OINTMENT TOPICAL at 09:07

## 2017-12-16 RX ADMIN — PIPERACILLIN SODIUM,TAZOBACTAM SODIUM 4.5 G: 4; .5 INJECTION, POWDER, FOR SOLUTION INTRAVENOUS at 11:12

## 2017-12-16 RX ADMIN — Medication 1 TABLET: at 09:00

## 2017-12-16 RX ADMIN — PIPERACILLIN SODIUM,TAZOBACTAM SODIUM 4.5 G: 4; .5 INJECTION, POWDER, FOR SOLUTION INTRAVENOUS at 19:35

## 2017-12-16 RX ADMIN — ENOXAPARIN SODIUM 40 MG: 40 INJECTION SUBCUTANEOUS at 09:00

## 2017-12-16 RX ADMIN — FLUTICASONE PROPIONATE AND SALMETEROL 1 PUFF: 50; 250 POWDER RESPIRATORY (INHALATION) at 22:32

## 2017-12-16 RX ADMIN — IOHEXOL 100 ML: 350 INJECTION, SOLUTION INTRAVENOUS at 18:53

## 2017-12-16 RX ADMIN — OXYCODONE HYDROCHLORIDE 5 MG: 5 TABLET ORAL at 22:29

## 2017-12-16 RX ADMIN — PANTOPRAZOLE SODIUM 40 MG: 40 TABLET, DELAYED RELEASE ORAL at 06:13

## 2017-12-16 RX ADMIN — FLUTICASONE PROPIONATE 2 PUFF: 220 AEROSOL, METERED RESPIRATORY (INHALATION) at 19:35

## 2017-12-16 RX ADMIN — FLUTICASONE PROPIONATE 2 PUFF: 220 AEROSOL, METERED RESPIRATORY (INHALATION) at 09:07

## 2017-12-16 RX ADMIN — Medication 1 TABLET: at 19:35

## 2017-12-16 RX ADMIN — FLUTICASONE PROPIONATE AND SALMETEROL 1 PUFF: 50; 250 POWDER RESPIRATORY (INHALATION) at 09:00

## 2017-12-16 RX ADMIN — PIPERACILLIN SODIUM,TAZOBACTAM SODIUM 4.5 G: 4; .5 INJECTION, POWDER, FOR SOLUTION INTRAVENOUS at 06:13

## 2017-12-16 RX ADMIN — MIRTAZAPINE 15 MG: 15 TABLET, FILM COATED ORAL at 22:29

## 2017-12-16 RX ADMIN — DOXAZOSIN 2 MG: 2 TABLET ORAL at 22:29

## 2017-12-16 NOTE — PROGRESS NOTES
Progress Note - Infectious Disease   Kevyn Saini  81 y o  male MRN: [id number]  Unit/Bed#: -01 Encounter: [id number]      Impression/Plan:  1   Severe sepsis-POA   Fever, leukocytosis, lactic acidosis  Unclear etiology  [employer] all secondary to aspiration pneumonia or pneumonitis  Possibly secondary to an infected, necrotic neck lymph node or abscess  Patient remains hemodynamically stable and his fever and leukocytosis have resolved   Thus far he seems to be tolerating the antibiotics well without difficulty  -continue Zosyn for now  -follow-up blood cultures  -follow-up sputum culture  -recommend neck biopsy as below     2   Sacral decubitus ulceration-Does not appear overtly infected  No purulence or overt cellulitis  Patient is status post I and D of an abscess in that region recently   The wound cultures had recently grown enterococcus and strep as well as anaerobes   -antibiotics as above  -surgery follow-up  -local wound     3   Possible healthcare associated pneumonia-verses aspiration pneumonitis  The patient's respiratory status is quite stable despite the radiographic findings  [facility] is certainly at risk of aspiration as he was found down on the ground with possible loss of consciousness  At this point he is not requiring any oxygen support   -antibiotics as above  -monitor respiratory status  -follow-up cultures    4  Neck mass-fluctuant  Possible necrotic lymph node  Possibly cancerous  Possibly infectious  -recommend ENT evaluation  -suggest either excisional or aspirational biopsy to define whether this is infectious or malignant  -any biopsy material should be sent for routine culture, AFB, fungal, and pathology     5   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   Status post tracheostomy  Now with apparent necrotic lymph node suggesting the possibility of recurrence    -recommend aspiration or excisional biopsy to define whether this is infectious or cancerous  -recommend ENT evaluation    Antibiotics:  Zosyn 3  Antibiotics 4    Subjective:  Patient has no fever, chills, sweats; no nausea, vomiting, diarrhea; no cough, shortness of breath; no pain  No new symptoms  Objective:  Vitals:  HR:  [56-65] 56  Resp:  [18-20] 18  BP: (104-125)/(55-59) 113/56  SpO2:  [95 %-100 %] 100 %  Temp (24hrs), Av 1 °F (36 7 °C), Min:98 °F (36 7 °C), Max:98 2 °F (36 8 °C)  Current: Temperature: 98 °F (36 7 °C)    Physical Exam:   General Appearance:  Alert, nontoxic, no acute distress  Throat: Oropharynx moist without lesions  Neck:   Trach site without erythema or drainage  Left side of the neck with swollen, fluctuant mass   Lungs:   Decreased breath sounds bilaterally; respirations unlabored   Heart:  RRR; no murmur, rub or gallop   Abdomen:   Soft, non-tender, non-distended, positive bowel sounds  Extremities: No clubbing, cyanosis or edema   Skin: No new rashes or lesions  No draining wounds noted         Labs, Imaging, & Other studies:   All pertinent labs and imaging studies were personally reviewed    Results from last 7 days  Lab Units 17  0517 12/15/17  0503 17  0449   WBC Thousand/uL 3 47* 6 02 8 18   HEMOGLOBIN g/dL 9 1* 9 7* 10 6*   PLATELETS Thousands/uL 154 150 145*       Results from last 7 days  Lab Units 17  0517 12/15/17  0503 17  0449 17  2112   SODIUM mmol/L 140 139 138 139   POTASSIUM mmol/L 3 7 3 4* 3 5 4 3   CHLORIDE mmol/L 107 106 102 102   CO2 mmol/L 28 27 29 27   ANION GAP mmol/L 5 6 7 10   BUN mg/dL 22 21 21 24   CREATININE mg/dL 0 80 0 88 0 93 0 99   EGFR ml/min/1 73sq m 84 81 77 71   GLUCOSE RANDOM mg/dL 95 92 87 91   CALCIUM mg/dL 8 1* 8 0* 8 4 8 7   AST U/L  --   --   --  46*   ALT U/L  --   --   --  27   ALK PHOS U/L  --   --   --  62   TOTAL PROTEIN g/dL  --   --   --  6 8   ALBUMIN g/dL  --   --   --  2 7*   BILIRUBIN TOTAL mg/dL  --   --   --  0 73       Results from last 7 days  Lab Units 12/14/17  1454 12/14/17  0052 12/13/17 2116 12/13/17 2112   BLOOD CULTURE   --   --  No Growth at 48 hrs  No Growth at 48 hrs     SPUTUM CULTURE  Culture too young- will reincubate  --   --   --    GRAM STAIN RESULT  2+ Polys  2+ Gram positive rods  1+ Gram positive cocci in pairs  --   --   --    INFLUENZA A PCR   --  None Detected  --   --    INFLUENZA B PCR   --  None Detected  --   --    RSV PCR   --  None Detected  --   --      Neck ultrasound-complex cystic left neck mass

## 2017-12-16 NOTE — PROGRESS NOTES
[facility] Internal Medicine Progress Note  Patient: Saba Pack  81 y o  male   MRN: [id number]  PCP: Estrella Pedraza MD  Unit/Bed#: [facility] Encounter: [id number]  Date Of Visit: 12/16/17    Assessment:    Principal Problem:    Severe sepsis ([facility] [address] )  Active Problems:    Depression    Laryngeal stenosis    Decubitus ulcer of sacral region, stage 4 ([facility] [address] )    HCAP (healthcare-associated pneumonia)    Anemia    History of laryngeal cancer    Fall      Plan:    · Severe sepsis, POA  · Has evidenced by fever, leukocytosis, and lactic acidosis  The ID team is following, I discussed with Dr Pedro Pablo Contreras today  Patient has remained afebrile thus far today and leukocytosis has resolved  · Etiology is unclear; pneumonia vs sacral ulcer vs necrotic lymph node  There was originally concern for possible aspiration pneumonia, and the speech therapy team was consulted, whom I spoke with after my note yesterday, and they informed me that due to the patient's laryngectomy and Larytube he is physically unable to aspirate  Of note, this patient was recently hospitalized and was subsequently discharged to a [facility]  The patient was evaluated by the general surgery team due to his stage IV sacral decubitus ulcer which is status post I&D by surgery on 11/22 who felt that there was no evidence of infection  · Blood cultures negative x2 at 48 hours and both flu and RSV negative  · Abx per ID - continue Zosyn for now per ID  · Left lower lobe pneumonia  · Patient had recent hospitalization and subsequent discharge to a [facility]  Per my discussion with the speech therapy yesterday, the patient is physically unable to aspirate after his laryngectomy  Abx as above per ID  · Left supraclavicular lymphadenopathy  · Soft tissue neck ultrasound was ordered by one of my colleagues last evening as she was called to evaluate the patient due to discovering a lump on his neck last evening    The ultrasound revealed a stable complex cystic left neck mass, most compatible with necrotic lymph node per the result report  I discussed this with both ID and consulted ENT today  I also spoke with Dr Jabari Reyes of ENT  Per ENT, this is highly suspicious for recurrent cancer   · ID recommended biopsy which should be sent for routine culture, AFB, fungal, and pathology to assess for infectious etiology  · ENT recommended FNAB of the left supraclavicular lymph node for further evaluation, with biopsy to include cytology for neoplasm as well as the studies recommended by ID above  ENT has recommended this biopsy be done with IR/image guidance with a cytotech present to confirm adequate cellularity since the majority of the center of the lymph noted necrotic and would need to get cells from the capsule  · I have placed a consult for IR for biopsy with a note with the recommendations of the above testing to be done on the sample as well as to make sure a cytotech is present for adequate cellularity  · Furthermore, ENT has recommended a CT of the neck and chest with contrast for further workup  · I discussed ENT and ID's recommendations as above for biopsy and CT scan and the patient states that he is agreeable  · Stage IV sacral decubitus ulcer  · Present on admission  Status post I&D by surgery team on 11/22  The patient was evaluated by the general surgery team and felt that this was unlikely the cause of the patient's sepsis  Ulcer care per wound care recommendations  Consider CT of the abdomen and pelvis  · History of laryngeal cancer with larytube  · Follows at [facility]  Patient was seen by ENT today as above  Discussed with speech therapy today regarding ENT is recommendations to see if they have any additional H and the cassette to fit on to the patient is Larytube  · Larytube cleaning per his home regimen: sterile saline and peroxide  Patient reports compliance with Larytube cleaning at home per our discussion today   ENT following  · Ambulatory dysfunction with fall  · PT and OT following  · Depression  · Patient is on Remeron  · The patient had attempted to be seen by Psychiatry in the past but had refused their evaluations during last admission  The patient was agreeable to psychiatric eval during our discussion yesterday, thus the psychiatric team was consulted - per their note, the patient was tired and preferred to be seen later  Await full psych consultation  I again discussed with the patient today and he states that he would be agreeable to psych consult  · Anemia  · Stable  Monitor  Patient is eating, will d/c IVF  · Severe protein calorie malnutrition  · Nutrition is following and their recommendations are appreciated  Nutrition has recommended to initiate antral nocturnal feeds for nutrition  I discussed with the patient as well as his daughter present at bedside yesterday and the patient has refused PEG tube placement  The patient's daughter declined neuropsych eval as she feels as though her father does has capacity to make his own medical decisions  Continue current diet  · Hypokalemia  · Resolved with repletion      VTE Pharmacologic Prophylaxis:   Pharmacologic: Enoxaparin (Lovenox)  Mechanical VTE Prophylaxis in Place: Yes    Patient Centered Rounds: I have performed bedside rounds with nursing staff today  Discussions with Specialists or Other Care Team Provider: Dr Michele Schwab - ENT, Dr Cathy DOMINGUEZ, nursing, Vitaliy Perdomo from SLP to look into additional HME cassettes per ENT recommendations    Education and Discussions with Family / Patient: Patient at bedside; I also discussed with the patient's daughter, Tutu Mcguire, over the phone today    Time Spent for Care: 30 minutes  More than 50% of total time spent on counseling and coordination of care as described above      Current Length of Stay: 2 day(s)    Current Patient Status: Inpatient   Certification Statement: The patient will continue to require additional inpatient hospital stay due to work up as per above and patient requiring IV abx    Discharge Plan: pending clinical course    Code Status: Level 1 - Full Code      Subjective:   Mr Curtis Taylor is non-verbal at baseline and communicated largely via writing with pen/paper and my mouthing words silently  He reports sacral discomfort  He asks when he would be going for the biopsy and CT scan  He states that he is agreeable to both the biopsy and CT scan  He reports that he would be agreeable to psych consult today  He states that he wants to walk more  Objective:     Vitals:   Temp (24hrs), Av 1 °F (36 7 °C), Min:98 °F (36 7 °C), Max:98 2 °F (36 8 °C)    HR:  [56-65] 56  Resp:  [18-20] 18  BP: (104-125)/(55-59) 113/56  SpO2:  [95 %-100 %] 100 %  Body mass index is 19 61 kg/m²  Input and Output Summary (last 24 hours): Intake/Output Summary (Last 24 hours) at 17 1429  Last data filed at 17 0758   Gross per 24 hour   Intake             1357 ml   Output              600 ml   Net              757 ml       Physical Exam:     Physical Exam   Constitutional: He is oriented to person, place, and time  Patient seen sitting upright in bedside chair  He is noon-verbal at baseline  He communicated largely via writing but also mouths words silently  He is pleasant and cooperative   Neck:   Left supraclavicular rounded mass appreciated   Cardiovascular: Normal rate and regular rhythm  Pulmonary/Chest: Effort normal    Coarse expiratory breath sounds bilaterally throughout  No acute respiratory distress  This likely represents referred sounds from larytube   Abdominal: Soft  Bowel sounds are normal  There is no tenderness  Musculoskeletal: He exhibits no edema  Neurological: He is alert and oriented to person, place, and time  Skin:   Sacrum with dressing over top of wound   Vitals reviewed        Additional Data:     Labs:      Results from last 7 days  Lab Units 17  0517 12/15/17  8950 WBC Thousand/uL 3 47* 6 02   HEMOGLOBIN g/dL 9 1* 9 7*   HEMATOCRIT % 28 4* 29 8*   PLATELETS Thousands/uL 154 150   LYMPHO PCT %  --  1*   MONO PCT MAN %  --  0*   EOSINO PCT MANUAL %  --  0       Results from last 7 days  Lab Units 12/16/17  0517  12/13/17 2112   SODIUM mmol/L 140  < > 139   POTASSIUM mmol/L 3 7  < > 4 3   CHLORIDE mmol/L 107  < > 102   CO2 mmol/L 28  < > 27   BUN mg/dL 22  < > 24   CREATININE mg/dL 0 80  < > 0 99   CALCIUM mg/dL 8 1*  < > 8 7   TOTAL PROTEIN g/dL  --   --  6 8   BILIRUBIN TOTAL mg/dL  --   --  0 73   ALK PHOS U/L  --   --  62   ALT U/L  --   --  27   AST U/L  --   --  46*   GLUCOSE RANDOM mg/dL 95  < > 91   < > = values in this interval not displayed  Results from last 7 days  Lab Units 12/13/17 2112   INR  1 22*       * I Have Reviewed All Lab Data Listed Above  * Additional Pertinent Lab Tests Reviewed: All Labs Within Last 24 Hours Reviewed    Imaging:    Imaging Reports Reviewed Today Include: Soft tissue neck US - discussed with Dr Michele Schwab, ENT  Imaging Personally Reviewed by Myself Includes:  None    Recent Cultures (last 7 days):       Results from last 7 days  Lab Units 12/14/17  1454 12/14/17  0052 12/13/17 2116 12/13/17 2112   BLOOD CULTURE   --   --  No Growth at 48 hrs  No Growth at 48 hrs     SPUTUM CULTURE  Few Colonies of Staphylococcus aureus*  1+ Growth of   --   --   --    GRAM STAIN RESULT  2+ Polys  2+ Gram positive rods  1+ Gram positive cocci in pairs  --   --   --    INFLUENZA A PCR   --  None Detected  --   --    INFLUENZA B PCR   --  None Detected  --   --    RSV PCR   --  None Detected  --   --        Last 24 Hours Medication List:     calcium carbonate-vitamin D 1 tablet Oral Daily   collagenase  Topical Daily   doxazosin 2 mg Oral HS   enoxaparin 40 mg Subcutaneous Daily   fentaNYL 12 mcg Transdermal Q72H   fentaNYL 25 mcg Transdermal Q72H   fluticasone 2 puff Inhalation BID   fluticasone-salmeterol 1 puff Inhalation Q12H [address]   mirtazapine 15 mg Oral HS   pantoprazole 40 mg Oral Early Morning   piperacillin-tazobactam 4 5 g Intravenous Q6H   senna-docusate sodium 1 tablet Oral BID        Today, Patient Was Seen By: Beth Villeda PA-C    ** Please Note: Dragon 360 Dictation voice to text software may have been used in the creation of this document   **

## 2017-12-16 NOTE — PLAN OF CARE
Problem: OCCUPATIONAL THERAPY ADULT  Goal: Performs self-care activities at highest level of function for planned discharge setting  See evaluation for individualized goals  Treatment Interventions: ADL retraining, Functional transfer training, Endurance training          See flowsheet documentation for full assessment, interventions and recommendations  Outcome: Progressing  Limitation: Decreased ADL status, Decreased endurance  Prognosis: Fair  Assessment: Pt participated in skilled OT session focusing on ADL retraining, activity tolerance, activity modification, use of AE, and functional transfers  See above for details on ADL function  Pt reports that his plan id to return home alone w/ family support  Pt does report fatigue/general weakness during ADL but is able to complete  Pt may benefit from increased assist/set up at home to facilitate transition to home to manage IADL activities and supervise medical conditions  Pt continues to require skilled OT services to increase overall functional independence  and safety w/ ADLs and functional transfers   Continued OT sessions to focus on the following areas: Endurance/activity tolerance, UE strength, IADL management,      OT Discharge Recommendation: Home OT

## 2017-12-16 NOTE — OCCUPATIONAL THERAPY NOTE
Occupational Therapy Progress Note      Patient Name: Nacho Farley's Date: 12/16/2017    Problem List:   Patient Active Problem List   Diagnosis    Asthma    Depression    Laryngeal stenosis    Severe sepsis ([facility] [address] )    Decubitus ulcer of sacral region, stage 4 ([facility] [address] )    HCAP (healthcare-associated pneumonia)    Anemia    History of laryngeal cancer    Fall        12/16/17 0835   Restrictions/Precautions   Weight Bearing Precautions Per Order No   Other Precautions O2;Multiple lines   Pain Assessment   Pain Assessment No/denies pain   Pain Score No Pain   ADL   Where Assessed Standing at sink   Grooming Assistance 5  Supervision/Setup   Grooming Deficit Teeth care;Wash/dry hands; Wash/dry face   UB Bathing Assistance 5  Supervision/Setup   UB Bathing Deficit Right arm;Left arm; Abdomen;Perineal area; Buttocks;Right upper leg;Left upper leg;Right lower leg including foot; Left lower leg including foot   UB Dressing Assistance 5  Supervision/Setup   LB Dressing Assistance 5  Supervision/Setup   Toileting Assistance  5  Supervision/Setup   Toileting Deficit Clothing management up;Clothing management down;Perineal hygiene   Functional Standing Tolerance   Time ~15 min at sink for ADL   Transfers   Sit to Stand 5  Supervision   Stand to Sit 5  Supervision   Stand pivot 5  Supervision   Additional Comments RW   Functional Mobility   Functional Mobility 5  Supervision   Additional Comments RW use Ambulates hallway w CS and assit for IV pole management  500FT no rest  breaks required  Pt reports that walking is a leisure pursuit  Pt will be able to tolerate household distatnces at home   Cognition   Overall Cognitive Status WFL   Activity Tolerance   Activity Tolerance Patient tolerated treatment well;Patient limited by fatigue   Assessment   Assessment Pt participated in skilled OT session focusing on ADL retraining, activity tolerance, activity modification, use of AE, and functional transfers   See above for details on ADL function  Pt reports that his plan id to return home alone w/ family support  Pt does report fatigue/general weakness during ADL but is able to complete  Pt may benefit from increased assist/set up at home to facilitate transition to home to manage IADL activities and supervise medical conditions  Pt continues to require skilled OT services to increase overall functional independence  and safety w/ ADLs and functional transfers  Continued OT sessions to focus on the following areas: Endurance/activity tolerance, UE strength, IADL management,    Plan   Treatment Interventions ADL retraining;Functional transfer training; Endurance training   Goal Expiration Date 12/24/17   Treatment Day 1   OT Frequency 3-5x/wk   Recommendation   OT Discharge Recommendation Home OT

## 2017-12-16 NOTE — CONSULTS
Consultation - ENT   Rosangela Peterson  81 y o  male MRN: [id number]  Unit/Bed#: -01 Encounter: [id number]      Assessment/Plan     Assessment:  Left neck LAD - supraclavicular with cystic/necrotic center; This is highly suspicious for recurrent cancer  Hx of larynx ca s/p chemo/RT locally followed by laryngectomy/neck dissection at [facility] approx 2 mos ago per the pt report    Plan:  Agree with FNAB of the left supraclavicular LN for further evaluation- incl cytology for neoplasm as well as the studies recommended by ID (see their note for further details)  This would be done ideally with IR/image guidance and with cytotech on hand to confirm adequate cellularity since the majority of the center of the LN is necrotic (would need to get cells from the capsule)  Recommend CT neck and chest with contrast for further workup as well    Empiric therapy for any infectious component deferred to ID    Also suggest talking to speech/language pathology dept to see if they have any additional HME cassettes to fit onto his vic tube  Definitive mgt pending results of above; This could be addressed as outpatient (either locally or at [facility])        History of Present Illness   Physician Requesting Consult: Mercedez Eldridge MD  Reason for Consult / Principal Problem: fever, left neck enlarged LN, hx of larynx ca  HPI: Rosangela Peterson  is a 81y o  year old male who presented with fevers, sacral decub ulcer, and finding of left supraclavicular LN  Patient known to our service s/p urgent tracheostomy, DL/bx for larynx cancer in Aug 2017  (Previous to this he had chemo/RT for his larynx cancer)  Ended up having laryngectomy/neck dissections at [facility] approx 2 mos ago  Had done well from this perspective  1-2 days ago, reportedly noted enlarged left supraclavicular LN, nontender  Seemed to appear quickly  He denied any new pains, change in breathing or swallowing    He is unsure of his next appt with Coby Angeles for follow up  Left neck u/s 12/15/17:   4 0 x 1 9 x 3 2 cm complex, predominately cystic mass in the left neck    Upon my review of prior CT and new u/s, this seems to represent a new neck mass that was not seen on previous CT in Oct       Inpatient consult to ENT  Consult performed by: Mack Reardon  Consult ordered by: Luis ZHANG          Review of Systems  Gen: +fatigue, +fevers/chills  ENT: as per HPI  GI: no nausea  Allergy/Immun: no allergies/asthma  Neuro: no headache  Heme: no bleeding/bruising concerns  Pulm: no SOB; no asthma; no cough  CV: no chest pain or palpitations  Derm: no rashes      Historical Information   Past Medical History:   Diagnosis Date    Asthma     Hypertension     Larynx cancer ([facility] [address] )      Past Surgical History:   Procedure Laterality Date    EGD AND COLONOSCOPY      TRACHEOSTOMY N/A 8/25/2017    Procedure: TRACHEOSTOMY (POSSIBLE AWAKE) , MICRO DIRECT LARYNGOSCOPY, Biopsy;  Surgeon: Adriana Boyce MD;  [facility]: [facility];  Service: ENT    WOUND DEBRIDEMENT N/A 11/22/2017    Procedure: DEBRIDEMENT WOUND [facility] OUT), sacrum;  Surgeon: Tiarra Andrews DO;  Location: [facility];  Service: General     Social History   History   Alcohol Use No     History   Drug Use No     History   Smoking Status    Former Smoker   Smokeless Tobacco    Never Used     Family History: non-contributory    Meds/Allergies   all current active meds have been reviewed, current meds:   Current Facility-Administered Medications   Medication Dose Route Frequency    acetaminophen (TYLENOL) rectal suppository 650 mg  650 mg Rectal Q4H PRN    albuterol (PROVENTIL HFA,VENTOLIN HFA) inhaler 2 puff  2 puff Inhalation Q6H PRN    calcium carbonate-vitamin D (OSCAL-D) 500 mg-200 units per tablet 1 tablet  1 tablet Oral Daily    collagenase (SANTYL) ointment   Topical Daily    doxazosin (CARDURA) tablet 2 mg  2 mg Oral HS    enoxaparin (LOVENOX) subcutaneous injection 40 mg  40 mg Subcutaneous Daily    fentaNYL (DURAGESIC) 12 mcg/hr TD 72 hr patch 12 mcg  12 mcg Transdermal Q72H    fentaNYL (DURAGESIC) 25 mcg/hr TD 72 hr patch 25 mcg  25 mcg Transdermal Q72H    fluticasone (FLOVENT HFA) 220 mcg/act inhaler 2 puff  2 puff Inhalation BID    fluticasone-salmeterol (ADVAIR) 250-50 mcg/dose inhaler 1 puff  1 puff Inhalation Q12H [address]    mirtazapine (REMERON) tablet 15 mg  15 mg Oral HS    oxyCODONE (ROXICODONE) IR tablet 5 mg  5 mg Oral Q6H PRN    pantoprazole (PROTONIX) EC tablet 40 mg  40 mg Oral Early Morning    piperacillin-tazobactam (ZOSYN) 4 5 g in dextrose 5 % 100 mL IVPB  4 5 g Intravenous Q6H    polyethylene glycol (MIRALAX) packet 17 g  17 g Oral Daily PRN    senna-docusate sodium (SENOKOT S) 8 6-50 mg per tablet 1 tablet  1 tablet Oral BID    sodium chloride 0 9 % infusion  75 mL/hr Intravenous Continuous    and PTA meds:   Prior to Admission Medications   Prescriptions Last Dose Informant Patient Reported? Taking?    Calcium 600-200 MG-UNIT per tablet Unknown at Unknown time  Yes No   Sig: Take 1 tablet by mouth daily   Mometasone Furo-Formoterol Fum (DULERA) 200-5 MCG/ACT AERO 12/12/2017 at 2100  Yes Yes   Sig: Inhale 2 puffs 2 (two) times a day   Sennosides-Docusate Sodium (SENEXON-S PO) Unknown at Unknown time  Yes No   Sig: Take 1 tablet by mouth   acetaminophen (TYLENOL) 325 mg tablet Unknown at Unknown time  No No   Sig: Take 2 tablets by mouth every 6 (six) hours as needed for mild pain, headaches or fever   albuterol (PROVENTIL HFA) 90 mcg/act inhaler Unknown at Unknown time  Yes No   Sig: Inhale 2 puffs as needed   collagenase (SANTYL) ointment Unknown at Unknown time  Yes No   Sig: Apply topically daily   doxazosin (CARDURA) 2 mg tablet Unknown at Unknown time  Yes No   Sig: Take 2 mg by mouth daily at bedtime   fentaNYL (DURAGESIC) 25 mcg/hr  Child Yes Yes   Sig: Place 1 patch on the skin every third day   melatonin 3 mg 12/12/2017 at 2100  No Yes   Sig: Take 1 tablet by mouth daily at bedtime   mirtazapine (REMERON) 15 mg tablet 12/12/2017 at 2100  No Yes   Sig: Take 1 tablet by mouth daily at bedtime   mometasone (ASMANEX 14 METERED DOSES) 220 MCG/INH inhaler Unknown at Unknown time  Yes No   Sig: Inhale 1 puff daily   omeprazole (PriLOSEC) 40 MG capsule 12/13/2017 at 0900  Yes Yes   Sig: Take 40 mg by mouth daily   oxyCODONE (OXY-IR) 5 MG capsule Unknown at Unknown time  Yes No   Sig: Take 5 mg by mouth every 4 (four) hours as needed for moderate pain 5 mg for mod pain q4 10 mg for severe pain q4   polyethylene glycol (MIRALAX) 17 g packet Unknown at Unknown time  No No   Sig: Take 17 g by mouth daily as needed (CONSTIPATION)      Facility-Administered Medications: None     No current facility-administered medications on file prior to encounter        Current Outpatient Prescriptions on File Prior to Encounter   Medication Sig Dispense Refill    melatonin 3 mg Take 1 tablet by mouth daily at bedtime  0    mirtazapine (REMERON) 15 mg tablet Take 1 tablet by mouth daily at bedtime 30 tablet 0    Mometasone Furo-Formoterol Fum (DULERA) 200-5 MCG/ACT AERO Inhale 2 puffs 2 (two) times a day      omeprazole (PriLOSEC) 40 MG capsule Take 40 mg by mouth daily      acetaminophen (TYLENOL) 325 mg tablet Take 2 tablets by mouth every 6 (six) hours as needed for mild pain, headaches or fever 30 tablet 0    albuterol (PROVENTIL HFA) 90 mcg/act inhaler Inhale 2 puffs as needed      Calcium 600-200 MG-UNIT per tablet Take 1 tablet by mouth daily      collagenase (SANTYL) ointment Apply topically daily      doxazosin (CARDURA) 2 mg tablet Take 2 mg by mouth daily at bedtime      mometasone (ASMANEX 14 METERED DOSES) 220 MCG/INH inhaler Inhale 1 puff daily      oxyCODONE (OXY-IR) 5 MG capsule Take 5 mg by mouth every 4 (four) hours as needed for moderate pain 5 mg for mod pain q4 10 mg for severe pain q4      polyethylene glycol (MIRALAX) 17 g packet Take 17 g by mouth daily as needed (CONSTIPATION) 14 each 0    Sennosides-Docusate Sodium (SENEXON-S PO) Take 1 tablet by mouth         Allergies   Allergen Reactions    Aspirin     Cleocin [Clindamycin]          Objective     Vitals:    12/15/17 2109 12/15/17 2209 12/15/17 2330 12/16/17 0653   BP:  125/59 115/57 113/56   Pulse:   65 56   Resp:   18 18   Temp:   98 1 °F (36 7 °C) 98 °F (36 7 °C)   TempSrc:   Oral Oral   SpO2: 95%  100% 100%   Weight:       Height:             Intake/Output Summary (Last 24 hours) at 12/16/17 1129  Last data filed at 12/16/17 6790   Gross per 24 hour   Intake             1457 ml   Output              600 ml   Net              857 ml       Invasive Devices     Peripheral Intravenous Line            Peripheral IV 12/13/17 Right Forearm 2 days          Airway            Surgical Airway 112 days                Physical Exam  Gen: NAD, awake/alert  Voice: aphonic   Head: Normocephalic/atraumatic  Face: symmetric  Ears: pinnae unremarkable; EAC patent; TM intact/translucent/without OME  Nose: no external abnormality; nares patent; septum essent midline; inferior turbinates pink; clear secretions; no pus; no polyps  Oral cavity: no lesions; tongue soft/mobile  Oropharynx: no lesions; tonsils without ulceration/exudate; soft palate/posterior wall unremarkable  Neck:laryngectomy stoma with vic tube and HME cassette; mild secretions around the tube; incisions healed; stiff from surgery/RT   Left supraclavicular/level 5 enlarged LN with fluctuance, nontender, and no overlying skin changes ~3cm   CN: grossly intact  Salivary glands: parotids/submandibular glands soft, nontender    Lab Results:   CBC:   Lab Results   Component Value Date    WBC 3 47 (L) 12/16/2017    HGB 9 1 (L) 12/16/2017    HCT 28 4 (L) 12/16/2017    MCV 99 (H) 12/16/2017     12/16/2017    MCH 31 8 12/16/2017    MCHC 32 0 12/16/2017    RDW 16 3 (H) 12/16/2017    MPV 9 3 12/16/2017     Imaging Studies: I have personally reviewed pertinent films in PACS  EKG, Pathology, and Other Studies:     Code Status: Level 1 - Full Code  Advance Directive and Living Will:      Power of : Yes  POLST:

## 2017-12-17 LAB
ANION GAP SERPL CALCULATED.3IONS-SCNC: 4 MMOL/L (ref 4–13)
BUN SERPL-MCNC: 21 MG/DL (ref 5–25)
CALCIUM SERPL-MCNC: 8 MG/DL (ref 8.3–10.1)
CHLORIDE SERPL-SCNC: 107 MMOL/L (ref 100–108)
CO2 SERPL-SCNC: 29 MMOL/L (ref 21–32)
CREAT SERPL-MCNC: 0.71 MG/DL (ref 0.6–1.3)
ERYTHROCYTE [DISTWIDTH] IN BLOOD BY AUTOMATED COUNT: 16.3 % (ref 11.6–15.1)
GFR SERPL CREATININE-BSD FRML MDRD: 88 ML/MIN/1.73SQ M
GLUCOSE SERPL-MCNC: 100 MG/DL (ref 65–140)
HCT VFR BLD AUTO: 29 % (ref 36.5–49.3)
HGB BLD-MCNC: 9.4 G/DL (ref 12–17)
MCH RBC QN AUTO: 32.6 PG (ref 26.8–34.3)
MCHC RBC AUTO-ENTMCNC: 32.4 G/DL (ref 31.4–37.4)
MCV RBC AUTO: 101 FL (ref 82–98)
PLATELET # BLD AUTO: 158 THOUSANDS/UL (ref 149–390)
PMV BLD AUTO: 9.2 FL (ref 8.9–12.7)
POTASSIUM SERPL-SCNC: 3.7 MMOL/L (ref 3.5–5.3)
RBC # BLD AUTO: 2.88 MILLION/UL (ref 3.88–5.62)
SODIUM SERPL-SCNC: 140 MMOL/L (ref 136–145)
WBC # BLD AUTO: 3.25 THOUSAND/UL (ref 4.31–10.16)

## 2017-12-17 PROCEDURE — 97535 SELF CARE MNGMENT TRAINING: CPT

## 2017-12-17 PROCEDURE — 80048 BASIC METABOLIC PNL TOTAL CA: CPT | Performed by: PHYSICIAN ASSISTANT

## 2017-12-17 PROCEDURE — 85027 COMPLETE CBC AUTOMATED: CPT | Performed by: PHYSICIAN ASSISTANT

## 2017-12-17 RX ORDER — OLANZAPINE 5 MG/1
5 TABLET, ORALLY DISINTEGRATING ORAL
Status: DISCONTINUED | OUTPATIENT
Start: 2017-12-17 | End: 2017-12-21 | Stop reason: HOSPADM

## 2017-12-17 RX ORDER — OXYCODONE HYDROCHLORIDE 10 MG/1
10 TABLET ORAL
Status: DISCONTINUED | OUTPATIENT
Start: 2017-12-17 | End: 2017-12-19

## 2017-12-17 RX ORDER — ALPRAZOLAM 0.25 MG/1
0.25 TABLET ORAL 4 TIMES DAILY PRN
Status: DISCONTINUED | OUTPATIENT
Start: 2017-12-17 | End: 2017-12-21 | Stop reason: HOSPADM

## 2017-12-17 RX ORDER — MIRTAZAPINE 30 MG/1
30 TABLET, FILM COATED ORAL
Status: DISCONTINUED | OUTPATIENT
Start: 2017-12-17 | End: 2017-12-21 | Stop reason: HOSPADM

## 2017-12-17 RX ADMIN — FLUTICASONE PROPIONATE AND SALMETEROL 1 PUFF: 50; 250 POWDER RESPIRATORY (INHALATION) at 08:57

## 2017-12-17 RX ADMIN — ALPRAZOLAM 0.25 MG: 0.25 TABLET ORAL at 17:36

## 2017-12-17 RX ADMIN — FLUTICASONE PROPIONATE AND SALMETEROL 1 PUFF: 50; 250 POWDER RESPIRATORY (INHALATION) at 21:49

## 2017-12-17 RX ADMIN — CALCIUM CARBONATE-VITAMIN D TAB 500 MG-200 UNIT 1 TABLET: 500-200 TAB at 08:57

## 2017-12-17 RX ADMIN — PANTOPRAZOLE SODIUM 40 MG: 40 TABLET, DELAYED RELEASE ORAL at 05:58

## 2017-12-17 RX ADMIN — PIPERACILLIN SODIUM,TAZOBACTAM SODIUM 4.5 G: 4; .5 INJECTION, POWDER, FOR SOLUTION INTRAVENOUS at 19:19

## 2017-12-17 RX ADMIN — FLUTICASONE PROPIONATE 2 PUFF: 220 AEROSOL, METERED RESPIRATORY (INHALATION) at 19:22

## 2017-12-17 RX ADMIN — FLUTICASONE PROPIONATE 2 PUFF: 220 AEROSOL, METERED RESPIRATORY (INHALATION) at 08:57

## 2017-12-17 RX ADMIN — FENTANYL 25 MCG: 25 PATCH, EXTENDED RELEASE TRANSDERMAL at 08:58

## 2017-12-17 RX ADMIN — ENOXAPARIN SODIUM 40 MG: 40 INJECTION SUBCUTANEOUS at 08:56

## 2017-12-17 RX ADMIN — PIPERACILLIN SODIUM,TAZOBACTAM SODIUM 4.5 G: 4; .5 INJECTION, POWDER, FOR SOLUTION INTRAVENOUS at 13:44

## 2017-12-17 RX ADMIN — MIRTAZAPINE 30 MG: 30 TABLET, FILM COATED ORAL at 21:49

## 2017-12-17 RX ADMIN — OXYCODONE HYDROCHLORIDE 5 MG: 5 TABLET ORAL at 13:50

## 2017-12-17 RX ADMIN — OXYCODONE HYDROCHLORIDE 10 MG: 10 TABLET ORAL at 21:50

## 2017-12-17 RX ADMIN — PIPERACILLIN SODIUM,TAZOBACTAM SODIUM 4.5 G: 4; .5 INJECTION, POWDER, FOR SOLUTION INTRAVENOUS at 00:42

## 2017-12-17 RX ADMIN — PIPERACILLIN SODIUM,TAZOBACTAM SODIUM 4.5 G: 4; .5 INJECTION, POWDER, FOR SOLUTION INTRAVENOUS at 08:59

## 2017-12-17 RX ADMIN — Medication 1 TABLET: at 08:57

## 2017-12-17 NOTE — PROGRESS NOTES
Pt's daughter Angy Hickey at bedside  RN updated as to current plan of care  Angy Hickey requesting to meet with Dr Andrew Segal, if available  RN spoke to Dr Andrew Segal who is unable to meet this evening, but stated for family to be present tomorrow before 3pm, if able  Angy Ayonin informed and agreeable  Pt's daughter also requesting scripts for [employer] supplies, as she is almost out ([address] [place] [address] does not carry)  RN spoke to Tirso Bro with [facility] to make aware  Stated he will come speak to family  Will continue to monitor

## 2017-12-17 NOTE — PROGRESS NOTES
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  HCAP  Cancer related pain  Laryngeal Stenosis    Plan:  Symptom Management:   Pain - fentanyl patch at 37mcg and oxy 5mg PRN  Will schedule oxy 10mg at bedtime as pt reports this to the worst time for his pain  Depression - on mirtazepine  Will add zyprexa at bedtime    Weight loss - on mirtazepine as above for depression and appetite stimulation  Will add zyprexa again both for depression and the possibility of weight gain  Will have rehab staff review menu every morning and call down choices  Pt prefers Boost Vanilla supplements to Ensure so family will supply them  Goals of Care:   Full code  Committed to a course of rehab and recovery despite his set backs to date  History  Family meeting held with his daughter Rodger Grant and the patient  His clinical course with laryngeal cancer was discussed  For the last 7 months he has struggled  He is currently dealing with overall failure to thrive  Some recent set backs have included HCAP and a sacral decub  This has resulted in an overall picture of failure to thrive with weight loss and depression  He refuses a PEG tube stating that he does not want to have any more procedures or surgeries - period  He is committed to trying to eat more but states nutritional efforts have not been well coordinated  ROS: Pertinent items are noted in HPI      Meds  all current active meds have been reviewed, current meds:   Current Facility-Administered Medications   Medication Dose Route Frequency    acetaminophen (TYLENOL) rectal suppository 650 mg  650 mg Rectal Q4H PRN    albuterol (PROVENTIL HFA,VENTOLIN HFA) inhaler 2 puff  2 puff Inhalation Q6H PRN    calcium carbonate-vitamin D (OSCAL-D) 500 mg-200 units per tablet 1 tablet  1 tablet Oral Daily    collagenase (SANTYL) ointment   Topical Daily    doxazosin (CARDURA) tablet 2 mg  2 mg Oral HS    enoxaparin (LOVENOX) subcutaneous injection 40 mg  40 mg Subcutaneous Daily    fentaNYL (DURAGESIC) 12 mcg/hr TD 72 hr patch 12 mcg  12 mcg Transdermal Q72H    fentaNYL (DURAGESIC) 25 mcg/hr TD 72 hr patch 25 mcg  25 mcg Transdermal Q72H    fluticasone (FLOVENT HFA) 220 mcg/act inhaler 2 puff  2 puff Inhalation BID    fluticasone-salmeterol (ADVAIR) 250-50 mcg/dose inhaler 1 puff  1 puff Inhalation Q12H [address]    mirtazapine (REMERON) tablet 15 mg  15 mg Oral HS    oxyCODONE (ROXICODONE) IR tablet 5 mg  5 mg Oral Q6H PRN    pantoprazole (PROTONIX) EC tablet 40 mg  40 mg Oral Early Morning    piperacillin-tazobactam (ZOSYN) 4 5 g in dextrose 5 % 100 mL IVPB  4 5 g Intravenous Q6H    polyethylene glycol (MIRALAX) packet 17 g  17 g Oral Daily PRN    senna-docusate sodium (SENOKOT S) 8 6-50 mg per tablet 1 tablet  1 tablet Oral BID    and PTA meds:   Prior to Admission Medications   Prescriptions Last Dose Informant Patient Reported? Taking?    Calcium 600-200 MG-UNIT per tablet Unknown at Unknown time  Yes No   Sig: Take 1 tablet by mouth daily   Mometasone Furo-Formoterol Fum (DULERA) 200-5 MCG/ACT AERO 12/12/2017 at 2100  Yes Yes   Sig: Inhale 2 puffs 2 (two) times a day   Sennosides-Docusate Sodium (SENEXON-S PO) Unknown at Unknown time  Yes No   Sig: Take 1 tablet by mouth   acetaminophen (TYLENOL) 325 mg tablet Unknown at Unknown time  No No   Sig: Take 2 tablets by mouth every 6 (six) hours as needed for mild pain, headaches or fever   albuterol (PROVENTIL HFA) 90 mcg/act inhaler Unknown at Unknown time  Yes No   Sig: Inhale 2 puffs as needed   collagenase (SANTYL) ointment Unknown at Unknown time  Yes No   Sig: Apply topically daily   doxazosin (CARDURA) 2 mg tablet Unknown at Unknown time  Yes No   Sig: Take 2 mg by mouth daily at bedtime   fentaNYL (DURAGESIC) 25 mcg/hr  Child Yes Yes   Sig: Place 1 patch on the skin every third day   melatonin 3 mg 12/12/2017 at 2100  No Yes   Sig: Take 1 tablet by mouth daily at bedtime   mirtazapine (REMERON) 15 mg tablet 12/12/2017 at 2100  No Yes   Sig: Take 1 tablet by mouth daily at bedtime   mometasone (ASMANEX 14 METERED DOSES) 220 MCG/INH inhaler Unknown at Unknown time  Yes No   Sig: Inhale 1 puff daily   omeprazole (PriLOSEC) 40 MG capsule 12/13/2017 at 0900  Yes Yes   Sig: Take 40 mg by mouth daily   oxyCODONE (OXY-IR) 5 MG capsule Unknown at Unknown time  Yes No   Sig: Take 5 mg by mouth every 4 (four) hours as needed for moderate pain 5 mg for mod pain q4 10 mg for severe pain q4   polyethylene glycol (MIRALAX) 17 g packet Unknown at Unknown time  No No   Sig: Take 17 g by mouth daily as needed (CONSTIPATION)      Facility-Administered Medications: None       Allergies   Allergen Reactions    Aspirin     Cleocin [Clindamycin]        Objective  Physical Exam: /52   Pulse 62   Temp 98 1 °F (36 7 °C) (Oral)   Resp 18   Ht 5' 7" (1 702 m)   Wt 56 8 kg (125 lb 3 5 oz)   SpO2 96%   BMI 19 61 kg/m²   General appearance: alert, appears stated age, cooperative and no distress  Eyes: negative  Lungs: clear to auscultation bilaterally  Heart: regular rate and rhythm, S1, S2 normal, no murmur, click, rub or gallop  Abdomen: soft, non-tender; bowel sounds normal; no masses,  no organomegaly  Extremities: extremities normal, atraumatic, no cyanosis or edema  Neurologic: Mental status: Alert, oriented, thought content appropriate  Eye contact sub optimal   Patient gets frustrated witting down his thoughts  Lab Results:   I have personally reviewed pertinent labs  , CBC:   Lab Results   Component Value Date    WBC 3 25 (L) 12/17/2017    HGB 9 4 (L) 12/17/2017    HCT 29 0 (L) 12/17/2017     (H) 12/17/2017     12/17/2017    MCH 32 6 12/17/2017    MCHC 32 4 12/17/2017    RDW 16 3 (H) 12/17/2017    MPV 9 2 12/17/2017   , CMP:   Lab Results   Component Value Date     12/17/2017    K 3 7 12/17/2017     12/17/2017    CO2 29 12/17/2017    ANIONGAP 4 12/17/2017    BUN 21 12/17/2017    CREATININE 0 71 12/17/2017    GLUCOSE 100 12/17/2017    CALCIUM 8 0 (L) 12/17/2017    EGFR 88 12/17/2017       Counseling / Coordination of Care  Total floor / unit time spent today 45 minutes  Greater than 50% of total time was spent with the patient and / or family counseling and / or coordinating of care   A description of the counseling / coordination of care: See family meeting     Khadar Salter, [address]  Office number: [phone]

## 2017-12-17 NOTE — PROGRESS NOTES
Progress Note - Infectious Disease   Kirk John  81 y o  male MRN: [id number]  Unit/Bed#: MS [place] Encounter: [id number]      Impression/Plan:  1   Severe sepsis-POA   Fever, leukocytosis, lactic acidosis  Unclear etiology  [place] Headings all secondary to pneumonia or pneumonitis  Possibly secondary to an infected, necrotic neck lymph node or abscess  Patient remains hemodynamically stable and his fever and leukocytosis have resolved   Thus far he seems to be tolerating the antibiotics well without difficulty  -continue Zosyn for now  -follow-up blood cultures  -follow-up sputum culture  -recommend neck biopsy as below     2   Sacral decubitus ulceration-Does not appear overtly infected  No purulence or overt cellulitis  Patient is status post I and D of an abscess in that region recently   The wound cultures had recently grown enterococcus and strep as well as anaerobes   -antibiotics as above  -surgery follow-up  -local wound     3   Possible healthcare associated pneumonia-verses aspiration pneumonitis   The patient's respiratory status is quite stable despite the radiographic findings  Claudette Olszewski is certainly at risk of aspiration as he was found down on the ground with possible loss of consciousness  At this point he is not requiring any oxygen support   -antibiotics as above  -monitor respiratory status  -follow-up cultures     4  Neck mass-fluctuant  Possible necrotic lymph node  Possibly cancerous  Possibly infectious  -ENT evaluation noted  -await IR biopsy to determine whether this is infectious or malignant  -any biopsy material should be sent for routine culture, AFB, fungal, and pathology     5   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   Status post tracheostomy  Now with apparent necrotic lymph node suggesting the possibility of recurrence    -ENT follow-up  -await percutaneous biopsy of the node    Antibiotics:  Zosyn 4  Antibiotics 5    Subjective:  Patient has no fever, chills, sweats; no nausea, vomiting, diarrhea; no cough, shortness of breath; no increased pain  No new symptoms  His respiratory status is stable    Objective:  Vitals:  HR:  [62-80] 62  Resp:  [18] 18  BP: ()/(52-68) 102/52  SpO2:  [96 %-98 %] 96 %  Temp (24hrs), Av 1 °F (36 7 °C), Min:97 9 °F (36 6 °C), Max:98 2 °F (36 8 °C)  Current: Temperature: 98 1 °F (36 7 °C)    Physical Exam:   General Appearance:  Alert, nontoxic, no acute distress  Throat: Oropharynx moist without lesions  Neck:   Left-sided mass unchanged   Lungs:   Decreased breath sounds bilaterally; respirations unlabored   Heart:  RRR; no murmur, rub or gallop   Abdomen:   Soft, non-tender, non-distended, positive bowel sounds  Extremities: No clubbing, cyanosis or edema   Skin: No new rashes or lesions  No draining wounds noted  Labs, Imaging, & Other studies:   All pertinent labs and imaging studies were personally reviewed    Results from last 7 days  Lab Units 17  0517 17  0517 12/15/17  0503   WBC Thousand/uL 3 25* 3 47* 6 02   HEMOGLOBIN g/dL 9 4* 9 1* 9 7*   PLATELETS Thousands/uL 158 154 150       Results from last 7 days  Lab Units 17  0517 17  0517 12/15/17  0503  17  2112   SODIUM mmol/L 140 140 139  < > 139   POTASSIUM mmol/L 3 7 3 7 3 4*  < > 4 3   CHLORIDE mmol/L 107 107 106  < > 102   CO2 mmol/L 29 28 27  < > 27   ANION GAP mmol/L 4 5 6  < > 10   BUN mg/dL 21 22 21  < > 24   CREATININE mg/dL 0 71 0 80 0 88  < > 0 99   EGFR ml/min/1 73sq m 88 84 81  < > 71   GLUCOSE RANDOM mg/dL 100 95 92  < > 91   CALCIUM mg/dL 8 0* 8 1* 8 0*  < > 8 7   AST U/L  --   --   --   --  46*   ALT U/L  --   --   --   --  27   ALK PHOS U/L  --   --   --   --  62   TOTAL PROTEIN g/dL  --   --   --   --  6 8   ALBUMIN g/dL  --   --   --   --  2 7*   BILIRUBIN TOTAL mg/dL  --   --   --   --  0 73   < > = values in this interval not displayed      Results from last 7 days  Lab Units 17  1454 17  0052 12/13/17 2116 12/13/17 2112   BLOOD CULTURE   --   --  No Growth at 72 hrs  No Growth at 72 hrs     SPUTUM CULTURE  Few Colonies of Staphylococcus aureus*  1+ Growth of   --   --   --    GRAM STAIN RESULT  2+ Polys  2+ Gram positive rods  1+ Gram positive cocci in pairs  --   --   --    INFLUENZA A PCR   --  None Detected  --   --    INFLUENZA B PCR   --  None Detected  --   --    RSV PCR   --  None Detected  --   --        CT chest-possible pneumonitis versus pneumonia    CT neck-status post surgical changes, XRT changes, possible tumor recurrence

## 2017-12-17 NOTE — OCCUPATIONAL THERAPY NOTE
Occupational Therapy Progress Note      Patient Name: Aliyah Dean  Today's Date: 12/17/2017    Problem List:   Patient Active Problem List   Diagnosis    Asthma    Depression    Laryngeal stenosis    Severe sepsis [facility] [address]    Decubitus ulcer of sacral region, stage 4 ([facility] [address]    HCAP (healthcare-associated pneumonia)    Anemia    History of laryngeal cancer    Fall        12/17/17 1300   Restrictions/Precautions   Weight Bearing Precautions Per Order No   Other Precautions Multiple lines; Fall Risk   Pain Assessment   Pain Assessment No/denies pain   Pain Score No Pain   Activity Tolerance   Activity Tolerance Patient tolerated treatment well   Assessment   Assessment Pt participates in skilled OT session w/ focus on DME recommendations, home safety concerns  Pts supportive daughter is present for session  Home safety concerns for IADL management made aware to daughter, she reports that she completes all food preparation and he warms items in the microwave  The Pt reports concerns for falls in the bathroom  They report that [facility] was previously acquired from last rehab but did not fit in space, and they got a tub bench but also did not fit in their space so they got rid of it  EDU provided for use of shower chair, and grab bar installation  They report that bathroom is very small and there is limited space for DME  Recommend HOME OT to make further recommendations in the home  Pt continues to require skilled OT services to increase overall functional independence  and safety w/ ADLs and functional transfers  Continued OT sessions to focus on the following areas: Endurance, Strengthening, balance, DME trialing, Tub transfers        Plan   Treatment Interventions ADL retraining   Goal Expiration Date 12/24/17   Treatment Day 2   OT Frequency 3-5x/wk   Recommendation   OT Discharge Recommendation Home OT

## 2017-12-17 NOTE — PROGRESS NOTES
[facility] Progress Note  Patient: Alfredo Alaniz  81 y o  male   MRN: [id number]  PCP: Shaila Silva MD  Unit/Bed#: [facility]-01 Encounter: [id number]  Date Of Visit: 12/17/17    Assessment:    Principal Problem:    Severe sepsis ([facility] )  Active Problems:    Depression    Laryngeal stenosis    Decubitus ulcer of sacral region, stage 4 ([facility] [address] )    HCAP (healthcare-associated pneumonia)    Anemia    History of laryngeal cancer    Fall      Plan:    · Severe sepsis, POA  · As evidenced by fever, leukocytosis, and lactic acidosis  The Infectious Disease team is following along, and I discussed with Dr Jayjay Ugarte today  Patient has remained afebrile today and is without leukocytosis  · The etiology of his severe sepsis is unclear, but is likely secondary to pneumonia versus necrotic lymph node  · The was originally concern for possible aspiration pneumonia in the speech therapy team was consulted, however I discussed with them, and as a result of the patient's laryngectomy, they informed me that it is physically unable for him to aspirate  Of note, this patient did have a recent hospitalization and subsequent discharged to the [facility]  · There was also originally concern for the possibility of infection of the patient's stage IV sacral decubitus ulcer which is status post I&D by the surgery team, however the patient was evaluated by surgery who felt that there was no evidence of infection and this was unlikely the cause of the patient's sepsis  · Blood cultures have remained negative x2 through 72 hours and both influenza and RSV are negative  · We will continue antibiotics per ID recommendations-IVs Zosyn   The patient's first dose of antibiotics was after midnight on 12/14, today is actually day #4 IV abx  · Left lobe pneumonia  · CXR on asmission revealed patchy infiltrate in the peripheral left lower lobe; CT of the chest which was performed yesterday for further work up of his supraclavicular lymph node ended up revealing consolidation in the left upper and lower lobes concerning for multifocal pneumonia  There was originally concern for possible aspiration component, however the patient is status post laryngectomy and is physically unable to aspirate  Of note, he did have a recent hospitalization and subsequent discharge to a [facility] following  Antibiotics as per above  Patient is afebrile and without leukocytosis  · Supraclavicular lymphadenopathy  · Soft tissue neck ultrasound revealed stable complex cystic left neck mass, most compatible with necrotic lymph node  Given the patient's history, the consultation of the ENT team was sought  Per ENT, this is highly suspicious for recurrent cancer  Id would also like to rule out infectious causes  · The Infectious Disease and ENT teams are in agreement for plan to biopsy - I have consulted IR for FNAB of the left supraclavicular lymph node for further evaluation  I have placed a comment in the consultation outlining the recommendations of the ID and ENT teams that the biopsy is to include cytology for neoplasm per ENT as well as routine culture, AFB, fungal, and pathology per ID  I have requested that a cytotech be present to ensure adequate cellularity of sample  For further clarification, please see ID and ENT notes  · The patient underwent CT of the neck and chest with contrast for further workup yesterday  · This CT of the neck revealed possible tumor recurrence in the oropharynx and to definite pathologic nodes left 1A and left 5B chains per the pathology report  · CT of the chest did not reveal any mediastinal or hilar lymphadenopathy  · I have requested the consultation of the oncology team regarding the above to help advise with possible treatment options  The patient is known to Dr Karthikeyan Kemp as an outpatient    · Of note, the patient is known to our Dr Grover Garibay (ENT) and Dr Karthikeyan Kemp (oncology) as well as an ENT and Oncology group with [facility], and he feels as though he is not strong enough to go to [facility], thus they would like to see providers locally at this time  · History laryngeal cancer status post laryngectomy with Larytube  · Follows at [facility] but is also known to Dr Marielle Hoffmann and Dr Edna Kwong  · I discussed with [employer] today who will look to see if they have HME cassettes for this patient  His daughter reports that they only have 6 and has brought all of them into the hospital  Patient will likely need a prescription at discharge  · The patient DOES have an Electrolarynx (it is here with him in the hospital), however his daughter reports that he has not had adequate training with it and initially had too much swelling post-operatively to use it properly  Speech therapy consult for electrolarynx education/training - discussed with them today  The family was also considering getting a different model of electrolarynx as well  · Larytube cleaning per his home regimen with sterile water and peroxide  · Oncology consult as per above  · Patient on modified diet  · Stage IV sacral decubitus ulcer, POA  · Status post I&D by surgery team on 11/22  Patient was evaluated by the surgery team this admission who felt that there was no evidence of infection and this is unlikely to be the cause of the patient's sepsis  Care per Wound Care recommendations  · Depression  · Patient is on Remeron  This dose was increased today by psychiatry (now on 30mg QHS)  The patient had declined psychiatric consultation in the past but was agreeable today and they were able to perform a formal consultation - discussed with Dr Moises Montgomery   · Psychiatry also added a low dose PRN Xanax (0 25mg)  · Palliative care added Zyprexa (5mg) at bedtime - discussed with Dr Scott Spencer today  · Anemia  · Hemoglobin monitor   Suspect their was a component of dilutional effect from IVF  · Severe protein calorie malnutrition  · Nutrition is following - they had recommended enteral nocturnal feeding  Patient continues to refuse PEG  Patient's daughter declined neuropsych eval because she feels as though her father has capacity for his own medical decision  · Palliative care added Zyprexa which can cause weight gain  Also on Remeron (dose increased today) which can increase appetite  · I discussed with the patient's nurse today - seeing as the patient is non-verbal, we have requested that someone from dietary services come to his room before every meal to assist with ordering meals  Nursing to communicate this to dietary services  · Patient states he ate over half of his lunch today  He is on supplements  VTE Pharmacologic Prophylaxis:   Pharmacologic: Enoxaparin (Lovenox)  Mechanical VTE Prophylaxis in Place: Yes    Patient Centered Rounds: I have performed bedside rounds with nursing staff today  Discussions with Specialists or Other Care Team Provider: Dr Jelly Olivo, Dr Fidencio Mcqueen, Dr Nargis Amaya, nursing, SLP    Education and Discussions with Family / Patient: Patient as well as his daughter, Justa Stephens, present at bedside    Time Spent for Care: 30 minutes  More than 50% of total time spent on counseling and coordination of care as described above  Current Length of Stay: 3 day(s)    Current Patient Status: Inpatient   Certification Statement: The patient will continue to require additional inpatient hospital stay due to work up as per above including biopsy, continues on IV abx    Discharge Plan: pending clinical course, patient is not medically cleared for discharge today    Code Status: Level 1 - Full Code      Subjective:   Mr Kendra Hudson reports he feels the same today as yesterday  He still reports discomfort at his sacrum  He is non-verbal and communicates largely via writing and mouthing words  He denies chest pain or abdominal pain  He reports eating over 1/2 of his food today  He inquires as to when his biopsy will happen   He states that he feels as though he is not strong enough to make the trop to [facility]    Objective:     Vitals:   Temp (24hrs), Av 1 °F (36 7 °C), Min:97 9 °F (36 6 °C), Max:98 2 °F (36 8 °C)    HR:  [62-80] 62  Resp:  [18] 18  BP: ()/(52-68) 102/52  SpO2:  [96 %-98 %] 96 %  Body mass index is 19 61 kg/m²  Input and Output Summary (last 24 hours): Intake/Output Summary (Last 24 hours) at 17 1106  Last data filed at 17 0800   Gross per 24 hour   Intake              320 ml   Output              700 ml   Net             -380 ml       Physical Exam:     Physical Exam   Constitutional: He is oriented to person, place, and time  No distress  Patient seen sitting upright in wheelchair with his daughter present watching football  He is very pleasant and cooperative   Neck:   Left supraclavicular lymph node appears unchanged    Cardiovascular: Normal rate and regular rhythm  Pulmonary/Chest: Effort normal  No respiratory distress  Coarse breath sounds throughout although he has coarse breath sounds which appear to come from his mouth/throat area as a result of his larytube, suspect referred sounds into lungs  Not on supplemental O2   Abdominal: Soft  Bowel sounds are normal  There is no tenderness (to tenderness with deep palpation)  Musculoskeletal: He exhibits no edema  Neurological: He is alert and oriented to person, place, and time  Skin: Skin is warm  Psychiatric:   Non-anxious appearing today  He gives me a thumbs-up and smiles in reference to the [employer] game on TV  Vitals reviewed  Additional Data:     Labs:      Results from last 7 days  Lab Units 17  0517  12/15/17  0503   WBC Thousand/uL 3 25*  < > 6 02   HEMOGLOBIN g/dL 9 4*  < > 9 7*   HEMATOCRIT % 29 0*  < > 29 8*   PLATELETS Thousands/uL 158  < > 150   LYMPHO PCT %  --   --  1*   MONO PCT MAN %  --   --  0*   EOSINO PCT MANUAL %  --   --  0   < > = values in this interval not displayed      Results from last 7 days  Lab Units 12/17/17  0517  12/13/17 2112   SODIUM mmol/L 140  < > 139   POTASSIUM mmol/L 3 7  < > 4 3   CHLORIDE mmol/L 107  < > 102   CO2 mmol/L 29  < > 27   BUN mg/dL 21  < > 24   CREATININE mg/dL 0 71  < > 0 99   CALCIUM mg/dL 8 0*  < > 8 7   TOTAL PROTEIN g/dL  --   --  6 8   BILIRUBIN TOTAL mg/dL  --   --  0 73   ALK PHOS U/L  --   --  62   ALT U/L  --   --  27   AST U/L  --   --  46*   GLUCOSE RANDOM mg/dL 100  < > 91   < > = values in this interval not displayed  Results from last 7 days  Lab Units 12/13/17 2112   INR  1 22*       * I Have Reviewed All Lab Data Listed Above  * Additional Pertinent Lab Tests Reviewed: All Labs Within Last 24 Hours Reviewed    Imaging:    Imaging Reports Reviewed Today Include: CT neck and chest with contrast  Imaging Personally Reviewed by Myself Includes:  none    Recent Cultures (last 7 days):       Results from last 7 days  Lab Units 12/14/17  1454 12/14/17  0052 12/13/17 2116 12/13/17 2112   BLOOD CULTURE   --   --  No Growth at 72 hrs  No Growth at 72 hrs     SPUTUM CULTURE  Few Colonies of Staphylococcus aureus*  1+ Growth of   --   --   --    GRAM STAIN RESULT  2+ Polys  2+ Gram positive rods  1+ Gram positive cocci in pairs  --   --   --    INFLUENZA A PCR   --  None Detected  --   --    INFLUENZA B PCR   --  None Detected  --   --    RSV PCR   --  None Detected  --   --        Last 24 Hours Medication List:     calcium carbonate-vitamin D 1 tablet Oral Daily   collagenase  Topical Daily   doxazosin 2 mg Oral HS   enoxaparin 40 mg Subcutaneous Daily   fentaNYL 12 mcg Transdermal Q72H   fentaNYL 25 mcg Transdermal Q72H   fluticasone 2 puff Inhalation BID   fluticasone-salmeterol 1 puff Inhalation Q12H JAMES   mirtazapine 15 mg Oral HS   pantoprazole 40 mg Oral Early Morning   piperacillin-tazobactam 4 5 g Intravenous Q6H   senna-docusate sodium 1 tablet Oral BID        Today, Patient Was Seen By: Cliff Ernandez PA-C    ** Please Note: Dragon 360 Dictation voice to text software may have been used in the creation of this document   **

## 2017-12-17 NOTE — PLAN OF CARE
Problem: OCCUPATIONAL THERAPY ADULT  Goal: Performs self-care activities at highest level of function for planned discharge setting  See evaluation for individualized goals  Treatment Interventions: ADL retraining, Functional transfer training, Endurance training          See flowsheet documentation for full assessment, interventions and recommendations  Outcome: Progressing  Limitation: Decreased ADL status, Decreased endurance  Prognosis: Fair  Assessment: Pt participates in skilled OT session w/ focus on DME recommendations, home safety concerns  Pts supportive daughter is present for session  Home safety concerns for IADL management made aware to daughter, she reports that she completes all food preparation and he warms items in the microwave  The Pt reports concerns for falls in the bathroom  They report that [facility] was previously acquired from last rehab but did not fit in space, and they got a tub bench but also did not fit in their space so they got rid of it  EDU provided for use of shower chair, and grab bar installation  They report that bathroom is very small and there is limited space for DME  Recommend HOME OT to make further recommendations in the home  Pt continues to require skilled OT services to increase overall functional independence  and safety w/ ADLs and functional transfers  Continued OT sessions to focus on the following areas: Endurance, Strengthening, balance, DME trialing, Tub transfers          OT Discharge Recommendation: Home OT

## 2017-12-18 ENCOUNTER — APPOINTMENT (INPATIENT)
Dept: RADIOLOGY | Facility: HOSPITAL | Age: 82
DRG: 853 | End: 2017-12-18
Attending: INTERNAL MEDICINE
Payer: COMMERCIAL

## 2017-12-18 LAB
ANION GAP SERPL CALCULATED.3IONS-SCNC: 5 MMOL/L (ref 4–13)
BACTERIA BLD CULT: NORMAL
BACTERIA BLD CULT: NORMAL
BACTERIA SPT RESP CULT: ABNORMAL
BACTERIA SPT RESP CULT: ABNORMAL
BUN SERPL-MCNC: 22 MG/DL (ref 5–25)
CALCIUM SERPL-MCNC: 8 MG/DL (ref 8.3–10.1)
CHLORIDE SERPL-SCNC: 105 MMOL/L (ref 100–108)
CO2 SERPL-SCNC: 30 MMOL/L (ref 21–32)
CREAT SERPL-MCNC: 0.7 MG/DL (ref 0.6–1.3)
ERYTHROCYTE [DISTWIDTH] IN BLOOD BY AUTOMATED COUNT: 16.2 % (ref 11.6–15.1)
GFR SERPL CREATININE-BSD FRML MDRD: 89 ML/MIN/1.73SQ M
GLUCOSE SERPL-MCNC: 85 MG/DL (ref 65–140)
GRAM STN SPEC: ABNORMAL
HCT VFR BLD AUTO: 29 % (ref 36.5–49.3)
HGB BLD-MCNC: 9.2 G/DL (ref 12–17)
MCH RBC QN AUTO: 31.6 PG (ref 26.8–34.3)
MCHC RBC AUTO-ENTMCNC: 31.7 G/DL (ref 31.4–37.4)
MCV RBC AUTO: 100 FL (ref 82–98)
PLATELET # BLD AUTO: 182 THOUSANDS/UL (ref 149–390)
PMV BLD AUTO: 9.4 FL (ref 8.9–12.7)
POTASSIUM SERPL-SCNC: 3.9 MMOL/L (ref 3.5–5.3)
RBC # BLD AUTO: 2.91 MILLION/UL (ref 3.88–5.62)
SODIUM SERPL-SCNC: 140 MMOL/L (ref 136–145)
WBC # BLD AUTO: 2.95 THOUSAND/UL (ref 4.31–10.16)

## 2017-12-18 PROCEDURE — 88172 CYTP DX EVAL FNA 1ST EA SITE: CPT | Performed by: INTERNAL MEDICINE

## 2017-12-18 PROCEDURE — 76942 ECHO GUIDE FOR BIOPSY: CPT

## 2017-12-18 PROCEDURE — 87116 MYCOBACTERIA CULTURE: CPT | Performed by: INTERNAL MEDICINE

## 2017-12-18 PROCEDURE — 07923ZX DRAINAGE OF LEFT NECK LYMPHATIC, PERCUTANEOUS APPROACH, DIAGNOSTIC: ICD-10-PCS | Performed by: RADIOLOGY

## 2017-12-18 PROCEDURE — 87206 SMEAR FLUORESCENT/ACID STAI: CPT | Performed by: INTERNAL MEDICINE

## 2017-12-18 PROCEDURE — 88305 TISSUE EXAM BY PATHOLOGIST: CPT | Performed by: INTERNAL MEDICINE

## 2017-12-18 PROCEDURE — 80048 BASIC METABOLIC PNL TOTAL CA: CPT | Performed by: PHYSICIAN ASSISTANT

## 2017-12-18 PROCEDURE — 87070 CULTURE OTHR SPECIMN AEROBIC: CPT | Performed by: INTERNAL MEDICINE

## 2017-12-18 PROCEDURE — 38505 NEEDLE BIOPSY LYMPH NODES: CPT

## 2017-12-18 PROCEDURE — 94760 N-INVAS EAR/PLS OXIMETRY 1: CPT

## 2017-12-18 PROCEDURE — 87102 FUNGUS ISOLATION CULTURE: CPT | Performed by: INTERNAL MEDICINE

## 2017-12-18 PROCEDURE — 87205 SMEAR GRAM STAIN: CPT | Performed by: INTERNAL MEDICINE

## 2017-12-18 PROCEDURE — 85027 COMPLETE CBC AUTOMATED: CPT | Performed by: PHYSICIAN ASSISTANT

## 2017-12-18 PROCEDURE — 88173 CYTOPATH EVAL FNA REPORT: CPT | Performed by: INTERNAL MEDICINE

## 2017-12-18 RX ADMIN — FLUTICASONE PROPIONATE 2 PUFF: 220 AEROSOL, METERED RESPIRATORY (INHALATION) at 21:33

## 2017-12-18 RX ADMIN — PIPERACILLIN SODIUM,TAZOBACTAM SODIUM 4.5 G: 4; .5 INJECTION, POWDER, FOR SOLUTION INTRAVENOUS at 09:00

## 2017-12-18 RX ADMIN — FLUTICASONE PROPIONATE AND SALMETEROL 1 PUFF: 50; 250 POWDER RESPIRATORY (INHALATION) at 08:54

## 2017-12-18 RX ADMIN — CALCIUM CARBONATE-VITAMIN D TAB 500 MG-200 UNIT 1 TABLET: 500-200 TAB at 08:55

## 2017-12-18 RX ADMIN — FENTANYL 25 MCG: 25 PATCH, EXTENDED RELEASE TRANSDERMAL at 18:40

## 2017-12-18 RX ADMIN — FLUTICASONE PROPIONATE AND SALMETEROL 1 PUFF: 50; 250 POWDER RESPIRATORY (INHALATION) at 21:35

## 2017-12-18 RX ADMIN — Medication 1 TABLET: at 08:55

## 2017-12-18 RX ADMIN — DOXAZOSIN 2 MG: 2 TABLET ORAL at 21:40

## 2017-12-18 RX ADMIN — FENTANYL 12 MCG: 12 PATCH, EXTENDED RELEASE TRANSDERMAL at 18:38

## 2017-12-18 RX ADMIN — CEFAZOLIN SODIUM 1000 MG: 1 SOLUTION INTRAVENOUS at 21:40

## 2017-12-18 RX ADMIN — OXYCODONE HYDROCHLORIDE 5 MG: 5 TABLET ORAL at 18:00

## 2017-12-18 RX ADMIN — CEFAZOLIN SODIUM 1000 MG: 1 SOLUTION INTRAVENOUS at 14:09

## 2017-12-18 RX ADMIN — PIPERACILLIN SODIUM,TAZOBACTAM SODIUM 4.5 G: 4; .5 INJECTION, POWDER, FOR SOLUTION INTRAVENOUS at 01:52

## 2017-12-18 RX ADMIN — PANTOPRAZOLE SODIUM 40 MG: 40 TABLET, DELAYED RELEASE ORAL at 05:07

## 2017-12-18 RX ADMIN — ENOXAPARIN SODIUM 40 MG: 40 INJECTION SUBCUTANEOUS at 08:59

## 2017-12-18 RX ADMIN — OLANZAPINE 5 MG: 5 TABLET, ORALLY DISINTEGRATING ORAL at 21:34

## 2017-12-18 RX ADMIN — OXYCODONE HYDROCHLORIDE 5 MG: 5 TABLET ORAL at 09:00

## 2017-12-18 RX ADMIN — MIRTAZAPINE 30 MG: 30 TABLET, FILM COATED ORAL at 21:40

## 2017-12-18 RX ADMIN — FLUTICASONE PROPIONATE 2 PUFF: 220 AEROSOL, METERED RESPIRATORY (INHALATION) at 08:54

## 2017-12-18 RX ADMIN — OXYCODONE HYDROCHLORIDE 10 MG: 10 TABLET ORAL at 21:39

## 2017-12-18 NOTE — SPEECH THERAPY NOTE
Consult received  Spoke marcial [employer] re: pt c vic tube  Pt would benefit from electrolarynx training and additional laryngectomy supplies (including HME cassettes/filters)

## 2017-12-18 NOTE — CONSULTS
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  PLAN:   1) Increase Remeron 30 mg PO Q HS for depression and sleep  2) Xanax 0 25 mg PO q 6 hr as needed for anxiety  3) Patient was started on Olanzapine by palliative care  This writer agrrees  4) Communicated with patients' medical team       Chief Complaint: "I feel soso  "    History of Present Illness: This is 81 CM with hx of locally advanced laryngeal cancer  He has a tracheostomy was admiitted for increased weakness  He has depression, sepsis, Laryngeal stenosis  He is having mood swings and at times is refusing to eat or talk to certain staff  Psych consult was requested to evaluate increasing depression  Dr Magaly Burkett attempted to talk to him twice , last week but he refused When this writer talked to him today, his daughter was also voiditing  initially he said that he felt tried as palliative was just there he had to talk for a long time but when he found out that this writer was a psychiatrist he said he can talk for some time  He did most of the talk by low voice but this writer could understand his daughter also helped  He said that he feels down and has low appetite  He said he is refusing meals at times as either he is not hungry or he does not like the meals  He said that he is not sleeping well and is feeling down about his current situation but he wants to get better  He agreed with medication changes but did not want to feel like a zobmie  He denied any suicidal or homicidal ideas  No hallucinations or delusions  PAST PSYCH HISTORY:    Just got started on Remeron for depression         Substance Abuse History:    none      Family Psychiatric History:   None      Social History:  Social History     Social History    Marital status: Single     Spouse name: N/A    Number of children: N/A    Years of education: N/A Occupational History    Not on file  Social History Main Topics    Smoking status: Former Smoker    Smokeless tobacco: Never Used    Alcohol use No    Drug use: No    Sexual activity: Not on file     Other Topics Concern    Not on file     Social History Narrative    No narrative on file       Traumatic History:   Abuse: None  Other Traumatic Events: None  Past Medical History:   Diagnosis Date    Asthma     Hypertension     Larynx cancer [facility])        Medical Review Of Systems:  All 12 point review of system is normal except for what is mention in medical hisotry  Scheduled Meds:  calcium carbonate-vitamin D 1 tablet Oral Daily   collagenase  Topical Daily   doxazosin 2 mg Oral HS   enoxaparin 40 mg Subcutaneous Daily   fentaNYL 12 mcg Transdermal Q72H   fentaNYL 25 mcg Transdermal Q72H   fluticasone 2 puff Inhalation BID   fluticasone-salmeterol 1 puff Inhalation Q12H JAMES   mirtazapine 30 mg Oral HS   OLANZapine 5 mg Oral HS   oxyCODONE 10 mg Oral HS   pantoprazole 40 mg Oral Early Morning   piperacillin-tazobactam 4 5 g Intravenous Q6H   senna-docusate sodium 1 tablet Oral BID     Continuous Infusions:   PRN Meds:   acetaminophen    albuterol    ALPRAZolam    oxyCODONE    polyethylene glycol     Allergies   Allergen Reactions    Aspirin     Cleocin [Clindamycin]      Vitals:    12/17/17 1641   BP: 102/58   Pulse: 95   Resp: 20   Temp: 98 °F (36 7 °C)   SpO2: 95%                  Mental Status Evaluation:  Appearance:  Appeared of age, he apeared tired  Behavior:   behavior was cooperative but evasive  Speech:   speech is normal goal directed he is speech prolem but able to talk or communicate via writing  Mood:  Depressed   Affect Anxious and tired  Language: naming objects and Goal directed  Thought Process:   logical and linear    Thought Content:  Normal   Perceptual Disturbances:  denying any auditory and visual hallucinations      Risk Potential: No suicidal ideas but feels hopeless   Sensorium:  person, place, time/date, situation, day of week, month of year and year   Cognition:  grossly intact   Consciousness:   alert, awake, and oriented ×3    Attention: Good attention span   Intellect: Normal   Fund of Knowledge: awareness of current events: normal    Insight:  Good   Judgment: Good   Muscle Strength and Tone: Normal    Gait/Station:  gait was not assessed    Motor Activity: no abnormal movements     Lab Results: I have personally reviewed pertinent lab results  NOTE:  Total of 40 minutes were spent in talking to patient completing this medical record reviewing medical chart medical decision making    Olesya Richards MD

## 2017-12-18 NOTE — DISCHARGE INSTRUCTIONS
POST BIOPSY    Care after your procedure:    1  Limit your activities for 24 hours after your biopsy  2  No driving day of biopsy  3  Return to your normal diet  Small sips of flat soda will help with mild nausea  4  Remove band-aid or dressing 24 hours after procedure  Contact Interventional Radiology at [phone] Callie PATIENTS: Contact Interventional Radiology at [phone]) Tamar Huffman PATIENTS: Contact Interventional Radiology at [phone]) if:    1  Difficulty breathing, nausea or vomiting  2  Chills or fever above 101 degrees F      3  Pain at biopsy site not relieved by medication  4  Develop any redness, swelling, heat, unusual drainage, heavy bruising or bleeding from biopsy site

## 2017-12-18 NOTE — PROGRESS NOTES
Progress Note - Infectious Disease   Echo Mott  81 y o  male MRN: [id number]  Unit/Bed#: -01 Encounter: [id number]      Impression/Plan:  1   Severe sepsis-POA   Fever, leukocytosis, lactic acidosis   Unclear etiology  Stephanie Carter all secondary to pneumonia or pneumonitis   Possibly secondary to an infected, necrotic neck lymph node or abscess   Patient remains hemodynamically stable and his fever and leukocytosis have resolved   Thus far he seems to be tolerating the antibiotics well without difficulty   -discontinue Zosyn  -cefazolin 1 g IV q 8 hours  -follow-up blood cultures  -follow-up neck biopsy result     2   Sacral decubitus ulceration-Does not appear overtly infected  No purulence or overt cellulitis  Patient is status post I and D of an abscess in that region recently   The wound cultures had recently grown enterococcus and strep as well as anaerobes   -antibiotics as above  -surgery follow-up  -local wound     3   Possible healthcare associated pneumonia-verses aspiration pneumonitis   The patient's respiratory status is quite stable despite the radiographic findings  Nina Room is certainly at risk of aspiration as he was found down on the ground with possible loss of consciousness  At this point he is not requiring any oxygen support  Culture has grown MSSA  -antibiotics as above  -monitor respiratory status  -follow-up cultures     4   Neck mass-fluctuant   Possible necrotic lymph node   Possibly cancerous   Possibly infectious  Status post IR aspiration of the cystic collection  -ENT follow-up  -follow-up cytology, culture, AFB, and fungal  -no additional ID workup for now     5   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   Status post tracheostomy   Now with apparent necrotic lymph node suggesting the possibility of recurrence  Status post aspiration of the cystic collection  -ENT follow-up  -follow-up studies of aspirated collection    6  Leukopenia-possibly antibiotic related  Possibly other etiology   -discontinue Zosyn as above  -monitor CBC with diff  -hematology oncology follow-up  -no additional ID workup for now    Antibiotics:  Zosyn 5  Antibiotics 6    Subjective:  Patient has no fever, chills, sweats; no nausea, vomiting, diarrhea; no cough, shortness of breath; no increased pain  No new symptoms  He is anxious to get his procedure done today  Objective:  Vitals:  HR:  [56-95] 65  Resp:  [16-20] 20  BP: ()/(51-65) 108/63  SpO2:  [88 %-99 %] 98 %  Temp (24hrs), Av °F (36 7 °C), Min:97 6 °F (36 4 °C), Max:98 8 °F (37 1 °C)  Current: Temperature: 98 1 °F (36 7 °C)    Physical Exam:   General Appearance:  Alert, nontoxic, no acute distress  Face/neck  Left mandibular and neck swelling without change   Throat: Oropharynx moist without lesions  Lungs:   Decreased breath sounds bilaterally; respirations unlabored   Heart:  RRR; no murmur, rub or gallop   Abdomen:   Soft, non-tender, non-distended, positive bowel sounds  Extremities: No clubbing, cyanosis or edema   Skin: No new rashes or lesions  No draining wounds noted         Labs, Imaging, & Other studies:   All pertinent labs and imaging studies were personally reviewed    Results from last 7 days  Lab Units 17  0505 17  0517 17  0517   WBC Thousand/uL 2 95* 3 25* 3 47*   HEMOGLOBIN g/dL 9 2* 9 4* 9 1*   PLATELETS Thousands/uL 182 158 154       Results from last 7 days  Lab Units 17  0505 17  0517 17  0517  17  2112   SODIUM mmol/L 140 140 140  < > 139   POTASSIUM mmol/L 3 9 3 7 3 7  < > 4 3   CHLORIDE mmol/L 105 107 107  < > 102   CO2 mmol/L 30 29 28  < > 27   ANION GAP mmol/L 5 4 5  < > 10   BUN mg/dL 22 21 22  < > 24   CREATININE mg/dL 0 70 0 71 0 80  < > 0 99   EGFR ml/min/1 73sq m 89 88 84  < > 71   GLUCOSE RANDOM mg/dL 85 100 95  < > 91   CALCIUM mg/dL 8 0* 8 0* 8 1*  < > 8 7   AST U/L  --   --   --   --  46*   ALT U/L  --   --   --   --  27   ALK PHOS U/L  -- --   --   --  62   TOTAL PROTEIN g/dL  --   --   --   --  6 8   ALBUMIN g/dL  --   --   --   --  2 7*   BILIRUBIN TOTAL mg/dL  --   --   --   --  0 73   < > = values in this interval not displayed  Results from last 7 days  Lab Units 12/14/17  1454 12/14/17  0052 12/13/17 2116 12/13/17 2112   BLOOD CULTURE   --   --  No Growth After 4 Days  No Growth After 4 Days     SPUTUM CULTURE  Few Colonies of Staphylococcus aureus*  1+ Growth of   --   --   --    GRAM STAIN RESULT  2+ Polys  2+ Gram positive rods  1+ Gram positive cocci in pairs  --   --   --    INFLUENZA A PCR   --  None Detected  --   --    INFLUENZA B PCR   --  None Detected  --   --    RSV PCR   --  None Detected  --   --

## 2017-12-18 NOTE — CONSULTS
Consultation - Medical Oncology   Randy Jolly  81 y o  male MRN: [id number]  Unit/Bed#: -01 Encounter: [id number]    History of Present Illness   Physician Requesting Consult: Rosa M Cuevas MD  Reason for Consult / Principal Problem:  Laryngeal carcinoma  HPI: Randy Jolly  is a 81y o  year old male who presents with generalized weakness and a falling episode on December 13, 2017 prompting this hospital admission  On chest x-ray December 13, 2017 there was patchy infiltrate in the peripheral left lower lobe concerning for pneumonia  He has been treated with piperacillin-tazobactam 4 5 g every 6 hours  He denies cough or dyspnea at rest   However, the laryngectomy tube requires cleaning of thick mucus a few times per day  He is aware of lymph node enlargement at the left neck base  He denies pain other than the right pelvic area site of decubitus ulcerations  He has been getting around with the use of a walker  He takes small portions of food and about 3 cans of boost per day p o  He has not yet learned to speak with the laryngectomy amplifier  Past oncologic history:    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, radiation therapy with concomitant weekly cetuximab, June 9, 2017 to July 21, 2007      Total laryngectomy and neck resection on October 18, 2017 at the [facility] for squamous cell carcinoma, well differentiated, 7 cm involving larynx and supraglottic larynx, margins uninvolved by invasive carcinoma, lymphovascular and perineural invasion present, metastasis to 2 of 30 lymph nodes, the largest node 5 3 cm with extranodal extension present, involved lymph nodes are bilateral, pT4a pN2c    Inpatient consult to Oncology  Consult performed by: Sammi Henderson ordered by: Clare Valle:   General: Feels well, no chills or swaets  Head and Neck: No nosebleeds, no oral cavity or throat soreness  Cardiovascular: No chest pain, no lower extremity edema  Respriatory: No cough or dyspnea  GI: Appetite is poor, no abdominal pain, bowel habits formed and regular  : No urinary frequency  Musculoskeletal: No back pains or joint pain, see history of present illness  Skin: No skin rash  Neurological: No headache, no numbness  Hematologic: No easy bruising  Psychiatric: No emotional problems    Historical Information   Past Medical History:   Diagnosis Date    Asthma     Hypertension     Larynx cancer ([address] )      Past Surgical History:   Procedure Laterality Date    EGD AND COLONOSCOPY      TRACHEOSTOMY N/A 8/25/2017    Procedure: TRACHEOSTOMY (POSSIBLE AWAKE) , MICRO DIRECT LARYNGOSCOPY, Biopsy;  Surgeon: Maryuri Rodriguez MD;  [facility]: [facility];  Service: ENT    WOUND DEBRIDEMENT N/A 11/22/2017    Procedure: DEBRIDEMENT WOUND [facility]), sacrum;  Surgeon: Alto Fabry, DO;  Location[facility];  Service: General     Social History   History   Alcohol Use No     History   Drug Use No     History   Smoking Status    Former Smoker   Smokeless Tobacco    Never Used     Family History: History reviewed  No pertinent family history      Meds/Allergies   current meds:   Current Facility-Administered Medications   Medication Dose Route Frequency    acetaminophen (TYLENOL) rectal suppository 650 mg  650 mg Rectal Q4H PRN    albuterol (PROVENTIL HFA,VENTOLIN HFA) inhaler 2 puff  2 puff Inhalation Q6H PRN    ALPRAZolam (XANAX) tablet 0 25 mg  0 25 mg Oral 4x Daily PRN    calcium carbonate-vitamin D (OSCAL-D) 500 mg-200 units per tablet 1 tablet  1 tablet Oral Daily    collagenase (SANTYL) ointment   Topical Daily    doxazosin (CARDURA) tablet 2 mg  2 mg Oral HS    enoxaparin (LOVENOX) subcutaneous injection 40 mg  40 mg Subcutaneous Daily    fentaNYL (DURAGESIC) 12 mcg/hr TD 72 hr patch 12 mcg  12 mcg Transdermal Q72H    fentaNYL (DURAGESIC) 25 mcg/hr TD 72 hr patch 25 mcg  25 mcg Transdermal Q72H    fluticasone (FLOVENT HFA) 220 mcg/act inhaler 2 puff  2 puff Inhalation BID    fluticasone-salmeterol (ADVAIR) 250-50 mcg/dose inhaler 1 puff  1 puff Inhalation Q12H [address]    mirtazapine (REMERON) tablet 30 mg  30 mg Oral HS    OLANZapine (ZyPREXA ZYDIS) dispersible tablet 5 mg  5 mg Oral HS    oxyCODONE (ROXICODONE) immediate release tablet 10 mg  10 mg Oral HS    oxyCODONE (ROXICODONE) IR tablet 5 mg  5 mg Oral Q6H PRN    pantoprazole (PROTONIX) EC tablet 40 mg  40 mg Oral Early Morning    piperacillin-tazobactam (ZOSYN) 4 5 g in dextrose 5 % 100 mL IVPB  4 5 g Intravenous Q6H    polyethylene glycol (MIRALAX) packet 17 g  17 g Oral Daily PRN    senna-docusate sodium (SENOKOT S) 8 6-50 mg per tablet 1 tablet  1 tablet Oral BID    and PTA meds:  Prescriptions Prior to Admission   Medication    fentaNYL (DURAGESIC) 25 mcg/hr    melatonin 3 mg    mirtazapine (REMERON) 15 mg tablet    Mometasone Furo-Formoterol Fum (DULERA) 200-5 MCG/ACT AERO    omeprazole (PriLOSEC) 40 MG capsule    acetaminophen (TYLENOL) 325 mg tablet    albuterol (PROVENTIL HFA) 90 mcg/act inhaler    Calcium 600-200 MG-UNIT per tablet    collagenase (SANTYL) ointment    doxazosin (CARDURA) 2 mg tablet    mometasone (ASMANEX 14 METERED DOSES) 220 MCG/INH inhaler    oxyCODONE (OXY-IR) 5 MG capsule    polyethylene glycol (MIRALAX) 17 g packet    Sennosides-Docusate Sodium (SENEXON-S PO)     Allergies   Allergen Reactions    Aspirin     Cleocin [Clindamycin]        Objective   Vitals: Blood pressure 102/58, pulse 95, temperature 98 °F (36 7 °C), temperature source Oral, resp  rate 20, height 5' 7" (1 702 m), weight 56 8 kg (125 lb 3 5 oz), SpO2 95 %      Intake/Output Summary (Last 24 hours) at 12/17/17 1956  Last data filed at 12/17/17 1817   Gross per 24 hour   Intake              857 ml   Output              800 ml   Net               57 ml     Invasive Devices     Peripheral Intravenous Line            Peripheral IV 12/17/17 Left Forearm less than 1 day          Airway Surgical Airway 114 days                Physical Exam: General appearance: Appears comfortable at rest   He speaks in wisper  Head: Normocephalic  Eyes: Extraocular movements intact  Ears: No gross hearing deficit  Oropharynx: Clear  Neck: Supple, the laryngectomy tube site is unremarkable, there is a 3 cm lymph node at the base of the left neck and a 1 cm lymph node of the upper anterior left neck  Chest: No axillary adenopathy  Lungs: Clear to auscultation bilaterally  Heart: Regular rate and rhythm  Abdomen: No tenderness or distention; No inguinal  lymphadenopathy  Extremities: No lower extremity edema bilaterally  Skin: No rashes, there are dressings over the right lateral pelvic and right buttock area sites of skin ulceration  Neurologic: Grossly intact, no focal neurological deficit (the patient was examined in bed and gait was not observed )  Psychiatric: Oriented to person, place and time, normal mood and affect    Lab Results:     From December 13, 2017:  Blood cultures x2 no growth in 72 hours, PT INR is 1 22 and APTT is 31 seconds    From December 14, 2017:  Influenza A and B are not detected by PCR, sputum culture with a few colonies of Staphylococcus aureus    From December 17, 2017:  Creatinine is 0 71, WBC is 3 25, hemoglobin 9 4, platelets 213  CT of the soft tissues of the neck with contrast from December 16, 2017: There is a new 17 mm necrotic left level 1A node and a 35 mm necrotic left level 5B node, there is marked infiltration of the fat within the sublingual and submandibular spaces,  there are treatment related changes throughout the neck    CT of the chest with contrast from December 16, 2017: There is ground-glass consolidation in the left upper and lower lobes concerning for multifocal pneumonia and small left pleural effusion  Assessment/Plan     Assessment:    1   A  a new 17 mm necrotic left level 1A node and a 35 mm necrotic left level 5B node and marked infiltration of the fat within the sublingual and submandibular spaces on CT scan of the neck from December 16, 2017 concerning for recurrence laryngeal carcinoma  2   Multilobar pneumonia suggestive of aspiration pneumonia on piperacillin-tazobactam 4 5 g every 6 hours    3  Weight loss despite multiple speech therapy evaluations as to adequacy of swallowing    4  Generalized weakness, multifactorial, including pneumonia, weight loss as well as laryngeal carcinoma and treatment thereof  Plan:    IR image guided FNAB of the left supraclavicular lymph node biopsy is planned to confirm the suspected diagnosis of recurrent laryngeal carcinoma  PET-CT may be helpful in restaging the suspected recurrence    According to NCCN guidelines version 2 2 1017 for very advanced head and neck cancer, in the case of resectable local regional recurrence, then surgery with or without postoperative reirradiation or systemic therapy/reirradiation would be recommended  Whereas in the case of unresectable disease (which is more likely), then reirradiation with or without systemic therapy or systemic therapy alone  In the case of distant metastasis, then systemic therapy alone  Counseling / Coordination of Care  Total floor / unit time spent today 45 minutes  Greater than 50% of total time was spent with the patient and / or family counseling and / or coordination of care  A description of the counseling / coordination of care:  Diagnostic tests, impressions and recommendations were reviewed with the patient and with his daughter Naomi Sanchez by telephone ([phone])

## 2017-12-18 NOTE — SOCIAL WORK
CM met with the Pt at bedside to discuss D/C planning  PT/OT recommending home therapies, Pt agreeable  Referrals sent

## 2017-12-18 NOTE — BRIEF OP NOTE (RAD/CATH)
ASPIRATION LEFT SUPRACLAVICULAR NODULE:  Procedure Note    PATIENT NAME: Shu Castro  : 1935  MRN: [id number]     Pre-op Diagnosis:   1  Pneumonia involving left lung    2  Severe sepsis (HCC)    3  Elevated creatine kinase    4  Decubitus ulcer of sacral region, stage 4 ([address] )    5  Laryngeal stenosis    6  Severe protein-calorie malnutrition ([address] )    7  History of laryngeal cancer    8  Depression    9  HCAP (healthcare-associated pneumonia)    8  Lymphadenopathy of head and neck      Post-op Diagnosis:   1  Pneumonia involving left lung    2  Severe sepsis (HCC)    3  Elevated creatine kinase    4  Decubitus ulcer of sacral region, stage 4 ([address] )    5  Laryngeal stenosis    6  Severe protein-calorie malnutrition ([address] )    7  History of laryngeal cancer    8  Depression    9  HCAP (healthcare-associated pneumonia)    8  Lymphadenopathy of head and neck        Surgeon:   Michael Sims MD  Assistants:     No qualified resident was available  Estimated Blood Loss: None  Findings: Palpable left neck abnormality corresponds to a hypoechoic supraclavicular nodule  Specimens: 10 mL tea colored fluid aspirated with near complete resolution of cyst     Complications:  None      Anesthesia: Carlos Figueredo MD     Date: 2017  Time: 4:39 PM

## 2017-12-18 NOTE — PHYSICAL THERAPY NOTE
Physical Therapy Cancellation Note  Pt returning from procedure, per MICAH Raygoza), pt is to be on bed rest at this time  Will continue therapy when appropriate as ordered

## 2017-12-19 PROBLEM — R59.0 LYMPHADENOPATHY, SUPRACLAVICULAR: Status: ACTIVE | Noted: 2017-12-19

## 2017-12-19 PROBLEM — D72.819 LEUKOPENIA: Status: ACTIVE | Noted: 2017-12-19

## 2017-12-19 LAB
BASOPHILS # BLD AUTO: 0.01 THOUSANDS/ΜL (ref 0–0.1)
BASOPHILS NFR BLD AUTO: 0 % (ref 0–1)
EOSINOPHIL # BLD AUTO: 0.23 THOUSAND/ΜL (ref 0–0.61)
EOSINOPHIL NFR BLD AUTO: 6 % (ref 0–6)
ERYTHROCYTE [DISTWIDTH] IN BLOOD BY AUTOMATED COUNT: 16.1 % (ref 11.6–15.1)
HCT VFR BLD AUTO: 27.9 % (ref 36.5–49.3)
HGB BLD-MCNC: 9 G/DL (ref 12–17)
LYMPHOCYTES # BLD AUTO: 0.44 THOUSANDS/ΜL (ref 0.6–4.47)
LYMPHOCYTES NFR BLD AUTO: 12 % (ref 14–44)
MCH RBC QN AUTO: 32.3 PG (ref 26.8–34.3)
MCHC RBC AUTO-ENTMCNC: 32.3 G/DL (ref 31.4–37.4)
MCV RBC AUTO: 100 FL (ref 82–98)
MONOCYTES # BLD AUTO: 0.33 THOUSAND/ΜL (ref 0.17–1.22)
MONOCYTES NFR BLD AUTO: 9 % (ref 4–12)
NEUTROPHILS # BLD AUTO: 2.72 THOUSANDS/ΜL (ref 1.85–7.62)
NEUTS SEG NFR BLD AUTO: 73 % (ref 43–75)
NRBC BLD AUTO-RTO: 0 /100 WBCS
PLATELET # BLD AUTO: 190 THOUSANDS/UL (ref 149–390)
PMV BLD AUTO: 9.2 FL (ref 8.9–12.7)
RBC # BLD AUTO: 2.79 MILLION/UL (ref 3.88–5.62)
WBC # BLD AUTO: 3.74 THOUSAND/UL (ref 4.31–10.16)

## 2017-12-19 PROCEDURE — 85025 COMPLETE CBC W/AUTO DIFF WBC: CPT | Performed by: INTERNAL MEDICINE

## 2017-12-19 PROCEDURE — 94760 N-INVAS EAR/PLS OXIMETRY 1: CPT

## 2017-12-19 PROCEDURE — 97110 THERAPEUTIC EXERCISES: CPT

## 2017-12-19 PROCEDURE — 97116 GAIT TRAINING THERAPY: CPT

## 2017-12-19 RX ORDER — ACETAMINOPHEN 325 MG/1
650 TABLET ORAL EVERY 4 HOURS PRN
Status: DISCONTINUED | OUTPATIENT
Start: 2017-12-19 | End: 2017-12-21 | Stop reason: HOSPADM

## 2017-12-19 RX ORDER — OXYCODONE HYDROCHLORIDE 10 MG/1
10 TABLET ORAL 2 TIMES DAILY
Status: DISCONTINUED | OUTPATIENT
Start: 2017-12-19 | End: 2017-12-21 | Stop reason: HOSPADM

## 2017-12-19 RX ADMIN — MIRTAZAPINE 30 MG: 30 TABLET, FILM COATED ORAL at 22:15

## 2017-12-19 RX ADMIN — FLUTICASONE PROPIONATE AND SALMETEROL 1 PUFF: 50; 250 POWDER RESPIRATORY (INHALATION) at 08:35

## 2017-12-19 RX ADMIN — CEFAZOLIN SODIUM 1000 MG: 1 SOLUTION INTRAVENOUS at 14:47

## 2017-12-19 RX ADMIN — OXYCODONE HYDROCHLORIDE 10 MG: 10 TABLET ORAL at 18:57

## 2017-12-19 RX ADMIN — FLUTICASONE PROPIONATE 2 PUFF: 220 AEROSOL, METERED RESPIRATORY (INHALATION) at 08:36

## 2017-12-19 RX ADMIN — ACETAMINOPHEN 650 MG: 325 TABLET, FILM COATED ORAL at 12:52

## 2017-12-19 RX ADMIN — ENOXAPARIN SODIUM 40 MG: 40 INJECTION SUBCUTANEOUS at 08:34

## 2017-12-19 RX ADMIN — CEFAZOLIN SODIUM 1000 MG: 1 SOLUTION INTRAVENOUS at 05:58

## 2017-12-19 RX ADMIN — CEFAZOLIN SODIUM 1000 MG: 1 SOLUTION INTRAVENOUS at 22:07

## 2017-12-19 RX ADMIN — PANTOPRAZOLE SODIUM 40 MG: 40 TABLET, DELAYED RELEASE ORAL at 05:58

## 2017-12-19 RX ADMIN — COLLAGENASE SANTYL: 250 OINTMENT TOPICAL at 08:35

## 2017-12-19 RX ADMIN — FLUTICASONE PROPIONATE AND SALMETEROL 1 PUFF: 50; 250 POWDER RESPIRATORY (INHALATION) at 22:30

## 2017-12-19 RX ADMIN — OLANZAPINE 5 MG: 5 TABLET, ORALLY DISINTEGRATING ORAL at 22:14

## 2017-12-19 RX ADMIN — OXYCODONE HYDROCHLORIDE 5 MG: 5 TABLET ORAL at 08:34

## 2017-12-19 RX ADMIN — CALCIUM CARBONATE-VITAMIN D TAB 500 MG-200 UNIT 1 TABLET: 500-200 TAB at 08:34

## 2017-12-19 NOTE — PROGRESS NOTES
Progress note - Palliative and Supportive Care       Assessment:   Asthma    [facility] )    Asthma    Depression    Laryngeal stenosis    [facility]    Decubitus ulcer of sacral region, stage 4 ([facility] )    HCAP (healthcare-associated pneumonia)    Continuous opioid dependence ([place] [address] )    Anemia         Plan:  1  Pain Management:   Currently pain controlled with Fentayl patch and scheduled oxycodone and Prn oxycodone, continue current pain medications  2  Loose stools: Likely 2/2 Nutrition supplement, will monitor closely, if loose stool continues will switch Senna to PRN  3  Mood disorder: Currently on Remeron and Olanzapine, denies any mood issues, continue current meds  4  Goals of care: Pain management, wound care, Nutrition improved, is able to tolerate Nutritional supplements  Has refused peg tube in the past      Code Status: FULL - Level 1   Power of :  presumed to be daughter by [place]   Advance Directive / Living Will: Has a POA document   POLST:  No      Interval history:     Patient seen and examined, Laying in the bed says his bottom hurts, was given his  PRN Pain meds 1/2 hr back  Appetite is improved, he makes sure he drinks his nutrition shakes  Complains of some loose stools  Says his sleep is ok and his mood is better  Patient currently is on Duragesic patches and Prn oxycodone and scheduled oxycodone at night  He is able to ask for his PRN medications  He is planning to walk with PT today  Patient denies abdominal pain, nausea/vomiting, headaches, chest pain, shortness of breath  MEDICATIONS / ALLERGIES:  all current active meds have been reviewed and current meds:      ALLERGY:   Aspirin      Cleocin [Clindamycin      Vitals:    12/19/17 0900   BP:    Pulse: 60   Resp: 18   Temp:    SpO2: 99%       OBJECTIVE:    Physical Exam:  Awake alert oriented, No acute distress  Communicates with writing on a writing pad  Heent: JANETH Fisher  Sweeling on the left side of the mandible and neck noted  RS: CTABL, No rales, No rhonchi, No wheeze  CVS: S1S2+RRR  ABD: Soft, NT, BS+ all 4 quadrants  Neurological: No sensory-neural deficits Noted  PSYCH: Awake, Pleasant, Follows command  Speech: Affected  Mood and affect: Normal       Lab Results: I have personally reviewed pertinent labs  Imaging Studies:   EKG, Pathology, and Other Studies:   Counseling / Coordination of Care  Total floor / unit time spent today minutes  Greater than 50% of total time was spent with the patient and / or family counseling and / or coordination of care   A description of the counseling / coordination of care:

## 2017-12-19 NOTE — PROGRESS NOTES
Progress Note - Infectious Disease   Keely Bloodgood  81 y o  male MRN: [id number]  Unit/Bed#: -[place] Encounter: [id number]      Impression/Plan:  1   Severe sepsis-POA   Fever, leukocytosis, lactic acidosis   Unclear etiology  [place] all secondary to pneumonia or pneumonitis   Possibly secondary to an infected, necrotic neck lymph node or abscess   Patient remains hemodynamically stable and his fever and leukocytosis have resolved   Thus far he seems to be tolerating the antibiotics well without difficulty  -continue cefazolin  -follow-up blood cultures  -follow-up neck biopsy result  -if cultures negative tomorrow, likely discontinue the antibiotic     2   Sacral decubitus ulceration-Does not appear overtly infected  No purulence or overt cellulitis  Patient is status post I and D of an abscess in that region recently   The wound cultures had recently grown enterococcus and strep as well as anaerobes   -antibiotics as above  -surgery follow-up  -local wound     3   Possible healthcare associated pneumonia-verses aspiration pneumonitis   The patient's respiratory status is quite stable despite the radiographic findings  Makenzie Balderas is certainly at risk of aspiration as he was found down on the ground with possible loss of consciousness  At this point he is not requiring any oxygen support  Culture has grown MSSA  -antibiotics as above  -monitor respiratory status  -follow-up cultures     4   Neck mass-fluctuant   Possible necrotic lymph node   Possibly cancerous   Less likely infectious  Status post IR aspiration of the cystic collection  -ENT follow-up  -follow-up cytology, culture, AFB, and fungal  -no additional ID workup for now     5   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   Status post tracheostomy   Now with apparent necrotic lymph node suggesting the possibility of recurrence    Status post aspiration of the cystic collection  -ENT follow-up  -follow-up studies of aspirated collection     6   Leukopenia-possibly antibiotic related  Possibly other etiology  The white cell count has come up since discontinuing the Zosyn   -no additional Zosyn for now  -monitor CBC with diff  -hematology oncology follow-up  -no additional ID workup for now    Antibiotics:  Cefazolin 2  Antibiotics 7    Subjective:  Patient has no fever, chills, sweats; no nausea, vomiting, diarrhea; no cough, shortness of breath; no increased pain  No new symptoms  Objective:  Vitals:  HR:  [59-77] 60  Resp:  [18-19] 18  BP: ()/(41-65) 90/47  SpO2:  [94 %-100 %] 99 %  Temp (24hrs), Av 4 °F (36 9 °C), Min:98 3 °F (36 8 °C), Max:98 5 °F (36 9 °C)  Current: Temperature: 98 5 °F (36 9 °C)    Physical Exam:   General Appearance:  Alert, nontoxic, no acute distress  Throat: Oropharynx moist without lesions  Neck:   Left-sided neck and mandibular swelling   Lungs:   Decreased breath sounds bilaterally; respirations unlabored   Heart:  RRR; no murmur, rub or gallop   Abdomen:   Soft, non-tender, non-distended, positive bowel sounds  Extremities: No clubbing, cyanosis or edema   Skin: No new rashes or lesions  No draining wounds noted         Labs, Imaging, & Other studies:   All pertinent labs and imaging studies were personally reviewed    Results from last 7 days  Lab Units 17  0521 17  0505 17  0517   WBC Thousand/uL 3 74* 2 95* 3 25*   HEMOGLOBIN g/dL 9 0* 9 2* 9 4*   PLATELETS Thousands/uL 190 182 158       Results from last 7 days  Lab Units 17  0505 17  0517 17  0517  17  2112   SODIUM mmol/L 140 140 140  < > 139   POTASSIUM mmol/L 3 9 3 7 3 7  < > 4 3   CHLORIDE mmol/L 105 107 107  < > 102   CO2 mmol/L 30 29 28  < > 27   ANION GAP mmol/L 5 4 5  < > 10   BUN mg/dL 22 21 22  < > 24   CREATININE mg/dL 0 70 0 71 0 80  < > 0 99   EGFR ml/min/1 73sq m 89 88 84  < > 71   GLUCOSE RANDOM mg/dL 85 100 95  < > 91   CALCIUM mg/dL 8 0* 8 0* 8 1*  < > 8 7   AST U/L  -- --   --   --  46*   ALT U/L  --   --   --   --  27   ALK PHOS U/L  --   --   --   --  62   TOTAL PROTEIN g/dL  --   --   --   --  6 8   ALBUMIN g/dL  --   --   --   --  2 7*   BILIRUBIN TOTAL mg/dL  --   --   --   --  0 73   < > = values in this interval not displayed  Results from last 7 days  Lab Units 12/18/17  1104 12/14/17  1454 12/14/17  0052 12/13/17 2116 12/13/17 2112   BLOOD CULTURE   --   --   --  No Growth After 5 Days  No Growth After 5 Days     SPUTUM CULTURE   --  Few Colonies of Staphylococcus aureus*  1+ Growth of   --   --   --    GRAM STAIN RESULT  No Polys or Bacteria seen 2+ Polys  2+ Gram positive rods  1+ Gram positive cocci in pairs  --   --   --    BODY FLUID CULTURE, STERILE  No growth  --   --   --   --    INFLUENZA A PCR   --   --  None Detected  --   --    INFLUENZA B PCR   --   --  None Detected  --   --    RSV PCR   --   --  None Detected  --   --

## 2017-12-19 NOTE — ASSESSMENT & PLAN NOTE
· With >3 MDR risk factors  LLL; previous R basilar PNA has resolved   · Continue IV abx as above  · ID input appreciated  · The patient should have a repeat chest x-ray in approximately 6 weeks which can be ordered by his primary care provider to check on stability of pneumonia

## 2017-12-19 NOTE — PHYSICAL THERAPY NOTE
Physical Therapy Progress Note       12/19/17 0928   Pain Assessment   Pain Assessment 0-10   Pain Score 8   Pain Type Acute pain   Pain Location Buttocks   Restrictions/Precautions   Other Precautions Multiple lines; Fall Risk   General   Chart Reviewed Yes   Subjective   Subjective pt agreeable to perform PT session and very tired   Bed Mobility   Rolling R 5  Supervision   Rolling L 5  Supervision   Supine to Sit 3  Moderate assistance   Transfers   Sit to Stand 5  Supervision   Stand to Sit 5  Supervision   Stand pivot 5  Supervision   Additional Comments with RW    Ambulation/Elevation   Gait pattern Improper Weight shift; Inconsistent bharat   Gait Assistance 5  Supervision   Additional items Verbal cues   Assistive Device Rolling walker   Distance 10 short distance d/t dec BP standing 72/38   Stair Management Assistance Not tested   Balance   Static Sitting Good   Static Standing Fair +   Ambulatory Fair   Endurance Deficit   Endurance Deficit Yes   Exercises   Hamstring Stretch PROM   Quad Sets 10 reps   Glute Sets 10 reps   Hip Flexion 10 reps   Hip Abduction 10 reps   Hip Adduction 10 reps   Knee AROM Short Arc Quad 15 reps   Knee AROM Long Arc Quad 15 reps   Ankle Pumps 10 reps   Balance Training Standing   Balance training  Standing BP 72/38 HR 70 SITTING 102/49 HR 62   Assessment   Prognosis Good   Problem List Decreased strength;Decreased endurance; Impaired balance;Decreased mobility; Impaired judgement;Decreased safety awareness   Assessment Pt perform log rolling in bed and EOB sitting , no dizziness noted , pt BP low nsgng aware , pt also check in standing BP dec again , short walk and return to recliner with all needs in reach   Pt will cont toward goals monitor SXS maría BP    Barriers to Discharge Inaccessible home environment;Decreased caregiver support   Plan   Treatment/Interventions Spoke to nursing; Endurance training; Therapeutic exercise; Functional transfer training;LE strengthening/ROM; Bed mobility;Gait training   PT Frequency 5x/wk   Recommendation   Recommendation Home independently;Home PT       Joel John, SARA

## 2017-12-19 NOTE — PROGRESS NOTES
[facility]  Progress Note - Codey Headings  1935, 81 y o  male MRN: [id number]    Unit/Bed#: [facility] Encounter: [id number]    Primary Care Provider: Linette Roasrio MD   Date and time admitted to hospital: 12/13/2017  8:55 PM        * Severe sepsis ([address] )   Assessment & Plan    · POA, evidenced by hyperthermia, lactic acidosis, leukocytosis and PNA as suspected source  · Appreciate Infectious Disease input  · Sputum culture positive for MSSA  · Blood cultures negative  · Patient remains on cefazolin  · Aspiration on likely the cause of the patient's sepsis and pneumonia  · Will transition to oral antibiotics and discharge when okay with Infectious Disease  · Patient is hoping for discharge today  HCAP (healthcare-associated pneumonia)   Assessment & Plan    · With >3 MDR risk factors  LLL; previous R basilar PNA has resolved   · Continue IV abx as above  · ID input appreciated  · The patient should have a repeat chest x-ray in approximately 6 weeks which can be ordered by his primary care provider to check on stability of pneumonia  History of laryngeal cancer   Assessment & Plan    · S/p laryngectomy at [facility]  Continue trach care  · Follow speech recommendations         Lymphadenopathy, supraclavicular   Assessment & Plan    · This is status post biopsy December 18, 2017  · Results are pending at this time  · Can be followed up on an outpatient basis        Severe protein-calorie malnutrition ([address] )   Assessment & Plan    · Patient has been offered a PEG tube for parental feedings and has declined  · Continue supplemental nutrition with boost         Decubitus ulcer of sacral region, stage 4 ([facility] [address] )   Assessment & Plan    · Was infected during last admission with abscess s/p I&D 11/22 with general surgery and abx course  · Does not appear infected   · Frequent repositioning/offloading         Depression   Assessment & Plan    · Continue Remeron   During last admission, psych attempted to see patient 2x but he refused         Anemia   Assessment & Plan    · hgb  stable  Monitor         Leukopenia   Assessment & Plan    · Likely due to patient's infection  · Will monitor            VTE Pharmacologic Prophylaxis:   Pharmacologic: Enoxaparin (Lovenox)  Mechanical VTE Prophylaxis in Place: Yes    Patient Centered Rounds: I have performed bedside rounds with nursing staff today  Discussions with Specialists or Other Care Team Provider:  Primary RN and case management    Education and Discussions with Family / Patient:  Patient and his daughter    Time Spent for Care: 20 minutes  More than 50% of total time spent on counseling and coordination of care as described above  Current Length of Stay: 5 day(s)    Current Patient Status: Inpatient   Certification Statement: The patient will continue to require additional inpatient hospital stay due to Sepsis and need to ensure that his lymphadenopathy is not infectious  Await biopsy for bacterial culture result  Discharge Plan / Estimated Discharge Date:  Discussed with Infectious Disease, the tentative discharge 2017      Code Status: Level 1 - Full Code      Subjective:   Has pain to his buttocks and feels as though he needs further rehab  No chest pain or shortness of breath this morning  Tolerating his diet well  Feels less strong today than yesterday  Objective:     Vitals:   Temp (24hrs), Av 1 °F (36 7 °C), Min:97 7 °F (36 5 °C), Max:98 5 °F (36 9 °C)    HR:  [59-77] 60  Resp:  [16-20] 18  BP: ()/(41-65) 90/47  SpO2:  [88 %-100 %] 99 %  Body mass index is 19 61 kg/m²  Input and Output Summary (last 24 hours):        Intake/Output Summary (Last 24 hours) at 17 1007  Last data filed at 17 0900   Gross per 24 hour   Intake              800 ml   Output             1075 ml   Net             -275 ml       Physical Exam:     Physical Exam   Constitutional: He is oriented to person, place, and time  He appears well-nourished  No distress  HENT:   Head: Normocephalic  Eyes: Pupils are equal, round, and reactive to light  Neck: Normal range of motion  Cardiovascular: Normal rate and regular rhythm  No murmur heard  Pulmonary/Chest: Effort normal and breath sounds normal    Abdominal: Soft  Bowel sounds are normal    Musculoskeletal: Normal range of motion  He exhibits no edema  Neurological: He is alert and oriented to person, place, and time  Skin: Skin is warm and dry  Psychiatric: He has a normal mood and affect  His behavior is normal  Thought content normal    Nursing note and vitals reviewed  Additional Data:     Labs:      Results from last 7 days  Lab Units 12/19/17  0521   WBC Thousand/uL 3 74*   HEMOGLOBIN g/dL 9 0*   HEMATOCRIT % 27 9*   PLATELETS Thousands/uL 190   NEUTROS PCT % 73   LYMPHS PCT % 12*   MONOS PCT % 9   EOS PCT % 6       Results from last 7 days  Lab Units 12/18/17  0505  12/13/17 2112   SODIUM mmol/L 140  < > 139   POTASSIUM mmol/L 3 9  < > 4 3   CHLORIDE mmol/L 105  < > 102   CO2 mmol/L 30  < > 27   BUN mg/dL 22  < > 24   CREATININE mg/dL 0 70  < > 0 99   CALCIUM mg/dL 8 0*  < > 8 7   TOTAL PROTEIN g/dL  --   --  6 8   BILIRUBIN TOTAL mg/dL  --   --  0 73   ALK PHOS U/L  --   --  62   ALT U/L  --   --  27   AST U/L  --   --  46*   GLUCOSE RANDOM mg/dL 85  < > 91   < > = values in this interval not displayed  Results from last 7 days  Lab Units 12/13/17 2112   INR  1 22*       * I Have Reviewed All Lab Data Listed Above  Recent Cultures (last 7 days):       Results from last 7 days  Lab Units 12/18/17  1104 12/14/17  1454 12/14/17  0052 12/13/17 2116 12/13/17 2112   BLOOD CULTURE   --   --   --  No Growth After 5 Days  No Growth After 5 Days     SPUTUM CULTURE   --  Few Colonies of Staphylococcus aureus*  1+ Growth of   --   --   --    GRAM STAIN RESULT  No Polys or Bacteria seen 2+ Polys  2+ Gram positive rods  1+ Gram positive cocci in pairs  --   --   --    INFLUENZA A PCR   --   --  None Detected  --   --    INFLUENZA B PCR   --   --  None Detected  --   --    RSV PCR   --   --  None Detected  --   --        Last 24 Hours Medication List:     calcium carbonate-vitamin D 1 tablet Oral Daily   cefazolin 1,000 mg Intravenous Q8H   collagenase  Topical Daily   doxazosin 2 mg Oral HS   enoxaparin 40 mg Subcutaneous Daily   fentaNYL 12 mcg Transdermal Q72H   fentaNYL 25 mcg Transdermal Q72H   fluticasone 2 puff Inhalation BID   fluticasone-salmeterol 1 puff Inhalation Q12H JAMES   mirtazapine 30 mg Oral HS   OLANZapine 5 mg Oral HS   oxyCODONE 10 mg Oral HS   pantoprazole 40 mg Oral Early Morning   senna-docusate sodium 1 tablet Oral BID        Today, Patient Was Seen By: AMY Sanchez    ** Please Note: Dragon 360 Dictation voice to text software may have been used in the creation of this document   **

## 2017-12-19 NOTE — CASE MANAGEMENT
Continued Stay Review    Date: 12/19/2017  Vital Signs: BP (!) 90/47   Pulse 60   Temp 98 5 °F (36 9 °C) (Oral)   Resp 18   Ht 5' 7" (1 702 m)   Wt 56 8 kg (125 lb 3 5 oz)   SpO2 99%   BMI 19 61 kg/m²     Medications:   Scheduled Meds:   calcium carbonate-vitamin D 1 tablet Oral Daily   cefazolin 1,000 mg Intravenous Q8H   collagenase  Topical Daily   doxazosin 2 mg Oral HS   enoxaparin 40 mg Subcutaneous Daily   fentaNYL 12 mcg Transdermal Q72H   fentaNYL 25 mcg Transdermal Q72H   fluticasone 2 puff Inhalation BID   fluticasone-salmeterol 1 puff Inhalation Q12H JAMES   mirtazapine 30 mg Oral HS   OLANZapine 5 mg Oral HS   oxyCODONE 10 mg Oral BID   pantoprazole 40 mg Oral Early Morning   senna-docusate sodium 1 tablet Oral BID     Continuous Infusions:    PRN Meds:   acetaminophen    albuterol    ALPRAZolam    oxyCODONEx3    polyethylene glycol    Abnormal Labs/Diagnostic Results:wbc 3 74---hgb 9 0/27 9  Sputum few colonies of staphy aureus  Age/Sex: 81 y o  male     Assessment/Plan: Principal Problem:    Severe sepsis ([address] )  Active Problems:    Depression    Laryngeal stenosis    Decubitus ulcer of sacral region, stage 4 (HCC)    HCAP (healthcare-associated pneumonia)    Anemia    History of laryngeal cancer    Fall        Plan:     · Severe sepsis, POA  ? As evidenced by fever, leukocytosis, and lactic acidosis  Infectious Disease team is following  Their recommendations are appreciated  Patient has remained afebrile today  ? The etiology of his severe sepsis is unclear, but is likely secondary to pneumonia versus necrotic lymph node  ? There was originally concern for possible aspiration pneumonia, however the patient is physically unable to aspirate after his laryngectomy  Of note, this patient did have a recent hospitalization and subsequent discharge to a [facility]  ?  There is also originally concern for the possibility of infection of the patient's stage IV sacral decubitus ulcer which was present on admission and status post I and D by the surgery team in the past, however the patient was evaluated by surgery this admission who felt that there was no evidence of infection and this was unlikely the cause of the patient's sepsis  ? Sputum cultures grew few colonies of Staphylococcus aureus  Id has adjusted antibiotics from IV Zosyn to now IV Ancef  Antibiotics per ID recommendations  ? Blood cultures with no growth after 4 days x2  Flu and RSV both negative  ? Follow-up results of FNAB  · Left lobe pneumonia  ? Chest x-ray on admission revealed patchy infiltrate in the peripheral left lower lobe, and CT of the chest which was performed for further workup of his supraclavicular lymph node showed consolidation in the left upper and lower lobe concerning for multifocal pneumonia  The patient is physically unable to aspirate  He did have recent hospitalization and subsequent discharge to [facility] following - abx as per above  He is afebrile today  · Supraclavicular lymphadenopathy  ? Soft tissue neck ultrasound revealed stable complex cystic left neck mass, most compatible with necrotic lymph node  ENT was consulted given the patient's history  Per ENT, this is highly suspicious for recurrent cancer  Id would also like to rule out infectious causes  ? The patient underwent FNAB with IR today for further evaluation  Follow-up cytology, fungal culture, AFB, and culture/Gram stain  ? The patient underwent CT of the neck and chest with contrast for further workup of this node, and the results revealed possible tumor recurrence in the oropharynx and two definite pathologic nodes, left 1A and left 5B change per the radiology report  CT of the chest did not reveal any mediastinal or hilar lymphadenopathy  ? The consultation of the oncology team was sought    Of note, the patient is known to Dr Abdifatah Cooper of oncology and Dr Jabari Reyes of ENT as well as oncology/ENT groups at [facility] but does not feel strong enough to make the trip to [facility] and wants to follow up here  ? Oncology following  They recommended PET-CT, will defer to Oncology whether this is to be done inpatient or outpatient  ? Await the collaborative recommendation of ENT and Oncology regarding surgery/chemo/radiation options  · History of laryngeal cancer status post laryngectomy with Larytube  ? Known to Dr Brice Miguel and Dr Ashley Lopez as well as [facility]    ? SLP to check on HME cassettes for this patient  Patient will likely need a prescription for these at discharge  ? 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 Larytube cleaning per his home regimen - peroxide and sterile water  · Stage IV sacral decubitus ulcer, POA  ? S/p I&D by surgery team on 11/22  Patient was evaluated by the surgery team admission who felt that there was no evidence of infection and that this was unlikely to be the cause of the patient's sepsis  Ulcer care per wound care recommendations   ? Per nursing, dressing was changed today  · Depression  ? Patient was evaluated by the psychiatry team yesterday  ? His Remeron dose was increased yesterday, now on 30 mg q h s   ? Psychiatry also added p r n  Xanax, 0 25 mg  ? Palliative care added Zyprexa yesterday, 5 mg at bedtime  · Anemia and leukopenia  ? Hemoglobin stable  Monitor  Monitor white count, now low  · Severe protein calorie malnutrition  ? Nutrition following  They had recommended antral nocturnal feeding, however the patient has refused PEG tube  The patient's daughter declined neuropsychiatric evaluation because she feels as though her father has capacity to make his own medical decisions  ? Patient on Zyprexa which can cause weight gain, and his Remeron dose was increased yesterday which can increase appetite  ? Continue supplement  ?  The dietary services have been coming to the patient's room since this was addressed yesterday to aid in helping this patient to order what he wants to eat       ASPIRATION LEFT SUPRACLAVICULAR NODULE:  Procedure Note 12/18/2017   dysphagia diet  /17/17 1415  Inpatient consult to Oncology Once   Specialty: Hematology and Oncology Provider: Nomi Tinoco MD Comments: Patient is known to Dr Avery Watson       12/17/17 1417   12/16/17 1019  Inpatient consult to ENT Once   Specialty: Otolaryngology Provider: Samreen Child MD Comments: Stable complex cystic left nec       12/16/17 1023   12/15/17 1129  Inpatient consult to Psychiatry Once   Specialty: Psychiatry Provider: Lattie Hatchet, MD Comments: Patient has refused consult in       12/15/17 1129   12/15/17 1105  Inpatient consult to Palliative Care Once   Specialty: Hospice and Palliative Medicine Provider: Marguerite Scanlon DO    12/15/17 1107   12/14/17 0906  Inpatient consult to Acute Care Surgery Once   Specialty: General Surgery Provider: Maggie Werner DO Comments: Decub ulcer s/p recent I&D by        12/14/17 1161   12/14/17 0405  Inpatient consult to Wound Care Once   Specialty: Wound Care Provider: Hanna Verdugo RN    12/14/17 0405   12/14/17 0106  Inpatient consult to Infectious Diseases Once   Specialty: Infectious Diseases Provider: (Not yet assigned)    12/14/17 0105   12/14/17 0045  Inpatient consult to Wound Care Once   Specialty: Wound Care Provider: (Not yet assigned)         Pt/ot  Discharge Plan: home vs rehab

## 2017-12-19 NOTE — ASSESSMENT & PLAN NOTE
· Was infected during last admission with abscess s/p I&D 11/22 with general surgery and abx course  · Does not appear infected   · Frequent repositioning/offloading

## 2017-12-19 NOTE — PLAN OF CARE
Problem: PHYSICAL THERAPY ADULT  Goal: Performs mobility at highest level of function for planned discharge setting  See evaluation for individualized goals  Treatment/Interventions: Spoke to nursing, Endurance training, Therapeutic exercise, Functional transfer training, LE strengthening/ROM, Bed mobility, Gait training          See flowsheet documentation for full assessment, interventions and recommendations  Outcome: Progressing  Prognosis: Good  Problem List: Decreased strength, Decreased endurance, Impaired balance, Decreased mobility, Impaired judgement, Decreased safety awareness  Assessment: Pt perform log rolling in bed and EOB sitting , no dizziness noted , pt BP low nsgng aware , pt also check in standing BP dec again , short walk and return to recliner with all needs in reach   Pt will cont toward goals monitor SXS maría BP   Barriers to Discharge: Inaccessible home environment, Decreased caregiver support  Barriers to Discharge Comments: lives alone with no MISTY  fully indep PTA although has 1 local dtr but works  Recommendation: Home independently, Home PT     PT - OK to Discharge: No    See flowsheet documentation for full assessment

## 2017-12-19 NOTE — ASSESSMENT & PLAN NOTE
· S/p laryngectomy at [facility]   Continue trach care  · Follow speech recommendations

## 2017-12-19 NOTE — ASSESSMENT & PLAN NOTE
· Patient has been offered a PEG tube for parental feedings and has declined    · Continue supplemental nutrition with boost

## 2017-12-19 NOTE — ASSESSMENT & PLAN NOTE
· This is status post biopsy December 18, 2017  · Results are pending at this time    · Can be followed up on an outpatient basis

## 2017-12-19 NOTE — PLAN OF CARE

## 2017-12-19 NOTE — ASSESSMENT & PLAN NOTE
· POA, evidenced by hyperthermia, lactic acidosis, leukocytosis and PNA as suspected source  · Appreciate Infectious Disease input  · Sputum culture positive for MSSA  · Blood cultures negative  · Patient remains on cefazolin  · Aspiration on likely the cause of the patient's sepsis and pneumonia  · Will transition to oral antibiotics and discharge when okay with Infectious Disease  · Patient is hoping for discharge today

## 2017-12-20 PROCEDURE — 92597 ORAL SPEECH DEVICE EVAL: CPT

## 2017-12-20 PROCEDURE — 97112 NEUROMUSCULAR REEDUCATION: CPT

## 2017-12-20 PROCEDURE — 97535 SELF CARE MNGMENT TRAINING: CPT

## 2017-12-20 PROCEDURE — 97116 GAIT TRAINING THERAPY: CPT

## 2017-12-20 PROCEDURE — 94760 N-INVAS EAR/PLS OXIMETRY 1: CPT

## 2017-12-20 PROCEDURE — 97110 THERAPEUTIC EXERCISES: CPT

## 2017-12-20 RX ORDER — FENTANYL 12 UG/H
12 PATCH TRANSDERMAL
Status: DISCONTINUED | OUTPATIENT
Start: 2017-12-20 | End: 2017-12-21 | Stop reason: HOSPADM

## 2017-12-20 RX ADMIN — ENOXAPARIN SODIUM 40 MG: 40 INJECTION SUBCUTANEOUS at 08:58

## 2017-12-20 RX ADMIN — OXYCODONE HYDROCHLORIDE 10 MG: 10 TABLET ORAL at 08:58

## 2017-12-20 RX ADMIN — PANTOPRAZOLE SODIUM 40 MG: 40 TABLET, DELAYED RELEASE ORAL at 06:50

## 2017-12-20 RX ADMIN — OXYCODONE HYDROCHLORIDE 10 MG: 10 TABLET ORAL at 17:19

## 2017-12-20 RX ADMIN — FLUTICASONE PROPIONATE AND SALMETEROL 1 PUFF: 50; 250 POWDER RESPIRATORY (INHALATION) at 21:26

## 2017-12-20 RX ADMIN — MIRTAZAPINE 30 MG: 30 TABLET, FILM COATED ORAL at 21:41

## 2017-12-20 RX ADMIN — FLUTICASONE PROPIONATE AND SALMETEROL 1 PUFF: 50; 250 POWDER RESPIRATORY (INHALATION) at 08:59

## 2017-12-20 RX ADMIN — DOXAZOSIN 2 MG: 2 TABLET ORAL at 21:36

## 2017-12-20 RX ADMIN — COLLAGENASE SANTYL: 250 OINTMENT TOPICAL at 08:59

## 2017-12-20 RX ADMIN — OLANZAPINE 5 MG: 5 TABLET, ORALLY DISINTEGRATING ORAL at 21:29

## 2017-12-20 RX ADMIN — FENTANYL 25 MCG: 25 PATCH, EXTENDED RELEASE TRANSDERMAL at 09:09

## 2017-12-20 RX ADMIN — FLUTICASONE PROPIONATE 2 PUFF: 220 AEROSOL, METERED RESPIRATORY (INHALATION) at 21:28

## 2017-12-20 RX ADMIN — OXYCODONE HYDROCHLORIDE 5 MG: 5 TABLET ORAL at 10:14

## 2017-12-20 RX ADMIN — CEFAZOLIN SODIUM 1000 MG: 1 SOLUTION INTRAVENOUS at 06:53

## 2017-12-20 RX ADMIN — CALCIUM CARBONATE-VITAMIN D TAB 500 MG-200 UNIT 1 TABLET: 500-200 TAB at 08:58

## 2017-12-20 RX ADMIN — FENTANYL 12 MCG: 12.5 PATCH TRANSDERMAL at 09:58

## 2017-12-20 NOTE — PROGRESS NOTES
Progress Note - Infectious Disease   Kulwinder Zheng  81 y o  male MRN: [id number]  Unit/Bed#: -01 Encounter: [id number]      Impression/Plan:  1   Severe sepsis-POA   Fever, leukocytosis, lactic acidosis   Unclear etiology  [place] all secondary to pneumonia or pneumonitis   Possibly secondary to an infected, necrotic neck lymph node or abscess   Patient remains hemodynamically stable and his fever and leukocytosis have resolved   Thus far he seems to be tolerating the antibiotics well without difficulty  He has completed more than 7 days of intravenous antibiotics  -discontinue cefazolin  -no additional antibiotics for now  -follow-up blood cultures  -follow-up neck biopsy result     2   Sacral decubitus ulceration-Does not appear overtly infected  No purulence or overt cellulitis  Patient is status post I and D of an abscess in that region recently   The wound cultures had recently grown enterococcus and strep as well as anaerobes   -antibiotics as above  -surgery follow-up  -local wound     3   Possible healthcare associated pneumonia-verses aspiration pneumonitis   The patient's respiratory status is quite stable despite the radiographic findings  Tania Garsia is certainly at risk of aspiration as he was found down on the ground with possible loss of consciousness  At this point he is not requiring any oxygen support   Culture has grown MSSA    Patient does not have any respiratory symptoms, is afebrile, has completed more than 7 days of antibiotics  -antibiotics as above  -monitor respiratory status  -follow-up cultures     4   Neck mass-fluctuant   Possible necrotic lymph node   Possibly cancerous   Less likely infectious   Status post IR aspiration of the cystic collection  -ENT follow-up  -follow-up culture, AFB, and fungal  -no additional ID workup for now     5   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   Status post tracheostomy   Now with apparent necrotic lymph node suggesting the possibility of recurrence   Status post aspiration of the cystic collection  The cytology is nondiagnostic  -ENT follow-up  -suspect patient will need excisional biopsy     6   Leukopenia-possibly antibiotic related   Possibly other etiology  The white cell count has come up since discontinuing the Zosyn   -no additional Zosyn for now  -monitor CBC with diff  -hematology oncology follow-up  -no additional ID workup for now    Antibiotics:  Cefazolin 3  Antibiotics 8    Subjective:  Patient has no fever, chills, sweats; no nausea, vomiting, diarrhea; no cough, shortness of breath; no pain  No new symptoms  He seems discouraged    Objective:  Vitals:  HR:  [65-94] 65  Resp:  [18] 18  BP: ()/(48-60) 126/52  SpO2:  [97 %-98 %] 97 %  Temp (24hrs), Av 7 °F (37 1 °C), Min:97 8 °F (36 6 °C), Max:99 6 °F (37 6 °C)  Current: Temperature: 97 8 °F (36 6 °C)    Physical Exam:   General Appearance:  Alert, nontoxic, no acute distress  Throat: Oropharynx moist without lesions  Face:   Left mandibular, and supraclavicular areas of swelling noted   Lungs:   Decreased breath sounds bilaterally; respirations unlabored   Heart:  RRR; no murmur, rub or gallop   Abdomen:   Soft, non-tender, non-distended, positive bowel sounds  Extremities: No clubbing, cyanosis or edema   Skin: No new rashes or lesions  No draining wounds noted         Labs, Imaging, & Other studies:   All pertinent labs and imaging studies were personally reviewed    Results from last 7 days  Lab Units 17  0521 17  0505 17  0517   WBC Thousand/uL 3 74* 2 95* 3 25*   HEMOGLOBIN g/dL 9 0* 9 2* 9 4*   PLATELETS Thousands/uL 190 182 158       Results from last 7 days  Lab Units 17  0505 17  0517 17  0517  17  2112   SODIUM mmol/L 140 140 140  < > 139   POTASSIUM mmol/L 3 9 3 7 3 7  < > 4 3   CHLORIDE mmol/L 105 107 107  < > 102   CO2 mmol/L 30 29 28  < > 27   ANION GAP mmol/L 5 4 5  < > 10   BUN mg/dL 22 21 22 < > 24   CREATININE mg/dL 0 70 0 71 0 80  < > 0 99   EGFR ml/min/1 73sq m 89 88 84  < > 71   GLUCOSE RANDOM mg/dL 85 100 95  < > 91   CALCIUM mg/dL 8 0* 8 0* 8 1*  < > 8 7   AST U/L  --   --   --   --  46*   ALT U/L  --   --   --   --  27   ALK PHOS U/L  --   --   --   --  62   TOTAL PROTEIN g/dL  --   --   --   --  6 8   ALBUMIN g/dL  --   --   --   --  2 7*   BILIRUBIN TOTAL mg/dL  --   --   --   --  0 73   < > = values in this interval not displayed  Results from last 7 days  Lab Units 12/18/17  1104 12/14/17  1454 12/14/17  0052 12/13/17 2116 12/13/17 2112   BLOOD CULTURE   --   --   --  No Growth After 5 Days  No Growth After 5 Days     SPUTUM CULTURE   --  Few Colonies of Staphylococcus aureus*  1+ Growth of   --   --   --    GRAM STAIN RESULT  No Polys or Bacteria seen 2+ Polys  2+ Gram positive rods  1+ Gram positive cocci in pairs  --   --   --    BODY FLUID CULTURE, STERILE  No growth  --   --   --   --    INFLUENZA A PCR   --   --  None Detected  --   --    INFLUENZA B PCR   --   --  None Detected  --   --    RSV PCR   --   --  None Detected  --   --

## 2017-12-20 NOTE — SPEECH THERAPY NOTE
Speech Pathology Communication Assessment    Patient Name: Kirk John  Today's Date: 12/20/2017     Problem List  Patient Active Problem List   Diagnosis    Asthma    Depression    Laryngeal stenosis    Severe sepsis ([address]    Decubitus ulcer of sacral region, stage 4 ([address] )    HCAP (healthcare-associated pneumonia)    Anemia    Severe protein-calorie malnutrition ([address] )    History of laryngeal cancer    Fall    Lymphadenopathy, supraclavicular    Leukopenia     Past Medical History  Past Medical History:   Diagnosis Date    Asthma     Hypertension     [facility] cancer [facility] [address] [facility]      Past Surgical History  Past Surgical History:   Procedure Laterality Date    EGD AND COLONOSCOPY      TRACHEOSTOMY N/A 8/25/2017    Procedure: TRACHEOSTOMY (POSSIBLE AWAKE) , MICRO DIRECT LARYNGOSCOPY, Biopsy;  Surgeon: Aiyana Lockett MD;  [facility];  Service: ENT    WOUND DEBRIDEMENT N/A 11/22/2017    Procedure: DEBRIDEMENT WOUND ([address]), sacrum;  Surgeon: Chantal Silva DO;  [facility];  Service: General        12/20/17 1515   Cognition   Overall Cognitive Status WFL   Arousal/Participation Alert   Attention Within functional limits   Orientation Level Oriented X4   Following Commands Follows all commands and directions without difficulty   Speech/Swallow Mechanism Exam   Facial Symmetry WFL   Facial Strength WFL   Facial ROM WFL   Lingual Strength WFL   Lingual ROM WFL   Lingual Sensation WFL   Mandible WFL   Tracheostomy (Laryngectomy tube in place)   Laryngectomy Evaluation   Evalulation and Education completed re: Anatomy for speech; Physiology for speech; Communication options; Electrolarynx use;Esophageal speech   Performance Electrolarynx use:  poor sound penetration through neck (2* fibrosis)  Demo & initiated training in intra-oral adaptor use  A min of spontaneous esophageal sound produced (training to enhance sound production also begun)    Reduced articulatory effort with speech attempts  Gestures & writes well  Summary   Speech/Language Summary Mr Daniel Andrews is jose sánchez 82 yo M c labor intensive communication 2* total laryngectomy c good gesturing and writing (slow & limited methods)  but c LIMITED esophageal sound and poor electrolarynx use  He also presented c minimal larygnectomy supplies (requested filters for vic tube-->samples obtained & provided)   Goals   Laryngectomy Goals Pt will demo electrolarynx use (on off, correct placement) c min assist needed  ;Pt will demo electrolarynx care; Pt will demo increasing esophageal speech sound production (begin c 75% of trials of 1-2 syllable utterances); Pt will demo communication strategies (slow & effortful articulation, rewording/rephrasing etc) c min cues

## 2017-12-20 NOTE — PLAN OF CARE
Problem: OCCUPATIONAL THERAPY ADULT  Goal: Performs self-care activities at highest level of function for planned discharge setting  See evaluation for individualized goals  Treatment Interventions: ADL retraining, Functional transfer training, Endurance training          See flowsheet documentation for full assessment, interventions and recommendations  Outcome: Progressing  Limitation: Decreased ADL status, Decreased endurance  Prognosis: Fair  Assessment: Pt participated in skilled OT session focusing on ADL retraining, activity tolerance, activity modification, use of AE, and functional transfers  See above for details on ADL function  Pt requiring increased assist today for increased pain in buttocks  Pt reports he wouldn't wear socks at home if he had this problem  Pt does not wish to use LHAE for Dressing  Pts daughter present for session  Pt reports that he feels weak and does not feel ready to return home  Pt EDU on increased assist at home may not be a bad idea, or D/C to daughters place to have increased assist  Pt reports that he may want to go to rehab, but Pt EDU on current level of function and he may not meet requirement for skilled inpatient rehab  Continue to recommend HOME OT services  Pt continues to require skilled OT services to increase overall functional independence  and safety w/ ADLs and functional transfers         OT Discharge Recommendation: Home OT

## 2017-12-20 NOTE — ASSESSMENT & PLAN NOTE
· ID discontinued ABX today  Completed course  No further ABX  · The patient should have a repeat chest x-ray in approximately 6 weeks which can be ordered by his primary care provider to check on stability of pneumonia

## 2017-12-20 NOTE — ASSESSMENT & PLAN NOTE
· Was infected during last admission with abscess s/p I&D 11/22 with general surgery and abx course  · Does not appear infected at this time  · Frequent repositioning/offloading

## 2017-12-20 NOTE — OCCUPATIONAL THERAPY NOTE
Occupational Therapy Progress Note      Patient Name: Sourav Wang  Today's Date: 12/20/2017    Problem List:   Patient Active Problem List   Diagnosis    Asthma    Depression    Laryngeal stenosis    Severe sepsis (HCC)    Decubitus ulcer of sacral region, stage 4 ([facility] )    HCAP (healthcare-associated pneumonia)    Anemia    [facility]    History of laryngeal cancer    Fall    Lymphadenopathy, supraclavicular    Leukopenia        12/20/17 1035   Restrictions/Precautions   Weight Bearing Precautions Per Order No   Other Precautions Multiple lines   Pain Assessment   Pain Assessment 0-10   Pain Score 7   Pain Type Acute pain   Pain Location Buttocks   ADL   Where Assessed Sitting at sink   LB Bathing Deficit Right lower leg including foot; Left lower leg including foot   LB Dressing Deficit Don/doff R sock; Don/doff L sock   LB Dressing Comments Pt C/O increased buttocks pain and Reports increased difficulty w/ LB dressing in sitting  Pt requires assist for    Transfers   Sit to Stand 5  Supervision   Stand to Sit 5  Supervision   Stand pivot 5  Supervision   Functional Mobility   Functional Mobility 5  Supervision   Cognition   Overall Cognitive Status WFL   Activity Tolerance   Activity Tolerance Patient tolerated treatment well   Assessment   Assessment Pt participated in skilled OT session focusing on ADL retraining, activity tolerance, activity modification, use of AE, and functional transfers  See above for details on ADL function  Pt requiring increased assist today for increased pain in buttocks  Pt reports he wouldn't wear socks at home if he had this problem  Pt does not wish to use LHAE for Dressing  Pts daughter present for session  Pt reports that he feels weak and does not feel ready to return home   Pt EDU on increased assist at home may not be a bad idea, or D/C to daughters place to have increased assist  Pt reports that he may want to go to rehab, but Pt EDU on current level of function and he may not meet requirement for skilled inpatient rehab  Continue to recommend HOME OT services  Pt continues to require skilled OT services to increase overall functional independence  and safety w/ ADLs and functional transfers  Plan   Treatment Interventions ADL retraining;Functional transfer training; Endurance training   Goal Expiration Date 12/24/17   Treatment Day 3   OT Frequency 3-5x/wk   Recommendation   OT Discharge Recommendation Home OT

## 2017-12-20 NOTE — PLAN OF CARE
Problem: PHYSICAL THERAPY ADULT  Goal: Performs mobility at highest level of function for planned discharge setting  See evaluation for individualized goals  Treatment/Interventions: ADL retraining, Functional transfer training, LE strengthening/ROM, Elevations, Therapeutic exercise, Endurance training, Cognitive reorientation, Patient/family training, Equipment eval/education, Bed mobility, Gait training          See flowsheet documentation for full assessment, interventions and recommendations  Outcome: Progressing  Prognosis: Good  Problem List: Decreased strength, Decreased endurance, Impaired balance, Decreased mobility, Impaired judgement, Decreased safety awareness  Assessment: Pt seated in bedside chair prior to session; S for STS/SPT and in/out of bed and on/off toilet; pt S for amb 250' with RW; able to navigate up/down curb; instructed in sitting/standing TE (as above); finished session seated in bedside recliner with all needs in reach; daughter present for session; recommend cont PT POC; Barriers to Discharge: Inaccessible home environment, Decreased caregiver support  Barriers to Discharge Comments: lives alone with no MISTY  fully indep PTA although has 1 local dtr but works  Recommendation: Home independently     PT - OK to Discharge: No    See flowsheet documentation for full assessment

## 2017-12-20 NOTE — PROGRESS NOTES
Progress Note - Germaine Wu  1935, 81 y o  male MRN: [id number]  Unit/Bed#: -[facility] Encounter: [id number]  Primary Care Provider: Rufus Santiago MD   Date and time admitted to hospital: 12/13/2017  8:55 PM        * Severe sepsis ([facility] [place] [address] )   Assessment & Plan    · POA, evidenced by hyperthermia, lactic acidosis, leukocytosis and PNA  · Appreciate Infectious Disease input  · Sputum culture positive for MSSA  · Blood cultures negative  · Ancef discontinued today, has completed > 7 days abx          HCAP (healthcare-associated pneumonia)   Assessment & Plan    · ID discontinued ABX today  Completed course  No further ABX  · The patient should have a repeat chest x-ray in approximately 6 weeks which can be ordered by his primary care provider to check on stability of pneumonia  Decubitus ulcer of sacral region, stage 4 ([facility] [place] [address] )   Assessment & Plan    · Was infected during last admission with abscess s/p I&D 11/22 with general surgery and abx course  · Does not appear infected at this time  · Frequent repositioning/offloading         Lymphadenopathy, supraclavicular   Assessment & Plan    · This is status post biopsy December 18, 2017  · Showed inconclusive bx results- called ENT  Dr Ben Hernandez discussed with Dr Favian House and they are recommending follow up with [facility] to determine further plan of care (likely excisional biopsy)        History of laryngeal cancer   Assessment & Plan    · S/p total laryngectomy at [facility] on 10/8/17  · radiation and cetuximab, completed on 7/28/2017 at [address]  · I called and spoke to [facility], scheduled for follow up 1/4/18 to discuss CT results and plan of care  Office said for patient to call sooner once discharged and to determine if can be fit in earlier if possible  Depression   Assessment & Plan    · Continue Remeron  Was seen by psych during this stay  Anemia   Assessment & Plan    · hgb  stable   Monitor         Severe protein-calorie malnutrition [facility])   Assessment & Plan    · Patient has been offered a PEG tube for parental feedings and has declined  · Continue supplemental nutrition with boost         cancer related pain: discussed with palliative care  They will re-eval tomorrow  Continue fentanyl patches and oxycodone BID  VTE Pharmacologic Prophylaxis:   Pharmacologic: Enoxaparin (Lovenox)  Mechanical VTE Prophylaxis in Place: Yes    Patient Centered Rounds: I have performed bedside rounds with nursing staff today  RN    Discussions with Specialists or Other Care Team Provider: nursing, ENT, ID, [facility]    Education and Discussions with Family / Patient: patient, daughter inperson and via phone    Time Spent for Care: >90 mins  More than 50% of total time spent on counseling and coordination of care as described above  Current Length of Stay: 6 day(s)    Current Patient Status: Inpatient   Certification Statement: The patient will continue to require additional inpatient hospital stay due to plan for obtaining additional cassettes for larytube, monitor off abx    Discharge Plan: plan for d/c 12/21    Code Status: Level 1 - Full Code      Subjective:   Mr Felicitas Todd was noted to be admitted on 12/13/2017 secondary to severe sepsis felt to be from pneumonia, the patient was felt to be at high risk for pseudomonal infection and was placed on antibiotics  The patient was also noted to have a KI and elevated CPK secondary to fall  Patient has past medical history of locally advanced laryngeal cancer, 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  It was felt the patient likely had healthcare associated pneumonia  Patient also was noted to have decubitus ulcer stage IV  The patient had underwent recent I and D of the area for abscess by General surgery and antibiotic course  Patient was noted to be seen in consultation by Infectious Disease on 12/14/2017 secondary to severe sepsis    Cefepime and Flagyl were discontinued and the patient was started on Zosyn  Source of infection was felt to be pneumonia versus infected sacral decubitus ulcer which was noted to probe deeply  The patient was seen in consultation by General surgery who felt that this was unlikely the cause of sepsis  The patient was seen by palliative care and given cancer related pain, fentanyl patch was increased to 37 mcg  Patient was continued on Remeron  On 12/15/2017 the patient was noted to have a left supraclavicular mass  ENT was consulted  They were recommending fine-needle aspiration biopsy for further evaluation  Patient was seen in consultation by Hematology-Oncology on 2017 as well as Psychiatry  They recommended increased Remeron dose and Xanax p r n   On 2017, Zosyn was discontinued the patient was started on Ancef  It was felt to be likely related to pneumonia versus pneumonitis versus related to infected necrotic lymph node or abscess  Patient is extremely frustrated regarding the biopsy results being inconclusive  He feels that he has been wasting his time and that individuals are incompetent in obtaining bx  Objective:     Vitals:   Temp (24hrs), Av 7 °F (37 1 °C), Min:97 8 °F (36 6 °C), Max:99 6 °F (37 6 °C)    HR:  [65-67] 65  Resp:  [18-21] 21  BP: ()/(48-60) 120/55  SpO2:  [97 %-98 %] 98 %  Body mass index is 19 61 kg/m²  Input and Output Summary (last 24 hours): Intake/Output Summary (Last 24 hours) at 17 1639  Last data filed at 17 1608   Gross per 24 hour   Intake             1130 ml   Output             1855 ml   Net             -725 ml       Physical Exam:     Physical Exam   Constitutional: No distress  Appears chronically ill, malnourished  Depressed and angry   HENT:   Trach collar in place, larytube  Neck:   Left supraclavicular node present  Left neck edema noted   Cardiovascular: Normal rate, regular rhythm, normal heart sounds and intact distal pulses  No murmur heard  Pulmonary/Chest: Effort normal and breath sounds normal  No respiratory distress  He has no wheezes  He has no rales  Abdominal: Soft  Bowel sounds are normal  He exhibits no distension  There is tenderness (slight lower quadrant tenderness)  There is no rebound and no guarding  Musculoskeletal: He exhibits no edema  Neurological: He is alert  Skin: Skin is warm and dry  No rash noted  He is not diaphoretic  No erythema  Nursing note and vitals reviewed  Additional Data:     Labs:      Results from last 7 days  Lab Units 12/19/17  0521   WBC Thousand/uL 3 74*   HEMOGLOBIN g/dL 9 0*   HEMATOCRIT % 27 9*   PLATELETS Thousands/uL 190   NEUTROS PCT % 73   LYMPHS PCT % 12*   MONOS PCT % 9   EOS PCT % 6       Results from last 7 days  Lab Units 12/18/17  0505  12/13/17 2112   SODIUM mmol/L 140  < > 139   POTASSIUM mmol/L 3 9  < > 4 3   CHLORIDE mmol/L 105  < > 102   CO2 mmol/L 30  < > 27   BUN mg/dL 22  < > 24   CREATININE mg/dL 0 70  < > 0 99   CALCIUM mg/dL 8 0*  < > 8 7   TOTAL PROTEIN g/dL  --   --  6 8   BILIRUBIN TOTAL mg/dL  --   --  0 73   ALK PHOS U/L  --   --  62   ALT U/L  --   --  27   AST U/L  --   --  46*   GLUCOSE RANDOM mg/dL 85  < > 91   < > = values in this interval not displayed  Results from last 7 days  Lab Units 12/13/17 2112   INR  1 22*       * I Have Reviewed All Lab Data Listed Above  * Additional Pertinent Lab Tests Reviewed: All Labs Within Last 24 Hours Reviewed    Imaging:    Imaging Reports Reviewed Today Include: CT neck, IR procedure  Imaging Personally Reviewed by Myself Includes:  none    Recent Cultures (last 7 days):       Results from last 7 days  Lab Units 12/18/17  1104 12/14/17  1454 12/14/17  0052 12/13/17 2116 12/13/17 2112   BLOOD CULTURE   --   --   --  No Growth After 5 Days  No Growth After 5 Days     SPUTUM CULTURE   --  Few Colonies of Staphylococcus aureus*  1+ Growth of   --   --   --    GRAM STAIN RESULT  No Polys or Bacteria seen 2+ Polys  2+ Gram positive rods  1+ Gram positive cocci in pairs  --   --   --    BODY FLUID CULTURE, STERILE  No growth  --   --   --   --    INFLUENZA A PCR   --   --  None Detected  --   --    INFLUENZA B PCR   --   --  None Detected  --   --    RSV PCR   --   --  None Detected  --   --        Last 24 Hours Medication List:     calcium carbonate-vitamin D 1 tablet Oral Daily   collagenase  Topical Daily   doxazosin 2 mg Oral HS   enoxaparin 40 mg Subcutaneous Daily   fentaNYL 12 mcg Transdermal Q72H   fentaNYL 25 mcg Transdermal Q72H   fluticasone 2 puff Inhalation BID   fluticasone-salmeterol 1 puff Inhalation Q12H JAMES   mirtazapine 30 mg Oral HS   OLANZapine 5 mg Oral HS   oxyCODONE 10 mg Oral BID   pantoprazole 40 mg Oral Early Morning   senna-docusate sodium 1 tablet Oral BID        Today, Patient Was Seen By: Holly Whittaker PA-C    ** Please Note: Dictation voice to text software may have been used in the creation of this document   **

## 2017-12-20 NOTE — ASSESSMENT & PLAN NOTE
· S/p total laryngectomy at [facility] on 10/8/17  · radiation and cetuximab, completed on 7/28/2017 at [facility]  · I called and spoke to [facility], scheduled for follow up 1/4/18 to discuss CT results and plan of care  Office said for patient to call sooner once discharged and to determine if can be fit in earlier if possible

## 2017-12-20 NOTE — ASSESSMENT & PLAN NOTE
· This is status post biopsy December 18, 2017  · Showed inconclusive bx results- called ENT   Dr Dorcas Chandler discussed with Dr Ramon Edge and they are recommending follow up with [facility] to determine further plan of care (likely excisional biopsy)

## 2017-12-20 NOTE — PHYSICAL THERAPY NOTE
Physical Therapy Progress Note     12/20/17 1110   Pain Assessment   Pain Assessment 0-10   Pain Score 5   Pain Type Acute pain   Pain Location Back;Buttocks   Pain Orientation Mid   Pain Frequency Constant/continuous   Hospital Pain Intervention(s) Ambulation/increased activity   Restrictions/Precautions   Weight Bearing Precautions Per Order No   Other Precautions Multiple lines; Fall Risk   General   Chart Reviewed Yes   Response to Previous Treatment Patient with no complaints from previous session  Family/Caregiver Present No   Cognition   Overall Cognitive Status WFL   Orientation Level Oriented X4   Subjective   Subjective reports 5/10 pain in back and buttocks   Bed Mobility   Rolling R 5  Supervision   Rolling L 5  Supervision   Supine to Sit 5  Supervision   Sit to Supine 5  Supervision   Transfers   Sit to Stand 5  Supervision   Stand to Sit 5  Supervision   Stand pivot 5  Supervision   Toilet transfer 5  Supervision   Ambulation/Elevation   Gait pattern Improper Weight shift; Inconsistent bharat   Gait Assistance 5  Supervision   Additional items Verbal cues   Assistive Device Rolling walker   Distance 250' S with RW   Stair Management Assistance Not tested   Curbs x2 with RW and S   Balance   Static Sitting Good   Dynamic Sitting Fair +   Static Standing Fair +   Dynamic Standing Fair   Ambulatory Fair   Endurance Deficit   Endurance Deficit Yes   Endurance Deficit Description fatigued easily; Activity Tolerance   Activity Tolerance Patient tolerated treatment well   Nurse Made Aware yes   Exercises   THR Sitting;Standing;Bilateral   TKR Sitting;Standing;Bilateral   Assessment   Prognosis Good   Problem List Decreased strength;Decreased endurance; Impaired balance;Decreased mobility; Impaired judgement;Decreased safety awareness   Assessment Pt seated in bedside chair prior to session; S for STS/SPT and in/out of bed and on/off toilet; pt S for amb 250' with RW; able to navigate up/down curb; instructed in sitting/standing TE (as above); finished session seated in bedside recliner with all needs in reach; daughter present for session; recommend cont PT POC; Barriers to Discharge Inaccessible home environment;Decreased caregiver support   Plan   Treatment/Interventions ADL retraining;Functional transfer training;LE strengthening/ROM; Elevations; Therapeutic exercise; Endurance training;Cognitive reorientation;Patient/family training;Equipment eval/education; Bed mobility;Gait training   Progress Progressing toward goals   PT Frequency 5x/wk   Recommendation   Recommendation Home independently     La Watkins, PTA

## 2017-12-21 VITALS
HEIGHT: 67 IN | HEART RATE: 70 BPM | BODY MASS INDEX: 19.65 KG/M2 | RESPIRATION RATE: 18 BRPM | SYSTOLIC BLOOD PRESSURE: 104 MMHG | OXYGEN SATURATION: 98 % | TEMPERATURE: 98.6 F | WEIGHT: 125.22 LBS | DIASTOLIC BLOOD PRESSURE: 55 MMHG

## 2017-12-21 LAB
ANION GAP SERPL CALCULATED.3IONS-SCNC: 5 MMOL/L (ref 4–13)
BACTERIA SPEC BFLD CULT: NO GROWTH
BASOPHILS # BLD AUTO: 0 THOUSANDS/ΜL (ref 0–0.1)
BASOPHILS NFR BLD AUTO: 0 % (ref 0–1)
BUN SERPL-MCNC: 22 MG/DL (ref 5–25)
CALCIUM SERPL-MCNC: 8.4 MG/DL (ref 8.3–10.1)
CHLORIDE SERPL-SCNC: 103 MMOL/L (ref 100–108)
CO2 SERPL-SCNC: 32 MMOL/L (ref 21–32)
CREAT SERPL-MCNC: 0.68 MG/DL (ref 0.6–1.3)
EOSINOPHIL # BLD AUTO: 0.21 THOUSAND/ΜL (ref 0–0.61)
EOSINOPHIL NFR BLD AUTO: 4 % (ref 0–6)
ERYTHROCYTE [DISTWIDTH] IN BLOOD BY AUTOMATED COUNT: 16.9 % (ref 11.6–15.1)
GFR SERPL CREATININE-BSD FRML MDRD: 90 ML/MIN/1.73SQ M
GLUCOSE SERPL-MCNC: 92 MG/DL (ref 65–140)
GRAM STN SPEC: NORMAL
HCT VFR BLD AUTO: 28.8 % (ref 36.5–49.3)
HGB BLD-MCNC: 9.3 G/DL (ref 12–17)
LYMPHOCYTES # BLD AUTO: 0.21 THOUSANDS/ΜL (ref 0.6–4.47)
LYMPHOCYTES NFR BLD AUTO: 4 % (ref 14–44)
MCH RBC QN AUTO: 32.4 PG (ref 26.8–34.3)
MCHC RBC AUTO-ENTMCNC: 32.3 G/DL (ref 31.4–37.4)
MCV RBC AUTO: 100 FL (ref 82–98)
MONOCYTES # BLD AUTO: 0.47 THOUSAND/ΜL (ref 0.17–1.22)
MONOCYTES NFR BLD AUTO: 8 % (ref 4–12)
NEUTROPHILS # BLD AUTO: 4.69 THOUSANDS/ΜL (ref 1.85–7.62)
NEUTS SEG NFR BLD AUTO: 84 % (ref 43–75)
NRBC BLD AUTO-RTO: 0 /100 WBCS
PLATELET # BLD AUTO: 236 THOUSANDS/UL (ref 149–390)
PMV BLD AUTO: 8.8 FL (ref 8.9–12.7)
POTASSIUM SERPL-SCNC: 4.1 MMOL/L (ref 3.5–5.3)
RBC # BLD AUTO: 2.87 MILLION/UL (ref 3.88–5.62)
SODIUM SERPL-SCNC: 140 MMOL/L (ref 136–145)
WBC # BLD AUTO: 5.6 THOUSAND/UL (ref 4.31–10.16)

## 2017-12-21 PROCEDURE — 80048 BASIC METABOLIC PNL TOTAL CA: CPT | Performed by: PHYSICIAN ASSISTANT

## 2017-12-21 PROCEDURE — 85025 COMPLETE CBC W/AUTO DIFF WBC: CPT | Performed by: PHYSICIAN ASSISTANT

## 2017-12-21 RX ORDER — OLANZAPINE 5 MG/1
5 TABLET, ORALLY DISINTEGRATING ORAL
Qty: 30 TABLET | Refills: 0 | Status: SHIPPED | OUTPATIENT
Start: 2017-12-21 | End: 2017-12-22

## 2017-12-21 RX ORDER — FENTANYL 25 UG/H
1 PATCH TRANSDERMAL
Qty: 3 PATCH | Refills: 0 | Status: SHIPPED | OUTPATIENT
Start: 2017-12-23 | End: 2017-12-21

## 2017-12-21 RX ORDER — ALPRAZOLAM 0.25 MG/1
0.25 TABLET ORAL 4 TIMES DAILY PRN
Qty: 30 TABLET | Refills: 0 | Status: SHIPPED | OUTPATIENT
Start: 2017-12-21 | End: 2017-12-22

## 2017-12-21 RX ORDER — FENTANYL 25 UG/H
1 PATCH TRANSDERMAL
Qty: 3 PATCH | Refills: 0 | Status: SHIPPED | OUTPATIENT
Start: 2017-12-23 | End: 2017-12-22

## 2017-12-21 RX ORDER — ALPRAZOLAM 0.25 MG/1
0.25 TABLET ORAL 4 TIMES DAILY PRN
Qty: 30 TABLET | Refills: 0 | Status: SHIPPED | OUTPATIENT
Start: 2017-12-21 | End: 2017-12-21

## 2017-12-21 RX ORDER — OLANZAPINE 5 MG/1
5 TABLET, ORALLY DISINTEGRATING ORAL
Qty: 30 TABLET | Refills: 0 | Status: SHIPPED | OUTPATIENT
Start: 2017-12-21 | End: 2017-12-21

## 2017-12-21 RX ORDER — FENTANYL 12 UG/H
1 PATCH TRANSDERMAL
Qty: 3 PATCH | Refills: 0 | Status: SHIPPED | OUTPATIENT
Start: 2017-12-23 | End: 2017-12-21

## 2017-12-21 RX ORDER — FENTANYL 12 UG/H
1 PATCH TRANSDERMAL
Qty: 3 PATCH | Refills: 0 | Status: SHIPPED | OUTPATIENT
Start: 2017-12-23 | End: 2017-12-22

## 2017-12-21 RX ADMIN — CALCIUM CARBONATE-VITAMIN D TAB 500 MG-200 UNIT 1 TABLET: 500-200 TAB at 10:19

## 2017-12-21 RX ADMIN — OXYCODONE HYDROCHLORIDE 10 MG: 10 TABLET ORAL at 10:20

## 2017-12-21 RX ADMIN — FLUTICASONE PROPIONATE 2 PUFF: 220 AEROSOL, METERED RESPIRATORY (INHALATION) at 10:22

## 2017-12-21 RX ADMIN — COLLAGENASE SANTYL: 250 OINTMENT TOPICAL at 11:32

## 2017-12-21 RX ADMIN — ENOXAPARIN SODIUM 40 MG: 40 INJECTION SUBCUTANEOUS at 10:19

## 2017-12-21 RX ADMIN — PANTOPRAZOLE SODIUM 40 MG: 40 TABLET, DELAYED RELEASE ORAL at 06:04

## 2017-12-21 RX ADMIN — Medication 1 TABLET: at 10:18

## 2017-12-21 RX ADMIN — FLUTICASONE PROPIONATE AND SALMETEROL 1 PUFF: 50; 250 POWDER RESPIRATORY (INHALATION) at 10:20

## 2017-12-21 RX ADMIN — OXYCODONE HYDROCHLORIDE 10 MG: 10 TABLET ORAL at 17:12

## 2017-12-21 RX ADMIN — Medication 1 TABLET: at 17:13

## 2017-12-21 NOTE — NURSING NOTE
Patient discharged to home with [facility], left facility via w/c in stable condition with daughter and accompanied by PCA  Personal belongings sent with patient  Scripts and discharge instructions provided and reviewed with patient  Fentanyl patches intact and in place on patient's right upper back when leaving  Left forearm peripheral IV removed intact, patient tolerated well

## 2017-12-21 NOTE — ASSESSMENT & PLAN NOTE
· This is status post biopsy December 18, 2017  · Showed inconclusive bx results- called ENT   Dr Anastacio Ziegler discussed with Dr Carmen Pugh and they are recommending follow up with [facility] to determine further plan of care (likely excisional biopsy)---patient's daughter is aware

## 2017-12-21 NOTE — ASSESSMENT & PLAN NOTE
· Was infected during last admission with abscess s/p I&D 11/22 with general surgery and abx course  · Does not appear infected at this time  · Frequent repositioning/offloading   · Will recommend wound care follow up   · Will attempt to get the patient a hospital bed, discuss with CM

## 2017-12-21 NOTE — ASSESSMENT & PLAN NOTE
· ID discontinued ABX  Completed course  No further ABX  · The patient should have a repeat chest x-ray in approximately 6 weeks which can be ordered by his primary care provider to check on stability of pneumonia

## 2017-12-21 NOTE — SOCIAL WORK
CM spoke with Pt's daughter Vitaliy Ramirez via telephone who will provide transportation home at D/C  Pt and Krystina to discuss what house Pt will D/C to  [facility] care updated with D/C date  Krystina to discuss need for hospital bed at home with Pt and will contact PRUDENCIO tomorrow

## 2017-12-21 NOTE — ASSESSMENT & PLAN NOTE
· S/p total laryngectomy at [facility] on 10/8/17  · radiation and cetuximab, completed on 7/28/2017 at [place] [address]  · [facility] was called and  scheduled for follow up 1/4/18 to discuss CT results and plan of care  Office said for patient to call sooner once discharged and to determine if can be fit in earlier if possible

## 2017-12-21 NOTE — ASSESSMENT & PLAN NOTE
· POA, evidenced by hyperthermia, lactic acidosis, leukocytosis and PNA  · Appreciate Infectious Disease input  · Sputum culture positive for MSSA  · Blood cultures negative  · Ancef discontinued as the course was completed

## 2017-12-21 NOTE — DISCHARGE SUMMARY
[place] [address] Internal Medicine   Discharge- Alfredo [place]  1935, 81 y o  male MRN: [id number]    Unit/Bed#: -[facility] Encounter: [id number]    Primary Care Provider: Shaila Silva MD   Date and time admitted to hospital: 12/13/2017  8:55 PM        * Severe sepsis ([facility] [place] )   Assessment & Plan    · POA, evidenced by hyperthermia, lactic acidosis, leukocytosis and PNA  · Appreciate Infectious Disease input  · Sputum culture positive for MSSA  · Blood cultures negative  · Ancef discontinued as the course was completed          HCAP (healthcare-associated pneumonia)   Assessment & Plan    · ID discontinued ABX  Completed course  No further ABX  · The patient should have a repeat chest x-ray in approximately 6 weeks which can be ordered by his primary care provider to check on stability of pneumonia  History of laryngeal cancer   Assessment & Plan    · S/p total laryngectomy at [facility] on 10/8/17  · radiation and cetuximab, completed on 7/28/2017 at [place] [address]  · [facility] was called and  scheduled for follow up 1/4/18 to discuss CT results and plan of care  Office said for patient to call sooner once discharged and to determine if can be fit in earlier if possible  Lymphadenopathy, supraclavicular   Assessment & Plan    · This is status post biopsy December 18, 2017  · Showed inconclusive bx results- called ENT  Dr Kailey Castañeda discussed with Dr Jasiel Peña and they are recommending follow up with [facility] to determine further plan of care (likely excisional biopsy)---patient's daughter is aware        Severe protein-calorie malnutrition [facility])   Assessment & Plan    · Patient has been offered a PEG tube for parental feedings and has declined    · Continue supplemental nutrition with boost         Decubitus ulcer of sacral region, stage 4 ([facility] )   Assessment & Plan    · Was infected during last admission with abscess s/p I&D 11/22 with general surgery and abx course  · Does not appear infected at this time  · Frequent repositioning/offloading   · Will recommend wound care follow up   · Will attempt to get the patient a hospital bed, discuss with CM        Depression   Assessment & Plan    · Continue Yisel  Was seen by psych during this stay  Anemia   Assessment & Plan    · hgb  stable  Monitor         Leukopenia   Assessment & Plan    · Likely due to patient's infection  · Will monitor              Consultations During Hospital Stay:  · ENT  · Infectious Disease     Procedures Performed:     · CT neck: Interval neck dissection, total laryngectomy, fascial planes are markedly obscured in many of normal anatomic landmarks are absent   Treatment-related changes throughout the neck      Possible tumor recurrence in the oropharynx       There are 2 Definite pathologic nodes left 1A left 5B chains  Significant Findings / Test Results:     · none    Incidental Findings:   · none     Test Results Pending at Discharge (will require follow up):   · none     Outpatient Tests Requested:  · none    Complications:  none    Reason for Admission: infection and fall     Hospital Course:     Randy Jolly  is a 81 y o  male patient who originally presented to the hospital on 12/13/2017 due to a fall  He had a total laryngectomy in October 2017 at [facility]  He was in a rehab facility and then was home for 1 day before he fell  He was then brought back to the hospital   He showed signs of sepsis secondary to Hcap and was treated accordingly  There was noted to be a necrotic lymph node in his neck and that was biopsied  The causes of the necrosis were either infection, which she was treated for, or metastasis  Ear nose and throat Medicine was consulted and they were recommending excisional biopsy which should be done at [facility] which is where he had his original surgery    This was discussed with the patient's family and the patient who expressed his dissatisfaction with this recommendation  He is going to be going home on his normal home medication regimen  Zyprexa was added and fentanyl patch was increased  He also was given Xanax which can be used as needed  His daughter was given all of the discharge instructions  Please see above list of diagnoses and related plan for additional information  Condition at Discharge: poor     Discharge Day Visit / Exam:     Subjective:  "So disgusted with everything  "  Had a decent night's sleep  Offers no complaints of pain  No nausea vomiting diarrhea constipation  Just wants to go home now  Vitals: Blood Pressure: 116/55 (12/21/17 0719)  Pulse: 67 (12/21/17 0719)  Temperature: 97 6 °F (36 4 °C) (12/21/17 0719)  Temp Source: Oral (12/21/17 0719)  Respirations: 18 (12/21/17 0719)  Height: 5' 7" (170 2 cm) (12/14/17 0217)  Weight - Scale: 56 8 kg (125 lb 3 5 oz) (12/14/17 0217)  SpO2: 94 % (12/21/17 0719)  Exam:   Physical Exam   Constitutional: No distress  HENT:   Head: Normocephalic  Eyes: Pupils are equal, round, and reactive to light  Neck: Normal range of motion  Cardiovascular: Normal rate and regular rhythm  Pulmonary/Chest: Effort normal and breath sounds normal    Abdominal: Soft  Bowel sounds are normal    Musculoskeletal: Normal range of motion  Neurological: He is alert  Skin: Skin is warm and dry  Psychiatric: He has a normal mood and affect  His behavior is normal  Thought content normal        Discussion with Family:  Updated patient's daughter via phone     Discharge instructions/Information to patient and family:   See after visit summary for information provided to patient and family  Provisions for Follow-Up Care:  See after visit summary for information related to follow-up care and any pertinent home health orders        Disposition:     Home    For Discharges to [facility] SNF:   · Not Applicable to this Patient - Not Applicable to this Patient    Planned Readmission: not anticipated      Discharge Statement:  I spent 38 minutes discharging the patient  This time was spent on the day of discharge  I had direct contact with the patient on the day of discharge  Greater than 50% of the total time was spent examining patient, answering all patient questions, arranging and discussing plan of care with patient as well as directly providing post-discharge instructions  Additional time then spent on discharge activities  Discharge Medications:  See after visit summary for reconciled discharge medications provided to patient and family        ** Please Note: This note has been constructed using a voice recognition system **

## 2017-12-21 NOTE — PHYSICAL THERAPY NOTE
Physical Therapy Cancellation Note        Pt declined to participate in PT session 2* D/C home later today  Recommend home PT services upon D/C to further increase LE strength and endurance

## 2017-12-21 NOTE — SOCIAL WORK
CM spoke with the Pt's daughter David Madison via telephone and Pt at bedside  Pt would like to discuss D/C options with daughter today to make a decision if he will go home or to [facility]'s house at time of D/C

## 2017-12-21 NOTE — PROGRESS NOTES
Progress Note - Infectious Disease   Eric Going  81 y o  male MRN: [id number]  Unit/Bed#: -01 Encounter: [id number]      Impression/Plan:  1   Severe sepsis-POA   Fever, leukocytosis, lactic acidosis   Unclear etiology  Kizzy Pardon all secondary to pneumonia or pneumonitis   Possibly secondary to an infected, necrotic neck lymph node or abscess   Patient remains hemodynamically stable and his fever and leukocytosis have resolved   Thus far he seems to be tolerating the antibiotics well without difficulty  He has completed more than 7 days of intravenous antibiotics  -no additional antibiotics for now  -follow-up blood cultures  -follow-up neck biopsy result     2   Sacral decubitus ulceration-Does not appear overtly infected  No purulence or overt cellulitis  Patient is status post I and D of an abscess in that region recently   The wound cultures had recently grown enterococcus and strep as well as anaerobes  -surgery follow-up  -local wound     3   Possible healthcare associated pneumonia-verses aspiration pneumonitis   The patient's respiratory status is quite stable despite the radiographic findings  Jennifer Nguyen is certainly at risk of aspiration as he was found down on the ground with possible loss of consciousness  At this point he is not requiring any oxygen support   Culture has grown MSSA  Patient does not have any respiratory symptoms, is afebrile, has completed more than 7 days of antibiotics  -no additional antibiotics  -monitor respiratory status  -follow-up cultures     4   Neck mass-fluctuant   Possible necrotic lymph node   Possibly cancerous    Biopsies negative for any infectious process   Status post IR aspiration of the cystic collection  -ENT follow-up at [facility]  -follow-up  AFB, and fungal  -no additional ID workup for now  -May need excisional biopsy at [address] 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   Status post tracheostomy   Now with apparent necrotic lymph node suggesting the possibility of recurrence   Status post aspiration of the cystic collection  The cytology is nondiagnostic  -ENT follow-up at [facility]  -suspect patient will need excisional biopsy     6   Leukopenia-possibly antibiotic related   Possibly other etiology  Resolved     -no additional Zosyn for now  -monitor CBC with diff  -hematology oncology follow-up  -no additional ID workup for now    7  Disposition-prolonged discussion with the patient and the primary service  The patient is very upset that he has to go to [facility] to receive his follow-up care rather than receive it through ENT at [address] [place] [address]  Primary is had a prolonged discussion with the patient is daughter to explain the situation  Patient is to be discharged home today  Antibiotics:  Cefazolin 4  Antibiotics 9    Subjective:  Patient has no fever, chills, sweats; no nausea, vomiting, diarrhea; no cough, shortness of breath; no increased pain  No new symptoms  He has concerns that he is not getting his workup done at [address] [place] [address]  Objective:  Vitals:  HR:  [65-73] 67  Resp:  [18-21] 18  BP: (102-120)/(55-61) 116/55  SpO2:  [94 %-100 %] 94 %  Temp (24hrs), Av 3 °F (36 8 °C), Min:97 6 °F (36 4 °C), Max:98 7 °F (37 1 °C)  Current: Temperature: 97 6 °F (36 4 °C)    Physical Exam:   General Appearance:  Alert, nontoxic, no acute distress  Throat: Oropharynx moist without lesions  Left facial swelling without change   Lungs:   Clear to auscultation bilaterally,; respirations unlabored   Heart:  RRR; no murmur, rub or gallop   Abdomen:   Soft, non-tender, non-distended, positive bowel sounds  Extremities: No clubbing, cyanosis or edema   Skin: No new rashes or lesions  No draining wounds noted         Labs, Imaging, & Other studies:   All pertinent labs and imaging studies were personally reviewed    Results from last 7 days  Lab Units 17  0603 17  0521 17  0505   WBC Thousand/uL 5 60 3 74* 2 95* HEMOGLOBIN g/dL 9 3* 9 0* 9 2*   PLATELETS Thousands/uL 236 190 182       Results from last 7 days  Lab Units 12/21/17  0603 12/18/17  0505 12/17/17  0517   SODIUM mmol/L 140 140 140   POTASSIUM mmol/L 4 1 3 9 3 7   CHLORIDE mmol/L 103 105 107   CO2 mmol/L 32 30 29   ANION GAP mmol/L 5 5 4   BUN mg/dL 22 22 21   CREATININE mg/dL 0 68 0 70 0 71   EGFR ml/min/1 73sq m 90 89 88   GLUCOSE RANDOM mg/dL 92 85 100   CALCIUM mg/dL 8 4 8 0* 8 0*       Results from last 7 days  Lab Units 12/18/17  1104 12/14/17  1454   SPUTUM CULTURE   --  Few Colonies of Staphylococcus aureus*  1+ Growth of    GRAM STAIN RESULT  No Polys or Bacteria seen 2+ Polys  2+ Gram positive rods  1+ Gram positive cocci in pairs   BODY FLUID CULTURE, STERILE  No growth  --

## 2017-12-21 NOTE — OCCUPATIONAL THERAPY NOTE
Occupational TherapyTreatment Cancel Note    OT treatment session cancelled at this time  Pt  In R side lying in bed upon entry to room  Pt declined therapy because is planning to discharge today  Continue to recommend home OT services to assess home environment, safety in home, coping/stress management, energy conservation, ADL

## 2017-12-21 NOTE — PROGRESS NOTES
Spoke with Faby Grace from [facility] regarding patient's daughter inquiry about DME  CM will be contacting DME regarding this and patient's daughter will be contacted accordingly

## 2017-12-22 RX ORDER — ALPRAZOLAM 0.25 MG/1
0.25 TABLET ORAL 4 TIMES DAILY PRN
Qty: 30 TABLET | Refills: 0 | Status: SHIPPED | OUTPATIENT
Start: 2017-12-22 | End: 2018-02-02 | Stop reason: HOSPADM

## 2017-12-22 RX ORDER — OLANZAPINE 5 MG/1
5 TABLET, ORALLY DISINTEGRATING ORAL
Qty: 30 TABLET | Refills: 0 | Status: SHIPPED | OUTPATIENT
Start: 2017-12-22

## 2017-12-22 RX ORDER — FENTANYL 12 UG/H
1 PATCH TRANSDERMAL
Qty: 3 PATCH | Refills: 0 | Status: SHIPPED | OUTPATIENT
Start: 2017-12-23 | End: 2018-01-02

## 2017-12-22 RX ORDER — FENTANYL 25 UG/H
1 PATCH TRANSDERMAL
Qty: 3 PATCH | Refills: 0 | Status: SHIPPED | OUTPATIENT
Start: 2017-12-23 | End: 2018-01-02

## 2018-01-05 ENCOUNTER — APPOINTMENT (OUTPATIENT)
Dept: LAB | Facility: CLINIC | Age: 83
End: 2018-01-05
Payer: COMMERCIAL

## 2018-01-05 ENCOUNTER — TRANSCRIBE ORDERS (OUTPATIENT)
Dept: LAB | Facility: CLINIC | Age: 83
End: 2018-01-05

## 2018-01-05 DIAGNOSIS — R63.4 LOSS OF WEIGHT: ICD-10-CM

## 2018-01-05 DIAGNOSIS — C32.8 MALIGNANT NEOPLASM OF OVERLAPPING SITES OF LARYNX (HCC): ICD-10-CM

## 2018-01-05 DIAGNOSIS — C32.8 MALIGNANT NEOPLASM OF OVERLAPPING SITES OF LARYNX (HCC): Primary | ICD-10-CM

## 2018-01-05 DIAGNOSIS — E55.9 VITAMIN D DEFICIENCY DISEASE: ICD-10-CM

## 2018-01-05 DIAGNOSIS — R53.83 TIREDNESS: ICD-10-CM

## 2018-01-05 LAB
25(OH)D3 SERPL-MCNC: 33.7 NG/ML (ref 30–100)
ALBUMIN SERPL BCP-MCNC: 3 G/DL (ref 3.5–5)
ALP SERPL-CCNC: 75 U/L (ref 46–116)
ALT SERPL W P-5'-P-CCNC: 16 U/L (ref 12–78)
ANION GAP SERPL CALCULATED.3IONS-SCNC: 4 MMOL/L (ref 4–13)
AST SERPL W P-5'-P-CCNC: 13 U/L (ref 5–45)
BASOPHILS # BLD AUTO: 0.01 THOUSANDS/ΜL (ref 0–0.1)
BASOPHILS NFR BLD AUTO: 0 % (ref 0–1)
BILIRUB SERPL-MCNC: 0.43 MG/DL (ref 0.2–1)
BUN SERPL-MCNC: 25 MG/DL (ref 5–25)
CALCIUM SERPL-MCNC: 9.4 MG/DL (ref 8.3–10.1)
CHLORIDE SERPL-SCNC: 104 MMOL/L (ref 100–108)
CHOLEST SERPL-MCNC: 161 MG/DL (ref 50–200)
CO2 SERPL-SCNC: 32 MMOL/L (ref 21–32)
CREAT SERPL-MCNC: 0.84 MG/DL (ref 0.6–1.3)
EOSINOPHIL # BLD AUTO: 0.18 THOUSAND/ΜL (ref 0–0.61)
EOSINOPHIL NFR BLD AUTO: 3 % (ref 0–6)
ERYTHROCYTE [DISTWIDTH] IN BLOOD BY AUTOMATED COUNT: 16.1 % (ref 11.6–15.1)
GFR SERPL CREATININE-BSD FRML MDRD: 82 ML/MIN/1.73SQ M
GLUCOSE P FAST SERPL-MCNC: 103 MG/DL (ref 65–99)
HCT VFR BLD AUTO: 35.3 % (ref 36.5–49.3)
HDLC SERPL-MCNC: 57 MG/DL (ref 40–60)
HGB BLD-MCNC: 11.3 G/DL (ref 12–17)
LDLC SERPL CALC-MCNC: 89 MG/DL (ref 0–100)
LYMPHOCYTES # BLD AUTO: 0.72 THOUSANDS/ΜL (ref 0.6–4.47)
LYMPHOCYTES NFR BLD AUTO: 13 % (ref 14–44)
MCH RBC QN AUTO: 32.2 PG (ref 26.8–34.3)
MCHC RBC AUTO-ENTMCNC: 32 G/DL (ref 31.4–37.4)
MCV RBC AUTO: 101 FL (ref 82–98)
MONOCYTES # BLD AUTO: 0.43 THOUSAND/ΜL (ref 0.17–1.22)
MONOCYTES NFR BLD AUTO: 8 % (ref 4–12)
NEUTROPHILS # BLD AUTO: 4.03 THOUSANDS/ΜL (ref 1.85–7.62)
NEUTS SEG NFR BLD AUTO: 76 % (ref 43–75)
NRBC BLD AUTO-RTO: 0 /100 WBCS
PLATELET # BLD AUTO: 245 THOUSANDS/UL (ref 149–390)
PMV BLD AUTO: 9.2 FL (ref 8.9–12.7)
POTASSIUM SERPL-SCNC: 4.3 MMOL/L (ref 3.5–5.3)
PROT SERPL-MCNC: 7.1 G/DL (ref 6.4–8.2)
RBC # BLD AUTO: 3.51 MILLION/UL (ref 3.88–5.62)
SODIUM SERPL-SCNC: 140 MMOL/L (ref 136–145)
T4 FREE SERPL-MCNC: 1.06 NG/DL (ref 0.76–1.46)
TRIGL SERPL-MCNC: 75 MG/DL
TSH SERPL DL<=0.05 MIU/L-ACNC: 3.39 UIU/ML (ref 0.36–3.74)
WBC # BLD AUTO: 5.38 THOUSAND/UL (ref 4.31–10.16)

## 2018-01-05 PROCEDURE — 80061 LIPID PANEL: CPT

## 2018-01-05 PROCEDURE — 84443 ASSAY THYROID STIM HORMONE: CPT

## 2018-01-05 PROCEDURE — 84439 ASSAY OF FREE THYROXINE: CPT

## 2018-01-05 PROCEDURE — 85025 COMPLETE CBC W/AUTO DIFF WBC: CPT

## 2018-01-05 PROCEDURE — 80053 COMPREHEN METABOLIC PANEL: CPT

## 2018-01-05 PROCEDURE — 82306 VITAMIN D 25 HYDROXY: CPT

## 2018-01-05 PROCEDURE — 36415 COLL VENOUS BLD VENIPUNCTURE: CPT

## 2018-01-09 ENCOUNTER — ALLSCRIPTS OFFICE VISIT (OUTPATIENT)
Dept: OTHER | Facility: OTHER | Age: 83
End: 2018-01-09

## 2018-01-10 ENCOUNTER — TRANSCRIBE ORDERS (OUTPATIENT)
Dept: WOUND CARE | Facility: HOSPITAL | Age: 83
End: 2018-01-10

## 2018-01-10 ENCOUNTER — APPOINTMENT (OUTPATIENT)
Dept: WOUND CARE | Facility: HOSPITAL | Age: 83
End: 2018-01-10
Payer: COMMERCIAL

## 2018-01-10 DIAGNOSIS — L89.153 PRESSURE ULCER OF SACRAL REGION, STAGE 3 (HCC): Primary | ICD-10-CM

## 2018-01-10 PROCEDURE — 99213 OFFICE O/P EST LOW 20 MIN: CPT

## 2018-01-10 PROCEDURE — 10120 INC&RMVL FB SUBQ TISS SMPL: CPT

## 2018-01-10 PROCEDURE — 88304 TISSUE EXAM BY PATHOLOGIST: CPT

## 2018-01-10 NOTE — PROGRESS NOTES
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)   3  Depression (311) (F32 9)   4  Sacral wound (959 19) (S31 000A)   5  Insomnia (780 52) (G47 00)    Plan   Unlinked    · FentaNYL 12 MCG/HR Transdermal Patch 72 Hour   Rx By: Maged Madera; Dispense: 15 Days ; #:5 Patch 72 Hour; Refill: 0; VIJAYA = N; Record   · Mirtazapine 15 MG Oral Tablet   Rx By: Maged Madera; Dispense: 30 Days ; #:30 Tablet; Refill: 2; VIJAYA = N; Record    Discussion/Summary   Discussion Summary:    SCC larynx - follow up with Maria Esther Garcia on 1/9  [phone]  I called ahead to give the clinical team a heads up on the events since treatment and to discuss the family's expectation for biopsy review  - reduce opioid use to fentanyl 25mcg patch with use of oxy PRN  This may also help his constipation  sleeping - stop use of remeron as it is not helping him sleep but may be contributing to daytime fatigue  - stop remeron, continue zyprexa   stimulation/weight gain - remain on zyprexa but stop remeron as above  Counseling Documentation With Imm: total time of encounter was 60 minutes-- and-- 50 minutes was spent counseling  Goals and Barriers: The patient has the current Goals: [place]  The patent has the current Barriers: Frailty  Patient's Capacity to Self-Care: Patient agrees and allows to involve family/caregiver in development of care plan:    Medication SE Review and Pt Understands Tx: Possible side effects of new medications were reviewed with the patient/guardian today  The treatment plan was reviewed with the patient/guardian   The patient/guardian understands and agrees with the treatment plan    Self Referrals:    Self Referrals: No      Chief Complaint   Chief Complaint Free Text Note Form: hospital follow up: larygneal cancer      History of Present Illness   HPI: 82yoM with laryngeal cancer status post chemotherapy, radiation, and surgery at [facility]  He's had a very complicated post treatment course and bounced between hospitals and SNF facilities  Post one SNF discharge he went home but found down and had severe sepsis  He was treated at [place] [address] for HCAP and a decubitus ulcer  Depression, pain and overall failure to thrive were reasons that palliative care got involved    Status post his hospital discharge he went to live with his daughter  This occurred on 12/20 and he is getting homecare  He is eating better  He is getting around pretty well though he still feels unsteady sometimes  He denies dizziness  He still has a sore on his tailbone  THe visiting nurse dresses it and they are going to the wound care center tomorrow  He has follow up at [facility] with Dr Laurent Garcia on Thursday and they are hoping that he reviews the new images and biopsy report  His main complaint now is fatigue  He continues to wear fentanyl patches and uses oxycodone when needed which is less and less often  He has been cutting back on use of oxycodone and trying to just use tylenol  He reports constipation, been using MOM  Despite being tired a good night sleep continues to elude him with frequent night time awakenings  Review of Systems   Complete-Male:      Constitutional: recent weight gain-- and-- feeling tired  Gastrointestinal: constipation  Musculoskeletal: unsteady gait  Integumentary: skin wound  Psychiatric: sleep disturbances-- and-- depression  Endocrine: muscle weakness-- and-- feelings of weakness  [facility] Symptom Assessment System (ESAS):         Pain scale (on a scale of 0 to 10, the patient rated His Pain at 8 )  Tired scale (on a scale of 0 to 10, the patient rated His Tiredness at 5 )  Nausea scale (on a scale of 0 to 10, the patient rated His Nausea at 0 )  Depression scale (on a scale of 0 to 10, the patient rated His Depression at 6 )        Anxiety scale (on a scale of 0 to 10, the patient rated His Anxiety at 6 )  Drowsiness scale (on a scale of 0 to 10, the patient rated His Drowsiness at 5 )  Appetite scale (on a scale of 0 to 10, the patient rated His Appetite at 8 )  Well being scale (on a scale of 0 to 10, the patient rated His Well being at 7 )  Shortness of breathe scale (on a scale of 0 to 10, the patient rated His Shortness of breathe at 5 )  Active Problems   1  Asthma (493 90) (J45 909)   2  Carcinoma of larynx (161 9) (C32 9)   3  Insomnia (780 52) (G47 00)   4  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)   5  Nausea (787 02) (R11 0)   6  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)    Past Medical History   1  History of HCAP (healthcare-associated pneumonia) [phone]) (J18 9)   2  History of Severe sepsis (038 9,995 92) (A41 9,R65 20)    Surgical History   1  History of Cataract Extraction With Insertion Of Intraocular Lens   2  History of Direct Laryngoscopy With Biopsy   3  History of Excision Of Soft Tissue Tumor Of The Back   4  History of Fiberoptic Laryngoscopy With Biopsy (Diagnostic)   5  History of Trachectomy  Surgical History Reviewed: The surgical history was reviewed and updated today  Family History   Maternal Grandmother    1  Family history of malignant neoplasm (V16 9) (Z80 9)  Paternal Grandmother    2  Family history of malignant neoplasm (V16 9) (Z80 9)  Family History Reviewed: The family history was reviewed and updated today  Social History    · Alcohol drinker (V49 89) (Z78 9)   ·    · Former smoker (J03 28) (C10 277)   · Lives alone with help available (V60 3) (Z60 2)   · One child   · Retired  Social History Reviewed: The social history was reviewed and updated today  Current Meds    1  Asmanex 14 Metered Doses 220 MCG/INH Inhalation Aerosol Powder Breath Activated;     INHALE 1 PUFF TWICE DAILY; Therapy: (Recorded:16Wst1075) to Recorded   2   Dulera 200-5 MCG/ACT Inhalation Aerosol; INHALE 2 PUFFS TWICE DAILY; Therapy: 53UEG2173 to Recorded   3  FentaNYL 12 MCG/HR Transdermal Patch 72 Hour; APPLY 1 PATCH EVERY 3 DAYS; Therapy: 23QCB3118 to (WOTTBVSI:83PRH2605) Recorded   4  FentaNYL 25 MCG/HR Transdermal Patch 72 Hour; APPLY 1 PATCH EVERY 3 DAYS; Therapy: 09PWT0714 to (Evaluate:59Zrp6411) Recorded   5  Melatonin 3 MG Oral Tablet; 1 tab po qhs;     Therapy: 61THE5974 to Recorded   6  Milk of Magnesia 400 MG/5ML Oral Suspension; USE AS DIRECTED; Therapy: 95MQC8118 to Recorded   7  Mirtazapine 15 MG Oral Tablet; TAKE 1 TABLET AT BEDTIME; Therapy: 07LBV0473 to (0472 94 41 68) Recorded   8  OLANZapine 5 MG Oral Tablet Disintegrating; TAKE 1 TABLET Bedtime; Therapy: 14BIW7048 to (SDEDQGCK:39FDY4013)  Requested for: 17Ctq2901; Last     Rx:99Ktj1186 Ordered   9  Omeprazole 40 MG Oral Capsule Delayed Release; take 1 capsule daily; Therapy: 52GHJ8522 to (0472 94 41 68) Recorded   10  OxyCODONE HCl - 5 MG Oral Capsule; one every 4 hours prn; Therapy: 59DRR1413 to (Last Rx:05Buy2142) Ordered   11  Proventil HFA AERS; INHALE 1 PUFF EVERY 4 HOURS AS NEEDED; Therapy: (Recorded:10Vpn5967) to Recorded   12  Senna-Lax 8 6 MG Oral Tablet; 1 tab PO daily; Therapy: 80EHP6028 to Recorded  Medication List Reviewed: The medication list was reviewed and updated today  Allergies   1  aspirin   2  clindamycin    Vitals   Vital Signs    Recorded: 35OYK4135 01:19PM   Temperature 98 5 F   Heart Rate 56   Respiration 16   Systolic 973   Diastolic 70   Height 5 ft 4 8 in   Weight 134 lb    BMI Calculated 22 44   BSA Calculated 1 67   O2 Saturation 99     Physical Exam        Constitutional      General appearance: Abnormal  -- trach with some lower facial swelling  Eyes      Conjunctiva and lids: No swelling, erythema, or discharge  Pulmonary      Respiratory effort: No increased work of breathing or signs of respiratory distress  -- hoarse voice  Cardiovascular      Examination of extremities for edema and/or varicosities: Normal        Abdomen      Abdomen: Abnormal  -- thin  Musculoskeletal      Gait and station: Normal        Skin      Skin and subcutaneous tissue: Abnormal  -- examination of sacral wound deferred as it is dressed and he will be seeing wound care tomorrow  Neurologic      Cranial nerves: Cranial nerves 2-12 intact  Psychiatric      Orientation to person, place and time: Normal        Mood and affect: Normal           Signatures    Electronically signed by :  CHESTER Messina ; Jan 9 2018  4:17PM EST                       (Author)

## 2018-01-10 NOTE — PROGRESS NOTES
Discussion/Summary  [facility] Patient is being seen for a distress screenning assessment  LSW reviewed pt's distress thermometer completed by pt on 5/26/2017 in rad onc  Pt rated their distress as a 3/10 and named fears, nervousness, and worry as emotional problems  LSW contacted pt by phone on 5/31/2017 to introduce her role and assesses pt's emotional needs  Pt reports he is anxious to get started with his cancer treatment and denied issues coping emotionally at this time  LSW provided pt emotional support and encouraged pt to contact LSW as needed for psychosocial assistance  LSW will remain available to provide support        Signatures   Electronically signed by : NANI Contreras; May 31 2017 12:58PM EST                       (Author)

## 2018-01-11 ENCOUNTER — TRANSCRIBE ORDERS (OUTPATIENT)
Dept: WOUND CARE | Facility: HOSPITAL | Age: 83
End: 2018-01-11

## 2018-01-11 DIAGNOSIS — L89.153 PRESSURE ULCER OF SACRAL REGION, STAGE 3 (HCC): Primary | ICD-10-CM

## 2018-01-12 VITALS — HEART RATE: 81 BPM | RESPIRATION RATE: 16 BRPM | TEMPERATURE: 98.5 F | OXYGEN SATURATION: 96 %

## 2018-01-12 VITALS
RESPIRATION RATE: 16 BRPM | HEIGHT: 66 IN | WEIGHT: 162.31 LBS | SYSTOLIC BLOOD PRESSURE: 120 MMHG | DIASTOLIC BLOOD PRESSURE: 70 MMHG | BODY MASS INDEX: 26.08 KG/M2 | TEMPERATURE: 98.6 F | OXYGEN SATURATION: 96 % | HEART RATE: 70 BPM

## 2018-01-12 NOTE — MISCELLANEOUS
Message   Recorded as Task   Date: 09/18/2017 02:40 PM, Created By: Juarez Crowe   Task Name: Follow Up   Assigned To: Obdulia Flores   Regarding Patient: Kan Hernandez, Status: Active   CommentPecola Spar - 18 Sep 2017 2:40 PM     TASK CREATED  Caller: Sg Rodriguez, Adult Child; [phone] (Mobile Phone)  patients daughter is calling jay her dad was told to take 2 tablets of mirtazapine at night to help him sleep but they states it actually has him up longer at night he can't get to sleep  Wilda Junior told him if it doesn't help him to call the office back to get the medication changed  The pharmacy on file is the current pharmacy and you can call the daughter at the number provided  Script for Ambien to be called to patients pharmacy - Mirtazapine will be d/C'd      Active Problems    1  Asthma (493 90) (J45 909)   2  Carcinoma of larynx (161 9) (C32 9)   3  Insomnia (780 52) (G47 00)   4  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)   5  Nausea (787 02) (R11 0)   6  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)    Current Meds   1  Ampicillin 250 MG CAPS; 1 tab bid; Therapy: (Recorded:85Hut5937) to Recorded   2  Asmanex 14 Metered Doses 220 MCG/INH Inhalation Aerosol Powder Breath Activated;   INHALE 1 PUFF TWICE DAILY; Therapy: (Recorded:51Ogu1043) to Recorded   3  Flunisolide (Nasal) 0 025 % SOLN; INSTILL 2 SPRAYS IN EACH NOSTRIL ONCE   DAILY; Therapy: (Recorded:18Mib9267) to Recorded   4  Hydrocodone-Acetaminophen  MG Oral Tablet; 1 - 2 tabs q 4-6 h for pain; Therapy: (Recorded:72Gag1975) to Recorded   5  Lisinopril 5 MG Oral Tablet; TAKE 1 TABLET DAILY; Therapy: (Recorded:88Kvi9925) to Recorded   6  Mirtazapine 15 MG Oral Tablet; TAKE 1 TABLET AT BEDTIME; Therapy: 59YLZ1491 to (Evaluate:11Aug2017)  Requested for: 00SEK3354; Last   Rx:12Jun2017 Ordered   7  OxyCODONE HCl - 5 MG Oral Capsule; one every 4 hours prn;    Therapy: 93GCC3208 to (Last CL:82QHG9322) Ordered   8  Prochlorperazine Maleate 10 MG Oral Tablet; TAKE 1 TABLET EVERY 6 HOURS AS   NEEDED FOR NAUSEA; Therapy: 76GXY5270 to (Evaluate:18Jun2017)  Requested for: 71JOQ6373; Last   Rx:02Jun2017 Ordered   9  Proventil HFA AERS; INHALE 1 PUFF EVERY 4 HOURS AS NEEDED;    Therapy: (Recorded:57Nel9380) to Recorded    Allergies    1  aspirin   2  clindamycin    Plan  Health Maintenance    · Mirtazapine 15 MG Oral Tablet (Remeron)  Insomnia    · Zolpidem Tartrate 5 MG Oral Tablet (Ambien); TAKE 1 TABLET AT BEDTIME    Signatures   Electronically signed by : Ivonne Solano, ; Sep 19 2017  9:53AM EST                       (Author)

## 2018-01-13 VITALS
SYSTOLIC BLOOD PRESSURE: 128 MMHG | OXYGEN SATURATION: 98 % | TEMPERATURE: 98.8 F | HEIGHT: 65 IN | BODY MASS INDEX: 24.83 KG/M2 | HEART RATE: 77 BPM | DIASTOLIC BLOOD PRESSURE: 64 MMHG | WEIGHT: 149 LBS | RESPIRATION RATE: 16 BRPM

## 2018-01-15 NOTE — MISCELLANEOUS
Message   Recorded as Task   Date: 10/13/2017 12:48 PM, Created By: Manjeet Molina   Task Name: Follow Up   Assigned To: [facility]   Regarding Patient: Na Pascal, Status: Active   CommentAlto Madden - 13 Oct 2017 12:48 PM     TASK CREATED  Caller: Artemio Ovalle, Care Coordinator; [phone] (Day)  Artemio Ovalle from [facility] is calling because they tried to get in touch with the patient but was told he will no longer be needing this exam because he will be going to [employer]  Artemio Ovalle would like for you to let Corinne Eisenberg know and call her back at the number provided to let her know what to do  Dr Corinne Eisenberg made aware  Pet scan canceled  Active Problems    1  Asthma (493 90) (J45 909)   2  Carcinoma of larynx (161 9) (C32 9)   3  Insomnia (780 52) (G47 00)   4  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)   5  Nausea (787 02) (R11 0)   6  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)    Current Meds   1  Ampicillin 250 MG CAPS; 1 tab bid; Therapy: (Recorded:50Jqb6033) to Recorded   2  Asmanex 14 Metered Doses 220 MCG/INH Inhalation Aerosol Powder Breath Activated;   INHALE 1 PUFF TWICE DAILY; Therapy: (Recorded:10Uav1678) to Recorded   3  Flunisolide (Nasal) 0 025 % SOLN; INSTILL 2 SPRAYS IN EACH NOSTRIL ONCE   DAILY; Therapy: (Recorded:72Aen5901) to Recorded   4  Hydrocodone-Acetaminophen  MG Oral Tablet; 1 - 2 tabs q 4-6 h for pain; Therapy: (Recorded:62Sfp8113) to Recorded   5  Lisinopril 5 MG Oral Tablet; TAKE 1 TABLET DAILY; Therapy: (Recorded:96Bog0626) to Recorded   6  OxyCODONE HCl - 5 MG Oral Capsule; one every 4 hours prn; Therapy: 98BQU2670 to (Last Rx:30May2017) Ordered   7  Prochlorperazine Maleate 10 MG Oral Tablet; TAKE 1 TABLET EVERY 6 HOURS AS   NEEDED FOR NAUSEA; Therapy: 97MCK1794 to (Evaluate:18Jun2017)  Requested for: 92IGD0786; Last   Rx:02Jun2017 Ordered   8  Proventil HFA AERS; INHALE 1 PUFF EVERY 4 HOURS AS NEEDED;    Therapy: (Recorded:94Aqr2496) to Recorded   9  Zolpidem Tartrate 5 MG Oral Tablet (Ambien); TAKE 1 TABLET AT BEDTIME; Therapy: 21Tks2320 to (Evaluate:17Jan2018);  Last Rx:19Sep2017 Ordered    Allergies    1  aspirin   2  clindamycin    Signatures   Electronically signed by : Reid Kohler, ; Oct 13 2017  1:11PM EST                       (Author)

## 2018-01-16 ENCOUNTER — APPOINTMENT (EMERGENCY)
Dept: RADIOLOGY | Facility: HOSPITAL | Age: 83
DRG: 177 | End: 2018-01-16
Payer: COMMERCIAL

## 2018-01-16 ENCOUNTER — HOSPITAL ENCOUNTER (INPATIENT)
Facility: HOSPITAL | Age: 83
LOS: 16 days | Discharge: HOME | DRG: 177 | End: 2018-02-02
Attending: EMERGENCY MEDICINE | Admitting: INTERNAL MEDICINE
Payer: COMMERCIAL

## 2018-01-16 DIAGNOSIS — L89.159 SACRAL DECUBITUS ULCER: Chronic | ICD-10-CM

## 2018-01-16 DIAGNOSIS — Z85.21 HISTORY OF LARYNGEAL CANCER: ICD-10-CM

## 2018-01-16 DIAGNOSIS — C32.9 LARYNGEAL CANCER (HCC): ICD-10-CM

## 2018-01-16 DIAGNOSIS — R91.1 LUNG NODULE: ICD-10-CM

## 2018-01-16 DIAGNOSIS — F32.A DEPRESSION, UNSPECIFIED DEPRESSION TYPE: ICD-10-CM

## 2018-01-16 DIAGNOSIS — J18.9 RIGHT MIDDLE LOBE PNEUMONIA: Primary | ICD-10-CM

## 2018-01-16 LAB
ANION GAP SERPL CALCULATED.3IONS-SCNC: 4 MMOL/L (ref 4–13)
ANISOCYTOSIS BLD QL SMEAR: PRESENT
BASOPHILS # BLD MANUAL: 0.14 THOUSAND/UL (ref 0–0.1)
BASOPHILS NFR MAR MANUAL: 1 % (ref 0–1)
BUN SERPL-MCNC: 33 MG/DL (ref 5–25)
CALCIUM SERPL-MCNC: 9 MG/DL (ref 8.3–10.1)
CHLORIDE SERPL-SCNC: 101 MMOL/L (ref 100–108)
CO2 SERPL-SCNC: 32 MMOL/L (ref 21–32)
CREAT SERPL-MCNC: 0.94 MG/DL (ref 0.6–1.3)
EOSINOPHIL # BLD MANUAL: 0 THOUSAND/UL (ref 0–0.4)
EOSINOPHIL NFR BLD MANUAL: 0 % (ref 0–6)
ERYTHROCYTE [DISTWIDTH] IN BLOOD BY AUTOMATED COUNT: 15.8 % (ref 11.6–15.1)
GFR SERPL CREATININE-BSD FRML MDRD: 76 ML/MIN/1.73SQ M
GLUCOSE SERPL-MCNC: 93 MG/DL (ref 65–140)
HCT VFR BLD AUTO: 32.2 % (ref 36.5–49.3)
HGB BLD-MCNC: 10.6 G/DL (ref 12–17)
LACTATE SERPL-SCNC: 0.8 MMOL/L (ref 0.5–2)
LYMPHOCYTES # BLD AUTO: 0.29 THOUSAND/UL (ref 0.6–4.47)
LYMPHOCYTES # BLD AUTO: 2 % (ref 14–44)
MCH RBC QN AUTO: 32.4 PG (ref 26.8–34.3)
MCHC RBC AUTO-ENTMCNC: 32.9 G/DL (ref 31.4–37.4)
MCV RBC AUTO: 99 FL (ref 82–98)
MONOCYTES # BLD AUTO: 0.58 THOUSAND/UL (ref 0–1.22)
MONOCYTES NFR BLD: 4 % (ref 4–12)
NEUTROPHILS # BLD MANUAL: 13.44 THOUSAND/UL (ref 1.85–7.62)
NEUTS SEG NFR BLD AUTO: 93 % (ref 43–75)
NRBC BLD AUTO-RTO: 0 /100 WBCS
PLATELET # BLD AUTO: 212 THOUSANDS/UL (ref 149–390)
PLATELET BLD QL SMEAR: ADEQUATE
PMV BLD AUTO: 8.9 FL (ref 8.9–12.7)
POTASSIUM SERPL-SCNC: 4.2 MMOL/L (ref 3.5–5.3)
RBC # BLD AUTO: 3.27 MILLION/UL (ref 3.88–5.62)
RBC MORPH BLD: PRESENT
SODIUM SERPL-SCNC: 137 MMOL/L (ref 136–145)
WBC # BLD AUTO: 14.45 THOUSAND/UL (ref 4.31–10.16)

## 2018-01-16 PROCEDURE — 36415 COLL VENOUS BLD VENIPUNCTURE: CPT | Performed by: EMERGENCY MEDICINE

## 2018-01-16 PROCEDURE — 87185 SC STD ENZYME DETCJ PER NZM: CPT | Performed by: EMERGENCY MEDICINE

## 2018-01-16 PROCEDURE — 96361 HYDRATE IV INFUSION ADD-ON: CPT

## 2018-01-16 PROCEDURE — 87070 CULTURE OTHR SPECIMN AEROBIC: CPT | Performed by: EMERGENCY MEDICINE

## 2018-01-16 PROCEDURE — 85027 COMPLETE CBC AUTOMATED: CPT | Performed by: EMERGENCY MEDICINE

## 2018-01-16 PROCEDURE — 87040 BLOOD CULTURE FOR BACTERIA: CPT | Performed by: EMERGENCY MEDICINE

## 2018-01-16 PROCEDURE — 80048 BASIC METABOLIC PNL TOTAL CA: CPT | Performed by: EMERGENCY MEDICINE

## 2018-01-16 PROCEDURE — 93005 ELECTROCARDIOGRAM TRACING: CPT | Performed by: EMERGENCY MEDICINE

## 2018-01-16 PROCEDURE — 85007 BL SMEAR W/DIFF WBC COUNT: CPT | Performed by: EMERGENCY MEDICINE

## 2018-01-16 PROCEDURE — 83605 ASSAY OF LACTIC ACID: CPT | Performed by: EMERGENCY MEDICINE

## 2018-01-16 PROCEDURE — 96360 HYDRATION IV INFUSION INIT: CPT

## 2018-01-16 PROCEDURE — 71046 X-RAY EXAM CHEST 2 VIEWS: CPT

## 2018-01-16 PROCEDURE — 87205 SMEAR GRAM STAIN: CPT | Performed by: EMERGENCY MEDICINE

## 2018-01-16 RX ORDER — ONDANSETRON 2 MG/ML
4 INJECTION INTRAMUSCULAR; INTRAVENOUS EVERY 6 HOURS PRN
Status: DISCONTINUED | OUTPATIENT
Start: 2018-01-16 | End: 2018-02-02 | Stop reason: HOSPADM

## 2018-01-16 RX ORDER — FENTANYL 25 UG/H
1 PATCH TRANSDERMAL
Status: ON HOLD | COMMUNITY
End: 2018-02-02

## 2018-01-16 RX ORDER — DOXAZOSIN 2 MG/1
2 TABLET ORAL
Status: DISCONTINUED | OUTPATIENT
Start: 2018-01-17 | End: 2018-01-17

## 2018-01-16 RX ORDER — PANTOPRAZOLE SODIUM 40 MG/1
40 TABLET, DELAYED RELEASE ORAL
Status: DISCONTINUED | OUTPATIENT
Start: 2018-01-17 | End: 2018-02-02 | Stop reason: HOSPADM

## 2018-01-16 RX ORDER — MAGNESIUM HYDROXIDE/ALUMINUM HYDROXICE/SIMETHICONE 120; 1200; 1200 MG/30ML; MG/30ML; MG/30ML
30 SUSPENSION ORAL EVERY 6 HOURS PRN
Status: DISCONTINUED | OUTPATIENT
Start: 2018-01-16 | End: 2018-02-02 | Stop reason: HOSPADM

## 2018-01-16 RX ORDER — ALPRAZOLAM 0.25 MG/1
0.25 TABLET ORAL 4 TIMES DAILY PRN
Status: DISCONTINUED | OUTPATIENT
Start: 2018-01-16 | End: 2018-02-02 | Stop reason: HOSPADM

## 2018-01-16 RX ORDER — SODIUM CHLORIDE 9 MG/ML
100 INJECTION, SOLUTION INTRAVENOUS CONTINUOUS
Status: DISCONTINUED | OUTPATIENT
Start: 2018-01-17 | End: 2018-01-21

## 2018-01-16 RX ORDER — B-COMPLEX WITH VITAMIN C
1 TABLET ORAL
Status: DISCONTINUED | OUTPATIENT
Start: 2018-01-17 | End: 2018-02-02 | Stop reason: HOSPADM

## 2018-01-16 RX ORDER — OLANZAPINE 5 MG/1
5 TABLET, ORALLY DISINTEGRATING ORAL
Status: DISCONTINUED | OUTPATIENT
Start: 2018-01-17 | End: 2018-02-02 | Stop reason: HOSPADM

## 2018-01-16 RX ORDER — OXYCODONE HYDROCHLORIDE 10 MG/1
10 TABLET ORAL EVERY 4 HOURS PRN
Status: DISCONTINUED | OUTPATIENT
Start: 2018-01-16 | End: 2018-02-02 | Stop reason: HOSPADM

## 2018-01-16 RX ORDER — FENTANYL 25 UG/H
1 PATCH TRANSDERMAL
Status: DISCONTINUED | OUTPATIENT
Start: 2018-01-19 | End: 2018-02-02 | Stop reason: HOSPADM

## 2018-01-16 RX ORDER — FENTANYL 25 UG/H
25 PATCH TRANSDERMAL ONCE
Status: COMPLETED | OUTPATIENT
Start: 2018-01-16 | End: 2018-01-19

## 2018-01-16 RX ORDER — OXYCODONE HYDROCHLORIDE 5 MG/1
5 TABLET ORAL EVERY 4 HOURS PRN
Status: DISCONTINUED | OUTPATIENT
Start: 2018-01-16 | End: 2018-02-02 | Stop reason: HOSPADM

## 2018-01-16 RX ORDER — ALBUTEROL SULFATE 90 UG/1
1 AEROSOL, METERED RESPIRATORY (INHALATION) EVERY 4 HOURS PRN
Status: DISCONTINUED | OUTPATIENT
Start: 2018-01-16 | End: 2018-01-28

## 2018-01-16 RX ORDER — ACETAMINOPHEN 325 MG/1
650 TABLET ORAL EVERY 6 HOURS PRN
Status: DISCONTINUED | OUTPATIENT
Start: 2018-01-16 | End: 2018-02-02 | Stop reason: HOSPADM

## 2018-01-16 RX ORDER — POLYETHYLENE GLYCOL 3350 17 G/17G
17 POWDER, FOR SOLUTION ORAL DAILY PRN
Status: DISCONTINUED | OUTPATIENT
Start: 2018-01-16 | End: 2018-02-02 | Stop reason: HOSPADM

## 2018-01-16 RX ORDER — AMOXICILLIN 250 MG
1 CAPSULE ORAL DAILY
Status: DISCONTINUED | OUTPATIENT
Start: 2018-01-17 | End: 2018-02-02 | Stop reason: HOSPADM

## 2018-01-16 RX ORDER — LANOLIN ALCOHOL/MO/W.PET/CERES
3 CREAM (GRAM) TOPICAL
Status: DISCONTINUED | OUTPATIENT
Start: 2018-01-17 | End: 2018-01-20 | Stop reason: SDUPTHER

## 2018-01-16 RX ORDER — MIRTAZAPINE 15 MG/1
15 TABLET, FILM COATED ORAL
Status: DISCONTINUED | OUTPATIENT
Start: 2018-01-17 | End: 2018-02-02 | Stop reason: HOSPADM

## 2018-01-16 RX ADMIN — Medication 2000 MG: at 22:51

## 2018-01-16 RX ADMIN — IPRATROPIUM BROMIDE 0.5 MG: 0.5 SOLUTION RESPIRATORY (INHALATION) at 21:16

## 2018-01-16 RX ADMIN — ALBUTEROL SULFATE 5 MG: 2.5 SOLUTION RESPIRATORY (INHALATION) at 21:15

## 2018-01-16 RX ADMIN — SODIUM CHLORIDE 1000 ML: 0.9 INJECTION, SOLUTION INTRAVENOUS at 19:39

## 2018-01-16 RX ADMIN — FENTANYL 25 MCG: 25 PATCH TRANSDERMAL at 22:51

## 2018-01-17 PROBLEM — I95.9 HYPOTENSION: Status: ACTIVE | Noted: 2018-01-17

## 2018-01-17 LAB
ALBUMIN SERPL BCP-MCNC: 2.3 G/DL (ref 3.5–5)
ALP SERPL-CCNC: 55 U/L (ref 46–116)
ALT SERPL W P-5'-P-CCNC: 17 U/L (ref 12–78)
ANION GAP SERPL CALCULATED.3IONS-SCNC: 5 MMOL/L (ref 4–13)
AST SERPL W P-5'-P-CCNC: 37 U/L (ref 5–45)
ATRIAL RATE: 63 BPM
BASOPHILS # BLD MANUAL: 0 THOUSAND/UL (ref 0–0.1)
BASOPHILS NFR MAR MANUAL: 0 % (ref 0–1)
BILIRUB SERPL-MCNC: 0.33 MG/DL (ref 0.2–1)
BUN SERPL-MCNC: 26 MG/DL (ref 5–25)
CALCIUM SERPL-MCNC: 8.3 MG/DL (ref 8.3–10.1)
CHLORIDE SERPL-SCNC: 108 MMOL/L (ref 100–108)
CO2 SERPL-SCNC: 29 MMOL/L (ref 21–32)
CREAT SERPL-MCNC: 0.87 MG/DL (ref 0.6–1.3)
EOSINOPHIL # BLD MANUAL: 0 THOUSAND/UL (ref 0–0.4)
EOSINOPHIL NFR BLD MANUAL: 0 % (ref 0–6)
ERYTHROCYTE [DISTWIDTH] IN BLOOD BY AUTOMATED COUNT: 15.8 % (ref 11.6–15.1)
FLUAV AG SPEC QL: NORMAL
FLUBV AG SPEC QL: NORMAL
GFR SERPL CREATININE-BSD FRML MDRD: 81 ML/MIN/1.73SQ M
GLUCOSE SERPL-MCNC: 108 MG/DL (ref 65–140)
HCT VFR BLD AUTO: 29.4 % (ref 36.5–49.3)
HGB BLD-MCNC: 9.7 G/DL (ref 12–17)
L PNEUMO1 AG UR QL IA.RAPID: NEGATIVE
LYMPHOCYTES # BLD AUTO: 0.1 THOUSAND/UL (ref 0.6–4.47)
LYMPHOCYTES # BLD AUTO: 1 % (ref 14–44)
MAGNESIUM SERPL-MCNC: 2.1 MG/DL (ref 1.6–2.6)
MCH RBC QN AUTO: 32.6 PG (ref 26.8–34.3)
MCHC RBC AUTO-ENTMCNC: 33 G/DL (ref 31.4–37.4)
MCV RBC AUTO: 99 FL (ref 82–98)
MONOCYTES # BLD AUTO: 0 THOUSAND/UL (ref 0–1.22)
MONOCYTES NFR BLD: 0 % (ref 4–12)
NEUTROPHILS # BLD MANUAL: 10.13 THOUSAND/UL (ref 1.85–7.62)
NEUTS SEG NFR BLD AUTO: 99 % (ref 43–75)
NRBC BLD AUTO-RTO: 0 /100 WBCS
P AXIS: 61 DEGREES
PHOSPHATE SERPL-MCNC: 2.6 MG/DL (ref 2.3–4.1)
PLATELET # BLD AUTO: 158 THOUSANDS/UL (ref 149–390)
PLATELET # BLD AUTO: 178 THOUSANDS/UL (ref 149–390)
PLATELET BLD QL SMEAR: ADEQUATE
PMV BLD AUTO: 8.5 FL (ref 8.9–12.7)
PMV BLD AUTO: 9.1 FL (ref 8.9–12.7)
POTASSIUM SERPL-SCNC: 3.9 MMOL/L (ref 3.5–5.3)
PR INTERVAL: 146 MS
PROT SERPL-MCNC: 6 G/DL (ref 6.4–8.2)
QRS AXIS: 18 DEGREES
QRSD INTERVAL: 78 MS
QT INTERVAL: 424 MS
QTC INTERVAL: 433 MS
RBC # BLD AUTO: 2.98 MILLION/UL (ref 3.88–5.62)
RSV B RNA SPEC QL NAA+PROBE: NORMAL
S PNEUM AG UR QL: NEGATIVE
SODIUM SERPL-SCNC: 142 MMOL/L (ref 136–145)
T WAVE AXIS: 70 DEGREES
VENTRICULAR RATE: 63 BPM
WBC # BLD AUTO: 10.23 THOUSAND/UL (ref 4.31–10.16)

## 2018-01-17 PROCEDURE — 85049 AUTOMATED PLATELET COUNT: CPT | Performed by: INTERNAL MEDICINE

## 2018-01-17 PROCEDURE — 94760 N-INVAS EAR/PLS OXIMETRY 1: CPT

## 2018-01-17 PROCEDURE — 36415 COLL VENOUS BLD VENIPUNCTURE: CPT | Performed by: INTERNAL MEDICINE

## 2018-01-17 PROCEDURE — 80053 COMPREHEN METABOLIC PANEL: CPT | Performed by: INTERNAL MEDICINE

## 2018-01-17 PROCEDURE — 85027 COMPLETE CBC AUTOMATED: CPT | Performed by: INTERNAL MEDICINE

## 2018-01-17 PROCEDURE — 85007 BL SMEAR W/DIFF WBC COUNT: CPT | Performed by: INTERNAL MEDICINE

## 2018-01-17 PROCEDURE — 83735 ASSAY OF MAGNESIUM: CPT | Performed by: INTERNAL MEDICINE

## 2018-01-17 PROCEDURE — 87449 NOS EACH ORGANISM AG IA: CPT | Performed by: INTERNAL MEDICINE

## 2018-01-17 PROCEDURE — 99285 EMERGENCY DEPT VISIT HI MDM: CPT

## 2018-01-17 PROCEDURE — 84100 ASSAY OF PHOSPHORUS: CPT | Performed by: INTERNAL MEDICINE

## 2018-01-17 PROCEDURE — 87798 DETECT AGENT NOS DNA AMP: CPT | Performed by: INTERNAL MEDICINE

## 2018-01-17 RX ORDER — LEVALBUTEROL 1.25 MG/.5ML
1.25 SOLUTION, CONCENTRATE RESPIRATORY (INHALATION) EVERY 6 HOURS PRN
Status: DISCONTINUED | OUTPATIENT
Start: 2018-01-17 | End: 2018-01-21

## 2018-01-17 RX ORDER — LANOLIN ALCOHOL/MO/W.PET/CERES
3 CREAM (GRAM) TOPICAL ONCE
Status: COMPLETED | OUTPATIENT
Start: 2018-01-17 | End: 2018-01-17

## 2018-01-17 RX ORDER — LEVALBUTEROL 1.25 MG/.5ML
1.25 SOLUTION, CONCENTRATE RESPIRATORY (INHALATION)
Status: DISCONTINUED | OUTPATIENT
Start: 2018-01-17 | End: 2018-01-17

## 2018-01-17 RX ADMIN — FLUTICASONE PROPIONATE AND SALMETEROL 2 PUFF: 50; 250 POWDER RESPIRATORY (INHALATION) at 01:09

## 2018-01-17 RX ADMIN — MIRTAZAPINE 15 MG: 15 TABLET, FILM COATED ORAL at 21:17

## 2018-01-17 RX ADMIN — CEFEPIME 2000 MG: 2 INJECTION, POWDER, FOR SOLUTION INTRAMUSCULAR; INTRAVENOUS at 11:21

## 2018-01-17 RX ADMIN — ALBUTEROL SULFATE 1 PUFF: 90 AEROSOL, METERED RESPIRATORY (INHALATION) at 22:13

## 2018-01-17 RX ADMIN — DOXAZOSIN MESYLATE 2 MG: 2 TABLET ORAL at 08:40

## 2018-01-17 RX ADMIN — MIRTAZAPINE 15 MG: 15 TABLET, FILM COATED ORAL at 01:08

## 2018-01-17 RX ADMIN — PANTOPRAZOLE SODIUM 40 MG: 40 TABLET, DELAYED RELEASE ORAL at 05:29

## 2018-01-17 RX ADMIN — METRONIDAZOLE 500 MG: 500 INJECTION, SOLUTION INTRAVENOUS at 01:11

## 2018-01-17 RX ADMIN — Medication 1 TABLET: at 08:37

## 2018-01-17 RX ADMIN — SODIUM CHLORIDE 1000 ML: 0.9 INJECTION, SOLUTION INTRAVENOUS at 02:16

## 2018-01-17 RX ADMIN — MELATONIN TAB 3 MG 3 MG: 3 TAB at 21:17

## 2018-01-17 RX ADMIN — SODIUM CHLORIDE 100 ML/HR: 0.9 INJECTION, SOLUTION INTRAVENOUS at 00:10

## 2018-01-17 RX ADMIN — MELATONIN TAB 3 MG 3 MG: 3 TAB at 01:08

## 2018-01-17 RX ADMIN — SODIUM CHLORIDE 100 ML/HR: 0.9 INJECTION, SOLUTION INTRAVENOUS at 22:16

## 2018-01-17 RX ADMIN — FLUTICASONE PROPIONATE AND SALMETEROL 2 PUFF: 50; 250 POWDER RESPIRATORY (INHALATION) at 21:18

## 2018-01-17 RX ADMIN — ENOXAPARIN SODIUM 40 MG: 40 INJECTION SUBCUTANEOUS at 08:41

## 2018-01-17 RX ADMIN — COLLAGENASE SANTYL 1 APPLICATION: 250 OINTMENT TOPICAL at 10:33

## 2018-01-17 RX ADMIN — OLANZAPINE 5 MG: 5 TABLET, ORALLY DISINTEGRATING ORAL at 00:30

## 2018-01-17 RX ADMIN — CALCIUM CARBONATE 500 MG (1,250 MG)-VITAMIN D3 200 UNIT TABLET 1 TABLET: at 08:36

## 2018-01-17 RX ADMIN — MELATONIN TAB 3 MG 3 MG: 3 TAB at 23:33

## 2018-01-17 RX ADMIN — OLANZAPINE 5 MG: 5 TABLET, ORALLY DISINTEGRATING ORAL at 23:33

## 2018-01-17 RX ADMIN — METRONIDAZOLE 500 MG: 500 INJECTION, SOLUTION INTRAVENOUS at 17:19

## 2018-01-17 RX ADMIN — METRONIDAZOLE 500 MG: 500 INJECTION, SOLUTION INTRAVENOUS at 08:46

## 2018-01-17 RX ADMIN — SODIUM CHLORIDE 100 ML/HR: 0.9 INJECTION, SOLUTION INTRAVENOUS at 10:51

## 2018-01-17 RX ADMIN — CEFEPIME 2000 MG: 2 INJECTION, POWDER, FOR SOLUTION INTRAMUSCULAR; INTRAVENOUS at 23:33

## 2018-01-17 NOTE — ASSESSMENT & PLAN NOTE
· According to previous records, patient has stage IV sacral decubitus ulcer  · Does not appear infected at this time  · Nurse present and applied topical collagenase - continue daily  · Wound care to see patient  · Outpatient follow-up at the [facility]

## 2018-01-17 NOTE — CASE MANAGEMENT
Initial Clinical Review    Admission: Date/Time/Statement: OBSERVATION CHANGED TO INPATIENT 1/17/18 @ 0011     Orders Placed This Encounter   1-16-18   2248      Procedures    Place in Observation (expected length of stay for this patient is less than two midnights)     Standing Status:   Standing     Number of Occurrences:   1     Order Specific Question:   Admitting Physician     Answer:   Harris Forde     Order Specific Question:   Level of Care     Answer:   Med Surg [16]    Inpatient Admission  1-17-18  0011     Standing Status:   Standing     Number of Occurrences:   1     Order Specific Question:   Admitting Physician     Answer:   Harris Forde     Order Specific Question:   Level of Care     Answer:   Med Surg [16]     Order Specific Question:   Estimated length of stay     Answer:   More than 2 Midnights     Order Specific Question:   Certification     Answer:   I certify that inpatient services are medically necessary for this patient for a duration of greater than two midnights  See H&P and MD Progress Notes for additional information about the patient's course of treatment  ED: Date/Time/Mode of Arrival:   ED Arrival Information     Expected Arrival Acuity Means of Arrival Escorted By Service Admission Type    - 1/16/2018 17:34 Urgent Walk-In Family Member General Medicine Urgent    Arrival Complaint    R/O Pneumonia          Chief Complaint:   Chief Complaint   Patient presents with    Fever - 9 weeks to 76 years     Daughter states patient has fevers (Tylenol given at 3pm) and "not feeling well"  Daughter states that she called PCP and he prescribed abx due to recent pneumonia  History of Illness:    Mario Moody  is a 81 y o  male who presents with cough to the emergency room at [facility]  Last month, patient was admitted here at [facility] due to sepsis due to healthcare associated pneumonia    Patient was eventually discharge to home with her daughter  Patient has history of laryngeal cancer/stenosis and being managed at [facility]  Patient's local oncologist is Dr Zeferino Hagen  Patient was actually referred to [facility] for a core biopsy of a lymph node that is actually scheduled for today  However, due to patient's symptoms which I will mention here, patient did not make to this appointment  Instead, patient came up here to the emergency room as patient has been having productive cough lately  According to the patient and patient's daughter at bedside, patient's cough is productive of greenish thick phlegm, that was noted at the trach site today  Today, patient also had a slight fever at 99 7° F and patient took Tylenol with improvement  Patient also told me that lately, he has been having poor appetite and generally weak  Patient also admits to occasional shortness of breath  Patient also complains of pain on his bottom as patient has history of sacral decubitus ulcer stage IV  Patient is being followed by wound care for this  Patient also is being followed by palliative care and was just seen this month  Patient was prescribed with opiate pain medications  Patient denies any problems swallowing and according to the patient's daughter at bedside, patient eats regular food without any problems  Patient denies any other symptoms other the ones mentioned above  It is important to note, with history of laryngeal cancer, patient had total lowering ectomy with tracheostomy in October of 2017 and had chemotherapy and radiation treatments and last chemotherapy was spring of 2017 according the patient's family  In the emergency room, patient was found to have healthcare associated pneumonia and was given intravenous antibiotic    Blood cultures and sputum cultures were sent        ED Vital Signs:   ED Triage Vitals [01/16/18 1743]   Temperature Pulse Respirations Blood Pressure SpO2   98 2 °F (36 8 °C) 70 18 94/52 99 %      Temp Source Heart Rate Source Patient Position - Orthostatic VS BP Location FiO2 (%)   Oral Monitor Sitting Left arm --      Pain Score       No Pain        Wt Readings from Last 1 Encounters:   01/16/18 61 2 kg (135 lb)       Vital Signs (abnormal):    Date and Time Temp Pulse SpO2 Resp BP Pain Score   01/17/18 0900 -- 80 96 % 22 114/66 --   01/17/18 0840 -- -- -- -- 112/53 --   01/17/18 0823 -- 78 96 % 20 112/53 5/10   01/17/18 0630 -- 62 97 % 18  96/48 --   01/17/18 0400 -- 68 96 % 19  93/47 --   01/17/18 0215 -- 71 Pt broke out of rapid heart rate at this time  96 % 18 95/53 --   01/17/18 0210 --  160 Admiting notified and at bedside  -- --  84/42 --     01/16/18 2123  --  62  20  108/55  99 %  Aerosol mask  --   01/16/18 2043  --  65  18  123/56  96 %  None (Room air)  Lying   01/16/18 1959  --  60  19  --  96 %  --  --   01/16/18 1930  --  64  18  107/54  97 %  None (Room air)  --       Abnormal Labs/Diagnostic Test Results:    Positive for rales at the right base  Dry oral mucosa, dry tongue  Positive for tracheostomy    WBC 14 45 (H) 4 31 - 10 16 Thousand/uL   RBC 3 27 (L) 3 88 - 5 62 Million/uL   Hemoglobin 10 6 (L) 12 0 - 17 0 g/dL   Hematocrit 32 2 (L) 36 5 - 49 3 %   MCV 99 (H) 82 - 98 fL     CHEST X RAY   Lungs are chronically hyperinflated  Increasing consolidation in the right middle lobe  Previously seen left-sided consolidations have resolved  IMPRESSION:   Right middle lobe pneumonia  FINAL RESULT   Impression:    Probable postinflammatory scarring right lower lobe exhibiting groundglass attenuation   This is likely unchanged   Continued follow-up as clinically appropriate  No discrete pulmonary masses or infiltrates  Typical tracheostomy tube  Asymmetric laryngeal tissue          · EKG:  Reveals sinus rhythm with sinus arrhythmia at 63 per minute with poor R-wave progression from V1 to V3; no significant change from my review of patient's EKG dated 12/13/2017        ED Treatment:   Medication Administration from 01/16/2018 1734 to 01/17/2018 2874       Date/Time Order Dose Route     01/16/2018 1939 sodium chloride 0 9 % bolus 1,000 mL 1,000 mL Intravenous     01/16/2018 2115 albuterol inhalation solution 5 mg 5 mg Nebulization     01/16/2018 2116 ipratropium (ATROVENT) 0 02 % inhalation solution 0 5 mg 0 5 mg Nebulization     01/16/2018 2251 cefepime (MAXIPIME) 2 g/50 mL dextrose IVPB 2,000 mg Intravenous     01/16/2018 2251 fentaNYL (DURAGESIC) 25 mcg/hr TD 72 hr patch 25 mcg 25 mcg Transdermal     01/17/2018 0836 calcium carbonate-vitamin D (OSCAL-D) 500 mg-200 units per tablet 1 tablet 1 tablet Oral     01/17/2018 0840 doxazosin (CARDURA) tablet 2 mg 2 mg Oral     01/17/2018 0108 melatonin tablet 3 mg 3 mg Oral     01/17/2018 0108 mirtazapine (REMERON) tablet 15 mg 15 mg Oral     01/17/2018 0109 fluticasone-salmeterol (ADVAIR) 250-50 mcg/dose inhaler 2 puff 2 puff Inhalation     01/17/2018 0030 OLANZapine (ZyPREXA ZYDIS) dispersible tablet 5 mg 5 mg Oral     01/17/2018 0529 pantoprazole (PROTONIX) EC tablet 40 mg 40 mg Oral     01/17/2018 0837 senna-docusate sodium (SENOKOT S) 8 6-50 mg per tablet 1 tablet 1 tablet Oral     01/17/2018 0010 sodium chloride 0 9 % infusion 100 mL/hr Intravenous     01/17/2018 0009 sodium chloride 0 9 % infusion 100 mL/hr Intravenous     01/17/2018 0841 enoxaparin (LOVENOX) subcutaneous injection 40 mg 40 mg Subcutaneous     01/17/2018 0846 metroNIDAZOLE (FLAGYL) IVPB (premix) 500 mg 500 mg Intravenous     01/17/2018 0111 metroNIDAZOLE (FLAGYL) IVPB (premix) 500 mg 500 mg Intravenous     01/17/2018 0216 sodium chloride 0 9 % bolus 1,000 mL 1,000 mL Intravenous       Past Medical/Surgical History:    Active Ambulatory Problems     Diagnosis Date Noted    Asthma 08/19/2017    Depression 08/29/2017    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    Severe sepsis ([address] ) 11/19/2017    Sacral decubitus ulcer 11/20/2017    HCAP (healthcare-associated pneumonia) 11/20/2017    Anemia 11/20/2017    Severe protein-calorie malnutrition [facility] ) 11/22/2017    History of laryngeal cancer 12/14/2017    Fall 12/14/2017    Lymphadenopathy, supraclavicular 12/19/2017    Leukopenia 12/19/2017     Resolved Ambulatory Problems     Diagnosis Date Noted    SOB (shortness of breath) 08/19/2017    Reactive airway disease with acute exacerbation 08/21/2017    L[facility] cancer [facility] ) 08/21/2017    Asthma 08/29/2017    [facility] 11/20/2017    Constipation 11/23/2017    IVORY (acute kidney injury) ([facility] ) 12/14/2017    Elevated CPK 12/14/2017     Past Medical History:    Asthma    Hypertension    [facility]       Admitting Diagnosis: Fever [R50 9]    Age/Sex: 81 y o  male    Assessment/Plan:     Note:  Patient's healthcare associated pneumonia likely gram-negative bacteria  HCAP (healthcare-associated pneumonia)   Assessment & Plan     · Continue empiric IV cefepime that was started here in the emergency room  I will add metronidazole for possibility of aspiration  · From a discussion with the patient and patient's daughter, no history of MRSA  · Follow results of the cultures: Both blood and sputum (sent from the emergency room)  · Possibility of aspiration pneumonia cannot be totally ruled out yet at this point as patient has been having recurrent pneumonia with history of laryngeal cancer/stenosis  · Thus, for aspiration precautions  · Speech therapy evaluation for now  · Consider pulmonary consult if no improvement  · Monitor CBC  · Monitor temperature  · Cough medicine  · Tylenol p r n  Jose Weinberg · Outpatient follow-up with palliative care, and Oncology          History of laryngeal cancer   Assessment & Plan     · Patient follows up at [facility]    · Here, oncologist is Dr Dylan Rivera  · Patient was actually scheduled for a core biopsy of a lymph node at [facility], but patient was not able to go to the appointment due to present symptoms  · Patient's daughter told me that a biopsy was done on the last admission here, but results were inconclusive and that is the reason why, patient was referred to [facility]  Patient's daughter asked if the core biopsy may be done here on this admission  · Thus, hospitalist tomorrow should at least verbally discussed this patient to our oncologist, to determine if they need to be on board and if they will do the core biopsy here  From my assessment, I think, the core biopsy will be postponed until present infection/pneumonia has clinically improved/resolved  · Patient's daughter told me that, patient had laryngeal surgery before, and it was explained to them, that chances of aspiration is very minimal now due to the surgery; however, with recurrent pneumonia and with history of laryngeal cancer and stenosis, we still have to rule out aspiration  Thus I will start by having speech therapist to evaluate the patient        Sacral decubitus ulcer   Assessment & Plan     · According to previous records, patient has stage IV sacral decubitus ulcer  · However, presently, patient does not want his sacral decubitus ulcer to be examined  Thus will ask hospitalist and wound care staff tomorrow to examine patient's decubitus ulcer  · Wound care consult  · For local wound care  · Outpatient follow-up at the [facility]        Anemia   Assessment & Plan     · Chronic anemia likely due to malignancy  · Monitor CBC  · If significantly worsening, for further workup and management  · No active bleeding at this point        Asthma   Assessment & Plan     · No exacerbation or attack at this point  · Continue patient's home inhalers         Admission Orders:    LEGIONELLA  STREP PNEUMONIAE  INFLUENZA   BLOOD CULTURES   SPUTUM CULTURE   WOUND CONSULT  TELEMETRY  SEQUENTIAL COMPRESSION DEVICE   PT OT AND SPEECH EVAL AND TREAT  AMBULATORY ROOM AIR SAT  ASPIRATION PRECAUTIONS          Scheduled Meds:   calcium carbonate-vitamin D 1 tablet Oral Daily With Breakfast   cefepime 2,000 mg Intravenous Q12H   collagenase  Topical Daily   doxazosin 2 mg Oral HS   enoxaparin 40 mg Subcutaneous Daily   [START ON 1/19/2018] fentaNYL 1 patch Transdermal Q72H   fentaNYL 25 mcg Transdermal Once   fluticasone-salmeterol 2 puff Inhalation Q12H JAMES   ipratropium 0 5 mg Nebulization TID   levalbuterol 1 25 mg Nebulization TID   melatonin 3 mg Oral HS   metroNIDAZOLE 500 mg Intravenous Q8H   mirtazapine 15 mg Oral HS   OLANZapine 5 mg Oral HS   pantoprazole 40 mg Oral Early Morning   senna-docusate sodium 1 tablet Oral Daily     Continuous Infusions:   sodium chloride 100 mL/hr Last Rate: 100 mL/hr (01/17/18 0010)     PRN Meds:   acetaminophen    albuterol    ALPRAZolam    aluminum-magnesium hydroxide-simethicone    ondansetron    oxyCODONE    oxyCODONE    polyethylene glycol

## 2018-01-17 NOTE — RESPIRATORY THERAPY NOTE
RT Protocol Note  Layla Connors  81 y o  male MRN: [id number]  Unit/Bed#: ED 17 Encounter: [id number]    Assessment    Active Problems:    Asthma    Sacral decubitus ulcer    HCAP (healthcare-associated pneumonia)    Anemia    History of laryngeal cancer      Home Pulmonary Medications:  Albuterol    Past Medical History:   Diagnosis Date    Asthma     Hypertension     L[facility] ([facility]      Social History     Social History    Marital status: Single     Spouse name: N/A    Number of children: N/A    Years of education: N/A     Social History Main Topics    Smoking status: Former Smoker    Smokeless tobacco: Never Used    Alcohol use No    Drug use: No    Sexual activity: Not Asked     Other Topics Concern    None     Social History Narrative    None       Subjective         Objective    Physical Exam:   Assessment Type: Assess only  General Appearance: Alert, Awake  Respiratory Pattern: Accessory muscle use  Chest Assessment: Chest expansion symmetrical  Bilateral Breath Sounds: Diminished  Cough: Productive  Suction: Trach  O2 Device: RA    Vitals:  Blood pressure 101/59, pulse 72, temperature 98 2 °F (36 8 °C), temperature source Oral, resp  rate 22, height 5' 7" (1 702 m), weight 61 2 kg (135 lb), SpO2 98 %  Imaging and other studies: I have personally reviewed pertinent reports  O2 Device: RA     Plan    Respiratory Plan: Mild Distress pathway        Resp Comments: Pt evaluated per resp protocol  He presented to the ER due to fever and productive cough  Pt has PMH of asthma and takes bronchodilators at home  Sputum sample obtained and sent to the lab, Sputum is thick and tan  Pt ordered on bronchodilators TID at this time  BS diminish, no exp whezees and no O2 needed

## 2018-01-17 NOTE — ASSESSMENT & PLAN NOTE
· Chronic anemia likely due to malignancy  · Monitor CBC  · If significantly worsening, for further workup and management  · No active bleeding at this point

## 2018-01-17 NOTE — PLAN OF CARE
DISCHARGE PLANNING     Discharge to home or other facility with appropriate resources Progressing        HEMATOLOGIC - ADULT     Maintains hematologic stability Progressing        INFECTION - ADULT     Absence or prevention of progression during hospitalization Progressing     Absence of fever/infection during neutropenic period Progressing        Knowledge Deficit     Patient/family/caregiver demonstrates understanding of disease process, treatment plan, medications, and discharge instructions Progressing        METABOLIC, FLUID AND ELECTROLYTES - ADULT     Electrolytes maintained within normal limits Progressing     Fluid balance maintained Progressing     Glucose maintained within target range Progressing        MUSCULOSKELETAL - ADULT     Maintain or return mobility to safest level of function Progressing     Maintain proper alignment of affected body part Progressing        Nutrition/Hydration-ADULT     Nutrient/Hydration intake appropriate for improving, restoring or maintaining nutritional needs Progressing        Potential for Falls     Patient will remain free of falls Progressing        Prexisting or High Potential for Compromised Skin Integrity     Skin integrity is maintained or improved Progressing        RESPIRATORY - ADULT     Achieves optimal ventilation and oxygenation Progressing        SAFETY ADULT     Maintain or return to baseline ADL function Progressing     Maintain or return mobility status to optimal level Progressing        SKIN/TISSUE INTEGRITY - ADULT     Skin integrity remains intact Progressing     Incision(s), wounds(s) or drain site(s) healing without S/S of infection Progressing     Oral mucous membranes remain intact Progressing

## 2018-01-17 NOTE — ASSESSMENT & PLAN NOTE
· Continue empiric IV cefepime that was started here in the emergency room  I will add metronidazole for possibility of aspiration  · From a discussion with the patient and patient's daughter, no history of MRSA  · Follow results of the cultures: Both blood and sputum (sent from the emergency room)  · Possibility of aspiration pneumonia cannot be totally ruled out yet at this point as patient has been having recurrent pneumonia with history of laryngeal cancer/stenosis  · Thus, for aspiration precautions  · Speech therapy evaluation for now  · Consider pulmonary consult if no improvement  · Monitor CBC  · Monitor temperature  · Cough medicine  · Tylenol p r n  Mani Simple · Outpatient follow-up with palliative care, and Oncology

## 2018-01-17 NOTE — ED NOTES
Patient requesting for his trach to be cleaned  Respiratory called and will come down        Ramiro Brooks RN  01/17/18 2919

## 2018-01-17 NOTE — MISCELLANEOUS
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)    Current Meds   1  Ampicillin 250 MG Oral Capsule; 1 tab bid; Therapy: (Recorded:25May2017) to Recorded   2  Asmanex 14 Metered Doses 220 MCG/INH Inhalation Aerosol Powder Breath Activated;   INHALE 1 PUFF TWICE DAILY; Therapy: (Recorded:25May2017) to Recorded   3  Flunisolide (Nasal) 0 025 % SOLN; INSTILL 2 SPRAYS IN EACH NOSTRIL ONCE   DAILY; Therapy: (Recorded:25May2017) to Recorded   4  Hydrocodone-Acetaminophen  MG Oral Tablet; 1 - 2 tabs q 4-6 h for pain; Therapy: (Recorded:25May2017) to Recorded   5  Lisinopril 5 MG Oral Tablet; TAKE 1 TABLET DAILY; Therapy: (Recorded:25May2017) to Recorded   6  OxyCODONE HCl - 5 MG Oral Capsule; one every 4 hours prn; Therapy: [id number] to (Last Rx:30May2017) Ordered   7  Prochlorperazine Maleate 10 MG Oral Tablet; TAKE 1 TABLET EVERY 6 HOURS AS   NEEDED FOR NAUSEA; Therapy: [id number] to (Evaluate:18Jun2017)  Requested for: [id number]; Last   Rx:02Jun2017 Ordered   8  Proventil HFA AERS; INHALE 1 PUFF EVERY 4 HOURS AS NEEDED;    Therapy: (Recorded:25May2017) to Recorded    Allergies    1  aspirin   2  clindamycin    Plan  Health Maintenance    · Mirtazapine 15 MG Oral Tablet (Remeron); TAKE 1 TABLET AT BEDTIME    Signatures   Electronically signed by : Leonid Hartman, ; Jun 12 2017  4:25PM EST                       (Author)

## 2018-01-17 NOTE — ED NOTES
Patient's blood pressure 84/48 at this time  Patient is sleeping but is arousable to voice  Denies any complaints  SLIM made aware  Will see patient       Verito Funez, MICAH  01/17/18 5315

## 2018-01-17 NOTE — ED PROVIDER NOTES
History  Chief Complaint   Patient presents with    Fever - 9 weeks to 76 years     Daughter states patient has fevers (Tylenol given at 3pm) and "not feeling well"  Daughter states that she called PCP and he prescribed abx due to recent pneumonia  80 yo M presents to ED with a few days of cough, increased sputum production, fatigue, mild shortness of breath  Family reports he had temp of 99 7 this afternoon and took tylenol with improvement  No chest pain, lightheadedness, dizziness, or heart palpitations  No nausea/vomiting/abdominal pain/diarrhea  Pt has had decreased appetite today  Noticed green, thick sputum at trach site today, which family says they just cleaned prior to getting here  Family says they called pt's PCP, and pt was given prescription for levaquin today  Took first dose at 4 pm  Pt was recently admitted in Dec 2017 for sepsis due to HCAP  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 Taking?    ALPRAZolam (XANAX) 0 25 mg tablet   No Yes   Sig: Take 1 tablet by mouth 4 (four) times a day as needed for anxiety for up to 10 days   Calcium 600-200 MG-UNIT per tablet   Yes Yes   Sig: Take 1 tablet by mouth daily   Mometasone Furo-Formoterol Fum (DULERA) 200-5 MCG/ACT AERO   Yes Yes   Sig: Inhale 2 puffs 2 (two) times a day   OLANZapine (ZyPREXA ZYDIS) 5 mg dispersible tablet   No Yes   Sig: Take 1 tablet by mouth daily at bedtime   Sennosides-Docusate Sodium (SENEXON-S PO)   Yes Yes   Sig: Take 1 tablet by mouth acetaminophen (TYLENOL) 325 mg tablet   No Yes   Sig: Take 2 tablets by mouth every 6 (six) hours as needed for mild pain, headaches or fever   albuterol (PROVENTIL HFA) 90 mcg/act inhaler   Yes Yes   Sig: Inhale 2 puffs as needed   collagenase (SANTYL) ointment   Yes Yes   Sig: Apply topically daily   doxazosin (CARDURA) 2 mg tablet   Yes No   Sig: Take 2 mg by mouth daily at bedtime   fentaNYL (DURAGESIC) 25 mcg/hr   Yes Yes   Sig: Place 1 patch on the skin every third day   melatonin 3 mg   No Yes   Sig: Take 1 tablet by mouth daily at bedtime   mirtazapine (REMERON) 15 mg tablet   No No   Sig: Take 1 tablet by mouth daily at bedtime   mometasone (ASMANEX 14 METERED DOSES) 220 MCG/INH inhaler   Yes Yes   Sig: Inhale 1 puff daily   omeprazole (PriLOSEC) 40 MG capsule   Yes Yes   Sig: Take 40 mg by mouth daily   oxyCODONE (OXY-IR) 5 MG capsule   Yes Yes   Sig: Take 5 mg by mouth every 4 (four) hours as needed for moderate pain 5 mg for mod pain q4 10 mg for severe pain q4   polyethylene glycol (MIRALAX) 17 g packet   No Yes   Sig: Take 17 g by mouth daily as needed (CONSTIPATION)      Facility-Administered Medications: None       Past Medical History:   Diagnosis Date    Asthma     Hypertension     Larynx [facility] ([facility] [address] )        Past Surgical History:   Procedure Laterality Date    EGD AND COLONOSCOPY      LARYNGECTOMY      TRACHEOSTOMY N/A 8/25/2017    Procedure: TRACHEOSTOMY (POSSIBLE AWAKE) , MICRO DIRECT LARYNGOSCOPY, Biopsy;  Surgeon: Jessica Randall MD;  [facility];  Service: ENT    WOUND DEBRIDEMENT N/A 11/22/2017    Procedure: DEBRIDEMENT WOUND [facility], sacrum;  Surgeon: Srinivas Gilliam DO;  [facility];  Service: General       History reviewed  No pertinent family history  I have reviewed and agree with the history as documented      Social History   Substance Use Topics    Smoking status: Former Smoker    Smokeless tobacco: Never Used    Alcohol use No        Review of Systems Constitutional: Positive for appetite change and fatigue  Negative for chills and fever  HENT: Negative for congestion, rhinorrhea, sore throat and trouble swallowing  Eyes: Negative for pain, discharge and visual disturbance  Respiratory: Positive for cough and shortness of breath  Negative for wheezing  Cardiovascular: Negative for chest pain, palpitations and leg swelling  Gastrointestinal: Negative for abdominal pain, diarrhea, nausea and vomiting  Genitourinary: Negative for decreased urine volume, dysuria, frequency and urgency  Musculoskeletal: Negative for gait problem, neck pain and neck stiffness  Skin: Positive for wound  Negative for color change and rash  Neurological: Negative for dizziness, syncope, light-headedness and headaches  Physical Exam  ED Triage Vitals [01/16/18 1743]   Temperature Pulse Respirations Blood Pressure SpO2   98 2 °F (36 8 °C) 70 18 94/52 99 %      Temp Source Heart Rate Source Patient Position - Orthostatic VS BP Location FiO2 (%)   Oral Monitor Sitting Left arm --      Pain Score       No Pain           Orthostatic Vital Signs  Vitals:    01/16/18 1930 01/16/18 2043 01/16/18 2123 01/16/18 2223   BP: 107/54 123/56 108/55 109/64   Pulse: 64 65 62 64   Patient Position - Orthostatic VS:  Lying  Lying       Physical Exam   Constitutional: He is oriented to person, place, and time  He appears well-developed and well-nourished  No distress  HENT:   Head: Normocephalic and atraumatic  Mouth/Throat: Oropharynx is clear and moist    Eyes: Conjunctivae and EOM are normal  Right eye exhibits no discharge  Left eye exhibits no discharge  Neck: Normal range of motion  Neck supple  Trach in place, no obvious sputum/drainage at site   Cardiovascular: Normal rate, regular rhythm and intact distal pulses  Pulmonary/Chest: Effort normal  No respiratory distress  He has no wheezes  Mild crackles bibasilar   Abdominal: Soft  There is no tenderness   There is no rebound and no guarding  Musculoskeletal: He exhibits no edema, tenderness or deformity  Neurological: He is alert and oriented to person, place, and time  No sensory deficit  He exhibits normal muscle tone  Coordination normal    Skin: Skin is warm and dry  Dressing over sacral decub ulcer  Took dressing down  Mild erythema  No purulence, nontender  Nursing note and vitals reviewed  ED Medications  Medications   cefepime (MAXIPIME) 2 g/50 mL dextrose IVPB (2,000 mg Intravenous New Bag 1/16/18 2251)   fentaNYL (DURAGESIC) 25 mcg/hr TD 72 hr patch 25 mcg (25 mcg Transdermal Medication Applied 1/16/18 2251)   sodium chloride 0 9 % bolus 1,000 mL (0 mL Intravenous Stopped 1/16/18 2122)   albuterol inhalation solution 5 mg (5 mg Nebulization Given 1/16/18 2115)   ipratropium (ATROVENT) 0 02 % inhalation solution 0 5 mg (0 5 mg Nebulization Given 1/16/18 2116)       Diagnostic Studies  Results Reviewed     Procedure Component Value Units Date/Time    Lactic acid, plasma [[id number]]  (Normal) Collected:  01/16/18 2108    Lab Status:  Final result Specimen:  Blood from Arm, Left Updated:  01/16/18 2138     LACTIC ACID 0 8 mmol/L     Narrative:         Result may be elevated if tourniquet was used during collection  Blood culture #1 [[id number]] Collected:  01/16/18 2101    Lab Status: In process Specimen:  Blood from Arm, Right Updated:  01/16/18 2114    Blood culture #2 [[id number]] Collected:  01/16/18 2108    Lab Status:   In process Specimen:  Blood from Arm, Left Updated:  01/16/18 2114    CBC and differential [[id number]]  (Abnormal) Collected:  01/16/18 1941    Lab Status:  Final result Specimen:  Blood from Arm, Right Updated:  01/16/18 2100     WBC 14 45 (H) Thousand/uL      RBC 3 27 (L) Million/uL      Hemoglobin 10 6 (L) g/dL      Hematocrit 32 2 (L) %      MCV 99 (H) fL      MCH 32 4 pg      MCHC 32 9 g/dL      RDW 15 8 (H) %      MPV 8 9 fL      Platelets 751 Thousands/uL      nRBC 0 /100 WBCs Narrative: This is an appended report  These results have been appended to a previously verified report  Sputum culture and Gram stain [[id number]] Updated:  01/16/18 2038    Lab Status: In process Specimen:  Sputum     Basic metabolic panel [[id number]]  (Abnormal) Collected:  01/16/18 1941    Lab Status:  Final result Specimen:  Blood from Arm, Right Updated:  01/16/18 2012     Sodium 137 mmol/L      Potassium 4 2 mmol/L      Chloride 101 mmol/L      CO2 32 mmol/L      Anion Gap 4 mmol/L      BUN 33 (H) mg/dL      Creatinine 0 94 mg/dL      Glucose 93 mg/dL      Calcium 9 0 mg/dL      eGFR 76 ml/min/1 73sq m     Narrative:         National Kidney Disease Education Program recommendations are as follows:  GFR calculation is accurate only with a steady state creatinine  Chronic Kidney disease less than 60 ml/min/1 73 sq  meters  Kidney failure less than 15 ml/min/1 73 sq  meters  XR chest 2 views   Final Result by Nathan Correa MD (01/16 2219)   Probable postinflammatory scarring right lower lobe exhibiting groundglass attenuation  This is likely unchanged  Continued follow-up as clinically appropriate         No discrete pulmonary masses or infiltrates      Typical tracheostomy tube      Asymmetric laryngeal tissue                      VIEWS:  Frontal and lateral projections      IMAGES:  3      FINDINGS:           Lungs are chronically hyperinflated  Increasing consolidation in the right middle lobe  Previously seen left-sided consolidations have resolved  Heart size is normal   Mild aortic tortuosity  Normal pulmonary vasculature  Osteopenia  No acute osseous findings  IMPRESSION:      Right middle lobe pneumonia           Workstation performed: [id number]               Procedures  ECG 12 Lead Documentation  Date/Time: 1/16/2018 8:00 PM  Performed by: Prem Schroeder  Authorized by: Kayleigh Feliz     Indications / Diagnosis:  Short of breath  ECG reviewed by me, the ED Provider: yes    Patient location:  ED  Previous ECG:     Previous ECG:  Compared to current  Rate:     ECG rate:  63    ECG rate assessment: normal    Rhythm:     Rhythm: sinus rhythm      Rhythm comment:  Sinus arrhythmia  ST segments:     ST segments:  Normal  T waves:     T waves: normal            Phone Consults  ED Phone Contact    ED Course  ED Course                                MDM  Number of Diagnoses or Management Options  Right middle lobe pneumonia [facility]):   Diagnosis management comments: R middle lobe pneumonia  Elevated WBC  Normal lactic acid  Satting well on RA  No respiratory distress  Pt with trach and s/p total laryngectomy for laryngeal cancer  Discussed admission vs discharge with pt and family  Elect for admission  Will give IV cefepime and admit to [facility]  [facility] Time    Disposition  Final diagnoses:   Right middle lobe pneumonia ([address] )     Time reflects when diagnosis was documented in both MDM as applicable and the Disposition within this note     Time User Action Codes Description Comment    1/16/2018 10:46 PM Maria Teresa Rees [J18 1] Right middle lobe pneumonia [facility])       ED Disposition     ED Disposition Condition Comment    Admit  Case was discussed with [facility] and the patient's admission status was agreed to be Admission Status: observation status to the service of Dr Damaris Hunter   Follow-up Information    None       Patient's Medications   Discharge Prescriptions    No medications on file     No discharge procedures on file  ED Provider  Attending physically available and evaluated Ronnie Dickson I managed the patient along with the ED Attending      Electronically Signed by         Beryle Hallmark, MD  01/16/18 0986

## 2018-01-17 NOTE — ASSESSMENT & PLAN NOTE
· According to previous records, patient has stage IV sacral decubitus ulcer  · However, presently, patient does not want his sacral decubitus ulcer to be examined  Thus will ask hospitalist and wound care staff tomorrow to examine patient's decubitus ulcer  · Wound care consult  · For local wound care  · Outpatient follow-up at the [facility]

## 2018-01-17 NOTE — ED ATTENDING ATTESTATION
Raudel Mcguire MD, saw and evaluated the patient  I have discussed the patient with the resident/non-physician practitioner and agree with the resident's/non-physician practitioner's findings, Plan of Care, and MDM as documented in the resident's/non-physician practitioner's note, except where noted  All available labs and Radiology studies were reviewed  At this point I agree with the current assessment done in the Emergency Department    I have conducted an independent evaluation of this patient a history and physical is as follows:  Had head and neck ca with laryngectomy 10/17   Has tracheostomy   Here with low grade fever and cough and increased mucous     Was to have a lymph node bx today  But unable to make 2nd to illness  No cp  Mild sob    No n/v/d/f/c   Exam  nad  Lungs clear transmitted rhonchi  heart rrr  abd soft nt nd pos bs   cxr shows rml infiltrate    plan antibiotics IV fluids admission  Critical Care Time  CritCare Time    Procedures

## 2018-01-17 NOTE — ASSESSMENT & PLAN NOTE
· Recurrent - consult ID   · Continue IV cefapime   · Afebrile, leukocytosis of 10 down from 14 - continue to monitor   · Aspiration unlikely given laryngectomy - speech therapy to see patient   · Follow sputum culture, blood culture, MRSA  · Flu/RSV PCR - negative   · Strep urine antigen - negative   · Outpatient follow-up with palliative care and Oncology

## 2018-01-17 NOTE — CONSULTS
Consultation - Infectious Disease   Carmencita Abreu  81 y o  male MRN: [id number]  Unit/Bed#: ED 17 Encounter: [id number]      IMPRESSION & RECOMMENDATIONS:   Impression/Recommendations:  1  Productive cough  Possibly secondary to pneumonia versus pneumonitis secondary to possible aspiration  Patient states he had a subjective low-grade fever at home, but no documented fevers here  No hypoxia  Hemodynamically stable and nontoxic appearing  It is possible that he may have had an episode of microaspiration with subsequent pneumonitis  Based on exam, I am not convinced that he has a recurrent pneumonia  Patient was started empirically on cefepime/Flagyl  Sputum culture has been sent and Gram stain is polymicrobial     -continue cefepime/Flagyl for now  -monitor respiratory status closely  -follow-up sputum culture results  If results are not meaningful and patient remains clinically well, will plan to stop antibiotics soon   -supportive care per primary team  -follow-up blood cultures  -monitor temperatures and white blood cell count    2  Right middle lobe infiltrate  As stated above, patient may have had an episode of microaspiration, as 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  -plan as above  -follow-up speech therapy evaluation  -aspiration precaution    3  Sacral decubitus ulcer  This was debrided back in November of 2017  Does not overtly look infected  Wound consult is pending     -follow-up wound care consultation  -local wound care  -serial examinations    4  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  Status post tracheostomy  Patient follows with [facility]  5   Neck mass  Possibly necrotic lymph node  This was aspirated in December of 2017 with biopsies negative for any infectious process  The cytology is nondiagnostic  Patient was planning to follow-up with [facility] regarding this  6   Leukocytosis    May be leukemoid reaction in the setting of aspiration pneumonitis verses a possible pneumonia  Otherwise, patient remains afebrile, hemodynamically stable, nontoxic appearing     -plan as above  -monitor white blood cell count    Antibiotics:  Cefepime/Flagyl D2    Thank you for this consultation  We will follow along with you  HISTORY OF PRESENT ILLNESS:  Reason for Consult:  Pneumonia    HPI: Vince Bustillos  is a 81 y o  male with history of laryngeal cancer status post laryngectomy, radiation, chemotherapy, tracheostomy placement  Patient was recently hospitalized from 12/13/2017 to 12/20 1/17 secondary to a possible healthcare associated pneumonia status post 7 days of antibiotics  Patient presented on 01/16 to the ER due to worsening productive cough with greenish thick sputum that was noted at the trach site  Patient was also noted to have a temperature of 99 7° at home which improved with Tylenol  He has had a worsening appetite and has felt generally weak lately  Also occasional shortness of breath  Patient lives at home  Denies any difficulty swallowing and has been eating regular food at home  He also has a known stage IV sacral decubitus ulcer for which she had an incision and drainage in November of 2017  Of note, patient also had a neck lymph node aspirated in December 2017 for which biopsies were negative for any infectious process  The biopsy was otherwise nondiagnostic  He was actually going to follow up [facility] for a core biopsy of the lymph node that was scheduled on 01/16, but instead came to the ER  REVIEW OF SYSTEMS:  A complete system-based review of systems is otherwise negative      PAST MEDICAL HISTORY:  Past Medical History:   Diagnosis Date    Asthma     Hypertension     Larynx cancer ([facility] [address] )      Past Surgical History:   Procedure Laterality Date    EGD AND COLONOSCOPY      LARYNGECTOMY      TRACHEOSTOMY N/A 8/25/2017    Procedure: TRACHEOSTOMY (POSSIBLE AWAKE) , MICRO DIRECT LARYNGOSCOPY, Biopsy; Surgeon: Flex Multani MD;  [facility];  Service: ENT   [address] [place] [address] N/A 2017    Procedure: DEBRIDEMENT WOUND [facility] OUT), sacrum;  Surgeon: Cole Prado DO;  [facility];  Service: General       FAMILY HISTORY:  Non-contributory    SOCIAL HISTORY:  History   Alcohol Use No     History   Drug Use No     History   Smoking Status    Former Smoker   Smokeless Tobacco    Never Used       ALLERGIES:  Allergies   Allergen Reactions    Aspirin     Cleocin [Clindamycin]        MEDICATIONS:  All current active medications have been reviewed  PHYSICAL EXAM:  Vitals:  HR:  [] 68  Resp:  [18-22] 18  BP: ()/(42-66) 113/59  SpO2:  [94 %-99 %] 94 %  Temp (24hrs), Av 2 °F (36 8 °C), Min:98 2 °F (36 8 °C), Max:98 2 °F (36 8 °C)  Current: Temperature: 98 2 °F (36 8 °C)     Physical Exam:  General:  Chronically ill appearing, no acute distress  Eyes:  Conjunctive clear with no hemorrhages or effusions  Oropharynx:  No ulcers, no lesions  Neck:  Supple, no lymphadenopathy , trach in place with blood soaked dressing  Lungs:  Clear to auscultation bilaterally, no accessory muscle use  Cardiac:  Regular rate and rhythm, no murmurs  Abdomen:  Soft, non-tender, non-distended  Extremities:  No peripheral cyanosis, clubbing, or edema  Skin:  Stage IV sacral decubitus ulcer with no overt sign of infection  Neurological:  Moves all four extremities spontaneously, sensation grossly intact      LABS, IMAGING, & OTHER STUDIES:  Lab Results:  I have personally reviewed pertinent labs      Results from last 7 days  Lab Units 18  0531 18  1941   SODIUM mmol/L 142 137   POTASSIUM mmol/L 3 9 4 2   CHLORIDE mmol/L 108 101   CO2 mmol/L 29 32   ANION GAP mmol/L 5 4   BUN mg/dL 26* 33*   CREATININE mg/dL 0 87 0 94   EGFR ml/min/1 73sq m 81 76   GLUCOSE RANDOM mg/dL 108 93   CALCIUM mg/dL 8 3 9 0   AST U/L 37  --    ALT U/L 17  --    ALK PHOS U/L 55  --    TOTAL PROTEIN g/dL 6 0*  -- ALBUMIN g/dL 2 3*  --    BILIRUBIN TOTAL mg/dL 0 33  --        Results from last 7 days  Lab Units 01/17/18  0531 01/17/18  0009 01/16/18  1941   WBC Thousand/uL 10 23*  --  14 45*   HEMOGLOBIN g/dL 9 7*  --  10 6*   PLATELETS Thousands/uL 178 158 212       Results from last 7 days  Lab Units 01/17/18  0941 01/17/18  0056 01/17/18  0009   GRAM STAIN RESULT  No Epithelial cells seen  3+ Polys  1+ Gram positive cocci in pairs  Rare Gram negative diplococci  --   --    INFLUENZA A PCR   --   --  None Detected   INFLUENZA B PCR   --   --  None Detected   RSV PCR   --   --  None Detected   LEGIONELLA URINARY ANTIGEN   --  Negative  --          Imaging Studies:   I have personally reviewed pertinent imaging study reports and images in PACS  Chest x-ray showing right-sided postinflammatory scarring    EKG, Pathology, and Other Studies:   I have personally reviewed pertinent reports

## 2018-01-17 NOTE — H&P
H&P- Rosalba Rios  1935, 81 y o  male MRN: [id number]    Unit/Bed#: ED 17 Encounter: [id number]    Primary Care Provider: Meghann Garrison MD   Date and time admitted to hospital: 1/16/2018  7:10 PM      Note:  Patient's healthcare associated pneumonia likely gram-negative bacteria  HCAP (healthcare-associated pneumonia)   Assessment & Plan    · Continue empiric IV cefepime that was started here in the emergency room  I will add metronidazole for possibility of aspiration  · From a discussion with the patient and patient's daughter, no history of MRSA  · Follow results of the cultures: Both blood and sputum (sent from the emergency room)  · Possibility of aspiration pneumonia cannot be totally ruled out yet at this point as patient has been having recurrent pneumonia with history of laryngeal cancer/stenosis  · Thus, for aspiration precautions  · Speech therapy evaluation for now  · Consider pulmonary consult if no improvement  · Monitor CBC  · Monitor temperature  · Cough medicine  · Tylenol p r n  Henery Jurist · Outpatient follow-up with palliative care, and Oncology  History of laryngeal cancer   Assessment & Plan    · Patient follows up at [facility]  · Here, oncologist is Dr Viivana Phan  · Patient was actually scheduled for a core biopsy of a lymph node at [facility], but patient was not able to go to the appointment due to present symptoms  · Patient's daughter told me that a biopsy was done on the last admission here, but results were inconclusive and that is the reason why, patient was referred to [facility]  Patient's daughter asked if the core biopsy may be done here on this admission  · Thus, hospitalist tomorrow should at least verbally discussed this patient to our oncologist, to determine if they need to be on board and if they will do the core biopsy here    From my assessment, I think, the core biopsy will be postponed until present infection/pneumonia has clinically improved/resolved  · Patient's daughter told me that, patient had laryngeal surgery before, and it was explained to them, that chances of aspiration is very minimal now due to the surgery; however, with recurrent pneumonia and with history of laryngeal cancer and stenosis, we still have to rule out aspiration  Thus I will start by having speech therapist to evaluate the patient  Sacral decubitus ulcer   Assessment & Plan    · According to previous records, patient has stage IV sacral decubitus ulcer  · However, presently, patient does not want his sacral decubitus ulcer to be examined  Thus will ask hospitalist and wound care staff tomorrow to examine patient's decubitus ulcer  · Wound care consult  · For local wound care  · Outpatient follow-up at [facility]  Anemia   Assessment & Plan    · Chronic anemia likely due to malignancy  · Monitor CBC  · If significantly worsening, for further workup and management  · No active bleeding at this point  Asthma   Assessment & Plan    · No exacerbation or attack at this point  · Continue patient's home inhalers  VTE Prophylaxis: Enoxaparin (Lovenox)  / sequential compression device   Code Status:  As per my discussion with the patient and with patient's daughter and son-in-law at bedside, patient is a full code  He told me to try 1 time to revive/resuscitate him including CPR and intubation if he ever he will be in cardiopulmonary arrest   POLST: POLST form is not discussed and not completed at this time  Discussion with family:  I spoke to patient's daughterJunior Aas and patient's son-in-law at bedside  Aside from the patient, they also provided me with additional information about the patient  I discussed with them our findings and plans  I answered all her questions and concerns        Anticipated Length of Stay:  Patient will be admitted on an inpatient  basis with an anticipated length of stay of  greater than 2 midnights  Justification for Hospital Stay:  Due to the above findings and plans  Total Time for Visit, including Counseling / Coordination of Care: 1 hour  Greater than 50% of this total time spent on direct patient counseling and coordination of care  Chief Complaint:   Cough    History of Present Illness:    [address]  is a 81 y o  male who presents with cough to the emergency room at [facility]  Last month, patient was admitted here at [facility] due to sepsis due to healthcare associated pneumonia  Patient was eventually discharge to home with her daughter  Patient has history of laryngeal cancer/stenosis and being managed at [facility]  Patient's local oncologist is Dr Ector aPniagua  Patient was actually referred to [facility] for a core biopsy of a lymph node that is actually scheduled for today  However, due to patient's symptoms which I will mention here, patient did not make to this appointment  Instead, patient came up here to the emergency room as patient has been having productive cough lately  According to the patient and patient's daughter at bedside, patient's cough is productive of greenish thick phlegm, that was noted at the trach site today  Today, patient also had a slight fever at 99 7° F and patient took Tylenol with improvement  Patient also told me that lately, he has been having poor appetite and generally weak  Patient also admits to occasional shortness of breath  Patient also complains of pain on his bottom as patient has history of sacral decubitus ulcer stage IV  Patient is being followed by wound care for this  Patient also is being followed by palliative care and was just seen this month  Patient was prescribed with opiate pain medications  Patient denies any problems swallowing and according to the patient's daughter at bedside, patient eats regular food without any problems   Patient denies any other symptoms other the ones mentioned above  It is important to note, with history of laryngeal cancer, patient had total lowering ectomy with tracheostomy in October of 2017 and had chemotherapy and radiation treatments and last chemotherapy was spring of 2017 according the patient's family  In the emergency room, patient was found to have healthcare associated pneumonia and was given intravenous antibiotic  Blood cultures and sputum cultures were sent  Review of Systems:    Review of Systems     Ten point review systems done and they were negative except for the ones I mentioned in my HPI  Patient denies any headaches, dizziness  Patient denies any chest pains or any abdominal pains or any nausea or vomiting  Patient denies any problems urinating or moving his bowels  Past Medical and Surgical History:     Past Medical History:   Diagnosis Date    Asthma     Hypertension     Larynx cancer ([facility] [address] )        Past Surgical History:   Procedure Laterality Date    EGD AND COLONOSCOPY      LARYNGECTOMY      TRACHEOSTOMY N/A 8/25/2017    Procedure: TRACHEOSTOMY (POSSIBLE AWAKE) , MICRO DIRECT LARYNGOSCOPY, Biopsy;  Surgeon: Fracisco Gusman MD;  [facility]: [facility];  Service: ENT    WOUND DEBRIDEMENT N/A 11/22/2017    Procedure: DEBRIDEMENT WOUND ([address]), sacrum;  Surgeon: Rianna Sommer DO;  Location: [facility];  Service: General       Meds/Allergies:    Prior to Admission medications    Medication Sig Start Date End Date Taking?  Authorizing Provider   acetaminophen (TYLENOL) 325 mg tablet Take 2 tablets by mouth every 6 (six) hours as needed for mild pain, headaches or fever 8/29/17  Yes Joelle Heard DO   albuterol (PROVENTIL HFA) 90 mcg/act inhaler Inhale 2 puffs as needed   Yes Historical Provider, MD Praneeth Mancera) 0 25 mg tablet Take 1 tablet by mouth 4 (four) times a day as needed for anxiety for up to 10 days 12/22/17 1/16/18 Yes Joelle Heard DO   Calcium 600-200 MG-UNIT per tablet Take 1 tablet by mouth daily   Yes Historical Provider, MD   collagenase (SANTYL) ointment Apply topically daily   Yes Historical Provider, MD   fentaNYL (DURAGESIC) 25 mcg/hr Place 1 patch on the skin every third day   Yes Historical Provider, MD   melatonin 3 mg Take 1 tablet by mouth daily at bedtime 8/29/17  Yes Maru Bull DO   mometasone (ASMANEX 14 METERED DOSES) 220 MCG/INH inhaler Inhale 1 puff daily   Yes Historical Provider, MD   Mometasone Furo-Formoterol Fum (DULERA) 200-5 MCG/ACT AERO Inhale 2 puffs 2 (two) times a day   Yes Historical Provider, MD   OLANZapine (ZyPREXA ZYDIS) 5 mg dispersible tablet Take 1 tablet by mouth daily at bedtime 12/22/17  Yes Maru Bull DO   omeprazole (PriLOSEC) 40 MG capsule Take 40 mg by mouth daily   Yes Historical Provider, MD   oxyCODONE (OXY-IR) 5 MG capsule Take 5 mg by mouth every 4 (four) hours as needed for moderate pain 5 mg for mod pain q4 10 mg for severe pain q4   Yes Historical Provider, MD   polyethylene glycol (MIRALAX) 17 g packet Take 17 g by mouth daily as needed (CONSTIPATION) 8/29/17  Yes Maru Bull DO   Sennosides-Docusate Sodium (SENEXON-S PO) Take 1 tablet by mouth   Yes Historical Provider, MD   doxazosin (CARDURA) 2 mg tablet Take 2 mg by mouth daily at bedtime    Historical Provider, MD   mirtazapine (REMERON) 15 mg tablet Take 1 tablet by mouth daily at bedtime 11/27/17   Deon Higuera MD     I reviewed patient's medication list     Allergies: Allergies   Allergen Reactions    Aspirin     Cleocin [Clindamycin]        Social History:     Marital Status: Single     History   Alcohol Use No     History   Smoking Status    Former Smoker   Smokeless Tobacco    Never Used     History   Drug Use No       Family History:    History reviewed  No pertinent family history      Physical Exam:     Vitals:   Blood Pressure: 94/57 (01/17/18 0016)  Pulse: 67 (01/17/18 0016)  Temperature: 98 2 °F (36 8 °C) (01/16/18 1743)  Temp Source: Oral (01/16/18 1743)  Respirations: 20 (01/17/18 0016)  Height: 5' 7" (170 2 cm) (01/16/18 1743)  Weight - Scale: 61 2 kg (135 lb) (01/16/18 1743)  SpO2: 97 % (01/17/18 0016)    Physical Exam   Constitutional: He is oriented to person, place, and time  No distress  HENT:   Head: Normocephalic and atraumatic  Mouth/Throat: No oropharyngeal exudate  Dry oral mucosa, dry tongue  Eyes: Conjunctivae and EOM are normal  Pupils are equal, round, and reactive to light  Right eye exhibits no discharge  Left eye exhibits no discharge  No scleral icterus  Neck: No JVD present  Positive for tracheostomy  Cardiovascular: Normal rate and regular rhythm  Exam reveals no gallop and no friction rub  No murmur heard  Pulmonary/Chest: Effort normal  No stridor  No respiratory distress  He has no wheezes  He has rales  Positive for rales at the right base  Abdominal: Soft  Bowel sounds are normal  He exhibits no distension  There is no tenderness  There is no rebound and no guarding  Musculoskeletal: He exhibits no edema, tenderness or deformity  Neurological: He is alert and oriented to person, place, and time  No cranial nerve deficit  Skin: Skin is warm  No rash noted  He is not diaphoretic  No erythema  No pallor  Psychiatric: He has a normal mood and affect  His behavior is normal  Thought content normal    Vitals reviewed  It is important to note, that patient refused examination of his decubitus ulcer  From my discussion with him, he is okay to be examined tomorrow, when he is more comfortable (hopefully, patient is not in the ER anymore in a hospital stretcher)  Additional Data:     Lab Results: I have personally reviewed pertinent reports          Results from last 7 days  Lab Units 01/17/18  0009 01/16/18  1941   WBC Thousand/uL  --  14 45*   HEMOGLOBIN g/dL  --  10 6*   HEMATOCRIT %  --  32 2*   PLATELETS Thousands/uL 158 212   LYMPHO PCT %  --  2*   MONO PCT MAN %  --  4   EOSINO PCT MANUAL %  -- 0       Results from last 7 days  Lab Units 01/16/18  1941   SODIUM mmol/L 137   POTASSIUM mmol/L 4 2   CHLORIDE mmol/L 101   CO2 mmol/L 32   BUN mg/dL 33*   CREATININE mg/dL 0 94   CALCIUM mg/dL 9 0   GLUCOSE RANDOM mg/dL 93           Imaging: I have personally reviewed pertinent reports  XR chest 2 views   Final Result by Herrera Todd MD (01/16 1769)   Probable postinflammatory scarring right lower lobe exhibiting groundglass attenuation  This is likely unchanged  Continued follow-up as clinically appropriate         No discrete pulmonary masses or infiltrates      Typical tracheostomy tube      Asymmetric laryngeal tissue                      VIEWS:  Frontal and lateral projections      IMAGES:  3      FINDINGS:           Lungs are chronically hyperinflated  Increasing consolidation in the right middle lobe  Previously seen left-sided consolidations have resolved  Heart size is normal   Mild aortic tortuosity  Normal pulmonary vasculature  Osteopenia  No acute osseous findings  IMPRESSION:      Right middle lobe pneumonia  Workstation performed: [id number]             EKG, Pathology, and Other Studies Reviewed on Admission:   · EKG:  Reveals sinus rhythm with sinus arrhythmia at 63 per minute with poor R-wave progression from V1 to V3; no significant change from my review of patient's EKG dated 12/13/2017  [facility] / [employer] Records Reviewed: Yes     ** Please Note: This note has been constructed using a voice recognition system   **

## 2018-01-17 NOTE — SPEECH THERAPY NOTE
Speech Language/Pathology    Speech/Language Pathology Progress Note    Patient Name: Vince Bustillos  Today's Date: 1/17/2018     Received consult to rule out aspiration  Pt is well known to this service from recent hospitalizations  Pt is s/p total laryngectomy c HME in place  Pt has been checked during prior stays for a leak c none appreciated  As a total laryngectomy, pt is not an aspiration risk  RN performed nursing screen which pt tolerated well  Pt on a regular diet c thin liquids  Provided education to nursing staff on total laryngectomy  Further swallow evaluation not warranted at this time  During previous stay, pt able to obtain voicing using esophageal speech, was recommended for outpatient or VNA speech therapy  Recommend pt cont with this recommendation upon discharge

## 2018-01-17 NOTE — PROGRESS NOTES
Progress Note - Yissel Santos  1935, 81 y o  male MRN: [id number]  Unit/Bed#: ED 17 Encounter: [id number]  Primary Care Provider: Hollie Hood MD   Date and time admitted to hospital: 1/16/2018  7:10 PM    * HCAP (healthcare-associated pneumonia)   Assessment & Plan    · Recurrent - consult ID   · Continue IV cefapime   · Afebrile, leukocytosis of 10 down from 14 - continue to monitor   · Aspiration unlikely given laryngectomy - speech therapy to see patient   · Follow sputum culture, blood culture, MRSA  · Flu/RSV PCR - negative   · Strep urine antigen - negative   · Outpatient follow-up with palliative care and Oncology         Hypotension   Assessment & Plan    · BP 87/52  · Continue IVF  · Hold Cardura         History of laryngeal cancer   Assessment & Plan    · S/p total laryngectomy - patient follows up at [facility]  · Here, oncologist is Dr Wero Mcpherson  · Patient was actually scheduled for a core biopsy of a lymph node at [facility], but patient was not able to go to the appointment due to present symptoms  · Recommend outpatient core biopsy given current infection  Sacral decubitus ulcer   Assessment & Plan    · According to previous records, patient has stage IV sacral decubitus ulcer  · Does not appear infected at this time  · Nurse present and applied topical collagenase - continue daily  · Wound care to see patient  · Outpatient follow[facility]up at the [facility]  Anemia   Assessment & Plan    · Chronic anemia likely due to malignancy  · Monitor CBC  · If significantly worsening, for further workup and management  · No active bleeding at this point  Asthma   Assessment & Plan    · No exacerbation or attack at this point  · Continue patient's home inhalers  VTE Pharmacologic Prophylaxis:   Pharmacologic: Enoxaparin (Lovenox)  Mechanical: Mechanical VTE prophylaxis in place      Patient Centered Rounds: I have performed bedside rounds with nursing staff today   Discussions with Specialists or Other Care Team Provider: None  Education and Discussions with Family / Patient: Patient   Time Spent for Care: 30 minutes  More than 50% of total time spent on counseling and coordination of care as described above  Current Length of Stay: 0 day(s)  Current Patient Status: Inpatient   Certification Statement: The patient will continue to require additional inpatient hospital stay due to IV antibiotics    Discharge Plan: Not medially stable for discharge   Code Status: Level 1 - Full Code    Subjective:   Patient lethargic  He complains of difficulty breathing and slight cough  Denies chest pain, fever/chills, nausea/vomiting  History difficult to obtain given laryngostomy and having to read lips  Objective:   Vitals:   Temp (24hrs), Av 2 °F (36 8 °C), Min:98 2 °F (36 8 °C), Max:98 2 °F (36 8 °C)    HR:  [] 72  Resp:  [18-22] 19  BP: ()/(42-66) 87/52  SpO2:  [94 %-99 %] 94 %  Body mass index is 21 14 kg/m²  Input and Output Summary (last 24 hours): Intake/Output Summary (Last 24 hours) at 18 1050  Last data filed at 18 0944   Gross per 24 hour   Intake             3100 ml   Output              100 ml   Net             3000 ml       Physical Exam:     Physical Exam   Constitutional: He appears lethargic  He is sleeping and cooperative  Cardiovascular: Normal rate, regular rhythm, normal heart sounds and intact distal pulses  Pulmonary/Chest: Tachypnea noted  No respiratory distress  He has decreased breath sounds  He has no wheezes  He has no rhonchi  Tracheostomy   Respiratory rate 24 on exam   Exam limited by patient position   Abdominal: Soft  Normal appearance and bowel sounds are normal  He exhibits no distension  There is no tenderness  Neurological: He appears lethargic  Nursing note and vitals reviewed      Additional Data:   Labs:    Results from last 7 days  Lab Units 18  0531   WBC Thousand/uL 10 23* HEMOGLOBIN g/dL 9 7*   HEMATOCRIT % 29 4*   PLATELETS Thousands/uL 178   LYMPHO PCT % 1*   MONO PCT MAN % 0*   EOSINO PCT MANUAL % 0       Results from last 7 days  Lab Units 01/17/18  0531   SODIUM mmol/L 142   POTASSIUM mmol/L 3 9   CHLORIDE mmol/L 108   CO2 mmol/L 29   BUN mg/dL 26*   CREATININE mg/dL 0 87   CALCIUM mg/dL 8 3   TOTAL PROTEIN g/dL 6 0*   BILIRUBIN TOTAL mg/dL 0 33   ALK PHOS U/L 55   ALT U/L 17   AST U/L 37   GLUCOSE RANDOM mg/dL 108           * I Have Reviewed All Lab Data Listed Above  * Additional Pertinent Lab Tests Reviewed: [address] Admission Reviewed    Imaging:    Imaging Reports Reviewed Today Include: None    Cultures:   Blood Culture:   Lab Results   Component Value Date    BLOODCX No Growth After 5 Days  12/13/2017    BLOODCX No Growth After 5 Days  12/13/2017    BLOODCX No Growth After 5 Days  11/19/2017    BLOODCX No Growth After 5 Days  11/19/2017     Urine Culture: No results found for: URINECX  Sputum Culture: No components found for: SPUTUMCX  Wound Culture: No results found for: WOUNDCULT    Last 24 Hours Medication List:     calcium carbonate-vitamin D 1 tablet Oral Daily With Breakfast   cefepime 2,000 mg Intravenous Q12H   collagenase  Topical Daily   enoxaparin 40 mg Subcutaneous Daily   [START ON 1/19/2018] fentaNYL 1 patch Transdermal Q72H   fentaNYL 25 mcg Transdermal Once   fluticasone-salmeterol 2 puff Inhalation Q12H [address]   ipratropium 0 5 mg Nebulization TID   levalbuterol 1 25 mg Nebulization TID   melatonin 3 mg Oral HS   metroNIDAZOLE 500 mg Intravenous Q8H   mirtazapine 15 mg Oral HS   OLANZapine 5 mg Oral HS   pantoprazole 40 mg Oral Early Morning   senna-docusate sodium 1 tablet Oral Daily        Today, Patient Was Seen By: Maria G Cha PA-C    ** Please Note: Dragon 360 Dictation voice to text software may have been used in the creation of this document   **

## 2018-01-17 NOTE — RESPIRATORY THERAPY NOTE
RT Protocol Note  Fracisco Corley  81 y o  male MRN: [id number]  Unit/Bed#: ED 17 Encounter: [id number]    Assessment    Principal Problem:    HCAP (healthcare-associated pneumonia)  Active Problems:    Asthma    Sacral decubitus ulcer    Anemia    History of laryngeal cancer    Hypotension      Home Pulmonary Medications:      Past Medical History:   Diagnosis Date    Asthma     Hypertension     Larynx cancer ([address] )      Social History     Social History    Marital status: Single     Spouse name: N/A    Number of children: N/A    Years of education: N/A     Social History Main Topics    Smoking status: Former Smoker    Smokeless tobacco: Never Used    Alcohol use No    Drug use: No    Sexual activity: Not Asked     Other Topics Concern    None     Social History Narrative    None       Subjective         Objective    Physical Exam:   Assessment Type: Assess only  General Appearance: Sleeping (easily rousable)  Respiratory Pattern: Normal  Chest Assessment: Chest expansion symmetrical  Bilateral Breath Sounds: Diminished  Cough: Productive  Suction: Trach  O2 Device: RA    Vitals:  Blood pressure 113/59, pulse 68, temperature 98 2 °F (36 8 °C), temperature source Oral, resp  rate 18, height 5' 7" (1 702 m), weight 61 2 kg (135 lb), SpO2 94 %  Imaging and other studies: I have personally reviewed pertinent reports  O2 Device: RA     Plan    Respiratory Plan: Mild Distress pathway        Resp Comments: (P) Trach care done at pts request  He had his own HME which was replaced  BS are clear and pt does not want UDNs  I have changed UDN to prn only

## 2018-01-17 NOTE — ASSESSMENT & PLAN NOTE
· Patient follows up at [facility]  · Here, oncologist is Dr Christiano Box  · Patient was actually scheduled for a core biopsy of a lymph node at [facility], but patient was not able to go to the appointment due to present symptoms  · Patient's daughter told me that a biopsy was done on the last admission here, but results were inconclusive and that is the reason why, patient was referred to [facility]  Patient's daughter asked if the core biopsy may be done here on this admission  · Thus, hospitalist tomorrow should at least verbally discussed this patient to our oncologist, to determine if they need to be on board and if they will do the core biopsy here  From my assessment, I think, the core biopsy will be postponed until present infection/pneumonia has clinically improved/resolved  · Patient's daughter told me that, patient had laryngeal surgery before, and it was explained to them, that chances of aspiration is very minimal now due to the surgery; however, with recurrent pneumonia and with history of laryngeal cancer and stenosis, we still have to rule out aspiration  Thus I will start by having speech therapist to evaluate the patient

## 2018-01-17 NOTE — ASSESSMENT & PLAN NOTE
· S/p total laryngectomy - patient follows up at [facility]  · Here, oncologist is Dr Priya Quintanilla  · Patient was actually scheduled for a core biopsy of a lymph node at [facility], but patient was not able to go to the appointment due to present symptoms  · Recommend outpatient core biopsy given current infection

## 2018-01-17 NOTE — CONSULTS
Consultation - [address] y o  male MRN: [id number]  Unit/Bed#: [facility] 703-01 Encounter: [id number]    Assessment/Plan     Assessment:  Stage 3 sacral pressure ulcer - POA    Plan:  - Flush with 5 cc NSS, pat dry  - Skin prep to periwound  - Mesalt ribbon strip, light pack wound  - Tape end to intact skin, cover with gauze, secure with tape, change Daily  - Q 2 hour position changes  - Nutrition consult  - Sof care air cushion when in chair    History of Present Illness   HPI:  Rebeka Romano  is a 81 y o  male who presents with a Stage 3 sacral pressure ulcer -POA  Daughterr at bedside, she reports pressure ulcer started when he was admitted in August, 2017 for tracheostomy  History of laryngeal cancer, s/p Chemo/RT  Seen for the first time by the [address] [place] [address] with Dr Tracy Laureano on 1/10/17  Per daughter, patient has been in and out of hospitals, rehab, and SNF since August  Admitted with pneumonia  Per daughter, recent labs drawn by PCP indicate patient has protein calorie malnutrition  Wound center recommends a gel cushion when he is sitting in a chair  He does not have his own wheelchair, so the cost of the cushion is out of pocket  Inpatient consult to Wound Care  Consult performed by: Mohsen Both  Consult ordered by: Estiven Minor          Review of Systems   Constitutional: Positive for activity change, appetite change and fatigue  Negative for chills and fever  HENT:        Tracheostomy   Eyes: Negative for discharge and redness  Respiratory: Positive for cough and wheezing  Negative for shortness of breath  Cardiovascular: Negative for chest pain and palpitations  Musculoskeletal: Negative for arthralgias, myalgias and neck pain  Skin: Positive for pallor and wound  Neurological: Negative for syncope and light-headedness  Psychiatric/Behavioral: Negative for agitation and behavioral problems         Historical Information   Past Medical History:   Diagnosis Date    Asthma     Hypertension     Larynx cancer [facility])      Past Surgical History:   Procedure Laterality Date    EGD AND COLONOSCOPY      LARYNGECTOMY      TRACHEOSTOMY N/A 8/25/2017    Procedure: TRACHEOSTOMY (POSSIBLE AWAKE) , MICRO DIRECT LARYNGOSCOPY, Biopsy;  Surgeon: Wendy Eng MD;  Location: [facility];  Service: ENT    WOUND DEBRIDEMENT N/A 11/22/2017    Procedure: DEBRIDEMENT WOUND [facility] OUT), sacrum;  Surgeon: Vazquez Mills DO;  Location: [facility];  Service: General     Social History   History   Alcohol Use No     History   Drug Use No     History   Smoking Status    Former Smoker   Smokeless Tobacco    Never Used     Family History: non-contributory    Meds/Allergies   all current active meds have been reviewed  Allergies   Allergen Reactions    Aspirin Anaphylaxis    Cleocin [Clindamycin] Other (See Comments)     Cannot specify but had definite reaction in past!!       Objective   Vitals: Blood pressure 97/50, pulse 76, temperature 98 2 °F (36 8 °C), temperature source Oral, resp  rate 16, height 5' 7" (1 702 m), weight 63 2 kg (139 lb 5 3 oz), SpO2 95 %  Physical Exam   Constitutional: He is oriented to person, place, and time  He appears cachectic  He is cooperative  He appears ill  No distress  HENT:   Mouth/Throat: Oropharynx is clear and moist    Eyes: Right eye exhibits no discharge  Left eye exhibits no discharge  No scleral icterus  Neck:   Permanent trach   Cardiovascular: Normal rate, regular rhythm and normal heart sounds  Pulmonary/Chest: Effort normal  No respiratory distress  He has decreased breath sounds in the right lower field and the left lower field  He has rhonchi in the right lower field  Musculoskeletal: Normal range of motion  Neurological: He is alert and oriented to person, place, and time  Skin: Skin is warm and dry  He is not diaphoretic  There is pallor  Psychiatric: He has a normal mood and affect   His behavior is normal  Lab Results: I have personally reviewed pertinent reports  Imaging: I have personally reviewed pertinent reports  EKG, Pathology, and Other Studies: I have personally reviewed pertinent reports  Code Status: Level 1 - Full Code  Advance Directive and Living Will:      Power of : Yes  POLST:      Counseling / Coordination of Care  Total floor / unit time spent today 20 minutes  Greater than 50% of total time was spent with the patient and / or family counseling and / or coordination of care  A description of the counseling / coordination of care: wound care discussed with family  Nathaniel Isbell

## 2018-01-18 ENCOUNTER — APPOINTMENT (INPATIENT)
Dept: RADIOLOGY | Facility: HOSPITAL | Age: 83
DRG: 177 | End: 2018-01-18
Payer: COMMERCIAL

## 2018-01-18 LAB
ANION GAP SERPL CALCULATED.3IONS-SCNC: 5 MMOL/L (ref 4–13)
BACTERIA SPT RESP CULT: ABNORMAL
BACTERIA SPT RESP CULT: ABNORMAL
BUN SERPL-MCNC: 20 MG/DL (ref 5–25)
CALCIUM SERPL-MCNC: 8.5 MG/DL (ref 8.3–10.1)
CHLORIDE SERPL-SCNC: 110 MMOL/L (ref 100–108)
CO2 SERPL-SCNC: 27 MMOL/L (ref 21–32)
CREAT SERPL-MCNC: 0.74 MG/DL (ref 0.6–1.3)
ERYTHROCYTE [DISTWIDTH] IN BLOOD BY AUTOMATED COUNT: 15.5 % (ref 11.6–15.1)
GFR SERPL CREATININE-BSD FRML MDRD: 87 ML/MIN/1.73SQ M
GLUCOSE SERPL-MCNC: 98 MG/DL (ref 65–140)
GRAM STN SPEC: ABNORMAL
HCT VFR BLD AUTO: 31.4 % (ref 36.5–49.3)
HGB BLD-MCNC: 10.1 G/DL (ref 12–17)
MCH RBC QN AUTO: 31.9 PG (ref 26.8–34.3)
MCHC RBC AUTO-ENTMCNC: 32.2 G/DL (ref 31.4–37.4)
MCV RBC AUTO: 99 FL (ref 82–98)
PLATELET # BLD AUTO: 200 THOUSANDS/UL (ref 149–390)
PMV BLD AUTO: 9.3 FL (ref 8.9–12.7)
POTASSIUM SERPL-SCNC: 3.7 MMOL/L (ref 3.5–5.3)
RBC # BLD AUTO: 3.17 MILLION/UL (ref 3.88–5.62)
SODIUM SERPL-SCNC: 142 MMOL/L (ref 136–145)
WBC # BLD AUTO: 6.34 THOUSAND/UL (ref 4.31–10.16)

## 2018-01-18 PROCEDURE — 85027 COMPLETE CBC AUTOMATED: CPT | Performed by: PHYSICIAN ASSISTANT

## 2018-01-18 PROCEDURE — 94760 N-INVAS EAR/PLS OXIMETRY 1: CPT

## 2018-01-18 PROCEDURE — 97167 OT EVAL HIGH COMPLEX 60 MIN: CPT

## 2018-01-18 PROCEDURE — G8988 SELF CARE GOAL STATUS: HCPCS

## 2018-01-18 PROCEDURE — 80048 BASIC METABOLIC PNL TOTAL CA: CPT | Performed by: PHYSICIAN ASSISTANT

## 2018-01-18 PROCEDURE — G8987 SELF CARE CURRENT STATUS: HCPCS

## 2018-01-18 PROCEDURE — 71045 X-RAY EXAM CHEST 1 VIEW: CPT

## 2018-01-18 RX ADMIN — POLYETHYLENE GLYCOL 3350 17 G: 17 POWDER, FOR SOLUTION ORAL at 22:44

## 2018-01-18 RX ADMIN — OXYCODONE HYDROCHLORIDE 10 MG: 10 TABLET ORAL at 13:15

## 2018-01-18 RX ADMIN — OXYCODONE HYDROCHLORIDE 10 MG: 10 TABLET ORAL at 00:34

## 2018-01-18 RX ADMIN — MIRTAZAPINE 15 MG: 15 TABLET, FILM COATED ORAL at 22:43

## 2018-01-18 RX ADMIN — CEFEPIME 2000 MG: 2 INJECTION, POWDER, FOR SOLUTION INTRAMUSCULAR; INTRAVENOUS at 11:32

## 2018-01-18 RX ADMIN — CEFEPIME 2000 MG: 2 INJECTION, POWDER, FOR SOLUTION INTRAMUSCULAR; INTRAVENOUS at 23:52

## 2018-01-18 RX ADMIN — METRONIDAZOLE 500 MG: 500 INJECTION, SOLUTION INTRAVENOUS at 17:35

## 2018-01-18 RX ADMIN — CALCIUM CARBONATE 500 MG (1,250 MG)-VITAMIN D3 200 UNIT TABLET 1 TABLET: at 08:19

## 2018-01-18 RX ADMIN — FLUTICASONE PROPIONATE AND SALMETEROL 2 PUFF: 50; 250 POWDER RESPIRATORY (INHALATION) at 22:44

## 2018-01-18 RX ADMIN — MELATONIN TAB 3 MG 3 MG: 3 TAB at 22:43

## 2018-01-18 RX ADMIN — SODIUM CHLORIDE 100 ML/HR: 0.9 INJECTION, SOLUTION INTRAVENOUS at 23:52

## 2018-01-18 RX ADMIN — PANTOPRAZOLE SODIUM 40 MG: 40 TABLET, DELAYED RELEASE ORAL at 05:16

## 2018-01-18 RX ADMIN — METRONIDAZOLE 500 MG: 500 INJECTION, SOLUTION INTRAVENOUS at 08:20

## 2018-01-18 RX ADMIN — METRONIDAZOLE 500 MG: 500 INJECTION, SOLUTION INTRAVENOUS at 00:19

## 2018-01-18 RX ADMIN — FLUTICASONE PROPIONATE AND SALMETEROL 2 PUFF: 50; 250 POWDER RESPIRATORY (INHALATION) at 08:21

## 2018-01-18 RX ADMIN — ACETAMINOPHEN 650 MG: 325 TABLET, FILM COATED ORAL at 22:44

## 2018-01-18 RX ADMIN — COLLAGENASE SANTYL: 250 OINTMENT TOPICAL at 08:21

## 2018-01-18 RX ADMIN — ENOXAPARIN SODIUM 40 MG: 40 INJECTION SUBCUTANEOUS at 08:20

## 2018-01-18 RX ADMIN — Medication 1 TABLET: at 08:19

## 2018-01-18 RX ADMIN — OLANZAPINE 5 MG: 5 TABLET, ORALLY DISINTEGRATING ORAL at 22:45

## 2018-01-18 NOTE — MALNUTRITION/BMI
This medical record reflects one or more clinical indicators suggestive of malnutrition  Please indicate the one diagnosis below which you feel best reflects the clinical picture  Malnutrition Findings:   Malnutrition type: Chronic illness  Degree of Malnutrition: Other severe protein calorie malnutrition <75%energy intake versus needs for >1month resulting in >10% wt loss in 6 months (23#(14% in past 8 months) exhibiting fat loss with hollow orbital look, and muscle loss with protruding clavicle  BMI Findings: Body mass index is 21 82 kg/m²  See Nutrition note dated 1/18/18  for additional details  Completed nutrition assessment is viewable in the nutrition documentation

## 2018-01-18 NOTE — PLAN OF CARE
Problem: DISCHARGE PLANNING - CARE MANAGEMENT  Goal: Discharge to post-acute care or home with appropriate resources  INTERVENTIONS:  - Conduct assessment to determine patient/family and health care team treatment goals, and need for post-acute services based on payer coverage, community resources, and patient preferences, and barriers to discharge  - Address psychosocial, clinical, and financial barriers to discharge as identified in assessment in conjunction with the patient/family and health care team  - Arrange appropriate level of post-acute services according to patient's   needs and preference and payer coverage in collaboration with the physician and health care team  - Communicate with and update the patient/family, physician, and health care team regarding progress on the discharge plan  - Arrange appropriate transportation to post-acute venues  - Pt will be discharged to home with home health care  Outcome: Progressing

## 2018-01-18 NOTE — ASSESSMENT & PLAN NOTE
· Chronic anemia likely due to malignancy  · Monitor CBC  · If significantly worsening, further workup and management  · No active bleeding at this point

## 2018-01-18 NOTE — PROGRESS NOTES
Notified by RN that pt c/o SOB when laying down in bed  SpO2 96% on Ra  Pt is currently being treated for pneumonia with cefepime and flagyl, pt has hx of asthma  History limited, as pt s/p total laryngectomy with trach, however pt does nod head yes or no and mouths responses  Pt does confirm he feels SOB while lying flat in bed, though states that this is typical for him and he usually lies propped up  When sitting up in bed pt does not complain of SOB  Pt received Advair and albuterol inhaler  Pt has no other complaints at this time  On exam, pt appears to be in NAD, A&O, lungs no accessory muscle use noted, breath sounds decreased B/L with rales, heart RRR        Plan:  -Stat PCXR  -Elevate head of bed for relief of sx

## 2018-01-18 NOTE — PLAN OF CARE
Problem: OCCUPATIONAL THERAPY ADULT  Goal: Performs self-care activities at highest level of function for planned discharge setting  See evaluation for individualized goals  Treatment Interventions: ADL retraining, Functional transfer training, Endurance training, Cognitive reorientation, Patient/family training, Equipment evaluation/education, Compensatory technique education, Continued evaluation, Energy conservation, Activityengagement          See flowsheet documentation for full assessment, interventions and recommendations  Limitation: Decreased ADL status, Decreased endurance, Decreased self-care trans, Decreased high-level ADLs  Prognosis: Good  Assessment: Pt is an 80 y/o male seen for OT eval s/p adm to [facility] w/ a cough, decreased appetite and weakness  Pt was recently admitted to [facility] for sepsis and HCAP  Pt is being treated for HCAP and anemia  Comorbidities include a stage IV decubitus ulcer on his sacrum, h/o laryngeal cancer/stenosis s/p total lowering ectomy with tracheostomy in October of 2017 and s/o chemo and radiation, HTN, and asthma  Pt with active OT orders and up with assistance orders  Pt responds to yes/no questions  Per pt, Pt lives alone in a single story home w/ (-) MISTY  Per pt, Pt was I w/ ADLS and IADLS, drove, & required no use of DME PTA  CM notes report pt lives w/ his daughter and her family, using rw and has A for ADLS, however pt denies this  Pt is currently demonstrating the following occupational deficits: min A ADLS, close A functional transfers and CGA functional mobility using rw  Pt with deficits and limitations in all baseline areas of occupation 2* decreased endurance/activity tolerance, decreased ADL status, impaired balance, decreased mobility status, decreased ability to communicate, and decreased caregiver support   The following Occupational Performance Areas to address include: bathing/shower, toilet hygiene, dressing, medication management, health maintenance, community mobility, clothing management, meal prep and household maintenance  Pt scored overall 55/100 on the Barthel Index  Based on the aforementioned OT evaluation, functional performance deficits, and assessments, pt has been identified as a high complexity evaluation  Defer D/C recommendations at this time until more information from family  If pt lives with family and someone is home with him all the time, anticipate he will be able to return home once medically stable  If pt is to return to living alone, pt may require additional assistance   Pt to continue to benefit from acute immediate OT services to address the following goals 3-5x/wk to  w/in 7-10 days:     OT Discharge Recommendation: Other (Comment) (Please see assessment section )         Padmini Pillai MS, OTR/L

## 2018-01-18 NOTE — PROGRESS NOTES
Progress Note - Infectious Disease   Liberalyoan Wilson  81 y o  male MRN: [id number]  Unit/Bed#: [facility] 703-01 Encounter: [id number]    Impression/Recommendations:  1  Productive cough  Possibly secondary to pneumonia versus pneumonitis  Patient states he had a subjective low-grade fever at home, but no documented fevers here  No hypoxia  Hemodynamically stable and nontoxic appearing  Based on exam, I am not convinced that he has a recurrent pneumonia  Patient was started empirically on cefepime/Flagyl  Sputum culture has been sent and Gram stain is polymicrobial      -continue cefepime/Flagyl for now  -monitor respiratory status closely  -follow-up sputum culture results to guide management  Will plan for short course of antibiotics if patient remains clinically well   -supportive care per primary team  -follow-up blood cultures  -monitor temperatures and white blood cell count     2  Right middle lobe infiltrate  As stated above, this may be secondary to pneumonia versus pneumonitis  I have a lower clinical suspicion for pneumonia  Speech evaluation noted and they do not believe that patient is an aspiration risk      -plan as above  -recommendations per speech therapy     3  Sacral decubitus ulcer  This was debrided back in November of 2017  Does not overtly look infected  Wound Care evaluation noted      -local wound care  -serial examinations     4  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  Status post tracheostomy  Patient follows with [facility]      5   Neck mass  Possibly necrotic lymph node  This was aspirated in December of 2017 with biopsies negative for any infectious process  The cytology is nondiagnostic  Patient was planning to follow-up with [facility] regarding this      6   Leukocytosis  May be leukemoid reaction in the setting of aspiration pneumonitis verses a possible pneumonia    Otherwise, patient remains afebrile, hemodynamically stable, nontoxic appearing  WBC count has trended down      -plan as above  -monitor white blood cell count     Antibiotics:  Cefepime/Flagyl D3    Subjective:  Stable cough and shortness of breath  No fevers or chills  No chest pain  Limited history given status post total laryngectomy with trach  No nausea, vomiting, diarrhea  Objective:  Vitals:  HR:  [60-76] 60  Resp:  [16-18] 18  BP: ()/(50-62) 110/52  SpO2:  [94 %-99 %] 96 %  Temp (24hrs), Av 8 °F (37 1 °C), Min:98 2 °F (36 8 °C), Max:99 2 °F (37 3 °C)  Current: Temperature: 98 8 °F (37 1 °C)    Physical Exam:   General:  No acute distress, chronically ill-appearing, trach in place  Psychiatric:  Awake and alert  Pulmonary:  Normal respiratory excursion without accessory muscle use  Abdomen:  Soft, nontender  Extremities:  No edema  Skin:  Sacral decubitus ulcer with no overt signs of infection    Lab Results:  I have personally reviewed pertinent labs  Results from last 7 days  Lab Units 18  0518  0531 18  1941   SODIUM mmol/L 142 142 137   POTASSIUM mmol/L 3 7 3 9 4 2   CHLORIDE mmol/L 110* 108 101   CO2 mmol/L 27 29 32   ANION GAP mmol/L 5 5 4   BUN mg/dL 20 26* 33*   CREATININE mg/dL 0 74 0 87 0 94   EGFR ml/min/1 73sq m 87 81 76   GLUCOSE RANDOM mg/dL 98 108 93   CALCIUM mg/dL 8 5 8 3 9 0   AST U/L  --  37  --    ALT U/L  --  17  --    ALK PHOS U/L  --  55  --    TOTAL PROTEIN g/dL  --  6 0*  --    ALBUMIN g/dL  --  2 3*  --    BILIRUBIN TOTAL mg/dL  --  0 33  --        Results from last 7 days  Lab Units 18  0519 18  0531 18  0009 18  1941   WBC Thousand/uL 6 34 10 23*  --  14 45*   HEMOGLOBIN g/dL 10 1* 9 7*  --  10 6*   PLATELETS Thousands/uL 200 178 158 212       Results from last 7 days  Lab Units 18  1140 18  0056 18  0009 188 18   BLOOD CULTURE   --   --   --  No Growth at 24 hrs  No Growth at 24 hrs     SPUTUM CULTURE  Culture too young- will reincubate  -- --   --   --    GRAM STAIN RESULT  No Epithelial cells seen  3+ Polys  1+ Gram positive cocci in pairs  Rare Gram negative diplococci  --   --   --   --    INFLUENZA A PCR   --   --  None Detected  --   --    INFLUENZA B PCR   --   --  None Detected  --   --    RSV PCR   --   --  None Detected  --   --    LEGIONELLA URINARY ANTIGEN   --  Negative  --   --   --        Imaging Studies:   I have personally reviewed pertinent imaging study reports and images in PACS  EKG, Pathology, and Other Studies:   I have personally reviewed pertinent reports

## 2018-01-18 NOTE — PROGRESS NOTES
Progress Note - Yuly Goodman  1935, 81 y o  male MRN: [id number]  Unit/Bed#: [facility] 703-01 Encounter: [id number]  Primary Care Provider: Jordon Santos MD   Date and time admitted to hospital: 1/16/2018  7:10 PM    * HCAP (healthcare-associated pneumonia)   Assessment & Plan    · ID following   · Continue IV cefapime/flagyl    · Afebrile, leukocytosis resolved - continue to monitor   · Given total laryngectomy patient is not at risk for aspiration per speech therapy   · Follow sputum culture  · Blood cultures - no growth after 24 hours  · Flu/RSV PCR - negative   · Strep urine antigen - negative         Hypotension   Assessment & Plan    · BP stable  · Continue IVF  · Hold Cardura         History of laryngeal cancer   Assessment & Plan    · S/p total laryngectomy - patient follows up at [facility]  · Here, oncologist is Dr Claudio Tolentino  · Patient was actually scheduled for a core biopsy of a lymph node at [facility], but patient was not able to go to the appointment due to present symptoms  · Recommend outpatient core biopsy given current infection  · Outpatient follow-up with palliative care and Oncology   · Outpatient VNA speech therapy         Sacral decubitus ulcer   Assessment & Plan    · Wound care input appreciated   · Stage 3 pressure ulcer  · Daily dressing changed  · Frequent repositionig  · Outpatient follow-up at the [facility]        Anemia   Assessment & Plan    · Chronic anemia likely due to malignancy  · Monitor CBC  · If significantly worsening, further workup and management  · No active bleeding at this point  Asthma   Assessment & Plan    · Continue home medical management           VTE Pharmacologic Prophylaxis:   Pharmacologic: Enoxaparin (Lovenox)  Mechanical: Mechanical VTE prophylaxis in place  Patient Centered Rounds: I have performed bedside rounds with nursing staff today    Discussions with Specialists or Other Care Team Provider:  None  Education and Discussions with Family / Patient: Patient   Time Spent for Care: 30 minutes  More than 50% of total time spent on counseling and coordination of care as described above  Current Length of Stay: 1 day(s)  Current Patient Status: Inpatient   Certification Statement: The patient will continue to require additional inpatient hospital stay due to IV antibiotics    Discharge Plan:  Possibly tomorrow  Code Status: Level 1 - Full Code    Subjective:   Patient continues with cough but denies shortness of breath, fevers/chills, chest pain  Reports little improvement since yesterday  Limited history given status post total laryngectomy with trach  Objective:   Vitals:   Temp (24hrs), Av 8 °F (37 1 °C), Min:98 2 °F (36 8 °C), Max:99 2 °F (37 3 °C)    HR:  [60-76] 60  Resp:  [16-18] 18  BP: ()/(50-62) 110/52  SpO2:  [94 %-99 %] 96 %  Body mass index is 21 82 kg/m²  Input and Output Summary (last 24 hours): Intake/Output Summary (Last 24 hours) at 18 1031  Last data filed at 18 0820   Gross per 24 hour   Intake             3200 ml   Output              700 ml   Net             2500 ml       Physical Exam:     Physical Exam   Constitutional: Vital signs are normal  He appears lethargic  He is cooperative  He is easily aroused  No distress  Cardiovascular: Normal rate, regular rhythm, normal heart sounds and intact distal pulses  Pulmonary/Chest: Effort normal  No respiratory distress  He has no wheezes  He has rhonchi in the right upper field, the right middle field, the left upper field and the left middle field  Tracheostomy  Abdominal: Soft  Bowel sounds are normal  There is no tenderness  Neurological: He is easily aroused  He appears lethargic       Additional Data:   Labs:    Results from last 7 days  Lab Units 18  0519 18  0531   WBC Thousand/uL 6 34 10 23*   HEMOGLOBIN g/dL 10 1* 9 7*   HEMATOCRIT % 31 4* 29 4*   PLATELETS Thousands/uL 200 178   LYMPHO PCT %  -- 1*   MONO PCT MAN %  --  0*   EOSINO PCT MANUAL %  --  0       Results from last 7 days  Lab Units 01/18/18  0519 01/17/18  0531   SODIUM mmol/L 142 142   POTASSIUM mmol/L 3 7 3 9   CHLORIDE mmol/L 110* 108   CO2 mmol/L 27 29   BUN mg/dL 20 26*   CREATININE mg/dL 0 74 0 87   CALCIUM mg/dL 8 5 8 3   TOTAL PROTEIN g/dL  --  6 0*   BILIRUBIN TOTAL mg/dL  --  0 33   ALK PHOS U/L  --  55   ALT U/L  --  17   AST U/L  --  37   GLUCOSE RANDOM mg/dL 98 108           * I Have Reviewed All Lab Data Listed Above  * Additional Pertinent Lab Tests Reviewed: Franky 67 Admission Reviewed    Imaging:    Imaging Reports Reviewed Today Include:  None    Cultures:   Blood Culture:   Lab Results   Component Value Date    BLOODCX No Growth at 24 hrs  01/16/2018    BLOODCX No Growth at 24 hrs  01/16/2018    BLOODCX No Growth After 5 Days  12/13/2017    BLOODCX No Growth After 5 Days  12/13/2017    BLOODCX No Growth After 5 Days  11/19/2017    BLOODCX No Growth After 5 Days  11/19/2017     Urine Culture: No results found for: URINECX  Sputum Culture: No components found for: SPUTUMCX  Wound Culture: No results found for: WOUNDCULT    Last 24 Hours Medication List:     calcium carbonate-vitamin D 1 tablet Oral Daily With Breakfast   cefepime 2,000 mg Intravenous Q12H   collagenase  Topical Daily   enoxaparin 40 mg Subcutaneous Daily   [START ON 1/19/2018] fentaNYL 1 patch Transdermal Q72H   fentaNYL 25 mcg Transdermal Once   fluticasone-salmeterol 2 puff Inhalation Q12H [address]   melatonin 3 mg Oral HS   metroNIDAZOLE 500 mg Intravenous Q8H   mirtazapine 15 mg Oral HS   OLANZapine 5 mg Oral HS   pantoprazole 40 mg Oral Early Morning   senna-docusate sodium 1 tablet Oral Daily        Today, Patient Was Seen By: Concepción Melgoza PA-C    ** Please Note: Dragon 360 Dictation voice to text software may have been used in the creation of this document   **

## 2018-01-18 NOTE — ASSESSMENT & PLAN NOTE
· Wound care input appreciated   · Stage 3 pressure ulcer  · Daily dressing changed  · Frequent repositionig  · Outpatient follow-up at the [facility]

## 2018-01-18 NOTE — SOCIAL WORK
Pt resting at this time  Cm contacted pt's Betito Carney, contact # [phone], to discuss pt's discharge plan and explain Cm role  Pt has been staying with his dgt, son in law, and 3 grandchildren since his last admission in December 2017 in a 2 story house with 3 MISTY  Pt was independent PTA and requires assistance with ADL's at times  Pt does not drive and his dgt provides him with transportation  Pt's PCP is Dr Evelyn Underwood  Pt has a RW at home  Pt is open to [facility] for Rn, Pt, Ot, ST   Pt has a hx of rehab at [facility] and [facility] [address]  No hx of mental health issues or drug use  Pt has a prescription plan and uses [facility] on [address] in [place] for his prescriptions  Primary contact is pt's Lisa Braun, contact # [phone]  Pt's dgt will provide pt with transportation home upon discharge  Informed Kendal Sat that PT/OT evals are ordered and she preferred to await till therapy sees pt to start pt's discharge plan

## 2018-01-18 NOTE — ASSESSMENT & PLAN NOTE
· ID following   · Continue IV cefapime/flagyl    · Afebrile, leukocytosis resolved - continue to monitor   · Given total laryngectomy patient is not at risk for aspiration per speech therapy   · Follow sputum culture  · Blood cultures - no growth after 24 hours  · Flu/RSV PCR - negative   · Strep urine antigen - negative

## 2018-01-18 NOTE — ASSESSMENT & PLAN NOTE
· S/p total laryngectomy - patient follows up at [facility]  · Here, oncologist is Dr Angel Bhagat  · Patient was actually scheduled for a core biopsy of a lymph node at [facility], but patient was not able to go to the appointment due to present symptoms  · Recommend outpatient core biopsy given current infection    · Outpatient follow-up with palliative care and Oncology   · Outpatient VNA speech therapy

## 2018-01-18 NOTE — OCCUPATIONAL THERAPY NOTE
[address] Evaluation     Patient Name: Jose Youssef  Today's Date: 1/18/2018  Problem List  Patient Active Problem List   Diagnosis    Asthma    Depression    Laryngeal stenosis    Severe sepsis (HCC)    Sacral decubitus ulcer    HCAP (healthcare-associated pneumonia)    Anemia    Severe protein-calorie malnutrition ([address] )    History of laryngeal cancer    Fall    Lymphadenopathy, supraclavicular    Leukopenia    Hypotension     Past Medical History  Past Medical History:   Diagnosis Date    Asthma     Hypertension     Larynx cancer ([address] )      Past Surgical History  Past Surgical History:   Procedure Laterality Date    EGD AND COLONOSCOPY      LARYNGECTOMY      TRACHEOSTOMY N/A 8/25/2017    Procedure: TRACHEOSTOMY (POSSIBLE AWAKE) , MICRO DIRECT LARYNGOSCOPY, Biopsy;  Surgeon: Tori Villagran MD;  [facility];  Service: ENT    WOUND DEBRIDEMENT N/A 11/22/2017    Procedure: DEBRIDEMENT WOUND [facility] OUT), sacrum;  Surgeon: Claudia Ricks DO;  [facility];  Service: General         01/18/18 1640   Note Type   Note type Eval/Treat   Restrictions/Precautions   Weight Bearing Precautions Per Order No   Other Precautions Bed Alarm;Multiple lines;O2;Fall Risk  (trach collar )   Pain Assessment   Pain Assessment No/denies pain   Pain Score No Pain   Home Living   Type of [address] [place] [address] One level   Bathroom Accessibility Accessible   Additional Comments Pt responds to yes/no questions  Per pt, Pt lives alone in a single story home w/ (-) MISTY  Prior Function   Level of [place] Independent with ADLs and functional mobility   Lives With Alone   Receives Help From Family   ADL Assistance Independent   IADLs Independent   Falls in the last 6 months 0   Comments Per pt, Pt was I w/ ADLS and IADLS, drove, & required no use of DME PTA     Lifestyle   Autonomy Per pt, Pt was I w/ ADLS and IADLS, drove, & required no use of DME PTA   Reciprocal Relationships Pt responds to yes/no questions  Per pt, Pt lives alone however per chart review, CM notes report pt lives with daughter and her family  Psychosocial   Psychosocial (WDL) WDL   Subjective   Subjective PT RESPONDS NONVERBALLY TO YES/NO QUESTIONS ONLY    ADL   Eating Assistance 5  Supervision/Setup   Grooming Assistance 5  Supervision/Setup   UB Bathing Assistance 5  Supervision/Setup   LB Bathing Assistance 4  Minimal Assistance   UB Dressing Assistance 5  Supervision/Setup   LB Dressing Assistance 4  Minimal Assistance   Toileting Assistance  4  Minimal Assistance   Functional Assistance 4  Minimal Assistance   Bed Mobility   Supine to Sit 5  Supervision   Additional items Increased time required;HOB elevated   Sit to Supine Unable to assess   Transfers   Sit to Stand 5  Supervision   Additional items Assist x 1   Stand to Sit 5  Supervision   Additional items Assist x 1   Additional Comments Pt performs funcitonal transfers w/ close S for safety    Functional Mobility   Functional Mobility 4  Minimal assistance   Additional Comments Pt performs functional mobility using rw w/ CGA for steadying/balance and safety    Additional items Rolling walker   Balance   Static Sitting Good   Dynamic Sitting Fair +   Static Standing Fair +   Dynamic Standing Fair   Ambulatory Fair   Activity Tolerance   Activity Tolerance Patient tolerated treatment well   Nurse Made Aware yes   RUE Assessment   RUE Assessment WFL   LUE Assessment   LUE Assessment WFL   Hand Function   Gross Motor Coordination Functional   Fine Motor Coordination Functional   Cognition   Overall Cognitive Status Unable to assess  (Appears WFL but unable to fully assess)   Arousal/Participation Alert; Responsive; Cooperative   Attention Within functional limits   Memory Unable to assess   Following Commands Follows one step commands without difficulty   Comments Pt is alert and cooperative   Pt presents w/ trach collar and communicates w/ nonverbal reponses to yes/no questions  Unable to fully test cognition at this time  Continue to evaluate cognition    Assessment   Limitation Decreased ADL status; Decreased endurance;Decreased self-care trans;Decreased high-level ADLs   Prognosis Good   Assessment Pt is an 80 y/o male seen for OT eval s/p adm to [facility] w/ a cough, decreased appetite and weakness  Pt was recently admitted to [facility] for sepsis and HCAP  Pt is being treated for HCAP and anemia  Comorbidities include a stage IV decubitus ulcer on his sacrum, h/o laryngeal cancer/stenosis s/p total lowering ectomy with tracheostomy in October of 2017 and s/o chemo and radiation, HTN, and asthma  Pt with active OT orders and up with assistance orders  Pt responds to yes/no questions  Per pt, Pt lives alone in a single story home w/ (-) MISTY  Per pt, Pt was I w/ ADLS and IADLS, drove, & required no use of DME PTA  CM notes report pt lives w/ his daughter and her family, using rw and has A for ADLS, however pt denies this  Pt is currently demonstrating the following occupational deficits: min A ADLS, close A functional transfers and CGA functional mobility using rw  Pt with deficits and limitations in all baseline areas of occupation 2* decreased endurance/activity tolerance, decreased ADL status, impaired balance, decreased mobility status, decreased ability to communicate, and decreased caregiver support  The following Occupational Performance Areas to address include: bathing/shower, toilet hygiene, dressing, medication management, health maintenance, community mobility, clothing management, meal prep and household maintenance  Pt scored overall 55/100 on the Barthel Index  Based on the aforementioned OT evaluation, functional performance deficits, and assessments, pt has been identified as a high complexity evaluation  Defer D/C recommendations at this time until more information from family   If pt lives with family and someone is home with him all the time, anticipate he will be able to return home once medically stable  If pt is to return to living alone, pt may require additional assistance  Pt to continue to benefit from acute immediate OT services to address the following goals 3-5x/wk to  w/in 7-10 days:   Goals   Patient Goals none expressed   Plan   Treatment Interventions ADL retraining;Functional transfer training; Endurance training;Cognitive reorientation;Patient/family training;Equipment evaluation/education; Compensatory technique education;Continued evaluation; Energy conservation; Activityengagement   Goal Expiration Date 18   OT Frequency 3-5x/wk   Recommendation   OT Discharge Recommendation Other (Comment)  (Please see assessment section )   Barthel Index   Feeding 5   Bathing 0   Grooming Score 5   Dressing Score 5   Bladder Score 10   Bowels Score 10   Toilet Use Score 5   Transfers (Bed/Chair) Score 15   Mobility (Level Surface) Score 0   Stairs Score 0   Barthel Index Score 55   Modified Oldham Scale   Modified [place]ham Scale 3       GOALS:    1) Pt will improve activity tolerance to G for min 30 min txment sessions  2) Pt will complete ADLs/self care w/ mod I w/ G hyiene/thoroughness w/ min cues fro cog support  3) Pt will complete toileting w/ mod I w/ G hygiene/thoroughness using DME as needed  4) Pt will improve functional transfers on/off all surfaces using DME as needed w/ G balance/safety including toileting w/ mod I  5) Pt will improve functional mobility during ADL/IADL/leisure tasks using DME as needed w/ g balance/safety w/ mod I  6) Pt will engage in ongoing cognitive assessment w/ G participation w/ mod I to assist w/ safe d/c planning/recommendations  7) Pt will demonstrate G carryover of pt/caregiver education and training as appropriate w/ mod I w/o cues w/ G tolerance  8) Pt will demonstrate 100% carryover of learned E C  techniques s/p skilled education w/o cues t/o functional ADL/ IADL/leisure interest tasks w/ mod I      Melissa Hawthorne, MS, OTR/L

## 2018-01-18 NOTE — RESTORATIVE TECHNICIAN NOTE
Restorative Specialist Mobility Note       Activity: Ambulate in garner, Ambulate in room, Bathroom privileges, Dangle, Stand at bedside (Educated/encouraged pt to ambulate w/assistance 3-4 x's/day  Bed alarm on   Pt callbell, phone/tray within reach )     Assistive Device: Front wheel walker       [employer] BS, Restorative Technician, [employer]

## 2018-01-19 PROBLEM — I95.9 HYPOTENSION: Status: RESOLVED | Noted: 2018-01-17 | Resolved: 2018-01-19

## 2018-01-19 LAB
ANION GAP SERPL CALCULATED.3IONS-SCNC: 5 MMOL/L (ref 4–13)
BUN SERPL-MCNC: 20 MG/DL (ref 5–25)
CALCIUM SERPL-MCNC: 8.4 MG/DL (ref 8.3–10.1)
CHLORIDE SERPL-SCNC: 110 MMOL/L (ref 100–108)
CO2 SERPL-SCNC: 27 MMOL/L (ref 21–32)
CREAT SERPL-MCNC: 0.61 MG/DL (ref 0.6–1.3)
ERYTHROCYTE [DISTWIDTH] IN BLOOD BY AUTOMATED COUNT: 15.1 % (ref 11.6–15.1)
GFR SERPL CREATININE-BSD FRML MDRD: 94 ML/MIN/1.73SQ M
GLUCOSE SERPL-MCNC: 90 MG/DL (ref 65–140)
HCT VFR BLD AUTO: 30.3 % (ref 36.5–49.3)
HGB BLD-MCNC: 10 G/DL (ref 12–17)
MCH RBC QN AUTO: 32.4 PG (ref 26.8–34.3)
MCHC RBC AUTO-ENTMCNC: 33 G/DL (ref 31.4–37.4)
MCV RBC AUTO: 98 FL (ref 82–98)
PLATELET # BLD AUTO: 204 THOUSANDS/UL (ref 149–390)
PMV BLD AUTO: 9.1 FL (ref 8.9–12.7)
POTASSIUM SERPL-SCNC: 3.9 MMOL/L (ref 3.5–5.3)
RBC # BLD AUTO: 3.09 MILLION/UL (ref 3.88–5.62)
SODIUM SERPL-SCNC: 142 MMOL/L (ref 136–145)
WBC # BLD AUTO: 4.58 THOUSAND/UL (ref 4.31–10.16)

## 2018-01-19 PROCEDURE — 85027 COMPLETE CBC AUTOMATED: CPT | Performed by: PHYSICIAN ASSISTANT

## 2018-01-19 PROCEDURE — 97162 PT EVAL MOD COMPLEX 30 MIN: CPT

## 2018-01-19 PROCEDURE — G8978 MOBILITY CURRENT STATUS: HCPCS

## 2018-01-19 PROCEDURE — 94760 N-INVAS EAR/PLS OXIMETRY 1: CPT

## 2018-01-19 PROCEDURE — 80048 BASIC METABOLIC PNL TOTAL CA: CPT | Performed by: PHYSICIAN ASSISTANT

## 2018-01-19 PROCEDURE — G8979 MOBILITY GOAL STATUS: HCPCS

## 2018-01-19 PROCEDURE — 97535 SELF CARE MNGMENT TRAINING: CPT

## 2018-01-19 RX ORDER — DIPHENHYDRAMINE HCL 25 MG
12.5 TABLET ORAL ONCE
Status: COMPLETED | OUTPATIENT
Start: 2018-01-19 | End: 2018-01-19

## 2018-01-19 RX ADMIN — METRONIDAZOLE 500 MG: 500 INJECTION, SOLUTION INTRAVENOUS at 08:50

## 2018-01-19 RX ADMIN — PANTOPRAZOLE SODIUM 40 MG: 40 TABLET, DELAYED RELEASE ORAL at 05:51

## 2018-01-19 RX ADMIN — METRONIDAZOLE 500 MG: 500 INJECTION, SOLUTION INTRAVENOUS at 01:20

## 2018-01-19 RX ADMIN — SODIUM CHLORIDE 100 ML/HR: 0.9 INJECTION, SOLUTION INTRAVENOUS at 12:31

## 2018-01-19 RX ADMIN — Medication 1 TABLET: at 08:56

## 2018-01-19 RX ADMIN — ACETAMINOPHEN 650 MG: 325 TABLET, FILM COATED ORAL at 11:29

## 2018-01-19 RX ADMIN — OLANZAPINE 5 MG: 5 TABLET, ORALLY DISINTEGRATING ORAL at 21:44

## 2018-01-19 RX ADMIN — MELATONIN TAB 3 MG 3 MG: 3 TAB at 21:44

## 2018-01-19 RX ADMIN — FLUTICASONE PROPIONATE AND SALMETEROL 2 PUFF: 50; 250 POWDER RESPIRATORY (INHALATION) at 08:58

## 2018-01-19 RX ADMIN — COLLAGENASE SANTYL: 250 OINTMENT TOPICAL at 16:01

## 2018-01-19 RX ADMIN — ACETAMINOPHEN 650 MG: 325 TABLET, FILM COATED ORAL at 22:32

## 2018-01-19 RX ADMIN — Medication 1000 MG: at 12:31

## 2018-01-19 RX ADMIN — DIPHENHYDRAMINE HCL 12.5 MG: 25 TABLET ORAL at 01:23

## 2018-01-19 RX ADMIN — MIRTAZAPINE 15 MG: 15 TABLET, FILM COATED ORAL at 21:44

## 2018-01-19 RX ADMIN — CALCIUM CARBONATE 500 MG (1,250 MG)-VITAMIN D3 200 UNIT TABLET 1 TABLET: at 08:55

## 2018-01-19 RX ADMIN — OXYCODONE HYDROCHLORIDE 10 MG: 10 TABLET ORAL at 18:21

## 2018-01-19 RX ADMIN — FENTANYL 1 PATCH: 25 PATCH TRANSDERMAL at 21:44

## 2018-01-19 NOTE — PROGRESS NOTES
Progress Note - Lillian Razo  1935, 81 y o  male MRN: [id number]  Unit/Bed#: [facility] 703-01 Encounter: [id number]  Primary Care Provider: Kayleigh Mack MD   Date and time admitted to hospital: 1/16/2018  7:10 PM    * HCAP (healthcare-associated pneumonia)   Assessment & Plan    · ID following   · Sputum culture - Moraxella Catarrhalis  · Continue IV cefapime/flagyl    · Afebrile, leukocytosis resolved - continue to monitor   · Given total laryngectomy patient is not at risk for aspiration per speech therapy   · Blood cultures - no growth after 48 hours  · Flu/RSV PCR - negative   · Strep urine antigen - negative         Sacral decubitus ulcer   Assessment & Plan    · Wound care input appreciated   · Stage 3 pressure ulcer  · Daily dressing changes  · Frequent repositionig  · [facility]patient [facility] the [facility]        History of laryngeal cancer   Assessment & Plan    · S/p total laryngectomy - patient follows up at [facility]  · Here, oncologist is Dr Tk Macias  · Patient was actually scheduled for a core biopsy of a lymph node at [facility], but patient was not able to go to the appointment due to present symptoms  · Recommend outpatient core biopsy given current infection  · Outpatient follow-up with palliative care and Oncology   · Outpatient VNA speech therapy         Hypotensionresolved as of 1/19/2018   Assessment & Plan    · BP stable  · Continue IVF  · Hold Cardura         Anemia   Assessment & Plan    · Chronic anemia likely due to malignancy  · Monitor CBC  · If significantly worsening, further workup and management  · No active bleeding at this point  Asthma   Assessment & Plan    · Continue home medical management           VTE Pharmacologic Prophylaxis:   Pharmacologic: Enoxaparin (Lovenox)  Mechanical: Mechanical VTE prophylaxis in place  Patient Centered Rounds: I have performed bedside rounds with nursing staff today    Discussions with Specialists or Other Care Team Provider: Infectious disease   Education and Discussions with Family / Patient: Patient   Time Spent for Care: 20 minutes  More than 50% of total time spent on counseling and coordination of care as described above  Current Length of Stay: 2 day(s)  Current Patient Status: Inpatient   Certification Statement: The patient will continue to require additional inpatient hospital stay due to IV antibiotics     Discharge Plan: Not medically stable for discharge   Code Status: Level 1 - Full Code    Subjective:   Patient complained he could not sleep well last night because he was uncomfortable  Reports cough is improving and denies chest pain, shortness of breath  Objective:   Vitals:   Temp (24hrs), Av 8 °F (37 1 °C), Min:98 6 °F (37 °C), Max:99 2 °F (37 3 °C)    HR:  [60-74] 64  Resp:  [18] 18  BP: ()/(50-68) 115/56  SpO2:  [95 %-97 %] 95 %  Body mass index is 21 82 kg/m²  Input and Output Summary (last 24 hours): Intake/Output Summary (Last 24 hours) at 18 0838  Last data filed at 18 0834   Gross per 24 hour   Intake             2220 ml   Output             2500 ml   Net             -280 ml       Physical Exam:     Physical Exam   Constitutional: Vital signs are normal  He is cooperative  No distress  Cardiovascular: Normal rate, regular rhythm, normal heart sounds and intact distal pulses  No murmur heard  Pulmonary/Chest: Effort normal  He has no decreased breath sounds  He has no wheezes  Tracheostomy    Abdominal: Soft  Bowel sounds are normal  He exhibits no distension  There is no tenderness  Musculoskeletal: He exhibits no edema  Skin: Skin is warm and dry  No pallor         Additional Data:   Labs:    Results from last 7 days  Lab Units 18  0552  18  0531   WBC Thousand/uL 4 58  < > 10 23*   HEMOGLOBIN g/dL 10 0*  < > 9 7*   HEMATOCRIT % 30 3*  < > 29 4*   PLATELETS Thousands/uL 204  < > 178   LYMPHO PCT %  --   --  1*   MONO PCT MAN %  --   --  0* EOSINO PCT MANUAL %  --   --  0   < > = values in this interval not displayed  Results from last 7 days  Lab Units 01/19/18  0552  01/17/18  0531   SODIUM mmol/L 142  < > 142   POTASSIUM mmol/L 3 9  < > 3 9   CHLORIDE mmol/L 110*  < > 108   CO2 mmol/L 27  < > 29   BUN mg/dL 20  < > 26*   CREATININE mg/dL 0 61  < > 0 87   CALCIUM mg/dL 8 4  < > 8 3   TOTAL PROTEIN g/dL  --   --  6 0*   BILIRUBIN TOTAL mg/dL  --   --  0 33   ALK PHOS U/L  --   --  55   ALT U/L  --   --  17   AST U/L  --   --  37   GLUCOSE RANDOM mg/dL 90  < > 108   < > = values in this interval not displayed  * I Have Reviewed All Lab Data Listed Above  * Additional Pertinent Lab Tests Reviewed: [facility]    Imaging:    Imaging Reports Reviewed Today Include:  None    Cultures:   Blood Culture:   Lab Results   Component Value Date    BLOODCX No Growth at 48 hrs  01/16/2018    BLOODCX No Growth at 48 hrs  01/16/2018    BLOODCX No Growth After 5 Days  12/13/2017    BLOODCX No Growth After 5 Days  12/13/2017    BLOODCX No Growth After 5 Days  11/19/2017    BLOODCX No Growth After 5 Days   11/19/2017     Urine Culture: No results found for: URINECX  Sputum Culture: No components found for: SPUTUMCX  Wound Culture: No results found for: WOUNDCULT    Last 24 Hours Medication List:     calcium carbonate-vitamin D 1 tablet Oral Daily With Breakfast   cefepime 2,000 mg Intravenous Q12H   collagenase  Topical Daily   enoxaparin 40 mg Subcutaneous Daily   fentaNYL 1 patch Transdermal Q72H   fentaNYL 25 mcg Transdermal Once   fluticasone-salmeterol 2 puff Inhalation Q12H [address]   melatonin 3 mg Oral HS   metroNIDAZOLE 500 mg Intravenous Q8H   mirtazapine 15 mg Oral HS   OLANZapine 5 mg Oral HS   pantoprazole 40 mg Oral Early Morning   senna-docusate sodium 1 tablet Oral Daily        Today, Patient Was Seen By: Earnest Bunch PA-C    ** Please Note: Dragon 360 Dictation voice to text software may have been used in the creation of this document   **

## 2018-01-19 NOTE — PLAN OF CARE
DISCHARGE PLANNING     Discharge to home or other facility with appropriate resources Progressing        DISCHARGE PLANNING - CARE MANAGEMENT     Discharge to post-acute care or home with appropriate resources Progressing        HEMATOLOGIC - ADULT     Maintains hematologic stability Progressing        INFECTION - ADULT     Absence or prevention of progression during hospitalization Progressing     Absence of fever/infection during neutropenic period Progressing        Knowledge Deficit     Patient/family/caregiver demonstrates understanding of disease process, treatment plan, medications, and discharge instructions Progressing        METABOLIC, FLUID AND ELECTROLYTES - ADULT     Electrolytes maintained within normal limits Progressing     Fluid balance maintained Progressing     Glucose maintained within target range Progressing        MUSCULOSKELETAL - ADULT     Maintain or return mobility to safest level of function Progressing     Maintain proper alignment of affected body part Progressing        Nutrition/Hydration-ADULT     Nutrient/Hydration intake appropriate for improving, restoring or maintaining nutritional needs Progressing        Potential for Falls     Patient will remain free of falls Progressing        Prexisting or High Potential for Compromised Skin Integrity     Skin integrity is maintained or improved Progressing        RESPIRATORY - ADULT     Achieves optimal ventilation and oxygenation Progressing        SAFETY ADULT     Maintain or return to baseline ADL function Progressing     Maintain or return mobility status to optimal level Progressing        SKIN/TISSUE INTEGRITY - ADULT     Skin integrity remains intact Progressing     Incision(s), wounds(s) or drain site(s) healing without S/S of infection Progressing     Oral mucous membranes remain intact Progressing

## 2018-01-19 NOTE — PLAN OF CARE
Problem: OCCUPATIONAL THERAPY ADULT  Goal: Performs self-care activities at highest level of function for planned discharge setting  See evaluation for individualized goals  Treatment Interventions: ADL retraining, Functional transfer training, Endurance training, Cognitive reorientation, Patient/family training, Equipment evaluation/education, Compensatory technique education, Continued evaluation, Energy conservation, Activityengagement          See flowsheet documentation for full assessment, interventions and recommendations  Outcome: Progressing  Limitation: Decreased ADL status, Decreased endurance, Decreased self-care trans, Decreased high-level ADLs  Prognosis: Good  Assessment: Pt participated in occupational therapy with focus on activity tolerance,  bed mob, standing balance and tolerance for pt engagement in functional self-care task/grooming task  Pt cleared by MICAH Kwong for participation in OT session  Pt supine/sleeping upon initiation of OT session  Pt initially reported he did not wish to participate however agreeable with min encouragement  Pt progressing well with functional transfers/mob with RW to/from pt bathroom  Pt reported balance deficits limit him at this time  Pt reported [place] with LB dressing skills and able to demonstrate ability to reach b/l LE  Pt progressing well        OT Discharge Recommendation: Home with family support

## 2018-01-19 NOTE — SOCIAL WORK
Met with pt to discuss his discharge plan  He preferred CM contact his dgt to discuss his discharge plan  Contacted pt's dgt Symone Bernadette, contact # [phone], to discuss therapy's recommendation  Informed her that therapy is recommending pt return home with her with home therapy  She was agreeable, but has some reservations about it - she is concerned with pt's nutrition and would prefer to have bx completed while pt is hospitalized to assist them in developing a treatment plan for pt  She discussed possibility of assisted living facility  Provided her with contact information for [facility] to assist her in locating an assisted living facility  She wanted to continue with [facility] upon discharge and pt will return to her home upon discharge  [facility] same

## 2018-01-19 NOTE — PLAN OF CARE
Problem: PHYSICAL THERAPY ADULT  Goal: Performs mobility at highest level of function for planned discharge setting  See evaluation for individualized goals  Treatment/Interventions: Functional transfer training, LE strengthening/ROM, Elevations, Therapeutic exercise, Gait training  Equipment Recommended: Agustin Fountain       See flowsheet documentation for full assessment, interventions and recommendations  Prognosis: Good  Problem List: Decreased endurance, Decreased safety awareness, Decreased mobility  Assessment: pt seen for modeate complexity PT evaluation  pt is a 82 y/o male w/ history of asthma, falls, laryngeal cancer, and HTN who is now admitted for HCAP  PT consulted to assess mobility, transfers, BLE strength, sitting/standing balance, and endurance  pt presents with decrease ambulation and activity tolerance  pt will benefit from skilled PT to improve safe ambulation and increase endurance  pt seemed agitated and believes he is going home to his house by himself  However, recommend to go home with daughter again where he will have someone with him when d/c with home PT  Recommendation: Home with family support     PT - OK to Discharge: Yes (as long as pt is stable )    See flowsheet documentation for full assessment

## 2018-01-19 NOTE — RESTORATIVE TECHNICIAN NOTE
Restorative Specialist Mobility Note       Activity: Ambulate in garner, Ambulate in room, Bathroom privileges, Chair, Stand at bedside (Educated/encouraged pt to ambulate w/assistance 3-4 x's/day   Pt callbell, phone/tray within reach )     Assistive Device: Front wheel walker       Theodora ARANGO, Restorative Technician, [employer]

## 2018-01-19 NOTE — ASSESSMENT & PLAN NOTE
· Wound care input appreciated   · Stage 3 pressure ulcer  · Daily dressing changes  · Frequent repositionig  · Outpatient follow-up at the [facility]

## 2018-01-19 NOTE — PROGRESS NOTES
Progress Note - Infectious Disease   Karis Persons  81 y o  male MRN: [id number]  [facility] Encounter: [id number]      Impression/Recommendations:  1   Productive cough  Karla Code secondary to #2  [facility] states he had a subjective low-grade fever at home, but no documented fevers here   No hypoxia   Hemodynamically stable and nontoxic appearing   Patient was started empirically on cefepime/Flagyl        -antibiotics as below  -monitor respiratory status closely  -supportive care per primary team  -follow-up blood cultures-no growth to date  -monitor temperatures and white blood cell count     2   Right middle lobe infiltrate secondary to Moraxella pneumonia  Patient clinically much improved  Speech evaluation noted and they do not feel that patient is an aspiration risk      -change antibiotics to ceftriaxone 1 g IV Q 24 hours while in hospital   On discharge, we can transition to oral Augmentin 875/125 mg twice a day to complete a total of 7 days, through 01/22/2018      3   Sacral decubitus ulcer   This was debrided back in November of 2017   Does not overtly look infected   Wound Care evaluation noted      -local wound care  -serial examinations     4   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   Status post tracheostomy   Patient follows with [facility]      5   Neck mass   Possibly necrotic lymph node   This was aspirated in December of 2017 with biopsies negative for any infectious process   The cytology is nondiagnostic   Patient was planning to follow-up with [facility] regarding this      6   Leukocytosis   May be leukemoid reaction in the setting of aspiration pneumonitis verses a possible pneumonia   Otherwise, patient remains afebrile, hemodynamically stable, nontoxic appearing  WBC count has trended down      -plan as above  -monitor white blood cell count     Antibiotics:  Cefepime/Flagyl D4    Stable for discharge from ID standpoint    If still here, will plan to see again on   Please call with any questions  Subjective:  Patient feels much better  Ambulating on the floor  Improved cough  Denies fevers, chills, or sweats  Denies nausea, vomiting, or diarrhea  Objective:  Vitals:  HR:  [60-74] 64  Resp:  [18] 18  BP: ()/(50-68) 115/56  SpO2:  [95 %-97 %] 95 %  Temp (24hrs), Av 8 °F (37 1 °C), Min:98 6 °F (37 °C), Max:99 2 °F (37 3 °C)  Current: Temperature: 98 6 °F (37 °C)    Physical Exam:   General:  No acute distress, chronically ill-appearing, trach in place  Psychiatric:  Awake and alert  Pulmonary:  Normal respiratory excursion without accessory muscle use  Abdomen:  Soft, nontender  Extremities:  No edema  Skin:  Sacral decubitus ulcer with no overt signs of infection    Lab Results:  I have personally reviewed pertinent labs  Results from last 7 days  Lab Units 18  0552 18  0519 18  0531   SODIUM mmol/L 142 142 142   POTASSIUM mmol/L 3 9 3 7 3 9   CHLORIDE mmol/L 110* 110* 108   CO2 mmol/L 27 27 29   ANION GAP mmol/L 5 5 5   BUN mg/dL 20 20 26*   CREATININE mg/dL 0 61 0 74 0 87   EGFR ml/min/1 73sq m 94 87 81   GLUCOSE RANDOM mg/dL 90 98 108   CALCIUM mg/dL 8 4 8 5 8 3   AST U/L  --   --  37   ALT U/L  --   --  17   ALK PHOS U/L  --   --  55   TOTAL PROTEIN g/dL  --   --  6 0*   ALBUMIN g/dL  --   --  2 3*   BILIRUBIN TOTAL mg/dL  --   --  0 33       Results from last 7 days  Lab Units 18  0552 18  0519 18  0531   WBC Thousand/uL 4 58 6 34 10 23*   HEMOGLOBIN g/dL 10 0* 10 1* 9 7*   PLATELETS Thousands/uL 204 200 178       Results from last 7 days  Lab Units 18  1200 18  0056 18  0009 18  2108 18  2101   BLOOD CULTURE   --   --   --  No Growth at 48 hrs  No Growth at 48 hrs     SPUTUM CULTURE  1+ Growth of Moraxella catarrhalis*  1+ Growth of   --   --   --   --    GRAM STAIN RESULT  No Epithelial cells seen  3+ Polys  1+ Gram positive cocci in pairs  Rare Gram negative diplococci  --   --   --   --    INFLUENZA A PCR   --   --  None Detected  --   --    INFLUENZA B PCR   --   --  None Detected  --   --    RSV PCR   --   --  None Detected  --   --    LEGIONELLA URINARY ANTIGEN   --  Negative  --   --   --        Imaging Studies:   I have personally reviewed pertinent imaging study reports and images in PACS  EKG, Pathology, and Other Studies:   I have personally reviewed pertinent reports

## 2018-01-19 NOTE — CASE MANAGEMENT
Continued Stay Review    Date: 1-19-18       Vital Signs: /66 (BP Location: Left arm)   Pulse 66   Temp 98 6 °F (37 °C) (Oral)   Resp 18   Ht 5' 7" (1 702 m)   Wt 63 2 kg (139 lb 5 3 oz)   SpO2 95%   BMI 21 82 kg/m²     Medications:   Scheduled Meds:   calcium carbonate-vitamin D 1 tablet Oral Daily With Breakfast   cefTRIAXone 1,000 mg Intravenous Q24H   collagenase  Topical Daily   enoxaparin 40 mg Subcutaneous Daily   fentaNYL 1 patch Transdermal Q72H   fentaNYL 25 mcg Transdermal Once   fluticasone-salmeterol 2 puff Inhalation Q12H [facility]   melatonin 3 mg Oral HS   mirtazapine 15 mg Oral HS   OLANZapine 5 mg Oral HS   pantoprazole 40 mg Oral Early Morning   senna-docusate sodium 1 tablet Oral Daily     Continuous Infusions:   sodium chloride 100 mL/hr Last Rate: 100 mL/hr (01/18/18 7865)     PRN Meds:   acetaminophen    albuterol    ALPRAZolam    aluminum-magnesium hydroxide-simethicone    ipratropium    levalbuterol    ondansetron    oxyCODONE    oxyCODONE    polyethylene glycol    Abnormal Labs/Diagnostic Results:    SPUTUM CULTURE 1+ Growth of Moraxella catarrhalis*  1+     GRAM STAIN RESULT No Epithelial cells seen  3+ Polys  1+ Gram positive cocci in pairs  Rare Gram negative diplococci       Age/Sex: 81 y o  male     Assessment/Plan:    INTERNAL MEDICINE     HCAP (healthcare-associated pneumonia)   Assessment & Plan     · ID following   · Sputum culture - Moraxella Catarrhalis  · Continue IV cefapime/flagyl    · Afebrile, leukocytosis resolved - continue to monitor   · Given total laryngectomy patient is not at risk for aspiration per speech therapy   · Blood cultures - no growth after 48 hours  · Flu/RSV PCR - negative   · Strep urine antigen - negative        Sacral decubitus ulcer   Assessment & Plan     · Wound care input appreciated   · Stage 3 pressure ulcer  · Daily dressing changes  · Frequent repositionig  · Outpatient follow-up at the wound [facility]       History of laryngeal cancer   Assessment & Plan     · S/p total laryngectomy - patient follows up at [facility]  · Here, oncologist is Dr Geo Sams  · Patient was actually scheduled for a core biopsy of a lymph node at [facility], but patient was not able to go to the appointment due to present symptoms  · Recommend outpatient core biopsy given current infection  · Outpatient follow-up with palliative care and Oncology   · Outpatient VNA speech therapy        Hypotensionresolved as of 1/19/2018   Assessment & Plan     · BP stable  · Continue IVF  · Hold Cardura        Anemia   Assessment & Plan     · Chronic anemia likely due to malignancy  · Monitor CBC  · If significantly worsening, further workup and management  · No active bleeding at this point        Asthma   Assessment & Plan     · Continue home medical management            INFECTIOUS DISEASE   1   Productive cough   Likely secondary to #2  [facility] [place] states he had a subjective low-grade fever at home, but no documented fevers here   No hypoxia   Hemodynamically stable and nontoxic appearing   Patient was started empirically on cefepime/Flagyl        -antibiotics as below  -monitor respiratory status closely  -supportive care per primary team  -follow-up blood cultures-no growth to date  -monitor temperatures and white blood cell count     2   Right middle lobe infiltrate secondary to Moraxella pneumonia    Patient clinically much improved    Speech evaluation noted and they do not feel that patient is an aspiration risk      -change antibiotics to ceftriaxone 1 g IV Q 24 hours while in hospital   On discharge, we can transition to oral Augmentin 875/125 mg twice a day to complete a total of 7 days, through 01/22/2018      3   Sacral decubitus ulcer   This was debrided back in November of 2017   Does not overtly look infected   Wound Care evaluation noted      -local wound care  -serial examinations     4   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   Status post tracheostomy   Patient follows with [facility]      5   Neck mass   Possibly necrotic lymph node   This was aspirated in December of 2017 with biopsies negative for any infectious process   The cytology is nondiagnostic   Patient was planning to follow-up with [facility] regarding this      6   Leukocytosis   May be leukemoid reaction in the setting of aspiration pneumonitis verses a possible pneumonia   Otherwise, patient remains afebrile, hemodynamically stable, nontoxic appearing   WBC count has trended down      -plan as above  -monitor white blood cell count     Antibiotics:  Cefepime/Flagyl D4      Discharge Plan:   HOME  OT RECOMMENDS THAT SOMEONE BE HOME 24 /7   PT EVAL AND RECOMMENDATIONS PENDING

## 2018-01-19 NOTE — SOCIAL WORK
Received a call from Chad at [facility] who states she received a call from [address] [place] [address] for Mom  Pt's daughter is requesting pt be admitted to [facility] at [place]  CM faxed requested clinical information  Chad states she is reviewing clinical information with her   Chad states she will visit pt tomorrow morning to complete evaluation  Chad is aware of tentative dc for tomorrow

## 2018-01-19 NOTE — ASSESSMENT & PLAN NOTE
· S/p total laryngectomy - patient follows up at [facility]  · Here, oncologist is Dr Wilmer Pierson  · Patient was actually scheduled for a core biopsy of a lymph node at [facility], but patient was not able to go to the appointment due to present symptoms  · Recommend outpatient core biopsy given current infection    · Outpatient follow-up with palliative care and Oncology   · Outpatient VNA speech therapy

## 2018-01-19 NOTE — OCCUPATIONAL THERAPY NOTE
Occupational Therapy Treatment Note     01/19/18 1419   Restrictions/Precautions   Weight Bearing Precautions Per Order No   Other Precautions Fall Risk;Pain;Multiple lines   Lifestyle   Autonomy Per pt, Pt was I w/ ADLS and IADLS, drove, & required no use of DME PTA   Reciprocal Relationships Pt responds to yes/no questions  Per pt, Pt lives alone however per chart review, CM notes report pt lives with daughter and her family  Pain Assessment   Pain Assessment 0-10   Pain Score No Pain   ADL   Where Assessed Standing at sink   Grooming Assistance 5  Supervision/Setup   Bed Mobility   Supine to Sit 7  Independent   Sit to Supine 7  Independent   Transfers   Sit to Stand 7  Independent   Stand to Sit 7  Independent   Functional Mobility   Functional Mobility 5  Supervision   Additional items Rolling walker   Cognition   Overall Cognitive Status [facility]   Arousal/Participation Alert; Responsive; Cooperative   Attention Within functional limits   Orientation Level Oriented X4   Memory Unable to assess   Following Commands Follows one step commands without difficulty   Activity Tolerance   Activity Tolerance Patient tolerated treatment well   Assessment   Assessment Pt participated in occupational therapy with focus on activity tolerance,  bed mob, standing balance and tolerance for pt engagement in functional self-care task/grooming task  Pt cleared by MICAH Catherine for participation in OT session  Pt supine/sleeping upon initiation of OT session  Pt initially reported he did not wish to participate however agreeable with min encouragement  Pt progressing well with functional transfers/mob with RW to/from pt bathroom  Pt reported balance deficits limit him at this time  Pt reported [place] with LB dressing skills and able to demonstrate ability to reach b/l LE  Pt progressing well  Plan   Treatment Interventions ADL retraining;Functional transfer training; Endurance training   Goal Expiration Date 01/28/19   Treatment Day 1   OT Frequency 3-5x/wk   Recommendation   OT Discharge Recommendation Home with family support   Barthel Index   Feeding 5   Bathing 0   Grooming Score 5   Dressing Score 5   Bladder Score 10   Bowels Score 10   Toilet Use Score 5   Transfers (Bed/Chair) Score 15   Mobility (Level Surface) Score 10   Stairs Score 0   Barthel Index Score 65   Modified [place] Scale   Modified Sabrina Scale 4       Merle Banana  RIOJAS/L

## 2018-01-19 NOTE — PHYSICAL THERAPY NOTE
PT EVALUATION     01/19/18 1325   Note Type   Note type Eval only   Pain Assessment   Pain Assessment 0-10   Pain Score 3   Pain Type Acute pain   Pain Location Back   Pain Orientation Lower   Hospital Pain Intervention(s) Repositioned; Ambulation/increased activity   Response to Interventions pt did not report that ambulation increased his pain level   Home Living   Type of [address] One level   Home Equipment Walker   Additional Comments pt responded to y/n questions and mouthed some words  pt lives in a 1 story house w/ no MISTY, but pt has been living with daughter the past month who has a 2 story home w/ MISTY to enter  Prior Function   Level of [place] Independent with ADLs and functional mobility   Lives With Alone   Receives Help From Family   ADL Assistance Independent   IADLs Needs assistance  (pt does not drive )   Restrictions/Precautions   Weight Bearing Precautions Per Order No   Other Precautions Fall Risk;Pain;Multiple lines  (trach )   Cognition   Attention Within functional limits   Orientation Level Oriented X4   RLE Assessment   RLE Assessment X  (strength 4/5 assessed during ambulation )   LLE Assessment   LLE Assessment X  (strength 4/5 assessed during ambulation )   Coordination   Movements are Fluid and Coordinated 1   Bed Mobility   Supine to Sit 7  Independent   Sit to Supine 7  Independent   Transfers   Sit to Stand 7  Independent   Stand to Sit 7  Independent   Ambulation/Elevation   Gait pattern Foward flexed;Narrow SARKIS; Inconsistent bharat   Gait Assistance 4  Minimal assist   Additional items Assist x 1   Assistive Device Rolling walker   Distance 75'   Balance   Static Sitting Good   Dynamic Sitting Good   Static Standing Fair +   Dynamic Standing Fair   Ambulatory Fair   Endurance Deficit   Endurance Deficit Yes   Endurance Deficit Description fatigue and trach collar    Activity Tolerance   Nurse Made Aware yes   Assessment   Prognosis Good   Problem List Decreased endurance;Decreased safety awareness;Decreased mobility   Assessment pt seen for modeate complexity PT evaluation  pt is a 80 y/o male w/ history of asthma, falls, laryngeal cancer, and HTN who is now admitted for HCAP  PT consulted to assess mobility, transfers, BLE strength, sitting/standing balance, and endurance  pt presents with decrease ambulation and activity tolerance  pt will benefit from skilled PT to improve safe ambulation and increase endurance  pt seemed agitated and believes he is going home to his house by himself  However, recommend to go home with daughter again where he will have someone with him when d/c with home PT  Goals   Patient Goals wants to go home    STG Expiration Date 02/02/18   Short Term Goal #1 1-2wk: increase strength in BLE 1/2-1 grades, ambulate 150ft w/ RW independently, increase standing balance to good, and negoiate 3 stairs w/ supervision  Treatment Day 0   Plan   Treatment/Interventions Functional transfer training;LE strengthening/ROM; Elevations; Therapeutic exercise;Gait training   PT Frequency 5x/wk   Recommendation   Recommendation Home with family support   Equipment Recommended Walker   PT - OK to Discharge Yes  (as long as pt is stable )   Modified Sabrina Scale   Modified [place] Scale 3   Barthel Index   Feeding 5   Bathing 0   Grooming Score 5   Dressing Score 5   Bladder Score 10   Bowels Score 10   Toilet Use Score 5   Transfers (Bed/Chair) Score 15   Mobility (Level Surface) Score 10   Stairs Score 0   Barthel Index Score 65     Katie Villavicencio, SPT

## 2018-01-19 NOTE — ASSESSMENT & PLAN NOTE
· ID following   · Sputum culture - Moraxella Catarrhalis  · Continue IV cefapime/flagyl    · Afebrile, leukocytosis resolved - continue to monitor   · Given total laryngectomy patient is not at risk for aspiration per speech therapy   · Blood cultures - no growth after 48 hours  · Flu/RSV PCR - negative   · Strep urine antigen - negative

## 2018-01-20 PROBLEM — J15.6 GRAM-NEGATIVE PNEUMONIA (HCC): Status: ACTIVE | Noted: 2018-01-20

## 2018-01-20 PROCEDURE — 94760 N-INVAS EAR/PLS OXIMETRY 1: CPT

## 2018-01-20 RX ORDER — LANOLIN ALCOHOL/MO/W.PET/CERES
3 CREAM (GRAM) TOPICAL
Status: DISCONTINUED | OUTPATIENT
Start: 2018-01-20 | End: 2018-02-02 | Stop reason: HOSPADM

## 2018-01-20 RX ADMIN — OXYCODONE HYDROCHLORIDE 10 MG: 10 TABLET ORAL at 23:12

## 2018-01-20 RX ADMIN — CALCIUM CARBONATE 500 MG (1,250 MG)-VITAMIN D3 200 UNIT TABLET 1 TABLET: at 07:34

## 2018-01-20 RX ADMIN — Medication 1000 MG: at 11:44

## 2018-01-20 RX ADMIN — POLYETHYLENE GLYCOL 3350 17 G: 17 POWDER, FOR SOLUTION ORAL at 11:44

## 2018-01-20 RX ADMIN — COLLAGENASE SANTYL: 250 OINTMENT TOPICAL at 07:36

## 2018-01-20 RX ADMIN — MIRTAZAPINE 15 MG: 15 TABLET, FILM COATED ORAL at 23:11

## 2018-01-20 RX ADMIN — SODIUM CHLORIDE 100 ML/HR: 0.9 INJECTION, SOLUTION INTRAVENOUS at 19:49

## 2018-01-20 RX ADMIN — ACETAMINOPHEN 650 MG: 325 TABLET, FILM COATED ORAL at 18:01

## 2018-01-20 RX ADMIN — SODIUM CHLORIDE 100 ML/HR: 0.9 INJECTION, SOLUTION INTRAVENOUS at 07:38

## 2018-01-20 RX ADMIN — MELATONIN 3 MG: 3 TAB ORAL at 01:48

## 2018-01-20 RX ADMIN — Medication 1 TABLET: at 07:34

## 2018-01-20 RX ADMIN — MELATONIN 3 MG: 3 TAB ORAL at 23:11

## 2018-01-20 RX ADMIN — PANTOPRAZOLE SODIUM 40 MG: 40 TABLET, DELAYED RELEASE ORAL at 05:23

## 2018-01-20 RX ADMIN — OLANZAPINE 5 MG: 5 TABLET, ORALLY DISINTEGRATING ORAL at 23:11

## 2018-01-20 NOTE — ASSESSMENT & PLAN NOTE
· Chronic anemia likely due to malignancy  acceptable   · Monitor CBC  10/30 3 stable   · No active bleeding at this point

## 2018-01-20 NOTE — SOCIAL WORK
PRUDENCIO provided MD with medical evaluation form to be completed and faxed to [facility]  CM rec'd call from [facility] at [facility] ([phone]) requesting update on pt's d/c plan  CM called [facility] to f/up and advised that pt will not d/c over weekend  CM faxed medical evaluation form and Rx's for wound care, VNA, PT and OT to [facility]

## 2018-01-21 ENCOUNTER — APPOINTMENT (INPATIENT)
Dept: RADIOLOGY | Facility: HOSPITAL | Age: 83
DRG: 177 | End: 2018-01-21
Payer: COMMERCIAL

## 2018-01-21 PROBLEM — T78.3XXA ANGIO-EDEMA, INITIAL ENCOUNTER: Status: ACTIVE | Noted: 2018-01-21

## 2018-01-21 LAB
BACTERIA BLD CULT: NORMAL
BACTERIA BLD CULT: NORMAL

## 2018-01-21 PROCEDURE — 71260 CT THORAX DX C+: CPT

## 2018-01-21 PROCEDURE — 70491 CT SOFT TISSUE NECK W/DYE: CPT

## 2018-01-21 PROCEDURE — 94760 N-INVAS EAR/PLS OXIMETRY 1: CPT

## 2018-01-21 RX ORDER — DEXAMETHASONE SODIUM PHOSPHATE 4 MG/ML
2 INJECTION, SOLUTION INTRA-ARTICULAR; INTRALESIONAL; INTRAMUSCULAR; INTRAVENOUS; SOFT TISSUE EVERY 6 HOURS SCHEDULED
Status: DISPENSED | OUTPATIENT
Start: 2018-01-21 | End: 2018-01-22

## 2018-01-21 RX ORDER — DIPHENHYDRAMINE HYDROCHLORIDE 50 MG/ML
12.5 INJECTION INTRAMUSCULAR; INTRAVENOUS EVERY 6 HOURS
Status: COMPLETED | OUTPATIENT
Start: 2018-01-21 | End: 2018-01-22

## 2018-01-21 RX ORDER — BISACODYL 10 MG
10 SUPPOSITORY, RECTAL RECTAL DAILY PRN
Status: DISCONTINUED | OUTPATIENT
Start: 2018-01-21 | End: 2018-02-02 | Stop reason: HOSPADM

## 2018-01-21 RX ORDER — DEXAMETHASONE SODIUM PHOSPHATE 4 MG/ML
2 INJECTION, SOLUTION INTRA-ARTICULAR; INTRALESIONAL; INTRAMUSCULAR; INTRAVENOUS; SOFT TISSUE EVERY 6 HOURS SCHEDULED
Status: DISCONTINUED | OUTPATIENT
Start: 2018-01-21 | End: 2018-01-21

## 2018-01-21 RX ADMIN — DIPHENHYDRAMINE HYDROCHLORIDE 12.5 MG: 50 INJECTION, SOLUTION INTRAMUSCULAR; INTRAVENOUS at 14:14

## 2018-01-21 RX ADMIN — PANTOPRAZOLE SODIUM 40 MG: 40 TABLET, DELAYED RELEASE ORAL at 05:38

## 2018-01-21 RX ADMIN — CALCIUM CARBONATE 500 MG (1,250 MG)-VITAMIN D3 200 UNIT TABLET 1 TABLET: at 08:15

## 2018-01-21 RX ADMIN — MIRTAZAPINE 15 MG: 15 TABLET, FILM COATED ORAL at 22:45

## 2018-01-21 RX ADMIN — IOHEXOL 100 ML: 350 INJECTION, SOLUTION INTRAVENOUS at 15:33

## 2018-01-21 RX ADMIN — Medication 1 TABLET: at 08:15

## 2018-01-21 RX ADMIN — DEXAMETHASONE SODIUM PHOSPHATE 2 MG: 4 INJECTION, SOLUTION INTRAMUSCULAR; INTRAVENOUS at 14:15

## 2018-01-21 RX ADMIN — DIPHENHYDRAMINE HYDROCHLORIDE 12.5 MG: 50 INJECTION, SOLUTION INTRAMUSCULAR; INTRAVENOUS at 22:45

## 2018-01-21 RX ADMIN — POLYETHYLENE GLYCOL 3350 17 G: 17 POWDER, FOR SOLUTION ORAL at 08:16

## 2018-01-21 RX ADMIN — OXYCODONE HYDROCHLORIDE 10 MG: 10 TABLET ORAL at 05:38

## 2018-01-21 RX ADMIN — FAMOTIDINE 20 MG: 10 INJECTION, SOLUTION INTRAVENOUS at 17:30

## 2018-01-21 RX ADMIN — Medication 1000 MG: at 11:20

## 2018-01-21 RX ADMIN — ENOXAPARIN SODIUM 40 MG: 40 INJECTION SUBCUTANEOUS at 08:15

## 2018-01-21 RX ADMIN — SODIUM CHLORIDE 100 ML/HR: 0.9 INJECTION, SOLUTION INTRAVENOUS at 05:45

## 2018-01-21 RX ADMIN — DEXAMETHASONE SODIUM PHOSPHATE 2 MG: 4 INJECTION, SOLUTION INTRAMUSCULAR; INTRAVENOUS at 22:45

## 2018-01-21 RX ADMIN — OLANZAPINE 5 MG: 5 TABLET, ORALLY DISINTEGRATING ORAL at 22:45

## 2018-01-21 RX ADMIN — COLLAGENASE SANTYL: 250 OINTMENT TOPICAL at 08:27

## 2018-01-21 RX ADMIN — MELATONIN 3 MG: 3 TAB ORAL at 22:45

## 2018-01-21 NOTE — ASSESSMENT & PLAN NOTE
· ID following   · gram negative pneumonia  · Sputum culture - Moraxella Catarrhalis pna  · abx changed to ceftriaxone and will stop by 1/22/18  · Afebrile, leukocytosis resolved - continue to monitor   · Given total laryngectomy patient is not at risk for aspiration per speech therapy   · Blood cultures - no growth after 72 hours  · Flu/RSV PCR - negative   · Strep urine antigen - negative

## 2018-01-21 NOTE — CONSULTS
Consultation - Rosio 87 y o  male MRN: [id number]  Unit/Bed#: [facility] 703-01 Encounter: [id number]      Assessment/Plan     Assessment:  Repeat episodes of HCAP - moraxella catarrhalis PNA  Facial swelling  Laryngeal cancer - s/p chemo/XRT/surgery at [facility]  Locally sees Dr Yair Chapman in med/onc and Deborah Mcclure in ENT  Tracheostomy s/p total laryngectomy  Anemia  Depression/existential suffering  Sacral wound  Cancer associated pain  Insomnia  Decreased oral intake    Plan:  Symptom management:  Pain: Continue fentanyl 25mcg patch and PRN oxycodone 10mg    Depression/existential suffering - he is on pharmacotherapy  He has had a hard time recovering from head & neck cancer treatment and has fallen into a depression marked by symptoms of failure to thrive  He will benefit from continued close follow up with palliative care and counseling services, maría if no recurrent or residual disease is found    Sacral wound - continue wound are and work to improve nutrition    Decreased oral intake - on zyprexa and remeron  In the past he has refused a PEG  Goals of Care:  Level 1 code status    Laryngeal cancer - cancer care needs to transition to local teams  His recurrent episodes of HCAP and is slow recovery from laryngeal cancer treatment have impacted his functional status and ability to travel for care  I personally spoke to Dr Deborah Mcclure about the CT scan which he will review and he will see the patient in consultation tomorrow  There is an area that likely needs to be biopsied  If it comes back negative the emphasis from his care teams needs to stay on rehab and recovery as he will not have a terminal condition  If it comes back positive, goals of care can be readdressed      History of Present Illness   Physician Requesting Consult: Pedrito Andre MD  Reason for Consult / Principal Problem: goals of care  HPI: Marely Sommer  is a 81y o  year old male who presents with a productive cough and increased mucus at the trach site  He complained of some shortness of breath  He had a recent admission for HCAP and sepsis during which the palliative & supportive care consult service was involved during his recovery for overall failure to thrive  He was eventually discharged home and went to live with his daughter  He follows up at [facility] for his cancer care and is followed locally by Dr Martina Payne and Dr Julia Valentin  CXR completed revealed a interval increase in the consolidation overlying the right lower lung field  In total this is his 3rd episode of HCAP since the completion of his laryngeal cancer treatment  This morning he woke up with increase facial swelling  There has been an area of concern on recent images and the daughter is advocating for a CT scan and biopsy prior to discharge  In general he has been very slow to recover from laryngeal cancer treatments  He has at times failed to thrive and meeting have been had about goals of care  As of now he does not have conclusive evidence of recurrence or residual disease so the emphasis has been on rehab and recovery  Inpatient consult to Palliative Care  Consult performed by: Deric Tillman ordered by: Liza Scott        Review of Systems   Constitutional: Positive for activity change, appetite change, fatigue and unexpected weight change  Respiratory: Positive for cough and shortness of breath  Endocrine: Negative  Genitourinary: Negative  Musculoskeletal: Positive for gait problem  Skin: Positive for wound  Allergic/Immunologic: Negative  Neurological: Positive for weakness  Hematological: Negative  Psychiatric/Behavioral: Positive for sleep disturbance         Historical Information   Past Medical History:   Diagnosis Date    Asthma     Cancer associated pain     Decreased appetite     Depression     HCAP (healthcare-associated pneumonia)     Hypertension     Insomnia     [facility] [facility]     Physical deconditioning     [facility] ulcer [facility] [facility]     Severe sepsis ([facility] [facility]     Tracheostomy status ([facility] )      Past Surgical History:   Procedure Laterality Date    EGD AND COLONOSCOPY      LARYNGECTOMY      TRACHEOSTOMY N/A 8/25/2017    Procedure: TRACHEOSTOMY (POSSIBLE AWAKE) , MICRO DIRECT LARYNGOSCOPY, Biopsy;  Surgeon: Phuong Osorio MD;  [facility]: [facility];  Service: ENT    WOUND DEBRIDEMENT N/A 11/22/2017    Procedure: DEBRIDEMENT WOUND [facility] OUT), sacrum;  Surgeon: Candance Greulich, DO;  Location[facility];  Service: General     Social History     Social History    Marital status:      Spouse name: N/A    Number of children: 1    Years of education: N/A     Occupational History    retired      Social History Main Topics    Smoking status: Former Smoker    Smokeless tobacco: Never Used    Alcohol use No    Drug use: No    Sexual activity: No     Other Topics Concern    None     Social History Narrative    None     History reviewed  No pertinent family history      Meds/Allergies   all current active meds have been reviewed, current meds:   Current Facility-Administered Medications   Medication Dose Route Frequency    acetaminophen (TYLENOL) tablet 650 mg  650 mg Oral Q6H PRN    albuterol (PROVENTIL HFA,VENTOLIN HFA) inhaler 1 puff  1 puff Inhalation Q4H PRN    ALPRAZolam (XANAX) tablet 0 25 mg  0 25 mg Oral 4x Daily PRN    aluminum-magnesium hydroxide-simethicone (MYLANTA) 200-200-20 mg/5 mL oral suspension 30 mL  30 mL Oral Q6H PRN    calcium carbonate-vitamin D (OSCAL-D) 500 mg-200 units per tablet 1 tablet  1 tablet Oral Daily With Breakfast    cefTRIAXone (ROCEPHIN) 1,000 mg in dextrose 5 % 50 mL IVPB  1,000 mg Intravenous Q24H    collagenase (SANTYL) ointment   Topical Daily    dexamethasone (DECADRON) injection 2 mg  2 mg Intravenous Q6H [facility]    diphenhydrAMINE (BENADRYL) injection 12 5 mg  12 5 mg Intravenous Q6H    enoxaparin (LOVENOX) subcutaneous injection 40 mg  40 mg Subcutaneous Daily    famotidine (PEPCID) injection 20 mg  20 mg Intravenous Q12H [address]    fentaNYL (DURAGESIC) 25 mcg/hr TD 72 hr patch 1 patch  1 patch Transdermal Q72H    fluticasone-salmeterol (ADVAIR) 250-50 mcg/dose inhaler 2 puff  2 puff Inhalation Q12H [address]    ipratropium (ATROVENT) 0 02 % inhalation solution 0 5 mg  0 5 mg Nebulization Q6H PRN    levalbuterol (XOPENEX) inhalation solution 1 25 mg  1 25 mg Nebulization Q6H PRN    melatonin tablet 3 mg  3 mg Oral HS    mirtazapine (REMERON) tablet 15 mg  15 mg Oral HS    OLANZapine (ZyPREXA ZYDIS) dispersible tablet 5 mg  5 mg Oral HS    ondansetron (ZOFRAN) injection 4 mg  4 mg Intravenous Q6H PRN    oxyCODONE (ROXICODONE) immediate release tablet 10 mg  10 mg Oral Q4H PRN    oxyCODONE (ROXICODONE) IR tablet 5 mg  5 mg Oral Q4H PRN    pantoprazole (PROTONIX) EC tablet 40 mg  40 mg Oral Early Morning    polyethylene glycol (MIRALAX) packet 17 g  17 g Oral Daily PRN    senna-docusate sodium (SENOKOT S) 8 6-50 mg per tablet 1 tablet  1 tablet Oral Daily    and PTA meds:   Prior to Admission Medications   Prescriptions Last Dose Informant Patient Reported? Taking?    ALPRAZolam (XANAX) 0 25 mg tablet   No Yes   Sig: Take 1 tablet by mouth 4 (four) times a day as needed for anxiety for up to 10 days   Calcium 600-200 MG-UNIT per tablet   Yes Yes   Sig: Take 1 tablet by mouth daily   Mometasone Furo-Formoterol Fum (DULERA) 200-5 MCG/ACT AERO   Yes Yes   Sig: Inhale 2 puffs 2 (two) times a day   OLANZapine (ZyPREXA ZYDIS) 5 mg dispersible tablet   No Yes   Sig: Take 1 tablet by mouth daily at bedtime   Sennosides-Docusate Sodium (SENEXON-S PO)   Yes Yes   Sig: Take 1 tablet by mouth   acetaminophen (TYLENOL) 325 mg tablet   No Yes   Sig: Take 2 tablets by mouth every 6 (six) hours as needed for mild pain, headaches or fever   albuterol (PROVENTIL HFA) 90 mcg/act inhaler   Yes Yes   Sig: Inhale 2 puffs as needed   collagenase (SANTYL) ointment   Yes Yes Sig: Apply topically daily   doxazosin (CARDURA) 2 mg tablet   Yes No   Sig: Take 2 mg by mouth daily at bedtime   fentaNYL (DURAGESIC) 25 mcg/hr   Yes Yes   Sig: Place 1 patch on the skin every third day   melatonin 3 mg   No Yes   Sig: Take 1 tablet by mouth daily at bedtime   mirtazapine (REMERON) 15 mg tablet   No No   Sig: Take 1 tablet by mouth daily at bedtime   mometasone (ASMANEX 14 METERED DOSES) 220 MCG/INH inhaler   Yes Yes   Sig: Inhale 1 puff daily   omeprazole (PriLOSEC) 40 MG capsule   Yes Yes   Sig: Take 40 mg by mouth daily   oxyCODONE (OXY-IR) 5 MG capsule   Yes Yes   Sig: Take 5 mg by mouth every 4 (four) hours as needed for moderate pain 5 mg for mod pain q4 10 mg for severe pain q4   polyethylene glycol (MIRALAX) 17 g packet   No Yes   Sig: Take 17 g by mouth daily as needed (CONSTIPATION)      Facility-Administered Medications: None     Allergies   Allergen Reactions    Aspirin Anaphylaxis    Cleocin [Clindamycin] Other (See Comments)     Cannot specify but had definite reaction in past!!     Objective   Vitals:    01/21/18 1201   BP: 102/56   Pulse: 69   Resp: 18   Temp: 98 4 °F (36 9 °C)   SpO2: 98%     Physical Exam   Constitutional: He is oriented to person, place, and time  Appears thinner since the last time I saw him   HENT:   facial swelling  +Trach   Cardiovascular: Normal rate, regular rhythm and normal heart sounds  Pulmonary/Chest:   Course breath sounds with frequent coughing   Abdominal: Soft  Bowel sounds are normal  He exhibits no distension  There is no tenderness  Musculoskeletal:   Left upper arm swelling   Neurological: He is alert and oriented to person, place, and time  Skin: No rash noted  There is pallor  Psychiatric:   Appears psychologically distressed with frequent head shaking and poor eye contact       Lab Results: I have personally reviewed pertinent labs  Imaging Studies: I have personally reviewed pertinent reports        Code Status: Level 1 - Full Code  Advance Directive and Living Will:      Power of : Yes  POLST:      Counseling / Coordination of Care  Total floor / unit time spent today 60 minutes  Greater than 50% of total time was spent with the patient and / or family counseling and / or coordination of care   A description of the counseling / coordination of care: Spoke to SLIM, ENT and daughter at bedside

## 2018-01-21 NOTE — ASSESSMENT & PLAN NOTE
Patient with significant swelling, facial area more on right side as patient has been lying on his right side dependent  Lips Swollen  On Sunday as a result treated for   reaction, Pepcid b i d , Decadron q 6 hours and Benadryl q 6 hours for the next 24 hours  Also perform CT chest to rule out possible svc  Which has been performed and results reveal: Marked increase in size of right lower lobe nodule, suspicious for metastatic disease  Small right pleural effusion with compressive basilar atelectasis  Called daughter to update left message as no answer  Patient appears a little frustrated when informed we would perform some more testing      Discussed with daughter who states that he sometimes has this swelling but this is much worse, today with significant improvement but pt is with significant frustration and irritation

## 2018-01-21 NOTE — ASSESSMENT & PLAN NOTE
· Wound care input appreciated   · Stage 3 pressure ulcer  · Daily dressing changes, nursing doing daily state none infected lookiing   · Frequent repositionig  · Outpatient follow-up at the [facility]

## 2018-01-21 NOTE — ASSESSMENT & PLAN NOTE
· S/p total laryngectomy -  Pt sees dr Alice Pina for this   · Pt was supposed to follow up at [facility] in regards to core biopsy however, daughter is wanting this done here attempted to discuss with dr Wander Navas whom at this point feels ent should likely see pt for plan   · Will consult ENT for further input as daughter feels pt needs ng vs keofed tube placed for nutrition pt refusing peg tube I have informed daughter this is not a long term option  Pt is stable for discharge (pts nutritional status is poor dt poor oral intake  Pt refuses peg tube as I have discussed with him  · Daughter states she spoke to her dad today and she has "talked him into doing a keofed vs ng tube for nutritional input" this was never discussed with pt will discuss tomorrow with the pt   · I will wait to talk with dr Maribel Ferreira Monday if I have to call him on phone, to discuss  · I will also speak with ent   ·   · Here, oncologist is Dr Catie Rodriguez  · This is pts hx  "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, radiation therapy with concomitant weekly cetuximab, June 9, 2017 to July 21, 2017      Total laryngectomy and neck resection on October 18, 2017 at the [facility] for squamous cell carcinoma, well differentiated, 7 cm involving larynx and supraglottic larynx, margins uninvolved by invasive carcinoma, lymphovascular and perineural invasion present, metastasis to 2 of 30 lymph nodes, the largest node 5 3 cm with extranodal extension present, involved lymph nodes are bilateral, pT4a pN2c per oncology documentation  Now pt noted to have aspirate via ir 12/18/17 with : "Impression:    1  Predominantly cystic left supraclavicular mass with a tiny, 5 mm, echogenic component which may represent solid tissue or necrotic debris  2   Fine-needle aspiration was performed targeting the echogenic focus within the cystic mass as well as an irregular portion of the wall    3   The cystic component was aspirated to near complete resolution yielding 10 mL of tea-colored fluid  A portion of the fluid was submitted for microbiological analysis  All remaining specimens were submitted to pathology staff "    Biopsy results on 12/20/18 : "A, B & C  Lymph node, left neck (Thin-prep, smears and cell block preparations):  Nondiagnostic specimen    Few lymphocytes & RBC's in a fibrinous coagulum - the paucity of lymphoid material precludes meaningful interpretation "   Reason family wanting more definitive information to r/o malignancy    I am unsure as to whether large enough mass for core biopsy based on this above IR documentation (unclear to me where core biopsy would be taken from again will discuss with dr Mi Fernandez on Monday)

## 2018-01-21 NOTE — PROGRESS NOTES
Progress Note - Rebeka Romano  1935, 81 y o  male MRN: [id number]    Unit/Bed#: [facility] 703-01 Encounter: [id number]    Primary Care Provider: Vernon Ariza MD   Date and time admitted to hospital: 1/16/2018  7:10 PM        * Gram-negative pneumonia ([address] )   Assessment & Plan    · ID following   · gram negative pneumonia  · Sputum culture - Moraxella Catarrhalis pna  · abx changed to ceftriaxone and will stop by 1/22/18  · Afebrile, leukocytosis resolved - continue to monitor   · Given total laryngectomy patient is not at risk for aspiration per speech therapy   · Blood cultures - no growth after 72 hours  · Flu/RSV PCR - negative   · Strep urine antigen - negative         History of laryngeal cancer   Assessment & Plan    · S/p total laryngectomy -  Pt sees dr Hafsa Marks for this   · Pt was supposed to follow up at [facility] in regards to core biopsy however, daughter is wanting this done here attempted to discuss with dr Temitope Garcia whom at this point feels ent should likely see pt for plan   · Will consult ENT for further input as daughter feels pt needs ng vs keofed tube placed for nutrition pt refusing peg tube I have informed daughter this is not a long term option  Pt is stable for discharge (pts nutritional status is poor dt poor oral intake  Pt refuses peg tube as I have discussed with him  · Daughter states she spoke to her dad today and she has "talked him into doing a keofed vs ng tube for nutritional input" this was never discussed with pt will discuss tomorrow with the pt   · I will wait to talk with dr Tj Traore Monday if I have to call him on phone, to discuss     · I will also speak with ent   ·   · Here, oncologist is Dr Christiano Box  · This is pts hx  "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 for squamous cell carcinoma, well differentiated, 7 cm involving larynx and supraglottic larynx, margins uninvolved by invasive carcinoma, lymphovascular and perineural invasion present, metastasis to 2 of 30 lymph nodes, the largest node 5 3 cm with extranodal extension present, involved lymph nodes are bilateral, pT4a pN2c per oncology documentation  Now pt noted to have aspirate via ir 12/18/17 with : "Impression:    1  Predominantly cystic left supraclavicular mass with a tiny, 5 mm, echogenic component which may represent solid tissue or necrotic debris  2   Fine-needle aspiration was performed targeting the echogenic focus within the cystic mass as well as an irregular portion of the wall  3   The cystic component was aspirated to near complete resolution yielding 10 mL of tea-colored fluid  A portion of the fluid was submitted for microbiological analysis  All remaining specimens were submitted to pathology staff "    Biopsy results on 12/20/18 : "A, B & C  Lymph node, left neck (Thin-prep, smears and cell block preparations):  Nondiagnostic specimen  Few lymphocytes & RBC's in a fibrinous coagulum - the paucity of lymphoid material precludes meaningful interpretation "   Reason family wanting more definitive information to r/o malignancy    I am unsure as to whether large enough mass for core biopsy based on this above IR documentation (unclear to me where core biopsy would be taken from again will discuss with dr Cristina Anderson on Monday)         Anemia   Assessment & Plan    · Chronic anemia likely due to malignancy  acceptable   · Monitor CBC  10/30 3 stable   · No active bleeding at this point            Sacral decubitus ulcer   Assessment & Plan    · Wound care input appreciated   · Stage 3 pressure ulcer  · Daily dressing changes, nursing doing daily state none infected lookiing   · Frequent repositionig  · Outpatient follow-up at the wound [address]    · Continue home medical management VTE Pharmacologic Prophylaxis:   Pharmacologic: Enoxaparin (Lovenox)  Mechanical VTE Prophylaxis in Place: Yes    Patient Centered Rounds: I have performed bedside rounds with nursing staff today  Discussions with Specialists or Other Care Team Provider: nursing and dr Maribell Hinson    Education and Discussions with Family / Patient: patient and daughter     Time Spent for Care: 30 minutes  More than 50% of total time spent on counseling and coordination of care as described above  Current Length of Stay: 3 day(s)    Current Patient Status: Inpatient   Certification Statement: The patient will continue to require additional inpatient hospital stay due to clarifications with consulting mds     Discharge Plan: to rehab however daughter now not wanting pt to go as she feels he is not consuming adequate nutrition and now wanting ng tube     Code Status: Level 1 - Full Code      Subjective:   Pt denies any pain  No sob  occ mucus  Pt states no appetite will drink his ensure drinks    Objective:     Vitals:   Temp (24hrs), Av 4 °F (36 9 °C), Min:97 7 °F (36 5 °C), Max:99 1 °F (37 3 °C)    HR:  [66-75] 69  Resp:  [18-20] 20  BP: (123-156)/(65-89) 140/73  SpO2:  [96 %-98 %] 96 %  Body mass index is 21 82 kg/m²  Input and Output Summary (last 24 hours): Intake/Output Summary (Last 24 hours) at 18 1932  Last data filed at 18 1827   Gross per 24 hour   Intake             1520 ml   Output             3145 ml   Net            -1625 ml       Physical Exam:     Physical Exam   Constitutional: He is oriented to person, place, and time  No distress  HENT:   Head: Normocephalic  Mouth/Throat: No oropharyngeal exudate  Tracheostomy    Eyes: Conjunctivae are normal  Pupils are equal, round, and reactive to light  Right eye exhibits no discharge  Left eye exhibits no discharge  No scleral icterus  Neck: No JVD present  No tracheal deviation present  No thyromegaly present     Cardiovascular: Normal rate  Exam reveals no gallop and no friction rub  No murmur heard  Pulmonary/Chest: Effort normal  No stridor  No respiratory distress  He has no wheezes  He has no rales  He exhibits no tenderness  Abdominal: Soft  Bowel sounds are normal  He exhibits no distension and no mass  There is no tenderness  There is no rebound and no guarding  Musculoskeletal: He exhibits no edema, tenderness or deformity  Lymphadenopathy:     He has no cervical adenopathy  Neurological: He is oriented to person, place, and time  Skin: No rash noted  He is not diaphoretic  No erythema  No pallor  Sacral decub with dsd intact abd to sacral area   Psychiatric:   Depressed flat non verbal mouthing conversation       Additional Data:     Labs:      Results from last 7 days  Lab Units 01/19/18  0552  01/17/18  0531   WBC Thousand/uL 4 58  < > 10 23*   HEMOGLOBIN g/dL 10 0*  < > 9 7*   HEMATOCRIT % 30 3*  < > 29 4*   PLATELETS Thousands/uL 204  < > 178   LYMPHO PCT %  --   --  1*   MONO PCT MAN %  --   --  0*   EOSINO PCT MANUAL %  --   --  0   < > = values in this interval not displayed  Results from last 7 days  Lab Units 01/19/18  0552  01/17/18  0531   SODIUM mmol/L 142  < > 142   POTASSIUM mmol/L 3 9  < > 3 9   CHLORIDE mmol/L 110*  < > 108   CO2 mmol/L 27  < > 29   BUN mg/dL 20  < > 26*   CREATININE mg/dL 0 61  < > 0 87   CALCIUM mg/dL 8 4  < > 8 3   TOTAL PROTEIN g/dL  --   --  6 0*   BILIRUBIN TOTAL mg/dL  --   --  0 33   ALK PHOS U/L  --   --  55   ALT U/L  --   --  17   AST U/L  --   --  37   GLUCOSE RANDOM mg/dL 90  < > 108   < > = values in this interval not displayed  * I Have Reviewed All Lab Data Listed Above  * Additional Pertinent Lab Tests Reviewed:  All Labs Within Last 24 Hours Reviewed    Imaging:    Imaging Reports Reviewed Today Include: reviewed       Recent Cultures (last 7 days):       Results from last 7 days  Lab Units 01/18/18  1200 01/17/18  0056 01/17/18  0009 01/16/18  2106 01/16/18 2101   BLOOD CULTURE   --   --   --  No Growth at 72 hrs  No Growth at 72 hrs  SPUTUM CULTURE  1+ Growth of Moraxella catarrhalis*  1+ Growth of   --   --   --   --    GRAM STAIN RESULT  No Epithelial cells seen  3+ Polys  1+ Gram positive cocci in pairs  Rare Gram negative diplococci  --   --   --   --    INFLUENZA A PCR   --   --  None Detected  --   --    INFLUENZA B PCR   --   --  None Detected  --   --    RSV PCR   --   --  None Detected  --   --    LEGIONELLA URINARY ANTIGEN   --  Negative  --   --   --        Last 24 Hours Medication List:     calcium carbonate-vitamin D 1 tablet Oral Daily With Breakfast   cefTRIAXone 1,000 mg Intravenous Q24H   collagenase  Topical Daily   enoxaparin 40 mg Subcutaneous Daily   fentaNYL 1 patch Transdermal Q72H   fluticasone-salmeterol 2 puff Inhalation Q12H [facility]   melatonin 3 mg Oral HS   mirtazapine 15 mg Oral HS   OLANZapine 5 mg Oral HS   pantoprazole 40 mg Oral Early Morning   senna-docusate sodium 1 tablet Oral Daily        Today, Patient Was Seen By: AMY Fraser    ** Please Note: Dictation voice to text software may have been used in the creation of this document   **

## 2018-01-21 NOTE — ASSESSMENT & PLAN NOTE
· S/p total laryngectomy -  Pt sees ent and pt prefers to see dr Cherri Gordon and also dr Kyler Velasquez   · Discussed with dr Hossein Knight who will see the pt likely today? Plan for biopsy  · Pt was supposed to follow up at [facility] in regards to core biopsy however, daughter is wanting this done here attempted to discuss with dr Dolly Gonzales whom at this point feels ent should likely see pt for plan   · ENT consulted for  daughter feels pt needs ng vs keofed tube placed for nutrition pt refusing peg tube I have informed daughter this is not a long term option  Pt is stable for discharge (pts nutritional status is poor dt poor oral intake  Pt refuses peg tube as I have discussed with him  · Daughter states she spoke to her dad yesterday and she has "talked him into doing a keofed vs ng tube for nutritional input" this would have to be placed if planned for by ENT as pt has hx of laryngectomy  · Pending dr Cherri Gordon to see pt   · oncologist is Dr Wero Mcpherson, consulted oncology as now pt is with enlarging right lung nodule and family ? 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, radiation therapy with concomitant weekly cetuximab, June 9, 2017 to July 21, 2017      Total laryngectomy and neck resection on October 18, 2017 at the [facility] for squamous cell carcinoma, well differentiated, 7 cm involving larynx and supraglottic larynx, margins uninvolved by invasive carcinoma, lymphovascular and perineural invasion present, metastasis to 2 of 30 lymph nodes, the largest node 5 3 cm with extranodal extension present, involved lymph nodes are bilateral, pT4a pN2c per oncology documentation  Now pt noted to have aspirate via ir 12/18/17 with : "Impression:    1  Predominantly cystic left supraclavicular mass with a tiny, 5 mm, echogenic component which may represent solid tissue or necrotic debris    2   Fine-needle aspiration was performed targeting the echogenic focus within the cystic mass as well as an irregular portion of the wall  3   The cystic component was aspirated to near complete resolution yielding 10 mL of tea-colored fluid  A portion of the fluid was submitted for microbiological analysis  All remaining specimens were submitted to pathology staff "    Biopsy results on 12/20/18 : "A, B & C  Lymph node, left neck (Thin-prep, smears and cell block preparations):  Nondiagnostic specimen    Few lymphocytes & RBC's in a fibrinous coagulum - the paucity of lymphoid material precludes meaningful interpretation "   Reason family wanting more definitive information to r/o malignancy    I am unsure as to whether large enough mass for core biopsy based on this above IR documentation, still pending ent and oncology input

## 2018-01-21 NOTE — PROGRESS NOTES
Progress Note - Lillian Razo  1935, 81 y o  male MRN: [id number]    Unit/Bed#: [facility] 703-01 Encounter: [id number]    Primary Care Provider: Kayleigh Mack MD   Date and time admitted to hospital: 1/16/2018  7:10 PM        * Gram-negative pneumonia ([facility] [address] )   Assessment & Plan    · ID following   · gram negative pneumonia  · Sputum culture - Moraxella Catarrhalis pna  · abx changed to ceftriaxone and will stop by 1/22/18 tomorrow   · Afebrile, leukocytosis resolved - continue to monitor   · Given total laryngectomy patient is not at risk for aspiration per speech therapy   · Blood cultures - no growth after 72 hours  · Flu/RSV PCR - negative   · Strep urine antigen - negative         Angio-edema, initial encounter   Assessment & Plan    Patient with significant swelling, facial area more on right side as patient has been lying on his right side dependent  Lips Swollen patient able to communicate with me as he was yesterday denies any respiratory distress problems breathing  Will treat as if possible urge reaction, Pepcid b i d , Decadron q 6 hours and Benadryl q 6 hours for the next 24 hours  Also perform CT chest to rule out possible svc   Called daughter to update left message as no answer  Patient appears a little frustrated when informed we would perform some more testing      Discussed with daughter who states that he sometimes has this swelling but this is much worse, no apparent yesterday to me at all on exam         History of laryngeal cancer   Assessment & Plan    · S/p total laryngectomy -  Pt sees dr Bautista Perkins for this in past and also dr Brice Reynolds   · Pt was supposed to follow up at [facility] in regards to core biopsy however, daughter is wanting this done here attempted to discuss with dr Ty Maxwell whom at this point feels ent should likely see pt for plan   · Will consult ENT monday for further input as daughter feels pt needs ng vs keofed tube placed for nutrition pt refusing peg tube I have informed daughter this is not a long term option  Pt is stable for discharge (pts nutritional status is poor dt poor oral intake  Pt refuses peg tube as I have discussed with him  · Daughter states she spoke to her dad yesterday and she has "talked him into doing a keofed vs ng tube for nutritional input" this was never discussed with pt will discuss tomorrow with the pt  ·   · I will wait to talk with dr Magdalena Galeazzi Monday if I have to call him on phone, to discuss  · I will also speak with ent tomorrow DR bartlett at families request   ·   · Here, oncologist is Dr Taryn Morris Will call to discuss case with dr Magdalena Galeazzi if on or in outpt setting tomorrow to discuss case   · This is pts hx  "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, radiation therapy with concomitant weekly cetuximab, June 9, 2017 to July 21, 2017      Total laryngectomy and neck resection on October 18, 2017 at the [facility] for squamous cell carcinoma, well differentiated, 7 cm involving larynx and supraglottic larynx, margins uninvolved by invasive carcinoma, lymphovascular and perineural invasion present, metastasis to 2 of 30 lymph nodes, the largest node 5 3 cm with extranodal extension present, involved lymph nodes are bilateral, pT4a pN2c per oncology documentation  Now pt noted to have aspirate via ir 12/18/17 with : "Impression:    1  Predominantly cystic left supraclavicular mass with a tiny, 5 mm, echogenic component which may represent solid tissue or necrotic debris  2   Fine-needle aspiration was performed targeting the echogenic focus within the cystic mass as well as an irregular portion of the wall  3   The cystic component was aspirated to near complete resolution yielding 10 mL of tea-colored fluid  A portion of the fluid was submitted for microbiological analysis  All remaining specimens were submitted to pathology staff "    Biopsy results on 12/20/18 : "A, B & C    Lymph node, left neck (Thin-prep, smears and cell block preparations):  Nondiagnostic specimen  Few lymphocytes & RBC's in a fibrinous coagulum - the paucity of lymphoid material precludes meaningful interpretation "   Reason family wanting more definitive information to r/o malignancy    I am unsure as to whether large enough mass for core biopsy based on this above IR documentation (unclear to me where core biopsy would be taken from again will discuss with dr Kayden Lindsay on Monday)         Anemia   Assessment & Plan    · Chronic anemia likely due to malignancy  acceptable   · Monitor CBC  10/30 3 stable   · No active bleeding at this point  Sacral decubitus ulcer   Assessment & Plan    · Wound care input appreciated   · Stage 3 pressure ulcer  · Daily dressing changes, nursing doing daily state none infected lookiing   · Frequent repositioning   · Outpatient follow-up at the wound [address] does seem a little flat and depressed he is clearly frustrated and at times when talking about more intervention         Asthma   Assessment & Plan    · Continue home medical management             VTE Pharmacologic Prophylaxis:   Pharmacologic: Enoxaparin (Lovenox)  Mechanical VTE Prophylaxis in Place: Yes    Patient Centered Rounds: I have performed bedside rounds with nursing staff today  Discussions with Specialists or Other Care Team Provider: nursing and ENT,     Education and Discussions with Family / Patient: patient and patients daughter at bedside with her significant other,     Time Spent for Care: 45 minutes  More than 50% of total time spent on counseling and coordination of care as described above      Current Length of Stay: 4 day(s)    Current Patient Status: Inpatient   Certification Statement: The patient will continue to require additional inpatient hospital stay due to ongoing treatment and now with angioedema     Discharge Plan: to rehab when stable for discharge     Code Status: Level 1 - Full Code      Subjective:   Pt mouths conversation has trach , he seems frustrated facial edema significant lip swelling pt has no difficulty swallowing or breathing  Objective:     Vitals:   Temp (24hrs), Av 9 °F (37 2 °C), Min:98 4 °F (36 9 °C), Max:99 2 °F (37 3 °C)    HR:  [59-71] 69  Resp:  [18-20] 18  BP: (102-153)/(56-77) 102/56  SpO2:  [95 %-98 %] 98 %  Body mass index is 21 82 kg/m²  Input and Output Summary (last 24 hours): Intake/Output Summary (Last 24 hours) at 18 1443  Last data filed at 18 1300   Gross per 24 hour   Intake              650 ml   Output              945 ml   Net             -295 ml       Physical Exam:     Physical Exam   Constitutional: He is oriented to person, place, and time  No distress  HENT:   Pt with significant facial / andioedema, today new    Eyes: Conjunctivae are normal  Right eye exhibits no discharge  Left eye exhibits no discharge  Right eye swollen shut able to force open no visual deficit     Neck: No JVD present  No thyromegaly present  Cardiovascular: Normal rate  Exam reveals no gallop and no friction rub  No murmur heard  Pulmonary/Chest: No stridor  No respiratory distress  He has no wheezes  He has no rales  He exhibits no tenderness  Poor effort    Abdominal: He exhibits no distension and no mass  There is no tenderness  There is no rebound and no guarding  Musculoskeletal: He exhibits edema (facial edema )  He exhibits no tenderness or deformity  Neurological: He is oriented to person, place, and time  Skin: He is not diaphoretic     Posterior sacral wound with abd        Additional Data:     Labs:      Results from last 7 days  Lab Units 18  0552  18  0531   WBC Thousand/uL 4 58  < > 10 23*   HEMOGLOBIN g/dL 10 0*  < > 9 7*   HEMATOCRIT % 30 3*  < > 29 4*   PLATELETS Thousands/uL 204  < > 178   LYMPHO PCT %  --   --  1*   MONO PCT MAN %  --   --  0*   EOSINO PCT MANUAL %  --   --  0   < > = values in this interval not displayed  Results from last 7 days  Lab Units 01/19/18  0552  01/17/18  0531   SODIUM mmol/L 142  < > 142   POTASSIUM mmol/L 3 9  < > 3 9   CHLORIDE mmol/L 110*  < > 108   CO2 mmol/L 27  < > 29   BUN mg/dL 20  < > 26*   CREATININE mg/dL 0 61  < > 0 87   CALCIUM mg/dL 8 4  < > 8 3   TOTAL PROTEIN g/dL  --   --  6 0*   BILIRUBIN TOTAL mg/dL  --   --  0 33   ALK PHOS U/L  --   --  55   ALT U/L  --   --  17   AST U/L  --   --  37   GLUCOSE RANDOM mg/dL 90  < > 108   < > = values in this interval not displayed  * I Have Reviewed All Lab Data Listed Above  * Additional Pertinent Lab Tests Reviewed: All Labs Within Last 24 Hours Reviewed    Imaging:    Imaging Reports Reviewed Today Include: reviewed       Recent Cultures (last 7 days):       Results from last 7 days  Lab Units 01/18/18  1200 01/17/18  0056 01/17/18  0009 01/16/18  2108 01/16/18  2101   BLOOD CULTURE   --   --   --  No Growth After 4 Days  No Growth After 4 Days     SPUTUM CULTURE  1+ Growth of Moraxella catarrhalis*  1+ Growth of   --   --   --   --    GRAM STAIN RESULT  No Epithelial cells seen  3+ Polys  1+ Gram positive cocci in pairs  Rare Gram negative diplococci  --   --   --   --    INFLUENZA A PCR   --   --  None Detected  --   --    INFLUENZA B PCR   --   --  None Detected  --   --    RSV PCR   --   --  None Detected  --   --    LEGIONELLA URINARY ANTIGEN   --  Negative  --   --   --        Last 24 Hours Medication List:     calcium carbonate-vitamin D 1 tablet Oral Daily With Breakfast   cefTRIAXone 1,000 mg Intravenous Q24H   collagenase  Topical Daily   dexamethasone 2 mg Intravenous Q6H [address]   diphenhydrAMINE 12 5 mg Intravenous Q6H   enoxaparin 40 mg Subcutaneous Daily   famotidine 20 mg Intravenous Q12H [address]   fentaNYL 1 patch Transdermal Q72H   fluticasone-salmeterol 2 puff Inhalation Q12H [address]   melatonin 3 mg Oral HS   mirtazapine 15 mg Oral HS   OLANZapine 5 mg Oral HS   pantoprazole 40 mg Oral Early Morning senna-docusate sodium 1 tablet Oral Daily        Today, Patient Was Seen By: AMY Perkins    ** Please Note: Dictation voice to text software may have been used in the creation of this document   **

## 2018-01-21 NOTE — ASSESSMENT & PLAN NOTE
Pt does seem a little flat and depressed he is clearly frustrated and at times when talking about more intervention   Spoke with pt again today who states that he does want intervention but no peg tube but open to keofed vs ng tube I am waiting to talk with  ent who have not yet seen pt  Seen by palliative as he follows with them from home

## 2018-01-21 NOTE — ASSESSMENT & PLAN NOTE
· ID following   · Suspected gram negative pneumonia  · Sputum culture - Moraxella Catarrhalis pna  · Continue IV cefapime/flagyl    · Afebrile, leukocytosis resolved - continue to monitor   · Given total laryngectomy patient is not at risk for aspiration per speech therapy   · Blood cultures - no growth after 48 hours  · Flu/RSV PCR - negative   · Strep urine antigen - negative

## 2018-01-21 NOTE — ASSESSMENT & PLAN NOTE
· ID following   · gram negative pneumonia, last day for abx   · Sputum culture - Moraxella Catarrhalis pna  · abx changed to ceftriaxone and will stop by 1/22/18 today last dosing   · Afebrile, leukocytosis resolved - continue to monitor   · Given total laryngectomy patient is not at risk for aspiration per speech therapy   · Blood cultures - no growth after  5 days   · Flu/RSV PCR - negative   · Strep urine antigen - negative

## 2018-01-21 NOTE — ASSESSMENT & PLAN NOTE
· Chronic anemia likely due to malignancy  acceptable   · Monitor CBC  10/30 3 stable   · No active bleeding at this point    · Will chk labs in the am

## 2018-01-21 NOTE — ASSESSMENT & PLAN NOTE
· Wound care input appreciated   · Stage 3 pressure ulcer  · Daily dressing changes, nursing doing daily state none infected lookiing   · Frequent repositioning   · Outpatient follow-up at the [facility]

## 2018-01-22 VITALS
HEART RATE: 82 BPM | HEIGHT: 65 IN | OXYGEN SATURATION: 99 % | RESPIRATION RATE: 15 BRPM | WEIGHT: 130 LBS | BODY MASS INDEX: 21.66 KG/M2 | SYSTOLIC BLOOD PRESSURE: 138 MMHG | DIASTOLIC BLOOD PRESSURE: 76 MMHG | TEMPERATURE: 97.5 F

## 2018-01-22 PROBLEM — R91.1 LUNG NODULE: Status: ACTIVE | Noted: 2018-01-22

## 2018-01-22 LAB — FUNGUS SPEC CULT: NORMAL

## 2018-01-22 PROCEDURE — 94760 N-INVAS EAR/PLS OXIMETRY 1: CPT

## 2018-01-22 RX ORDER — ROPINIROLE 0.25 MG/1
0.25 TABLET, FILM COATED ORAL ONCE
Status: COMPLETED | OUTPATIENT
Start: 2018-01-22 | End: 2018-01-22

## 2018-01-22 RX ADMIN — DEXAMETHASONE SODIUM PHOSPHATE 2 MG: 4 INJECTION, SOLUTION INTRAMUSCULAR; INTRAVENOUS at 06:31

## 2018-01-22 RX ADMIN — DIPHENHYDRAMINE HYDROCHLORIDE 12.5 MG: 50 INJECTION, SOLUTION INTRAMUSCULAR; INTRAVENOUS at 09:32

## 2018-01-22 RX ADMIN — COLLAGENASE SANTYL: 250 OINTMENT TOPICAL at 09:35

## 2018-01-22 RX ADMIN — FAMOTIDINE 20 MG: 10 INJECTION, SOLUTION INTRAVENOUS at 17:00

## 2018-01-22 RX ADMIN — MELATONIN 3 MG: 3 TAB ORAL at 21:15

## 2018-01-22 RX ADMIN — CALCIUM CARBONATE 500 MG (1,250 MG)-VITAMIN D3 200 UNIT TABLET 1 TABLET: at 09:32

## 2018-01-22 RX ADMIN — PANTOPRAZOLE SODIUM 40 MG: 40 TABLET, DELAYED RELEASE ORAL at 06:31

## 2018-01-22 RX ADMIN — MIRTAZAPINE 15 MG: 15 TABLET, FILM COATED ORAL at 21:15

## 2018-01-22 RX ADMIN — FENTANYL 1 PATCH: 25 PATCH TRANSDERMAL at 22:22

## 2018-01-22 RX ADMIN — ACETAMINOPHEN 650 MG: 325 TABLET, FILM COATED ORAL at 00:08

## 2018-01-22 RX ADMIN — OLANZAPINE 5 MG: 5 TABLET, ORALLY DISINTEGRATING ORAL at 21:15

## 2018-01-22 RX ADMIN — Medication 1000 MG: at 11:52

## 2018-01-22 RX ADMIN — ACETAMINOPHEN 650 MG: 325 TABLET, FILM COATED ORAL at 10:41

## 2018-01-22 RX ADMIN — OXYCODONE HYDROCHLORIDE 10 MG: 10 TABLET ORAL at 15:31

## 2018-01-22 RX ADMIN — DIPHENHYDRAMINE HYDROCHLORIDE 12.5 MG: 50 INJECTION, SOLUTION INTRAMUSCULAR; INTRAVENOUS at 03:25

## 2018-01-22 RX ADMIN — ENOXAPARIN SODIUM 40 MG: 40 INJECTION SUBCUTANEOUS at 09:32

## 2018-01-22 RX ADMIN — Medication 1 TABLET: at 09:32

## 2018-01-22 RX ADMIN — ROPINIROLE 0.25 MG: 0.25 TABLET, FILM COATED ORAL at 01:32

## 2018-01-22 RX ADMIN — FAMOTIDINE 20 MG: 10 INJECTION, SOLUTION INTRAVENOUS at 06:31

## 2018-01-22 RX ADMIN — OXYCODONE HYDROCHLORIDE 10 MG: 10 TABLET ORAL at 20:28

## 2018-01-22 NOTE — PROGRESS NOTES
Pt's IV is leaking  Explained to pt that we may need to replace the IV  Pt got very frustrated and angry about this  Spoke with Ron Higgins, okay to keep IV out and DC tele  Will continue to monitor

## 2018-01-22 NOTE — RESTORATIVE TECHNICIAN NOTE
Restorative Specialist Mobility Note       Activity: Ambulate in garner, Ambulate in room, Bathroom privileges, Dangle, Stand at bedside (Educated/encouraged pt to ambulate w/assistance 3-4 x's/day   Pt callbell, phone/tray within reach )     Assistive Device: Front wheel walker       Tiffanie ARANGO, Restorative Technician, [employer]

## 2018-01-22 NOTE — PROGRESS NOTES
Progress Note - Infectious Disease   Layla Connors  81 y o  male MRN: [id number]  Unit/Bed#: [facility] 703-01 Encounter: [id number]      Impression/Recommendations:  1   Productive cough  Marilou Legerik secondary to #2  Mandie Segal states he had a subjective low-grade fever at home, but no documented fevers here   No hypoxia   Hemodynamically stable and nontoxic appearing   Patient was started empirically on cefepime/Flagyl        -antibiotics as below  -monitor respiratory status closely  -supportive care per primary team  -follow-up blood cultures-no growth to date  -monitor temperatures and white blood cell count     2   Right middle lobe infiltrate secondary to Moraxella pneumonia    Patient clinically much improved  Speech evaluation noted and they do not feel that patient is an aspiration risk  CT of the chest performed today shows no evidence of pneumonia      -continue with ceftriaxone 1 g IV Q 24 hours  Plan for total of 7 days of antibiotics, through 1/22  Last dose is today  Then will plan to monitor off anymore antibiotics      3   Sacral decubitus ulcer   This was debrided back in November of 2017   Does not overtly look infected   Wound Care evaluation noted      -local wound care  -serial examinations     4   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   Status post tracheostomy   Patient follows with [facility]    CT chest performed today concerning for metastatic disease      5   Neck mass   Possibly necrotic lymph node   This was aspirated in December of 2017 with biopsies negative for any infectious process   The cytology is nondiagnostic   Patient was planning to follow-up with [facility] regarding this      6   Leukocytosis   May be leukemoid reaction in the setting of aspiration pneumonitis verses a possible pneumonia   Otherwise, patient remains afebrile, hemodynamically stable, nontoxic appearing   Leukocytosis has resolved      -plan as above  -monitor white blood cell count     Antibiotics:  Antibiotics D7  Ceftriaxone D4    Subjective:  Much improved from a respiratory standpoint  Denies fevers, chills, or sweats  Denies nausea, vomiting, or diarrhea  Objective:  Vitals:  HR:  [67-74] 69  Resp:  [18] 18  BP: (125-139)/(70-83) 130/75  SpO2:  [95 %-96 %] 96 %  Temp (24hrs), Av 2 °F (36 8 °C), Min:98 °F (36 7 °C), Max:98 7 °F (37 1 °C)  Current: Temperature: 98 °F (36 7 °C)    Physical Exam:   General:  No acute distress, chronically ill-appearing, trach in place  Psychiatric:  Awake and alert  Pulmonary:  Normal respiratory excursion without accessory muscle use  Abdomen:  Soft, nontender  Extremities:  No edema  Skin:  Sacral decubitus ulcer with no overt signs of infection    Lab Results:  I have personally reviewed pertinent labs  Results from last 7 days  Lab Units 18  0552 18  0519 18  0531   SODIUM mmol/L 142 142 142   POTASSIUM mmol/L 3 9 3 7 3 9   CHLORIDE mmol/L 110* 110* 108   CO2 mmol/L 27 27 29   ANION GAP mmol/L 5 5 5   BUN mg/dL 20 20 26*   CREATININE mg/dL 0 61 0 74 0 87   EGFR ml/min/1 73sq m 94 87 81   GLUCOSE RANDOM mg/dL 90 98 108   CALCIUM mg/dL 8 4 8 5 8 3   AST U/L  --   --  37   ALT U/L  --   --  17   ALK PHOS U/L  --   --  55   TOTAL PROTEIN g/dL  --   --  6 0*   ALBUMIN g/dL  --   --  2 3*   BILIRUBIN TOTAL mg/dL  --   --  0 33       Results from last 7 days  Lab Units 18  0552 18  0519 18  0531   WBC Thousand/uL 4 58 6 34 10 23*   HEMOGLOBIN g/dL 10 0* 10 1* 9 7*   PLATELETS Thousands/uL 204 200 178       Results from last 7 days  Lab Units 18  1200 18  0056 18  0009 18  2108 18  2101   BLOOD CULTURE   --   --   --  No Growth After 5 Days  No Growth After 5 Days     SPUTUM CULTURE  1+ Growth of Moraxella catarrhalis*  1+ Growth of   --   --   --   --    GRAM STAIN RESULT  No Epithelial cells seen  3+ Polys  1+ Gram positive cocci in pairs  Rare Gram negative diplococci  -- --   --   --    INFLUENZA A PCR   --   --  None Detected  --   --    INFLUENZA B PCR   --   --  None Detected  --   --    RSV PCR   --   --  None Detected  --   --    LEGIONELLA URINARY ANTIGEN   --  Negative  --   --   --        Imaging Studies:   I have personally reviewed pertinent imaging study reports and images in PACS  CT chest with contrast shows a small right pleural effusion with compressive basilar atelectasis  Market increase in size of right lower lobe nodule suspicious for metastatic disease  A CT neck shows edema within the subcutaneous fat and thickening of the mucosal surface of the oropharynx and hypopharynx  EKG, Pathology, and Other Studies:   I have personally reviewed pertinent reports

## 2018-01-22 NOTE — PROGRESS NOTES
Progress note - Palliative and [address]  81 y o  male [id number]    Assessment:  Repeat episodes of HCAP - moraxella catarrhalis PNA  Facial swelling  Laryngeal cancer - s/p chemo/XRT/surgery at [facility]  Locally sees Dr Jordi Spring in med/onc and Sonal Jose in ENT  Tracheostomy s/p total laryngectomy  Anemia  Depression/existential suffering  Sacral wound  Cancer associated pain  Insomnia  Decreased oral intake    Plan:  1  Continue current pain regimen of Duragesic 25 mcg- may need to increase  2  Continue PRN oxycodone  3  Continue Zyprexa and Remeron  4  Goals- disease directed  Patient needs ongoing support for his emotional health and we will continue to follow daily to offer psychosocial support  Code Status: Full - Level 1   Power of :  presumed to be daughter by [place] Act 169   Advance Directive / Living Will: yes   POLST:  no      Interval history:       Patient actually the brightest I've ever seen him  He is interacting with me and showing more emotion  He admits to frustration of being re-hospitalized  He states that his pain is between 5/10 and 7/10   No other complaints    MEDICATIONS / ALLERGIES:    all current active meds have been reviewed and current meds:   Current Facility-Administered Medications   Medication Dose Route Frequency    acetaminophen (TYLENOL) tablet 650 mg  650 mg Oral Q6H PRN    albuterol (PROVENTIL HFA,VENTOLIN HFA) inhaler 1 puff  1 puff Inhalation Q4H PRN    ALPRAZolam (XANAX) tablet 0 25 mg  0 25 mg Oral 4x Daily PRN    aluminum-magnesium hydroxide-simethicone (MYLANTA) 200-200-20 mg/5 mL oral suspension 30 mL  30 mL Oral Q6H PRN    bisacodyl (DULCOLAX) rectal suppository 10 mg  10 mg Rectal Daily PRN    calcium carbonate-vitamin D (OSCAL-D) 500 mg-200 units per tablet 1 tablet  1 tablet Oral Daily With Breakfast    collagenase (SANTYL) ointment   Topical Daily    enoxaparin (LOVENOX) subcutaneous injection 40 mg  40 mg Subcutaneous Daily  famotidine (PEPCID) injection 20 mg  20 mg Intravenous Q12H [address]    fentaNYL (DURAGESIC) 25 mcg/hr TD 72 hr patch 1 patch  1 patch Transdermal Q72H    fluticasone-salmeterol (ADVAIR) 250-50 mcg/dose inhaler 2 puff  2 puff Inhalation Q12H [address]    melatonin tablet 3 mg  3 mg Oral HS    mirtazapine (REMERON) tablet 15 mg  15 mg Oral HS    OLANZapine (ZyPREXA ZYDIS) dispersible tablet 5 mg  5 mg Oral HS    ondansetron (ZOFRAN) injection 4 mg  4 mg Intravenous Q6H PRN    oxyCODONE (ROXICODONE) immediate release tablet 10 mg  10 mg Oral Q4H PRN    oxyCODONE (ROXICODONE) IR tablet 5 mg  5 mg Oral Q4H PRN    pantoprazole (PROTONIX) EC tablet 40 mg  40 mg Oral Early Morning    polyethylene glycol (MIRALAX) packet 17 g  17 g Oral Daily PRN    senna-docusate sodium (SENOKOT S) 8 6-50 mg per tablet 1 tablet  1 tablet Oral Daily       Allergies   Allergen Reactions    Aspirin Anaphylaxis    Cleocin [Clindamycin] Other (See Comments)     Cannot specify but had definite reaction in past!!       OBJECTIVE:    Physical Exam  Physical Exam   Constitutional: He is oriented to person, place, and time  No distress  HENT:   Right Ear: External ear normal    Left Ear: External ear normal    Nose: Nose normal    Edema of face   Eyes: EOM are normal  Right eye exhibits no discharge  Left eye exhibits no discharge  No scleral icterus  Neck:   Trach noted   Cardiovascular: Normal rate, regular rhythm and intact distal pulses  Murmur heard  Pulmonary/Chest: Effort normal and breath sounds normal  No respiratory distress  Abdominal: Soft  Bowel sounds are normal  He exhibits no distension  Musculoskeletal: He exhibits no edema  Neurological: He is alert and oriented to person, place, and time  Skin: Skin is warm and dry  There is pallor  Psychiatric: He has a normal mood and affect  Nursing note and vitals reviewed  Lab Results: I have personally reviewed pertinent labs  , CBC: No results found for: WBC, HGB, HCT, MCV, PLT, ADJUSTEDWBC, MCH, MCHC, RDW, MPV, NRBC, CMP: No results found for: NA, K, CL, CO2, ANIONGAP, BUN, CREATININE, GLUCOSE, CALCIUM, AST, ALT, ALKPHOS, PROT, ALBUMIN, BILITOT, EGFR  Imaging Studies: reviewed  EKG, Pathology, and Other Studies: reviewed    Counseling / Coordination of Care  Total floor / unit time spent today 25 minutes  Greater than 50% of total time was spent with the patient and / or family counseling and / or coordination of care   A description of the counseling / coordination of care: symptom assessment

## 2018-01-23 VITALS
SYSTOLIC BLOOD PRESSURE: 130 MMHG | TEMPERATURE: 98.5 F | BODY MASS INDEX: 22.33 KG/M2 | DIASTOLIC BLOOD PRESSURE: 70 MMHG | OXYGEN SATURATION: 99 % | HEIGHT: 65 IN | RESPIRATION RATE: 16 BRPM | HEART RATE: 56 BPM | WEIGHT: 134 LBS

## 2018-01-23 PROBLEM — A41.9 SEVERE SEPSIS (HCC): Status: RESOLVED | Noted: 2017-11-19 | Resolved: 2018-01-23

## 2018-01-23 PROBLEM — L89.159 SACRAL DECUBITUS ULCER: Status: ACTIVE | Noted: 2017-11-20
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
## 2018-01-23 PROBLEM — J38.6 LARYNGEAL STENOSIS: Chronic | Status: RESOLVED | Noted: 2017-08-29 | Resolved: 2018-01-23

## 2018-01-23 PROBLEM — R65.20 SEVERE SEPSIS (HCC): Status: RESOLVED | Noted: 2017-11-19 | Resolved: 2018-01-23

## 2018-01-23 PROBLEM — D72.819 LEUKOPENIA: Status: RESOLVED | Noted: 2017-12-19 | Resolved: 2018-01-23

## 2018-01-23 LAB
INR PPP: 1.08 (ref 0.86–1.16)
PROTHROMBIN TIME: 14 SECONDS (ref 12.1–14.4)

## 2018-01-23 PROCEDURE — 85610 PROTHROMBIN TIME: CPT | Performed by: RADIOLOGY

## 2018-01-23 PROCEDURE — 94760 N-INVAS EAR/PLS OXIMETRY 1: CPT

## 2018-01-23 RX ADMIN — FLUTICASONE PROPIONATE AND SALMETEROL 2 PUFF: 50; 250 POWDER RESPIRATORY (INHALATION) at 21:33

## 2018-01-23 RX ADMIN — COLLAGENASE SANTYL: 250 OINTMENT TOPICAL at 09:07

## 2018-01-23 RX ADMIN — ENOXAPARIN SODIUM 40 MG: 40 INJECTION SUBCUTANEOUS at 09:07

## 2018-01-23 RX ADMIN — OLANZAPINE 5 MG: 5 TABLET, ORALLY DISINTEGRATING ORAL at 21:36

## 2018-01-23 RX ADMIN — OXYCODONE HYDROCHLORIDE 10 MG: 10 TABLET ORAL at 21:36

## 2018-01-23 RX ADMIN — Medication 1 TABLET: at 09:06

## 2018-01-23 RX ADMIN — MELATONIN 3 MG: 3 TAB ORAL at 21:36

## 2018-01-23 RX ADMIN — OXYCODONE HYDROCHLORIDE 10 MG: 10 TABLET ORAL at 03:33

## 2018-01-23 RX ADMIN — CALCIUM CARBONATE 500 MG (1,250 MG)-VITAMIN D3 200 UNIT TABLET 1 TABLET: at 09:06

## 2018-01-23 RX ADMIN — MIRTAZAPINE 15 MG: 15 TABLET, FILM COATED ORAL at 21:36

## 2018-01-23 RX ADMIN — OXYCODONE HYDROCHLORIDE 10 MG: 10 TABLET ORAL at 15:05

## 2018-01-23 RX ADMIN — PANTOPRAZOLE SODIUM 40 MG: 40 TABLET, DELAYED RELEASE ORAL at 06:17

## 2018-01-23 NOTE — ASSESSMENT & PLAN NOTE
· Wound care input appreciated   · Stage 3 pressure ulcer  · Daily dressing changes, nursing doing daily state none infected lookiing   · Frequent repositioning   · Outpatient follow-up at the [facility]

## 2018-01-23 NOTE — CONSULTS
Chief Complaint  hospital follow up: larmingneal cancer      History of Present Illness  82yoM with laryngeal cancer status post chemotherapy, radiation, and surgery at [facility]  He's had a very complicated post treatment course and bounced between hospitals and SNF facilities  Post one SNF discharge he went home but found down and had severe sepsis  He was treated at [place] [address] for HCAP and a decubitus ulcer  Depression, pain and overall failure to thrive were reasons that palliative care got involved    Status post his hospital discharge he went to live with his daughter  This occurred on 12/20 and he is getting homecare  He is eating better  He is getting around pretty well though he still feels unsteady sometimes  He denies dizziness  He still has a sore on his tailbone  THe visiting nurse dresses it and they are going to the wound care center tomorrow  He has follow up at [facility] with Dr Akilah Jon on Thursday and they are hoping that he reviews the new images and biopsy report  His main complaint now is fatigue  He continues to wear fentanyl patches and uses oxycodone when needed which is less and less often  He has been cutting back on use of oxycodone and trying to just use tylenol  He reports constipation, been using MOM  Despite being tired a good night sleep continues to elude him with frequent night time awakenings  Review of Systems    Constitutional: recent weight gain and feeling tired  Gastrointestinal: constipation  Musculoskeletal: unsteady gait  Integumentary: skin wound  Psychiatric: sleep disturbances and depression  Endocrine: muscle weakness and feelings of weakness  Pain scale (on a scale of 0 to 10, the patient rated His Pain at 8 )  Tired scale (on a scale of 0 to 10, the patient rated His Tiredness at 5 )  Nausea scale (on a scale of 0 to 10, the patient rated His Nausea at 0 )  Depression scale (on a scale of 0 to 10, the patient rated His Depression at 6 )  Anxiety scale (on a scale of 0 to 10, the patient rated His Anxiety at 6 )  Drowsiness scale (on a scale of 0 to 10, the patient rated His Drowsiness at 5 )  Appetite scale (on a scale of 0 to 10, the patient rated His Appetite at 8 )  Well being scale (on a scale of 0 to 10, the patient rated His Well being at 7 )  Shortness of breathe scale (on a scale of 0 to 10, the patient rated His Shortness of breathe at 5 )  Active Problems    1  Asthma (493 90) (J45 909)   2  Carcinoma of larynx (161 9) (C32 9)   3  Insomnia (780 52) (G47 00)   4  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)   5  Nausea (787 02) (R11 0)   6  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)    Past Medical History    · History of HCAP (healthcare-associated pneumonia) (5) (J18 9)   · History of Severe sepsis (038 9,995 92) (A41 9,R65 20)    Surgical History    · History of Cataract Extraction With Insertion Of Intraocular Lens   · History of Direct Laryngoscopy With Biopsy   · History of Excision Of Soft Tissue Tumor Of The Back   · History of Fiberoptic Laryngoscopy With Biopsy (Diagnostic)   · History of Trachectomy    The surgical history was reviewed and updated today  Family History    · Family history of malignant neoplasm (V16 9) (Z80 9)    · Family history of malignant neoplasm (V16 9) (Z80 9)    The family history was reviewed and updated today  Social History    · Alcohol drinker (V49 89) (Z78 9)   ·    · Former smoker (P73 73) (K42 869)   · Lives alone with help available (V60 3) (Z60 2)   · One child   · Retired  The social history was reviewed and updated today  Current Meds   1  Asmanex 14 Metered Doses 220 MCG/INH Inhalation Aerosol Powder Breath Activated;   INHALE 1 PUFF TWICE DAILY; Therapy: (Recorded:46Emm4719) to Recorded   2  Dulera 200-5 MCG/ACT Inhalation Aerosol; INHALE 2 PUFFS TWICE DAILY; Therapy: 43FCS0085 to Recorded   3   FentaNYL 12 MCG/HR Transdermal Patch 72 Hour; APPLY 1 PATCH EVERY 3 DAYS; Therapy: 05OVL9107 to (WMWGCDNT:22ZYV4984) Recorded   4  FentaNYL 25 MCG/HR Transdermal Patch 72 Hour; APPLY 1 PATCH EVERY 3 DAYS; Therapy: 69BPR6064 to (Evaluate:61Fei7257) Recorded   5  Melatonin 3 MG Oral Tablet; 1 tab po qhs;   Therapy: 94ESK5562 to Recorded   6  Milk of Magnesia 400 MG/5ML Oral Suspension; USE AS DIRECTED; Therapy: 05NNO1781 to Recorded   7  Mirtazapine 15 MG Oral Tablet; TAKE 1 TABLET AT BEDTIME; Therapy: 67PTG7849 to (Benji Bigness) Recorded   8  OLANZapine 5 MG Oral Tablet Disintegrating; TAKE 1 TABLET Bedtime; Therapy: 26IJR3571 to (ECUMPZRX:01JJB6077)  Requested for: 58Hgn9604; Last   Rx:75Ptm3627 Ordered   9  Omeprazole 40 MG Oral Capsule Delayed Release; take 1 capsule daily; Therapy: 72QTW7211 to (Benji Bigness) Recorded   10  OxyCODONE HCl - 5 MG Oral Capsule; one every 4 hours prn; Therapy: 03IGY9152 to (Last Rx:26Gcz3222) Ordered   11  Proventil HFA AERS; INHALE 1 PUFF EVERY 4 HOURS AS NEEDED; Therapy: (Recorded:31Dcq5974) to Recorded   12  Senna-Lax 8 6 MG Oral Tablet; 1 tab PO daily; Therapy: 29LEJ6826 to Recorded    The medication list was reviewed and updated today  Allergies    1  aspirin   2  clindamycin    Vitals   Recorded: 00YBO6292 01:19PM   Temperature 98 5 F   Heart Rate 56   Respiration 16   Systolic 599   Diastolic 70   Height 5 ft 4 8 in   Weight 134 lb    BMI Calculated 22 44   BSA Calculated 1 67   O2 Saturation 99     Physical Exam    Constitutional   General appearance: Abnormal   trach with some lower facial swelling  Eyes   Conjunctiva and lids: No swelling, erythema, or discharge  Pulmonary   Respiratory effort: No increased work of breathing or signs of respiratory distress  hoarse voice  Cardiovascular   Examination of extremities for edema and/or varicosities: Normal     Abdomen   Abdomen: Abnormal   thin     Musculoskeletal   Gait and station: Normal     Skin   Skin and subcutaneous tissue: Abnormal   examination of sacral wound deferred as it is dressed and he will be seeing wound care tomorrow  Neurologic   Cranial nerves: Cranial nerves 2-12 intact  Psychiatric   Orientation to person, place and time: Normal     Mood and affect: Normal          Assessment    1  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)   2  Cancer associated pain (338 3) (G89 3)   3  Depression (311) (F32 9)   4  Sacral wound (959 19) (S31 000A)   5  Insomnia (780 52) (G47 00)    Plan  Unlinked    · FentaNYL 12 MCG/HR Transdermal Patch 72 Hour   Rx By: Nile Hunt; Dispense: 15 Days ; #:5 Patch 72 Hour; Refill: 0; VIJAYA = N; Record   · Mirtazapine 15 MG Oral Tablet   Rx By: Nile Hunt; Dispense: 30 Days ; #:30 Tablet; Refill: 2; VIJAYA = N; Record    Discussion/Summary    SCC larynx - follow up with Danny Benz on 1/9  [phone]  I called ahead to give the clinical team a heads up on the events since treatment and to discuss the family's expectation for biopsy review  Unsteadiness/fatigue - reduce opioid use to fentanyl 25mcg patch with use of oxy PRN  This may also help his constipation  Difficulty sleeping - stop use of remeron as it is not helping him sleep but may be contributing to daytime fatigue  Depression - stop remeron, continue zyprexa    Appetite stimulation/weight gain - remain on zyprexa but stop remeron as above    total time of encounter was 60 minutes and 50 minutes was spent counseling  The patient has the current Goals: [place]  The patent has the current Barriers: Frailty  Patient agrees and allows to involve family/caregiver in development of care plan:   Possible side effects of new medications were reviewed with the patient/guardian today  The treatment plan was reviewed with the patient/guardian   The patient/guardian understands and agrees with the treatment plan     Self Referrals: No      Signatures   Electronically signed by : CHESTER Del Toro ; Jan 9 2018  4:17PM EST                       (Author)

## 2018-01-23 NOTE — PROGRESS NOTES
Progress Note - Remy Krishnamurthy  1935, 81 y o  male MRN: [id number]    [facility] Encounter: [id number]    Primary Care Provider: Betito Rodriguez MD   Date and time admitted to hospital: 1/16/2018  7:10 PM        Lung nodule   Assessment & Plan    Sp imaging last evening due to severe facial/orbital and lip edema with treatment   Called by radiology this morning pt has new right lung nodule that is larger than prior imaging and suspicious for mets  Will consult oncology for further input as we are still pending core biopsy which daughter no longer wants to take pt to [facility], she want it done here discussed on Sunday with dr Wesley Dumas who will see the pt  ? If plan for biopsy once reviewed  (pt requesting to have biopsy lung and core biopsy to be done at same time if determined necessary after discussion )           * [facility]   Assessment & Plan    · ID following   · gram negative pneumonia, last day for abx   · Sputum culture - Moraxella Catarrhalis pna  · abx changed to ceftriaxone and will stop by 1/22/18 today last dosing   · Afebrile, leukocytosis resolved - continue to monitor   · Given total laryngectomy patient is not at risk for aspiration per speech therapy   · Blood cultures - no growth after  5 days   · Flu/RSV PCR - negative   · Strep urine antigen - negative         Angio-edema, initial encounter   Assessment & Plan    Patient with significant swelling, facial area more on right side as patient has been lying on his right side dependent  Lips Swollen  On Sunday as a result treated for   reaction, Pepcid b i d , Decadron q 6 hours and Benadryl q 6 hours for the next 24 hours  Also perform CT chest to rule out possible svc  Which has been performed and results reveal: Marked increase in size of right lower lobe nodule, suspicious for metastatic disease  Small right pleural effusion with compressive basilar atelectasis      Called daughter to update left message as no answer  Patient appears a little frustrated when informed we would perform some more testing  Discussed with daughter who states that he sometimes has this swelling but this is much worse, today with significant improvement but pt is with significant frustration and irritation        History of laryngeal cancer   Assessment & Plan    · S/p total laryngectomy -  Pt sees ent and pt prefers to see dr Concha Blackburn and also dr Marilin Lomeli   · Discussed with dr Liza Garibay Sunday who will see the pt likely today? Plan for biopsy  · Pt was supposed to follow up at [facility] in regards to core biopsy however, daughter is wanting this done here attempted to discuss with dr Jimy Mack whom at this point feels ent should likely see pt for plan   · ENT consulted for  daughter feels pt needs ng vs keofed tube placed for nutrition pt refusing peg tube I have informed daughter this is not a long term option  Pt is stable for discharge (pts nutritional status is poor dt poor oral intake  Pt refuses peg tube as I have discussed with him  · Daughter states she spoke to her dad yesterday and she has "talked him into doing a keofed vs ng tube for nutritional input" this would have to be placed if planned for by ENT as pt has hx of laryngectomy  · Pending dr Concha Blackburn to see pt   · oncologist is Dr Elliott Bah, consulted oncology as now pt is with enlarging right lung nodule and family ?  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, radiation therapy with concomitant weekly cetuximab, June 9, 2017 to July 21, 2017      Total laryngectomy and neck resection on October 18, 2017 at the [facility] for squamous cell carcinoma, well differentiated, 7 cm involving larynx and supraglottic larynx, margins uninvolved by invasive carcinoma, lymphovascular and perineural invasion present, metastasis to 2 of 30 lymph nodes, the largest node 5 3 cm with extranodal extension present, involved lymph nodes are bilateral, pT4a pN2c per oncology documentation  Now pt noted to have aspirate via ir 12/18/17 with : "Impression:    1  Predominantly cystic left supraclavicular mass with a tiny, 5 mm, echogenic component which may represent solid tissue or necrotic debris  2   Fine-needle aspiration was performed targeting the echogenic focus within the cystic mass as well as an irregular portion of the wall  3   The cystic component was aspirated to near complete resolution yielding 10 mL of tea-colored fluid  A portion of the fluid was submitted for microbiological analysis  All remaining specimens were submitted to pathology staff "    Biopsy results on 12/20/18 : "A, B & C  Lymph node, left neck (Thin-prep, smears and cell block preparations):  Nondiagnostic specimen  Few lymphocytes & RBC's in a fibrinous coagulum - the paucity of lymphoid material precludes meaningful interpretation "   Reason family wanting more definitive information to r/o malignancy    I am unsure as to whether large enough mass for core biopsy based on this above IR documentation, still pending ent and oncology input         Anemia   Assessment & Plan    · Chronic anemia likely due to malignancy  acceptable   · Monitor CBC  10/30 3 stable   · No active bleeding at this point    · Will chk labs in the am          Sacral decubitus ulcer   Assessment & Plan    · Wound care input appreciated   · Stage 3 pressure ulcer  · Daily dressing changes, nursing doing daily state none infected lookiing   · Frequent repositioning   · Outpatient follow-up at the wound [address] does seem a little flat and depressed he is clearly frustrated and at times when talking about more intervention   Spoke with pt again today who states that he does want intervention but no peg tube but open to keofed vs ng tube I am waiting to talk with  ent who have not yet seen pt  Seen by palliative as he follows with them from home        Asthma   Assessment & Plan    · Continue home medical management             VTE Pharmacologic Prophylaxis:   Pharmacologic: Enoxaparin (Lovenox)  Mechanical VTE Prophylaxis in Place: Yes    Patient Centered Rounds: I have performed bedside rounds with nursing staff today  Discussions with Specialists or Other Care Team Provider: patient     Education and Discussions with Family / Patient: patient and patients daughter at bedside   Time Spent for Care: 1 hour  More than 50% of total time spent on counseling and coordination of care as described above  Current Length of Stay: 5 day(s)    Current Patient Status: Inpatient   Certification Statement: The patient will continue to require additional inpatient hospital stay due to pending further input from consultants     Discharge Plan: reviewed     Code Status: Level 1 - Full Code      Subjective:   Long conversation had with pt in regards to his wishes  He is frustrated with this discussion  He was informed for lung nodule and radiologists suspicion  Objective:     Vitals:   Temp (24hrs), Av 1 °F (36 7 °C), Min:98 °F (36 7 °C), Max:98 3 °F (36 8 °C)    HR:  [67-69] 68  Resp:  [18] 18  BP: (124-130)/(66-75) 124/66  SpO2:  [95 %-98 %] 98 %  Body mass index is 21 82 kg/m²  Input and Output Summary (last 24 hours): Intake/Output Summary (Last 24 hours) at 18  Last data filed at 18 2217   Gross per 24 hour   Intake             1390 ml   Output             2200 ml   Net             -810 ml       Physical Exam:     Physical Exam   Constitutional: He is oriented to person, place, and time  He appears well-developed and well-nourished  No distress  HENT:   Head: Normocephalic  Mouth/Throat: No oropharyngeal exudate  Trach intact    Eyes: Conjunctivae are normal  Right eye exhibits no discharge  Left eye exhibits no discharge  No scleral icterus     Neck: No JVD present  No tracheal deviation present  No thyromegaly present  Cardiovascular: Normal rate  Pulmonary/Chest: Effort normal and breath sounds normal  No stridor  No respiratory distress  He has no wheezes  He has no rales  He exhibits no tenderness  Abdominal: Soft  Bowel sounds are normal  He exhibits no distension and no mass  There is no tenderness  There is no rebound and no guarding  Musculoskeletal: He exhibits edema (pt still with some mild factial edema sign improvment )  He exhibits no tenderness or deformity  Neurological: He is oriented to person, place, and time  Skin: He is not diaphoretic  Sacral decubitus        Additional Data:     Labs:      Results from last 7 days  Lab Units 01/19/18  0552  01/17/18  0531   WBC Thousand/uL 4 58  < > 10 23*   HEMOGLOBIN g/dL 10 0*  < > 9 7*   HEMATOCRIT % 30 3*  < > 29 4*   PLATELETS Thousands/uL 204  < > 178   LYMPHO PCT %  --   --  1*   MONO PCT MAN %  --   --  0*   EOSINO PCT MANUAL %  --   --  0   < > = values in this interval not displayed  Results from last 7 days  Lab Units 01/19/18  0552  01/17/18  0531   SODIUM mmol/L 142  < > 142   POTASSIUM mmol/L 3 9  < > 3 9   CHLORIDE mmol/L 110*  < > 108   CO2 mmol/L 27  < > 29   BUN mg/dL 20  < > 26*   CREATININE mg/dL 0 61  < > 0 87   CALCIUM mg/dL 8 4  < > 8 3   TOTAL PROTEIN g/dL  --   --  6 0*   BILIRUBIN TOTAL mg/dL  --   --  0 33   ALK PHOS U/L  --   --  55   ALT U/L  --   --  17   AST U/L  --   --  37   GLUCOSE RANDOM mg/dL 90  < > 108   < > = values in this interval not displayed  * I Have Reviewed All Lab Data Listed Above  * Additional Pertinent Lab Tests Reviewed: All Labs Within Last 24 Hours Reviewed    Imaging:    Imaging Reports Reviewed Today Include: reviwed      Recent Cultures (last 7 days):       Results from last 7 days  Lab Units 01/18/18  1200 01/17/18  0056 01/17/18  0009 01/16/18  2108 01/16/18  2101   BLOOD CULTURE   --   --   --  No Growth After 5 Days   No Growth After 5 Days  SPUTUM CULTURE  1+ Growth of Moraxella catarrhalis*  1+ Growth of   --   --   --   --    GRAM STAIN RESULT  No Epithelial cells seen  3+ Polys  1+ Gram positive cocci in pairs  Rare Gram negative diplococci  --   --   --   --    INFLUENZA A PCR   --   --  None Detected  --   --    INFLUENZA B PCR   --   --  None Detected  --   --    RSV PCR   --   --  None Detected  --   --    LEGIONELLA URINARY ANTIGEN   --  Negative  --   --   --        Last 24 Hours Medication List:     calcium carbonate-vitamin D 1 tablet Oral Daily With Breakfast   collagenase  Topical Daily   enoxaparin 40 mg Subcutaneous Daily   famotidine 20 mg Intravenous Q12H [facility]   fentaNYL 1 patch Transdermal Q72H   fluticasone-salmeterol 2 puff Inhalation Q12H [facility]   melatonin 3 mg Oral HS   mirtazapine 15 mg Oral HS   OLANZapine 5 mg Oral HS   pantoprazole 40 mg Oral Early Morning   senna-docusate sodium 1 tablet Oral Daily        Today, Patient Was Seen By: AMY Romano    ** Please Note: Dictation voice to text software may have been used in the creation of this document   **

## 2018-01-23 NOTE — ASSESSMENT & PLAN NOTE
· S/p total laryngectomy -  Pt sees ent and pt prefers to see dr Pricilla Chester and also dr Wendy Mclaughlin   · Discussed with dr Federico Coats Sunday who will see the pt likely today? Plan for biopsy  · Pt was supposed to follow up at [facility] in regards to core biopsy however, daughter is wanting this done here attempted to discuss with dr Demario Villalobos whom at this point feels ent should likely see pt for plan   · ENT consulted for  daughter feels pt needs ng vs keofed tube placed for nutrition pt refusing peg tube I have informed daughter this is not a long term option  Pt is stable for discharge (pts nutritional status is poor dt poor oral intake  Pt refuses peg tube as I have discussed with him  · Daughter states she spoke to her dad yesterday and she has "talked him into doing a keofed vs ng tube for nutritional input" this would have to be placed if planned for by ENT as pt has hx of laryngectomy  · Pending dr Pricilla Chester to see pt   · oncologist is Dr Deb Montgomery, consulted oncology as now pt is with enlarging right lung nodule and family ? 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, radiation therapy with concomitant weekly cetuximab, June 9, 2017 to July 21, 2017      Total laryngectomy and neck resection on October 18, 2017 at the [facility] for squamous cell carcinoma, well differentiated, 7 cm involving larynx and supraglottic larynx, margins uninvolved by invasive carcinoma, lymphovascular and perineural invasion present, metastasis to 2 of 30 lymph nodes, the largest node 5 3 cm with extranodal extension present, involved lymph nodes are bilateral, pT4a pN2c per oncology documentation  Now pt noted to have aspirate via ir 12/18/17 with : "Impression:    1  Predominantly cystic left supraclavicular mass with a tiny, 5 mm, echogenic component which may represent solid tissue or necrotic debris    2   Fine-needle aspiration was performed targeting the echogenic focus within the cystic mass as well as an irregular portion of the wall  3   The cystic component was aspirated to near complete resolution yielding 10 mL of tea-colored fluid  A portion of the fluid was submitted for microbiological analysis  All remaining specimens were submitted to pathology staff "    Biopsy results on 12/20/18 : "A, B & C  Lymph node, left neck (Thin-prep, smears and cell block preparations):  Nondiagnostic specimen    Few lymphocytes & RBC's in a fibrinous coagulum - the paucity of lymphoid material precludes meaningful interpretation "   Reason family wanting more definitive information to r/o malignancy    I am unsure as to whether large enough mass for core biopsy based on this above IR documentation, still pending ent and oncology input

## 2018-01-23 NOTE — ASSESSMENT & PLAN NOTE
· Wound care following  · Stage 3 pressure ulcer, unclear if present on admission  · Continue with local care as per wound care recommendation  · Frequent repositioning   · Outpatient follow-up at the [facility]

## 2018-01-23 NOTE — CONSULTS
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 at [facility]  The patient has history of smoking  The patient sees Dr Daniel Sinclair as an outpatient oncology, and Dr Yanira Mccracken ENT  Patient was admitted on January 16, 2018 and treated for Hcap  Other comorbidities  Sacral decubital ulcer, depression, pain related to cancer, non verbal     Patient also noted to have lymph node around left clavicular, his  last biopsy  was nondiagnostic  During this admission the patient also presented with  facial edema and went for a chest CT scan that showed marked increase in size of right lower lobe nodule, suspicious for metastatic disease  Oncology consulted for assistance with possible metastasis  During review of system patient denies headaches shortness of breath  chest pain abdominal pain nausea vomiting diarrhea constipation  Admits pains located lower back / sacrum, weight loss of at least 30 lb in the last couple months  Patient at this time is eating breakfast, almost 80% of the tray with no issue at all  Review of Systems - As stated in the HPI otherwise the fourteen point review of systems was negative      Past Medical History:   Diagnosis Date    Asthma     Cancer associated pain     Decreased appetite     Depression     HCAP (healthcare-associated pneumonia)     Hypertension     Insomnia     Larynx cancer ([address]     Physical deconditioning     [address]ral ulcer [address]     Severe sepsis [facility] [facility]     [facility]        Social History     Social History    Marital status:      Spouse name: N/A    Number of children: 1    Years of education: N/A     Occupational History    retired      Social History Main Topics    Smoking status: Former Smoker    Smokeless tobacco: Never Used    Alcohol use No    Drug use: No    Sexual activity: No     Other Topics Concern    None     Social History Narrative    None       History reviewed  No pertinent family history      Allergies   Allergen Reactions    Aspirin Anaphylaxis    Cleocin [Clindamycin] Other (See Comments)     Cannot specify but had definite reaction in past!!         Current Facility-Administered Medications:     acetaminophen (TYLENOL) tablet 650 mg, 650 mg, Oral, Q6H PRN, Abel Allison MD, 650 mg at 01/22/18 1041    albuterol (PROVENTIL HFA,VENTOLIN HFA) inhaler 1 puff, 1 puff, Inhalation, Q4H PRN, Abel Allison MD, 1 puff at 01/17/18 2213    ALPRAZolam (XANAX) tablet 0 25 mg, 0 25 mg, Oral, 4x Daily PRN, Abel Allison MD    aluminum-magnesium hydroxide-simethicone (MYLANTA) 200-200-20 mg/5 mL oral suspension 30 mL, 30 mL, Oral, Q6H PRN, Abel Allison MD    bisacodyl (DULCOLAX) rectal suppository 10 mg, 10 mg, Rectal, Daily PRN, Arianna Flores PA-C    calcium carbonate-vitamin D (OSCAL-D) 500 mg-200 units per tablet 1 tablet, 1 tablet, Oral, Daily With Breakfast, Abel Allison MD, 1 tablet at 01/23/18 0906    collagenase (SANTYL) ointment, , Topical, Daily, Abel Allison MD    enoxaparin (LOVENOX) subcutaneous injection 40 mg, 40 mg, Subcutaneous, Daily, Abel Allison MD, 40 mg at 01/23/18 0907    fentaNYL (DURAGESIC) 25 mcg/hr TD 72 hr patch 1 patch, 1 patch, Transdermal, Q72H, Abel Allison MD, 1 patch at 01/22/18 2222    fluticasone-salmeterol (ADVAIR) 250-50 mcg/dose inhaler 2 puff, 2 puff, Inhalation, Q12H [address], Abel Allison MD, 2 puff at 01/19/18 0858    melatonin tablet 3 mg, 3 mg, Oral, HS, Arianna Flores PA-C, 3 mg at 01/22/18 2115    mirtazapine (REMERON) tablet 15 mg, 15 mg, Oral, HS, Abel Allison MD, 15 mg at 01/22/18 2115    OLANZapine (ZyPREXA ZYDIS) dispersible tablet 5 mg, 5 mg, Oral, HS, Abel Allison MD, 5 mg at 01/22/18 2115    ondansetron (ZOFRAN) injection 4 mg, 4 mg, Intravenous, Q6H PRN, Abel Allison MD    oxyCODONE (ROXICODONE) immediate release tablet 10 mg, 10 mg, Oral, Q4H PRN, Abel Allison MD, 10 mg at 01/23/18 0333    oxyCODONE (ROXICODONE) IR tablet 5 mg, 5 mg, Oral, Q4H PRN, Abel Allison MD    pantoprazole (PROTONIX) EC tablet 40 mg, 40 mg, Oral, Early Morning, Abel Allison MD, 40 mg at 01/23/18 0617    polyethylene glycol (MIRALAX) packet 17 g, 17 g, Oral, Daily PRN, Abel Allison MD, 17 g at 01/21/18 0816    senna-docusate sodium (SENOKOT S) 8 6-50 mg per tablet 1 tablet, 1 tablet, Oral, Daily, Abel Allison MD, 1 tablet at 01/23/18 0906      /57 (BP Location: Left arm)   Pulse 59   Temp 98 2 °F (36 8 °C) (Oral)   Resp 18   Ht 5' 7" (1 702 m)   Wt 63 2 kg (139 lb 5 3 oz)   SpO2 97%   BMI 21 82 kg/m²     General Appearance:    Alert, oriented , mild facial edema right side       Eyes:    PERRL   Ears:    Normal external ear canals, both ears   Nose:   Nares normal, septum midline   Throat:   No exudate   Neck:   Supple, Tract in place intact       Lungs:     Clear to auscultation bilaterally   Chest Wall:    No tenderness or deformity    Heart:    Regular rate and rhythm       Abdomen:     Soft, non-tender, bowel sounds +, no organomegaly           Extremities:   Extremities no cyanosis or edema       Skin:   Decubitus sacral ulcer      Lymph nodes:   left large lymph node supraclavicular      Neurologic:   CNII-XII intact, normal strength, sensation and reflexes     Throughout               No results found for this or any previous visit (from the past 48 hour(s))  Xr Chest Portable    Result Date: 1/18/2018  Narrative: CHEST INDICATION:  shortness of breath  History taken directly from the electronic ordering system  COMPARISON:  1/16/2018 at 1959 hours  VIEWS:   AP frontal IMAGES:  1 FINDINGS:     Cardiomediastinal silhouette appears unremarkable  Redemonstration of aortic arch calcification  Mild aortic tortuosity  Increasing consolidation within the right lower lung field  No pneumothorax or pleural effusion  Visualized osseous structures appear within normal limits for the patient's age  Impression: Interval increase in consolidation overlying the right lower lung field  Workstation performed: [id number]     Xr Chest 2 Views    Result Date: 1/16/2018  Narrative: CHEST INDICATION:  Productive cough  COMPARISON:  Chest CT dated 12/16/2017 CT CHEST WITH IV CONTRAST INDICATION:  81-year-old male, residual malignancy, follow-up COMPARISON: 5/22/2017 outside PET/CT; 7/12/2017 radiation therapy planning CT; 8/21/2017 chest x-ray TECHNIQUE: CT examination of the chest was performed  Reformatted images were created in axial, sagittal, and coronal planes  Radiation dose length product (DLP) for this visit:       This examination, like all CT scans performed in the [facility], was performed utilizing techniques to minimize radiation dose exposure, including the use of iterative reconstruction and automated exposure control  IV Contrast:          FINDINGS: LUNGS:  There is faint region of somewhat linear groundglass attenuation measuring approximately 1 cm involving the right lower lobe, likely to represent postinflammatory scarring, 3/33  This finding was somewhat less conspicuous on the prior studies but probably unchanged  PLEURA:  Unremarkable  HEART/GREAT VESSELS:   Coronary artery calcifications  MEDIASTINUM AND YOANA:  Unremarkable  CHEST WALL AND LOWER NECK:    Typical tracheostomy tube    Asymmetric laryngeal soft tissue, better visualized on dedicated neck CT  VISUALIZED STRUCTURES IN THE UPPER ABDOMEN:  Mild bilateral adrenal thickening, less conspicuous previously  OSSEOUS STRUCTURES:  No acute fracture  No destructive osseous lesion  Multilevel degenerative spondylosis  Impression: Probable postinflammatory scarring right lower lobe exhibiting groundglass attenuation  This is likely unchanged  Continued follow-up as clinically appropriate    No discrete pulmonary masses or infiltrates Typical tracheostomy tube Asymmetric laryngeal tissue     VIEWS:  Frontal and lateral projections IMAGES:  3 FINDINGS:     Lungs are chronically hyperinflated  Increasing consolidation in the right middle lobe  Previously seen left-sided consolidations have resolved  Heart size is normal   Mild aortic tortuosity  Normal pulmonary vasculature  Osteopenia  No acute osseous findings  IMPRESSION: Right middle lobe pneumonia  Workstation performed: [id number]     Ct Soft Tissue Neck W Contrast    Result Date: 1/22/2018  Narrative: CT NECK WITH CONTRAST INDICATION: Lymphadenopathy  History of laryngeal malignancy  Previous surgery  COMPARISON:  Most recently 12/16/2017  TECHNIQUE:  Contiguous 2 5 mm images were obtained through the neck after administration of intravenous contrast  Radiation dose length product (DLP) for this visit:  648 mGy-cm   This examination, like all CT scans performed in the [facility], was performed utilizing techniques to minimize radiation dose exposure, including the use of iterative reconstruction and automated exposure control  IV Contrast:  100 mL of iohexol (OMNIPAQUE)         IMAGE QUALITY:  Diagnostic  FINDINGS: VISUALIZED BRAIN PARENCHYMA: No acute intracranial pathology of the visualized brain parenchyma  VISUALIZED ORBITS AND PARANASAL SINUSES: Limited visualization of the orbits    Paranasal sinuses demonstrate complete opacification of the left maxillary sinus and extensive opacification of the visualized ethmoid air cells, left greater than right  Mild mucosal thickening of the sphenoid sinus  NASAL CAVITY AND NASOPHARYNX: Normal  SUPRAHYOID NECK: Stable postoperative and posttreatment related change throughout the neck  Patient is undergone previous laryngectomy, neck dissection and fat flap reconstruction  Numerous surgical clips are identified  Loss of normal fascial planes  Diffuse edema within the subcutaneous fat  Thickening of the oropharynx and hypopharynx noted  INFRAHYOID NECK: Patient is status post laryngectomy  Thickening of the mucosal surfaces above the tracheotomy  Tracheostomy appears appropriately positioned  See above description of soft tissue changes as a result of prior surgery and treatment  THYROID GLAND:  There is no thyroid tissue noted on the left  Normal thyroid tissue on the right  PAROTID AND SUBMANDIBULAR GLANDS: Hypoplastic parotid glands  Hypoplastic right submandibular gland  Left submandibular gland may have been resected LYMPH NODES: Small level 1 nodes noted bilaterally, series 3 image 52 unchanged  There is a centrally necrotic mass identified within the left inferior neck measuring 3 3 cm in maximum oblique dimension on series 3 image 50, minimally decreased in size since the prior examination which may represent pathologic node or postoperative seroma  VASCULAR STRUCTURES: Left jugular has been resected  The right jugular appears to have decreased in size with narrowing proximally and distally  Aortic arch not included on this exam   Mild calcification of the proximal cervical internal carotid arteries bilaterally without focal stenosis  Vertebral arteries are patent  THORACIC INLET:  See separate CT chest report  BONY STRUCTURES: Normal      Impression: Stable postsurgical and posttreatment related change throughout the neck with laryngectomy and neck dissection    Edema within the subcutaneous fat with loss of normal fascial planes  Thickening of the mucosal surface of the oropharynx and hypopharynx  Residual/recurrent tumor not excluded  Stable small level 1 nodes  Slight interval decrease in size in a 3 3 cm cystic mass within the left anterior inferior neck which may represent necrotic node or postoperative seroma  Workstation performed: [id number]     Ct Chest W Contrast    Result Date: 1/22/2018  Narrative: CT CHEST WITH IV CONTRAST INDICATION:  svc r/o  History taken directly from the electronic ordering system  Personal history of laryngeal cancer  COMPARISON: CT chest 12/16/2017  TECHNIQUE: CT examination of the chest was performed  Reformatted images were created in axial, sagittal, and coronal planes  Radiation dose length product (DLP) for this visit:  464 mGy-cm   This examination, like all CT scans performed in the [facility], was performed utilizing techniques to minimize radiation dose exposure, including the use of iterative reconstruction and automated exposure control  IV Contrast:  100 mL of iohexol (OMNIPAQUE)         FINDINGS: LUNGS:  Tracheostomy tube is in place  Small amount of debris within the trachea, likely mucus  There is been marked increase in size of lesion within the right lower lobe, now measuring 2 4 x 1 7 cm decreasing measuring 0 7 x 0 6 cm  Right basilar compressive atelectasis is present  There is no pneumothorax  There are no tracheal or endobronchial lesions  PLEURA:  Small right pleural effusion is present  HEART/GREAT VESSELS:  Unremarkable for patient's age  MEDIASTINUM AND YOANA:  Unremarkable  CHEST WALL AND LOWER NECK:       Normal  VISUALIZED STRUCTURES IN THE UPPER ABDOMEN:  Unremarkable  OSSEOUS STRUCTURES:  No acute fracture  No destructive osseous lesion  Impression: Marked increase in size of right lower lobe nodule, suspicious for metastatic disease  Small right pleural effusion with compressive basilar atelectasis    I personally discussed this study with BIRDIE PETERSON on 1/22/2018 8:47 AM  Workstation performed: [id number]       Assessment and Plan:   Stage T1 N2 Stage IV a well differentiated squamous cell carcinoma left vocal cord   Treated with  Radiation,  chemotherapy with Cetuximab and total laryngectomy neck resection on October 2017 due to laryngeal stenosis at [facility]  Left clavicular lymph node vs cyst, after speaking with  IR decision is not to biopsy due to most likely cyst   Right lower lobe Lung  Nodule will be  Biopsy  CT-guided biopsy  Dr Angela Natarajan  will update patient's daughter Shun Dobbs about this plan   Patient agreeable with plan  Further recommendations with biopsy results

## 2018-01-23 NOTE — ASSESSMENT & PLAN NOTE
· Likely related to chronic disease, hemoglobin remained stable around 10   · Continue with monitoring

## 2018-01-23 NOTE — ASSESSMENT & PLAN NOTE
- episode occurred on Sunday, with right-sided facial swelling and lip edema, unclear if related to the patient weight to Lame bed as there is some degree of edema of bilateral side of the face at baseline  - did receive full treatment with Pepcid and steroids  - at this time will continue with monitoring  - patient appears to be at his baseline

## 2018-01-23 NOTE — ASSESSMENT & PLAN NOTE
Sp imaging last evening due to severe facial/orbital and lip edema with treatment   Called by radiology this morning pt has new right lung nodule that is larger than prior imaging and suspicious for mets  Will consult oncology for further input as we are still pending core biopsy which daughter no longer wants to take pt to [facility], she want it done here discussed on Sunday with dr Lesly Swartz who will see the pt  ?  If plan for biopsy once reviewed  (pt requesting to have biopsy lung and core biopsy to be done at same time if determined necessary after discussion )

## 2018-01-23 NOTE — PROGRESS NOTES
Progress Note - Yissel Santos  1935, 81 y o  male MRN: [id number]    Unit/Bed#: [facility] 703-01 Encounter: [id number]    Primary Care Provider: Hollie Hood MD   Date and time admitted to hospital: 1/16/2018  7:10 PM        * Lung nodule   Assessment & Plan    - patient with enlarging right lung nodule when compared to prior images  - case was discussed with Dr Hossein Ortiz with ENT, suggested to perform lung biopsy other than repeat cystic neck mass biopsy that was previously performed and found to be inconclusive  - at this time, but IR consult has been placed, patient likely to undergo biopsy either later on today or tomorrow  - oncology is following, likely to represent metastatic cancer from primary laryngeal cancer versus less likely primary lung cancer  - palliative Care has been following the patient now  - patient is of course very upset about again another bad news, yes declined physical exam with me today, he although at discussion about lung biopsy with me and his daughter in room, we have discussed also feeding through PEG tube versus NG tube with NG tube or Sai-Fed being only temporary solution  At this time, patient is trying to understand which options he has, I have talked to the patient about the fact that he needs to consider all these options at this time and make that decision based upon what he wants for himself without feeling the pressure of either physicians or family  Daughter at bedside is the following agreeable to have dad to consider his options and try to support him regardless what would be a final decision    At this time although further management with Oncology will be based upon results of lung biopsy          History of laryngeal cancer   Assessment & Plan    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  · Previous biopsy of cystic lesion of the neck/supraclavicular area found to be inconclusive, lesion dimension reduced on current CT scan  · Case discussed with Dr Flo Marsh today, at this time no plan for ENT intervention, even if cancer proved to be metastatic patient will be candidate for chemotherapy and not vision and intervention will be added surgically at this point  · Neck cystic lesion possibly related to postsurgical seroma as per conversation with Radiology as documented in Oncology note  · Will keep Dr Flo Marsh and formed on biopsy results  · Risk and benefit on NG tube feeding discussed with patient and family, at this time tube should be placed by ENT given history of laryngectomy and the placement safely could be kept only for 7 days with of course no long-term improvement of patient nutritional status especially if chemotherapy will be considered by patient himself  Peg tube will be the best option to correct patient poor nutritional status at this time  At this time patient not interested in PEG placement    Will follow up if any change of mind              Gram-negative pneumonia (HCC)   Assessment & Plan    · Sputum culture - Moraxella Catarrhalis pna  · Patient has completed antibiotics on January 22nd, is followed by Infectious Disease, continue to monitor off antibiotics  · As previous conversation with speech pathology, patient low risk for aspiration given post laryngectomy status  · Monitor closely respiratory status, continue with pulmonary toilet  · Small amount of blood-tinged sputum was noted, for now will continue to monitor and if there is any increase will obtain new images of the chest although this was recently obtained with CT scan        Angio-edema, initial encounter   Assessment & Plan    - episode occurred on Sunday, with right-sided facial swelling and lip edema, unclear if related to the patient weight to Lame bed as there is some degree of edema of bilateral side of the face at baseline  - did receive full treatment with Pepcid and steroids  - at this time will continue with monitoring  - patient appears to be at his baseline        Depression   Assessment & Plan    - patient is following with palliative care, will continue to offer psychosocial support  - today patient is having particular of time, will try to address his needs when he feels ready to talk        Anemia   Assessment & Plan    · Likely related to chronic disease, hemoglobin remained stable around 10   · Continue with monitoring          Asthma   Assessment & Plan    · Continue home medical management         Sacral decubitus ulcer   Assessment & Plan    · Wound care following  · Stage 3 pressure ulcer, unclear if present on admission  · Continue with local care as per wound care recommendation  · Frequent repositioning   · [facility] at [facility]        Severe protein-calorie malnutrition ([address] )   Assessment & Plan    - likely related to acute and chronic illness, follow-up p o  intake consult nutritionist          VTE Pharmacologic Prophylaxis: yes  Pharmacologic: Enoxaparin (Lovenox)  Mechanical VTE Prophylaxis in Place: Yes     Patient Centered Rounds: I have performed bedside rounds with nursing staff today      Discussions with Specialists or Other Care Team Provider: ENT, palliative care     Education and Discussions with Family / Patient: patient and patients daughter at bedside   Time Spent for Care: 45 minutes    More than 50% of total time spent on counseling and coordination of care as described above      Current Length of Stay: 6 day(s)     Current Patient Status: Inpatient   Certification Statement: The patient will continue to require additional inpatient hospital stay due to refer to above     Discharge Plan: TBD     Code Status: Level 1 - Full Code    Subjective:   Patient declines to answers, shrugs his shoulders    Objective:     Vitals:   Temp (24hrs), Av 2 °F (36 8 °C), Min:98 °F (36 7 °C), Max:98 3 °F (36 8 °C)    HR:  [58-68] 59  Resp:  [18] 18  BP: (121-124)/(50-66) 122/57  SpO2:  [95 %-98 %] 97 %  Body mass index is 21 82 kg/m²  Input and Output Summary (last 24 hours): Intake/Output Summary (Last 24 hours) at 01/23/18 1442  Last data filed at 01/23/18 1242   Gross per 24 hour   Intake             1160 ml   Output             2100 ml   Net             -940 ml       Physical Exam: declined    Physical Exam    Additional Data:     Labs:      Results from last 7 days  Lab Units 01/19/18  0552  01/17/18  0531   WBC Thousand/uL 4 58  < > 10 23*   HEMOGLOBIN g/dL 10 0*  < > 9 7*   HEMATOCRIT % 30 3*  < > 29 4*   PLATELETS Thousands/uL 204  < > 178   LYMPHO PCT %  --   --  1*   MONO PCT MAN %  --   --  0*   EOSINO PCT MANUAL %  --   --  0   < > = values in this interval not displayed  Results from last 7 days  Lab Units 01/19/18  0552  01/17/18  0531   SODIUM mmol/L 142  < > 142   POTASSIUM mmol/L 3 9  < > 3 9   CHLORIDE mmol/L 110*  < > 108   CO2 mmol/L 27  < > 29   BUN mg/dL 20  < > 26*   CREATININE mg/dL 0 61  < > 0 87   CALCIUM mg/dL 8 4  < > 8 3   TOTAL PROTEIN g/dL  --   --  6 0*   BILIRUBIN TOTAL mg/dL  --   --  0 33   ALK PHOS U/L  --   --  55   ALT U/L  --   --  17   AST U/L  --   --  37   GLUCOSE RANDOM mg/dL 90  < > 108   < > = values in this interval not displayed  * I Have Reviewed All Lab Data Listed Above  * Additional Pertinent Lab Tests Reviewed: All Labs Within Last 24 Hours Reviewed    Imaging:    Imaging Reports Reviewed Today Include: CT chest  Imaging Personally Reviewed by Myself Includes:  none    Recent Cultures (last 7 days):       Results from last 7 days  Lab Units 01/18/18  1200 01/17/18  0056 01/17/18  0009 01/16/18  2108 01/16/18  2101   BLOOD CULTURE   --   --   --  No Growth After 5 Days  No Growth After 5 Days     SPUTUM CULTURE  1+ Growth of Moraxella catarrhalis*  1+ Growth of   --   --   --   --    GRAM STAIN RESULT  No Epithelial cells seen  3+ Polys  1+ Gram positive cocci in pairs  Rare Gram negative diplococci  --   --   --   --    INFLUENZA A PCR   --   --  None Detected  --   --    INFLUENZA B PCR   --   --  None Detected  --   --    RSV PCR   --   --  None Detected  --   --    LEGIONELLA URINARY ANTIGEN   --  Negative  --   --   --        Last 24 Hours Medication List:     calcium carbonate-vitamin D 1 tablet Oral Daily With Breakfast   collagenase  Topical Daily   enoxaparin 40 mg Subcutaneous Daily   fentaNYL 1 patch Transdermal Q72H   fluticasone-salmeterol 2 puff Inhalation Q12H [facility]   melatonin 3 mg Oral HS   mirtazapine 15 mg Oral HS   OLANZapine 5 mg Oral HS   pantoprazole 40 mg Oral Early Morning   senna-docusate sodium 1 tablet Oral Daily        Today, Patient Was Seen By: Rosibel Perez MD    ** Please Note: Dragon 360 Dictation voice to text software may have been used in the creation of this document   **

## 2018-01-23 NOTE — CASE MANAGEMENT
Continued Stay Review    Date:   1- 23-18         Vital Signs: /57 (BP Location: Left arm)   Pulse 59   Temp 98 2 °F (36 8 °C) (Oral)   Resp 18   Ht 5' 7" (1 702 m)   Wt 63 2 kg (139 lb 5 3 oz)   SpO2 97%   BMI 21 82 kg/m²     Medications:   Scheduled Meds:   calcium carbonate-vitamin D 1 tablet Oral Daily With Breakfast   collagenase  Topical Daily   enoxaparin 40 mg Subcutaneous Daily   fentaNYL 1 patch Transdermal Q72H   fluticasone-salmeterol 2 puff Inhalation Q12H [facility]   melatonin 3 mg Oral HS   mirtazapine 15 mg Oral HS   OLANZapine 5 mg Oral HS   pantoprazole 40 mg Oral Early Morning   senna-docusate sodium 1 tablet Oral Daily     Continuous Infusions:    PRN Meds:   acetaminophen    albuterol    ALPRAZolam    aluminum-magnesium hydroxide-simethicone    bisacodyl    ondansetron    oxyCODONE    oxyCODONE    polyethylene glycol    Abnormal Labs/Diagnostic Results:       Lab Units 01/19/18  0552 01/17/18  0531   WBC Thousand/uL 4 58 10 23*   HEMOGLOBIN g/dL 10 0* 9 7*   HEMATOCRIT % 30 3* 29 4*           Age/Sex: 81 y o  male     Assessment/Plan:     INFECTIOUS DISEASE   1   Productive cough   Likely secondary to #2   Patient states he had a subjective low-grade fever at home, but no documented fevers here   No hypoxia   Hemodynamically stable and nontoxic appearing   Patient was started empirically on cefepime/Flagyl   Blood cultures are negative      -antibiotics as below  -monitor respiratory status closely  -supportive care per primary team  -monitor temperatures and white blood cell count     2   Right middle lobe infiltrate secondary to Moraxella pneumonia    Patient clinically much improved   Speech evaluation noted and they do not feel that patient is an aspiration risk   CT of the chest performed today shows no evidence of pneumonia  Respiratory status remains stable on room air      -now status post 7 day course of antibiotics completed on 01/22    Continue to monitor off anymore antibiotics at this time      3   Sacral decubitus ulcer   This was debrided back in November of 2017   Does not overtly look infected   Wound Care evaluation noted      -local wound care  -serial examinations     4   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   Status post tracheostomy   Patient follows with [facility]   CT chest performed today concerning for metastatic disease  Hematology has been consulted for consideration of biopsy      5   Neck mass   Possibly necrotic lymph node   This was aspirated in December of 2017 with biopsies negative for any infectious process   The cytology is nondiagnostic   Patient was planning to follow-up with [facility] regarding this      6   Leukocytosis   May be leukemoid reaction in the setting of aspiration pneumonitis verses a possible pneumonia   Otherwise, patient remains afebrile, hemodynamically stable, nontoxic appearing   Leukocytosis has resolved      -plan as above  -monitor white blood cell count     Antibiotics:  Off antibiotics    CONSULT ONCOLOGY  Presenting Complaint:  1  Squamous cell carcinoma left vocal cord  2  Enlarged right lung nodule suspicious for metastasis  Stage T1 N2 Stage IV a well differentiated squamous cell carcinoma left vocal cord   Treated with  Radiation,  chemotherapy with Cetuximab and total laryngectomy neck resection on October 2017 due to [facility] stenosis at [facility]  Left clavicular lymph node vs cyst, after speaking with  IR decision is not to biopsy due to most likely cyst   Right lower lobe Lung  Nodule will be  Biopsy    CT-guided biopsy    PALLIATIVE CARE   Assessment:  Repeat episodes of HCAP - moraxella catarrhalis PNA  Facial swelling  Laryngeal cancer - s/p chemo/XRT/surgery at [facility] sees Dr Angel Bhagat in med/onc and Teresa Fabian in ENT  Tracheostomy s/p total laryngectomy  Anemia  Depression/existential suffering  Sacral wound  Cancer associated pain  Insomnia  Decreased oral intake     Plan:  1  Continue current pain regimen of Duragesic 25 mcg  2  Continue PRN oxycodone  3  Continue Zyprexa and Remeron  4  Goals- disease directed  Patient refused to discuss treatment options today        INTERNAL MEDICINE  Lung nodule   Assessment & Plan     Sp imaging last evening due to severe facial/orbital and lip edema with treatment   Called by radiology this morning pt has new right lung nodule that is larger than prior imaging and suspicious for mets  Will consult oncology for further input as we are still pending core biopsy which daughter no longer wants to take pt to [facility], she want it done here discussed on Sunday with dr Lesly Swartz who will see the pt  ? If plan for biopsy once reviewed  (pt requesting to have biopsy lung and core biopsy to be done at same time if determined necessary after discussion )              * Gram-negative pneumonia ([facility] [address] )   Assessment & Plan     · ID following   · gram negative pneumonia, last day for abx   · Sputum culture - Moraxella Catarrhalis pna  · abx changed to ceftriaxone and will stop by 1/22/18 today last dosing   · Afebrile, leukocytosis resolved - continue to monitor   · Given total laryngectomy patient is not at risk for aspiration per speech therapy   · Blood cultures - no growth after  5 days   · Flu/RSV PCR - negative   · Strep urine antigen - negative           Angio-edema, initial encounter   Assessment & Plan     Patient with significant swelling, facial area more on right side as patient has been lying on his right side dependent  Lips Swollen  On Sunday as a result treated for   reaction, Pepcid b i d , Decadron q 6 hours and Benadryl q 6 hours for the next 24 hours  Also perform CT chest to rule out possible svc  Which has been performed and results reveal: Marked increase in size of right lower lobe nodule, suspicious for metastatic disease    Small right pleural effusion with compressive basilar atelectasis      Called daughter to update left message as no answer  Patient appears a little frustrated when informed we would perform some more testing  Discussed with daughter who states that he sometimes has this swelling but this is much worse, today with significant improvement but pt is with significant frustration and irritation          History of laryngeal cancer   Assessment & Plan     · S/p total laryngectomy -  Pt sees ent and pt prefers to see dr Delta Coburn and also dr Santos Perry   · Discussed with dr Jailene Hernandez Sunday who will see the pt likely today? Plan for biopsy  · Pt was supposed to follow up at [facility] in regards to core biopsy however, daughter is wanting this done here attempted to discuss with dr Krystal Rodriguez whom at this point feels ent should likely see pt for plan   · ENT consulted for  daughter feels pt needs ng vs keofed tube placed for nutrition pt refusing peg tube I have informed daughter this is not a long term option  Pt is stable for discharge (pts nutritional status is poor dt poor oral intake  Pt refuses peg tube as I have discussed with him  · Daughter states she spoke to her dad yesterday and she has "talked him into doing a keofed vs ng tube for nutritional input" this would have to be placed if planned for by ENT as pt has hx of laryngectomy  · Pending dr Delta Coburn to see pt   · oncologist is Dr Priya Quintanilla, consulted oncology as now pt is with enlarging right lung nodule and family ?  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, radiation therapy with concomitant weekly cetuximab, June 9, 2017 to July 21, 2017      Total laryngectomy and neck resection on October 18, 2017 at the [facility] squamous cell carcinoma, well differentiated, 7 cm involving larynx and supraglottic larynx, margins uninvolved by invasive carcinoma, lymphovascular and perineural invasion present, metastasis to 2 of 30 lymph nodes, the largest node 5 3 cm with extranodal extension present, involved lymph nodes are bilateral, pT4a pN2c per oncology documentation       Now pt noted to have aspirate via ir 12/18/17 with : "Impression:    1   Predominantly cystic left supraclavicular mass with a tiny, 5 mm, echogenic component which may represent solid tissue or necrotic debris  2   Fine-needle aspiration was performed targeting the echogenic focus within the cystic mass as well as an irregular portion of the wall  3   The cystic component was aspirated to near complete resolution yielding 10 mL of tea-colored fluid   A portion of the fluid was submitted for microbiological analysis   All remaining specimens were submitted to pathology staff "     Biopsy results on 12/20/18 : "A, B & C   Lymph node, left neck (Thin-prep, smears and cell block preparations):  Nondiagnostic specimen  Few lymphocytes & RBC's in a fibrinous coagulum - the paucity of lymphoid material precludes meaningful interpretation "   Reason family wanting more definitive information to r/o malignancy     I am unsure as to whether large enough mass for core biopsy based on this above IR documentation, still pending ent and oncology input           Anemia   Assessment & Plan     · Chronic anemia likely due to malignancy  acceptable   · Monitor CBC  10/30 3 stable   · No active bleeding at this point    · Will chk labs in the am             Sacral decubitus ulcer   Assessment & Plan     · Wound care input appreciated   · Stage 3 pressure ulcer  · Daily dressing changes, nursing doing daily state none infected lookiing   · Frequent repositioning   · Outpatient follow-up at the wound [address] does seem a little flat and depressed he is clearly frustrated and at times when talking about more intervention   Spoke with pt again today who states that he does want intervention but no peg tube but open to keofed vs ng tube I am waiting to talk with  ent who have not yet seen pt  Seen by palliative as he follows with them from home          Asthma   Assessment & Plan     · Continue home medical management             Discharge Plan: REFERRAL TO [id number] [facility]

## 2018-01-23 NOTE — ASSESSMENT & PLAN NOTE
- patient is following with palliative care, will continue to offer psychosocial support  - today patient is having particular of time, will try to address his needs when he feels ready to talk

## 2018-01-23 NOTE — PROGRESS NOTES
Pt has IV Pepcid ordered, next dose due at 0600  Spoke with Margret Berry, PAC, regarding possibly switching to PO  Would like to keep Pepcid IV for now until day team can reassess  Will have night shift RN to attempt IV site with AM labs  Will continue to monitor

## 2018-01-23 NOTE — ASSESSMENT & PLAN NOTE
· Sputum culture - Moraxella Catarrhalis pna  · Patient has completed antibiotics on January 22nd, is followed by Infectious Disease, continue to monitor off antibiotics  · As previous conversation with speech pathology, patient low risk for aspiration given post laryngectomy status  · Monitor closely respiratory status, continue with pulmonary toilet  · Small amount of blood-tinged sputum was noted, for now will continue to monitor and if there is any increase will obtain new images of the chest although this was recently obtained with CT scan

## 2018-01-23 NOTE — PROGRESS NOTES
Progress note - Palliative and [address]  81 y o  male [id number]    Assessment:  Repeat episodes of HCAP - moraxella catarrhalis PNA  Facial swelling  Laryngeal cancer - s/p chemo/XRT/surgery at [facility]  Locally sees Dr Zeferino Hagen in med/onc and Ivonne Palmer in ENT  Tracheostomy s/p total laryngectomy  Anemia  Depression/existential suffering  Sacral wound  Cancer associated pain  Insomnia  Decreased oral intake    Plan:  1  Continue current pain regimen of Duragesic 25 mcg  2  Continue PRN oxycodone  3  Continue Zyprexa and Remeron  4  Goals- disease directed  Patient refused to discuss treatment options today      Code Status: Full - Level 1   Power of :  presumed to be daughter by [place] Act 169   Advance Directive / Living Will: yes   POLST:  no      Interval history:       Patient seen at bedside along with [facility] LSW in attempt to provide support and assess symptoms  Pt started out by communicating on paper "there's no good news"  He confirms that Oncology has offered C-guided lung biopsy and he is willing to undergo this procedure writing "what choice do I have?" Attempts to discuss his feeling regarding possible outcome and comfort focused treatment approach visibly upset him and he threw us out of his room  He refused to discuss his pain or allow us to address any symptoms today      MEDICATIONS / ALLERGIES:    all current active meds have been reviewed and current meds:   Current Facility-Administered Medications   Medication Dose Route Frequency    acetaminophen (TYLENOL) tablet 650 mg  650 mg Oral Q6H PRN    albuterol (PROVENTIL HFA,VENTOLIN HFA) inhaler 1 puff  1 puff Inhalation Q4H PRN    ALPRAZolam (XANAX) tablet 0 25 mg  0 25 mg Oral 4x Daily PRN    aluminum-magnesium hydroxide-simethicone (MYLANTA) 200-200-20 mg/5 mL oral suspension 30 mL  30 mL Oral Q6H PRN    bisacodyl (DULCOLAX) rectal suppository 10 mg  10 mg Rectal Daily PRN    calcium carbonate-vitamin D (OSCAL-D) 500 mg-200 units per tablet 1 tablet  1 tablet Oral Daily With Breakfast    collagenase (SANTYL) ointment   Topical Daily    enoxaparin (LOVENOX) subcutaneous injection 40 mg  40 mg Subcutaneous Daily    fentaNYL (DURAGESIC) 25 mcg/hr TD 72 hr patch 1 patch  1 patch Transdermal Q72H    fluticasone-salmeterol (ADVAIR) 250-50 mcg/dose inhaler 2 puff  2 puff Inhalation Q12H [address]    melatonin tablet 3 mg  3 mg Oral HS    mirtazapine (REMERON) tablet 15 mg  15 mg Oral HS    OLANZapine (ZyPREXA ZYDIS) dispersible tablet 5 mg  5 mg Oral HS    ondansetron (ZOFRAN) injection 4 mg  4 mg Intravenous Q6H PRN    oxyCODONE (ROXICODONE) immediate release tablet 10 mg  10 mg Oral Q4H PRN    oxyCODONE (ROXICODONE) IR tablet 5 mg  5 mg Oral Q4H PRN    pantoprazole (PROTONIX) EC tablet 40 mg  40 mg Oral Early Morning    polyethylene glycol (MIRALAX) packet 17 g  17 g Oral Daily PRN    senna-docusate sodium (SENOKOT S) 8 6-50 mg per tablet 1 tablet  1 tablet Oral Daily       Allergies   Allergen Reactions    Aspirin Anaphylaxis    Cleocin [Clindamycin] Other (See Comments)     Cannot specify but had definite reaction in past!!       OBJECTIVE:    Physical Exam  Physical Exam    Lab Results: I have personally reviewed pertinent labs  , CBC: No results found for: WBC, HGB, HCT, MCV, PLT, ADJUSTEDWBC, MCH, MCHC, RDW, MPV, NRBC, CMP: No results found for: NA, K, CL, CO2, ANIONGAP, BUN, CREATININE, GLUCOSE, CALCIUM, AST, ALT, ALKPHOS, PROT, ALBUMIN, BILITOT, EGFR  Imaging Studies: reviewed  EKG, Pathology, and Other Studies: reviewed    Counseling / Coordination of Care  Total floor / unit time spent today 25 minutes  Greater than 50% of total time was spent with the patient and / or family counseling and / or coordination of care   A description of the counseling / coordination of care: symptom assessment

## 2018-01-23 NOTE — PROGRESS NOTES
Patient refusing AM labs and IV  Unable to give IV Pepcid  RN for day shift made aware   Will continue to monitor,

## 2018-01-23 NOTE — PROGRESS NOTES
Progress Note - Infectious Disease   Rashad Wilson  81 y o  male MRN: [id number]  Unit/Bed#: [facility] 703-01 Encounter: [id number]      Impression/Recommendations:  1   Productive cough  Darrol Malady secondary to #2  Mansoor Bhumi states he had a subjective low-grade fever at home, but no documented fevers here   No hypoxia   Hemodynamically stable and nontoxic appearing   Patient was started empirically on cefepime/Flagyl   Blood cultures are negative      -antibiotics as below  -monitor respiratory status closely  -supportive care per primary team  -monitor temperatures and white blood cell count     2   Right middle lobe infiltrate secondary to Moraxella pneumonia    Patient clinically much improved   Speech evaluation noted and they do not feel that patient is an aspiration risk  CT of the chest performed today shows no evidence of pneumonia  Respiratory status remains stable on room air      -now status post 7 day course of antibiotics completed on 01/22  Continue to monitor off anymore antibiotics at this time      3   Sacral decubitus ulcer   This was debrided back in November of 2017   Does not overtly look infected   Wound Care evaluation noted      -local wound care  -serial examinations     4   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   Status post tracheostomy   Patient follows with [facility]  CT chest performed today concerning for metastatic disease    Hematology has been consulted for consideration of biopsy      5   Neck mass   Possibly necrotic lymph node   This was aspirated in December of 2017 with biopsies negative for any infectious process   The cytology is nondiagnostic   Patient was planning to follow-up with [facility] regarding this      6   Leukocytosis   May be leukemoid reaction in the setting of aspiration pneumonitis verses a possible pneumonia   Otherwise, patient remains afebrile, hemodynamically stable, nontoxic appearing   Leukocytosis has resolved      -plan as above  -monitor white blood cell count     Antibiotics:  Off antibiotics    No active ID issues  Will sign off  Please call with any questions  Subjective:  Shortness of breath is stable  No fevers  Denies nausea, vomiting, or diarrhea  Objective:  Vitals:  HR:  [58-69] 59  Resp:  [18] 18  BP: (121-130)/(50-75) 122/57  SpO2:  [95 %-98 %] 97 %  Temp (24hrs), Av 1 °F (36 7 °C), Min:98 °F (36 7 °C), Max:98 3 °F (36 8 °C)  Current: Temperature: 98 2 °F (36 8 °C)    Physical Exam:   General:  No acute distress, chronically ill-appearing, trach in place  Psychiatric:  Awake and alert  Pulmonary:  Normal respiratory excursion without accessory muscle use  Abdomen:  Soft, nontender  Extremities:  No edema  Skin:  No rashes, sacral decubitus ulcer no overt signs of infection    Lab Results:  I have personally reviewed pertinent labs  Results from last 7 days  Lab Units 18  0552 18  0519 18  0531   SODIUM mmol/L 142 142 142   POTASSIUM mmol/L 3 9 3 7 3 9   CHLORIDE mmol/L 110* 110* 108   CO2 mmol/L 27 27 29   ANION GAP mmol/L 5 5 5   BUN mg/dL 20 20 26*   CREATININE mg/dL 0 61 0 74 0 87   EGFR ml/min/1 73sq m 94 87 81   GLUCOSE RANDOM mg/dL 90 98 108   CALCIUM mg/dL 8 4 8 5 8 3   AST U/L  --   --  37   ALT U/L  --   --  17   ALK PHOS U/L  --   --  55   TOTAL PROTEIN g/dL  --   --  6 0*   ALBUMIN g/dL  --   --  2 3*   BILIRUBIN TOTAL mg/dL  --   --  0 33       Results from last 7 days  Lab Units 18  0552 18  0519 18  0531   WBC Thousand/uL 4 58 6 34 10 23*   HEMOGLOBIN g/dL 10 0* 10 1* 9 7*   PLATELETS Thousands/uL 204 200 178       Results from last 7 days  Lab Units 18  1200 18  0056 18  0009 18  2108 18  2101   BLOOD CULTURE   --   --   --  No Growth After 5 Days  No Growth After 5 Days     SPUTUM CULTURE  1+ Growth of Moraxella catarrhalis*  1+ Growth of   --   --   --   --    GRAM STAIN RESULT  No Epithelial cells seen  3+ Polys  1+ Gram positive cocci in pairs  Rare Gram negative diplococci  --   --   --   --    INFLUENZA A PCR   --   --  None Detected  --   --    INFLUENZA B PCR   --   --  None Detected  --   --    RSV PCR   --   --  None Detected  --   --    LEGIONELLA URINARY ANTIGEN   --  Negative  --   --   --        Imaging Studies:   I have personally reviewed pertinent imaging study reports and images in PACS  EKG, Pathology, and Other Studies:   I have personally reviewed pertinent reports

## 2018-01-23 NOTE — ASSESSMENT & PLAN NOTE
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  · Previous biopsy of cystic lesion of the neck/supraclavicular area found to be inconclusive, lesion dimension reduced on current CT scan  · Case discussed with Dr Socorro Basurto today, at this time no plan for ENT intervention, even if cancer proved to be metastatic patient will be candidate for chemotherapy and not vision and intervention will be added surgically at this point  · Neck cystic lesion possibly related to postsurgical seroma as per conversation with Radiology as documented in Oncology note  · Will keep Dr Socorro Basurto and formed on biopsy results  · Risk and benefit on NG tube feeding discussed with patient and family, at this time tube should be placed by ENT given history of laryngectomy and the placement safely could be kept only for 7 days with of course no long-term improvement of patient nutritional status especially if chemotherapy will be considered by patient himself  Peg tube will be the best option to correct patient poor nutritional status at this time  At this time patient not interested in PEG placement    Will follow up if any change of mind

## 2018-01-23 NOTE — RESTORATIVE TECHNICIAN NOTE
Restorative Specialist Mobility Note       Activity: Ambulate in garner, Ambulate in room, Bathroom privileges, Chair, Dangle, Stand at bedside (Educated/encouraged pt to ambulate w/assistance 3-4 x's/day  Bed alarm on   Pt callbell, phone/tray within reach )     Assistive Device: Front wheel walker             Grisel ARANGO, Restorative Technician, [employer] [facility]

## 2018-01-23 NOTE — ASSESSMENT & PLAN NOTE
- patient with enlarging right lung nodule when compared to prior images  - case was discussed with Dr Yessenia Hernandez with ENT, suggested to perform lung biopsy other than repeat cystic neck mass biopsy that was previously performed and found to be inconclusive  - at this time, but IR consult has been placed, patient likely to undergo biopsy either later on today or tomorrow  - oncology is following, likely to represent metastatic cancer from primary laryngeal cancer versus less likely primary lung cancer  - palliative Care has been following the patient now  - patient is of course very upset about again another bad news, yes declined physical exam with me today, he although at discussion about lung biopsy with me and his daughter in room, we have discussed also feeding through PEG tube versus NG tube with NG tube or Sai-Fed being only temporary solution  At this time, patient is trying to understand which options he has, I have talked to the patient about the fact that he needs to consider all these options at this time and make that decision based upon what he wants for himself without feeling the pressure of either physicians or family  Daughter at bedside is the following agreeable to have dad to consider his options and try to support him regardless what would be a final decision    At this time although further management with Oncology will be based upon results of lung biopsy

## 2018-01-24 ENCOUNTER — APPOINTMENT (INPATIENT)
Dept: RADIOLOGY | Facility: HOSPITAL | Age: 83
DRG: 177 | End: 2018-01-24
Attending: INTERNAL MEDICINE
Payer: COMMERCIAL

## 2018-01-24 PROCEDURE — 77012 CT SCAN FOR NEEDLE BIOPSY: CPT

## 2018-01-24 PROCEDURE — 88333 PATH CONSLTJ SURG CYTO XM 1: CPT | Performed by: PATHOLOGY

## 2018-01-24 PROCEDURE — 0BBD3ZX EXCISION OF RIGHT MIDDLE LUNG LOBE, PERCUTANEOUS APPROACH, DIAGNOSTIC: ICD-10-PCS | Performed by: RADIOLOGY

## 2018-01-24 PROCEDURE — 88305 TISSUE EXAM BY PATHOLOGIST: CPT | Performed by: PATHOLOGY

## 2018-01-24 PROCEDURE — 77012 CT SCAN FOR NEEDLE BIOPSY: CPT | Performed by: RADIOLOGY

## 2018-01-24 PROCEDURE — 99152 MOD SED SAME PHYS/QHP 5/>YRS: CPT | Performed by: RADIOLOGY

## 2018-01-24 PROCEDURE — 32405 HB PERCUT BX LUNG/MEDIASTINUM: CPT

## 2018-01-24 PROCEDURE — 32405 PR BIOPSY LUNG/MEDIASTINUM PERCUTANEOUS NEEDLE: CPT | Performed by: RADIOLOGY

## 2018-01-24 PROCEDURE — 88333 PATH CONSLTJ SURG CYTO XM 1: CPT | Performed by: INTERNAL MEDICINE

## 2018-01-24 PROCEDURE — 99232 SBSQ HOSP IP/OBS MODERATE 35: CPT | Performed by: INTERNAL MEDICINE

## 2018-01-24 PROCEDURE — 99153 MOD SED SAME PHYS/QHP EA: CPT

## 2018-01-24 PROCEDURE — 88305 TISSUE EXAM BY PATHOLOGIST: CPT | Performed by: INTERNAL MEDICINE

## 2018-01-24 PROCEDURE — 99152 MOD SED SAME PHYS/QHP 5/>YRS: CPT

## 2018-01-24 RX ORDER — FENTANYL CITRATE 50 UG/ML
INJECTION, SOLUTION INTRAMUSCULAR; INTRAVENOUS CODE/TRAUMA/SEDATION MEDICATION
Status: COMPLETED | OUTPATIENT
Start: 2018-01-24 | End: 2018-01-24

## 2018-01-24 RX ORDER — MIDAZOLAM HYDROCHLORIDE 1 MG/ML
INJECTION INTRAMUSCULAR; INTRAVENOUS CODE/TRAUMA/SEDATION MEDICATION
Status: COMPLETED | OUTPATIENT
Start: 2018-01-24 | End: 2018-01-24

## 2018-01-24 RX ADMIN — ENOXAPARIN SODIUM 40 MG: 40 INJECTION SUBCUTANEOUS at 22:18

## 2018-01-24 RX ADMIN — MIDAZOLAM 0.5 MG: 1 INJECTION INTRAMUSCULAR; INTRAVENOUS at 17:11

## 2018-01-24 RX ADMIN — MELATONIN 3 MG: 3 TAB ORAL at 22:18

## 2018-01-24 RX ADMIN — OLANZAPINE 5 MG: 5 TABLET, ORALLY DISINTEGRATING ORAL at 22:18

## 2018-01-24 RX ADMIN — FLUTICASONE PROPIONATE AND SALMETEROL 2 PUFF: 50; 250 POWDER RESPIRATORY (INHALATION) at 22:18

## 2018-01-24 RX ADMIN — CALCIUM CARBONATE 500 MG (1,250 MG)-VITAMIN D3 200 UNIT TABLET 1 TABLET: at 08:57

## 2018-01-24 RX ADMIN — MIRTAZAPINE 15 MG: 15 TABLET, FILM COATED ORAL at 22:18

## 2018-01-24 RX ADMIN — Medication 1 TABLET: at 08:49

## 2018-01-24 RX ADMIN — FENTANYL CITRATE 50 MCG: 50 INJECTION, SOLUTION INTRAMUSCULAR; INTRAVENOUS at 17:03

## 2018-01-24 RX ADMIN — MIDAZOLAM 0.5 MG: 1 INJECTION INTRAMUSCULAR; INTRAVENOUS at 17:03

## 2018-01-24 RX ADMIN — FLUTICASONE PROPIONATE AND SALMETEROL 2 PUFF: 50; 250 POWDER RESPIRATORY (INHALATION) at 10:22

## 2018-01-24 RX ADMIN — OXYCODONE HYDROCHLORIDE 5 MG: 5 TABLET ORAL at 18:20

## 2018-01-24 NOTE — ASSESSMENT & PLAN NOTE
· Sputum culture - Moraxella Catarrhalis pna  · Patient has completed antibiotics on January 22nd, is followed by Infectious Disease, continue to monitor off antibiotics  · As previous conversation with speech pathology, patient low risk for aspiration given post laryngectomy status  · Monitor closely respiratory status, continue with pulmonary toilet

## 2018-01-24 NOTE — SEDATION DOCUMENTATION
Right lower lobe lung biopsy by Dr Alina Burrell, specimins given to pathology, no complications, VSS, pt transferred back to hospitals[facility]  [facility] [place]

## 2018-01-24 NOTE — PROGRESS NOTES
Progress Note - Wound   Kaitlin Ruiz  81 y o  male MRN: [id number]  Unit/Bed#: [facility] 703-01 Encounter: [id number]      Assessment:   Stage 3 sacral pressure ulcer    Plan:   Discontinue mesalt ribbon  Cleanase with NSS  Skin prep periwound skin  Cover with alginate, hydrocolloid  Change every 3 days  Offload pressure      Subjective:  Non-verbal hand gesture that it is ok to assess his pressure ulcer    Objective:    Vitals: Blood pressure 131/71, pulse 60, temperature 98 8 °F (37 1 °C), temperature source Oral, resp  rate 18, height 5' 7" (1 702 m), weight 63 2 kg (139 lb 5 3 oz), SpO2 94 %  ,Body mass index is 21 82 kg/m²  Physical Exam:   Awake and alert, cooperative  Sacral pressure ulcer measurers 0 1 cm L x 0 2 cm W x 0 8 cm D, visible pink tissue  Wound opening too small to allow any packing  Periwound intact  Lab, Imaging and other studies: I have personally reviewed pertinent reports

## 2018-01-24 NOTE — DISCHARGE INSTRUCTIONS
Needle Biopsy of the Lung    WHAT YOU NEED TO KNOW:  A needle biopsy of the lung is a procedure to remove cells or tissue from your lung  You may have a fine needle aspiration biopsy (FNAB), or a core needle biopsy (CNB)  A FNAB is used to remove cells through a thin needle  CNB uses a thicker needle to remove lung tissue  The samples are collected and tested for inflammation, infection, or cancer  DISCHARGE INSTRUCTIONS:   Resume your normal diet  Small sips of flat soda will help with nausea  Limit your activity for 24 hours  Wound Care:      - Remove band aid in 24 hours      Contact Interventional Radiology at [phone] [place] PATIENTS: Contact Interventional Radiology at [phone]) ([address]: Contact Interventional Radiology at [phone]) if any of the following occur:    - You have a fever greater than 101*    - You cough up large amounts of bright red blood     - You have chest pain with breathing    - You have shortness of breath    -You have persistent nausea and vomiting    - You have pus, redness or swelling around your biopsy site    - You have questions or concerns about your condition or care    Assessment:  Stage 3 sacral pressure ulcer -       Plan:  - Flush with 5 cc NSS, pat dry  - Skin prep to periwound  - Mesalt ribbon strip, light pack wound  - Tape end to intact skin, cover with gauze, secure with tape, change Daily  - Q 2 hour position changes  - Nutrition consult  - Sof care air cushion when in chair

## 2018-01-24 NOTE — ASSESSMENT & PLAN NOTE
- case discussed with Radiology today, interventional Radiology for biopsy tomorrow morning  - also, case was discussed with cardiologist in regard to previous CT scan obtained in December of 2017 with no mention for the lung nodule  At that time lung nodule was not clearly visible and abnormality on CT scan was thought secondary to pneumonia at that time  Also discussed fast growing rate of the nodule that was not even present on CT scan in September of 2017  This is in line with very aggressive malignancy  - this findings were discussed with patient's daughter, Walter Ochoa, over the phone  Tomorrow at 12:00 p m   Walter Ochoa, Dr Jorge A Whittington with palliative care, myself will have a meeting with the patient to discuss these findings and try and understand patient goals, although daughter will be very interested in discharged to with hospice if this is decided by father

## 2018-01-24 NOTE — BRIEF OP NOTE (RAD/CATH)
CT biopsy right lower lobe mass  Procedure Note    PATIENT NAME: Vince Bustillos  : 1935  MRN: [id number]     Pre-op Diagnosis:   1  Right middle lobe pneumonia ([address] )    2  Sacral decubitus ulcer    3  History of laryngeal cancer    4  Lung nodule      Post-op Diagnosis:   1  Right middle lobe pneumonia ([address] )    2  Sacral decubitus ulcer    3  History of laryngeal cancer    4  Lung nodule        Surgeon:   Kael Garcia MD  Assistants:     No qualified resident was available, Resident is only observing    Estimated Blood Loss: minimal     Findings:     Lesional cells present  Specimens: 4 18 gauge core samples obtained       Complications:  none    Anesthesia: Conscious sedation and Estela Lopez MD     Date: 2018  Time: 5:25 PM

## 2018-01-24 NOTE — PROGRESS NOTES
Brief Palliative Progress Note:    Patient declined visit today  Plan for biopsy of lung nodule today  Will plan to follow up tomorrow to address pain further and will re-address goals once biopsy results are available       Lenora Goldmann, DO  Palliative and Supportive Care  [phone]

## 2018-01-24 NOTE — ASSESSMENT & PLAN NOTE
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

## 2018-01-24 NOTE — RESTORATIVE TECHNICIAN NOTE
Restorative Specialist Mobility Note       Activity: Other (Comment) (Educated/encouraged pt to ambulate w/assistance 3-4 x's/day, pt refused 2* feeling tired/wants to rest  Bed alarm on   Pt callbell, phone/tray within reach )     Assistive Device: Front wheel walker       Ilene ARANGO, Restorative Technician, [employer]

## 2018-01-24 NOTE — ASSESSMENT & PLAN NOTE
· Likely related to chronic disease, hemoglobin remained stable around 10 on last blood drawn on January 19  · Continue with monitoring if patient allows, he has been refusing blood work

## 2018-01-24 NOTE — ASSESSMENT & PLAN NOTE
- resolved   - episode occurred on Sunday, with right-sided facial swelling and lip edema, unclear if related to the patient way to lay in bed as there is some degree of edema of bilateral side of the face at baseline  - did receive full treatment with Pepcid and steroids  - continue with monitoring

## 2018-01-24 NOTE — RESTORATIVE TECHNICIAN NOTE
Restorative Specialist Mobility Note       Activity: Ambulate in garner, Ambulate in room, Bathroom privileges, Chair, Dangle, Stand at bedside (Educated/encouraged pt to ambulate w/assistance 3-4 x's/day  Bed alarm on   Pt callbell, phone/tray within reach )     Assistive Device: Front wheel walker       [employer] BS, Restorative Technician, [employer]

## 2018-01-24 NOTE — PROGRESS NOTES
Progress Note - Karl Astorga  1935, 81 y o  male MRN: [id number]    Unit/Bed#: [facility] 703-01 Encounter: [id number]    Primary Care Provider: Augustus Farrell MD   Date and time admitted to hospital: 1/16/2018  7:10 PM        * Lung nodule   Assessment & Plan    - case discussed with Radiology today, interventional Radiology for biopsy tomorrow morning  - also, case was discussed with cardiologist in regard to previous CT scan obtained in December of 2017 with no mention for the lung nodule  At that time lung nodule was not clearly visible and abnormality on CT scan was thought secondary to pneumonia at that time  Also discussed fast growing rate of the nodule that was not even present on CT scan in September of 2017  This is in line with very aggressive malignancy  - this findings were discussed with patient's daughter, Kendal Rubio, over the phone  Tomorrow at 12:00 p m   Kendal Rubio, Dr Haskel Bamberger with palliative care, myself will have a meeting with the patient to discuss these findings and try and understand patient goals, although daughter will be very interested in discharged to with hospice if this is decided by father          History of laryngeal cancer   Assessment & Plan    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  · Previous biopsy of cystic lesion of the neck/supraclavicular area found to be inconclusive, lesion dimension reduced on current CT scan  · Case discussed with Dr Sonal Jose today, at this time no plan for ENT intervention, even if cancer proved to be metastatic patient will be candidate for chemotherapy and not vision and intervention will be added surgically at this point  · Neck cystic lesion possibly related to postsurgical seroma as per conversation with Radiology as documented in Oncology note              Gram-negative pneumonia ([facility] [address] )   Assessment & Plan    · Sputum culture - Moraxella Catarrhalis pna  · Patient has completed antibiotics on January 22nd, is followed by Infectious Disease, continue to monitor off antibiotics  · As previous conversation with speech pathology, patient low risk for aspiration given post laryngectomy status  · Monitor closely respiratory status, continue with pulmonary toilet          Angio-edema, initial encounter   Assessment & Plan    - resolved   - episode occurred on Sunday, with right-sided facial swelling and lip edema, unclear if related to the patient way to lay in bed as there is some degree of edema of bilateral side of the face at baseline  - did receive full treatment with Pepcid and steroids  - continue with monitoring        Depression   Assessment & Plan    - family meeting as above        Anemia   Assessment & Plan    · Likely related to chronic disease, hemoglobin remained stable around 10 on last blood drawn on January 19  · Continue with monitoring if patient allows, he has been refusing blood work          Asthma   Assessment & Plan    · Continue home medical management         Sacral decubitus ulcer   Assessment & Plan    · Wound care following  · Stage 3 pressure ulcer, unclear if present on admission  · Continue with local care as per wound care recommendation  · Frequent repositioning   · Outpatient follow-up at the [facility]        Severe protein-calorie malnutrition [facility])   Assessment & Plan    - likely related to acute and chronic illness, follow-up p o   Intake  - dietician is following          VTE Pharmacologic Prophylaxis: yes  Pharmacologic: Enoxaparin (Lovenox)  Mechanical VTE Prophylaxis in Place: Yes     Patient Centered Rounds: I have performed bedside rounds with nursing staff today      Discussions with Specialists or Other Care Team Provider: radiology, IR, palliative care     Education and Discussions with Family / Patient: patient's daughter     Time Spent for Care: 45 minutes   More than 50% of total time spent on counseling and coordination of care as described above      Current Length of Stay: 7 day(s)     Current Patient Status: Inpatient   Certification Statement: The patient will continue to require additional inpatient hospital stay due to refer to above     Discharge Plan: TBD     Code Status: Level 1 - Full Code    Subjective:   Patient shrugs his shoulders when asked how he is feeling today  Does not voice any complaints, declined physical exam    Objective:     Vitals:   Temp (24hrs), Av °F (37 2 °C), Min:98 8 °F (37 1 °C), Max:99 3 °F (37 4 °C)    HR:  [60-67] 60  Resp:  [18] 18  BP: (117-131)/(54-71) 131/71  SpO2:  [94 %-96 %] 94 %  Body mass index is 21 82 kg/m²  Input and Output Summary (last 24 hours): Intake/Output Summary (Last 24 hours) at 18 1644  Last data filed at 18 1548   Gross per 24 hour   Intake              520 ml   Output             1625 ml   Net            -1105 ml       Physical Exam: declined    Physical Exam    Additional Data:     Labs:      Results from last 7 days  Lab Units 18  0552   WBC Thousand/uL 4 58   HEMOGLOBIN g/dL 10 0*   HEMATOCRIT % 30 3*   PLATELETS Thousands/uL 204       Results from last 7 days  Lab Units 18  0552   SODIUM mmol/L 142   POTASSIUM mmol/L 3 9   CHLORIDE mmol/L 110*   CO2 mmol/L 27   BUN mg/dL 20   CREATININE mg/dL 0 61   CALCIUM mg/dL 8 4   GLUCOSE RANDOM mg/dL 90       Results from last 7 days  Lab Units 18  2025   INR  1 08       * I Have Reviewed All Lab Data Listed Above    * Additional Pertinent Lab Tests Reviewed: No New Labs Available For Today    Imaging:    Imaging Reports Reviewed Today Include: none  Imaging Personally Reviewed by Myself Includes:  none    Recent Cultures (last 7 days):       Results from last 7 days  Lab Units 18  1200   SPUTUM CULTURE  1+ Growth of Moraxella catarrhalis*  1+ Growth of    GRAM STAIN RESULT  No Epithelial cells seen  3+ Polys  1+ Gram positive cocci in pairs  Rare Gram negative diplococci       Last 24 Hours Medication List:     calcium carbonate-vitamin D 1 tablet Oral Daily With Breakfast   enoxaparin 40 mg Subcutaneous Daily   fentaNYL 1 patch Transdermal Q72H   fluticasone-salmeterol 2 puff Inhalation Q12H [address]   melatonin 3 mg Oral HS   mirtazapine 15 mg Oral HS   OLANZapine 5 mg Oral HS   pantoprazole 40 mg Oral Early Morning   senna-docusate sodium 1 tablet Oral Daily        Today, Patient Was Seen By: Dixon Rasmussen MD    ** Please Note: Dragon 360 Dictation voice to text software may have been used in the creation of this document   **

## 2018-01-24 NOTE — ASSESSMENT & PLAN NOTE
· Wound care following  · Stage 3 pressure ulcer, unclear if present on admission  · Continue with local care as per wound care recommendation  · Frequent repositioning   · Outpatient follow-up at the [facility]

## 2018-01-25 ENCOUNTER — APPOINTMENT (INPATIENT)
Dept: RADIOLOGY | Facility: HOSPITAL | Age: 83
DRG: 177 | End: 2018-01-25
Attending: INTERNAL MEDICINE
Payer: COMMERCIAL

## 2018-01-25 ENCOUNTER — APPOINTMENT (INPATIENT)
Dept: RADIOLOGY | Facility: HOSPITAL | Age: 83
DRG: 177 | End: 2018-01-25
Payer: COMMERCIAL

## 2018-01-25 ENCOUNTER — TELEPHONE (OUTPATIENT)
Dept: RADIOLOGY | Facility: HOSPITAL | Age: 83
End: 2018-01-25

## 2018-01-25 LAB
ANION GAP SERPL CALCULATED.3IONS-SCNC: 4 MMOL/L (ref 4–13)
BASOPHILS # BLD AUTO: 0 THOUSANDS/ΜL (ref 0–0.1)
BASOPHILS NFR BLD AUTO: 0 % (ref 0–1)
BUN SERPL-MCNC: 18 MG/DL (ref 5–25)
CALCIUM SERPL-MCNC: 8.7 MG/DL (ref 8.3–10.1)
CHLORIDE SERPL-SCNC: 104 MMOL/L (ref 100–108)
CO2 SERPL-SCNC: 31 MMOL/L (ref 21–32)
CREAT SERPL-MCNC: 0.72 MG/DL (ref 0.6–1.3)
EOSINOPHIL # BLD AUTO: 0.06 THOUSAND/ΜL (ref 0–0.61)
EOSINOPHIL NFR BLD AUTO: 1 % (ref 0–6)
ERYTHROCYTE [DISTWIDTH] IN BLOOD BY AUTOMATED COUNT: 15.7 % (ref 11.6–15.1)
GFR SERPL CREATININE-BSD FRML MDRD: 87 ML/MIN/1.73SQ M
GLUCOSE SERPL-MCNC: 97 MG/DL (ref 65–140)
HCT VFR BLD AUTO: 34.8 % (ref 36.5–49.3)
HGB BLD-MCNC: 11.5 G/DL (ref 12–17)
LYMPHOCYTES # BLD AUTO: 0.33 THOUSANDS/ΜL (ref 0.6–4.47)
LYMPHOCYTES NFR BLD AUTO: 7 % (ref 14–44)
MAGNESIUM SERPL-MCNC: 2.2 MG/DL (ref 1.6–2.6)
MCH RBC QN AUTO: 32.7 PG (ref 26.8–34.3)
MCHC RBC AUTO-ENTMCNC: 33 G/DL (ref 31.4–37.4)
MCV RBC AUTO: 99 FL (ref 82–98)
MONOCYTES # BLD AUTO: 0.39 THOUSAND/ΜL (ref 0.17–1.22)
MONOCYTES NFR BLD AUTO: 8 % (ref 4–12)
NEUTROPHILS # BLD AUTO: 4.2 THOUSANDS/ΜL (ref 1.85–7.62)
NEUTS SEG NFR BLD AUTO: 84 % (ref 43–75)
NRBC BLD AUTO-RTO: 0 /100 WBCS
PHOSPHATE SERPL-MCNC: 3.1 MG/DL (ref 2.3–4.1)
PLATELET # BLD AUTO: 267 THOUSANDS/UL (ref 149–390)
PMV BLD AUTO: 9.3 FL (ref 8.9–12.7)
POTASSIUM SERPL-SCNC: 4.1 MMOL/L (ref 3.5–5.3)
RBC # BLD AUTO: 3.52 MILLION/UL (ref 3.88–5.62)
SODIUM SERPL-SCNC: 139 MMOL/L (ref 136–145)
WBC # BLD AUTO: 5 THOUSAND/UL (ref 4.31–10.16)

## 2018-01-25 PROCEDURE — 97535 SELF CARE MNGMENT TRAINING: CPT

## 2018-01-25 PROCEDURE — 83735 ASSAY OF MAGNESIUM: CPT | Performed by: INTERNAL MEDICINE

## 2018-01-25 PROCEDURE — 71045 X-RAY EXAM CHEST 1 VIEW: CPT

## 2018-01-25 PROCEDURE — C1769 GUIDE WIRE: HCPCS

## 2018-01-25 PROCEDURE — 99233 SBSQ HOSP IP/OBS HIGH 50: CPT | Performed by: INTERNAL MEDICINE

## 2018-01-25 PROCEDURE — 85025 COMPLETE CBC W/AUTO DIFF WBC: CPT | Performed by: INTERNAL MEDICINE

## 2018-01-25 PROCEDURE — 32557 INSERT CATH PLEURA W/ IMAGE: CPT | Performed by: RADIOLOGY

## 2018-01-25 PROCEDURE — 94760 N-INVAS EAR/PLS OXIMETRY 1: CPT

## 2018-01-25 PROCEDURE — 80048 BASIC METABOLIC PNL TOTAL CA: CPT | Performed by: INTERNAL MEDICINE

## 2018-01-25 PROCEDURE — 32557 INSERT CATH PLEURA W/ IMAGE: CPT

## 2018-01-25 PROCEDURE — 94640 AIRWAY INHALATION TREATMENT: CPT

## 2018-01-25 PROCEDURE — 84100 ASSAY OF PHOSPHORUS: CPT | Performed by: INTERNAL MEDICINE

## 2018-01-25 PROCEDURE — 99232 SBSQ HOSP IP/OBS MODERATE 35: CPT | Performed by: INTERNAL MEDICINE

## 2018-01-25 PROCEDURE — C1729 CATH, DRAINAGE: HCPCS

## 2018-01-25 PROCEDURE — 0W9930Z DRAINAGE OF RIGHT PLEURAL CAVITY WITH DRAINAGE DEVICE, PERCUTANEOUS APPROACH: ICD-10-PCS | Performed by: RADIOLOGY

## 2018-01-25 RX ORDER — LEVALBUTEROL 1.25 MG/.5ML
1.25 SOLUTION, CONCENTRATE RESPIRATORY (INHALATION)
Status: DISCONTINUED | OUTPATIENT
Start: 2018-01-25 | End: 2018-01-28

## 2018-01-25 RX ORDER — FENTANYL CITRATE 50 UG/ML
INJECTION, SOLUTION INTRAMUSCULAR; INTRAVENOUS CODE/TRAUMA/SEDATION MEDICATION
Status: COMPLETED | OUTPATIENT
Start: 2018-01-25 | End: 2018-01-25

## 2018-01-25 RX ADMIN — OXYCODONE HYDROCHLORIDE 10 MG: 10 TABLET ORAL at 21:47

## 2018-01-25 RX ADMIN — FENTANYL 1 PATCH: 25 PATCH TRANSDERMAL at 21:59

## 2018-01-25 RX ADMIN — LEVALBUTEROL HYDROCHLORIDE 1.25 MG: 1.25 SOLUTION, CONCENTRATE RESPIRATORY (INHALATION) at 14:49

## 2018-01-25 RX ADMIN — ALPRAZOLAM 0.25 MG: 0.25 TABLET ORAL at 21:47

## 2018-01-25 RX ADMIN — ALPRAZOLAM 0.25 MG: 0.25 TABLET ORAL at 10:55

## 2018-01-25 RX ADMIN — FENTANYL CITRATE 50 MCG: 50 INJECTION, SOLUTION INTRAMUSCULAR; INTRAVENOUS at 19:13

## 2018-01-25 RX ADMIN — FLUTICASONE PROPIONATE AND SALMETEROL 2 PUFF: 50; 250 POWDER RESPIRATORY (INHALATION) at 21:49

## 2018-01-25 RX ADMIN — IPRATROPIUM BROMIDE 0.5 MG: 0.5 SOLUTION RESPIRATORY (INHALATION) at 14:49

## 2018-01-25 RX ADMIN — Medication 1 TABLET: at 09:19

## 2018-01-25 RX ADMIN — OXYCODONE HYDROCHLORIDE 5 MG: 5 TABLET ORAL at 14:16

## 2018-01-25 RX ADMIN — IPRATROPIUM BROMIDE 0.5 MG: 0.5 SOLUTION RESPIRATORY (INHALATION) at 19:53

## 2018-01-25 RX ADMIN — MIRTAZAPINE 15 MG: 15 TABLET, FILM COATED ORAL at 21:53

## 2018-01-25 RX ADMIN — MELATONIN 3 MG: 3 TAB ORAL at 21:53

## 2018-01-25 RX ADMIN — ALBUTEROL SULFATE 1 PUFF: 90 AEROSOL, METERED RESPIRATORY (INHALATION) at 10:17

## 2018-01-25 RX ADMIN — FLUTICASONE PROPIONATE AND SALMETEROL 2 PUFF: 50; 250 POWDER RESPIRATORY (INHALATION) at 09:19

## 2018-01-25 RX ADMIN — ACETAMINOPHEN 650 MG: 325 TABLET, FILM COATED ORAL at 10:55

## 2018-01-25 RX ADMIN — LEVALBUTEROL HYDROCHLORIDE 1.25 MG: 1.25 SOLUTION, CONCENTRATE RESPIRATORY (INHALATION) at 19:53

## 2018-01-25 RX ADMIN — PANTOPRAZOLE SODIUM 40 MG: 40 TABLET, DELAYED RELEASE ORAL at 06:53

## 2018-01-25 RX ADMIN — CALCIUM CARBONATE 500 MG (1,250 MG)-VITAMIN D3 200 UNIT TABLET 1 TABLET: at 06:53

## 2018-01-25 RX ADMIN — OLANZAPINE 5 MG: 5 TABLET, ORALLY DISINTEGRATING ORAL at 21:51

## 2018-01-25 NOTE — ASSESSMENT & PLAN NOTE
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

## 2018-01-25 NOTE — ASSESSMENT & PLAN NOTE
- family meeting as above  - continue with mirtazapine, olanzapine, Xanax  - palliative care following

## 2018-01-25 NOTE — RESTORATIVE TECHNICIAN NOTE
Restorative Specialist Mobility Note       Activity: Ambulate in garner, Ambulate in room, Bathroom privileges, Chair, Stand at bedside (Educated/encouraged pt to ambulate with assistance 3-4 x's/day   Pt callbell, phone/tray within reach )     Assistive Device: Front wheel walker       [employer] BS, Restorative Technician, [employer]

## 2018-01-25 NOTE — PLAN OF CARE
Problem: OCCUPATIONAL THERAPY ADULT  Goal: Performs self-care activities at highest level of function for planned discharge setting  See evaluation for individualized goals  Treatment Interventions: ADL retraining, Functional transfer training, Endurance training, Cognitive reorientation, Patient/family training, Equipment evaluation/education, Compensatory technique education, Continued evaluation, Energy conservation, Activityengagement          See flowsheet documentation for full assessment, interventions and recommendations  Outcome: Progressing  Limitation: Decreased ADL status, Decreased endurance, Decreased self-care trans, Decreased high-level ADLs  Prognosis: Good  Assessment: Pt  Participated in occupational therapy session with focus on Activity tolerance, Grooming/ADLS, functional mobility, and energy conservation edu  Pt  Cleared by Rn/Jesica for participation in occupational therapy session  Pt  Trach collar,verbalizes short yes/no, or writes responses  Pt  Received EOB  Pt  Presents as alert and initially uncooperative with tx   Pt  States he is frustrated with staff and being in the hospital   Pt  Reports consistently being sob  pt  educated verbally and with literature on energy conservation  pt  express understanding of same  Pt  Displays fair + EOB  Dynamic sitting balance  Pt  Begrudgingly cooperative with session after multiple vc's  Pt  sba for don pants, rw in place for safety  Pt supervision functional mobility to sink 2* decreased balance, impulsive  Pt  Fair activity tolerance in stance at sink for grooming/hygiene, brush teeth, wash face, comb, Rw in place for safety 2* decreased balance  Pt  Supervision for functional mobility retrieval from the closet to bedside 2* decreased balance  Pt  Tolerated shampoo cap while EOB  Pt  Supine in bed to rest at this time  Patient will benefit from further rehab for achieving optimal performance with all functional tasks and safety  OT Discharge Recommendation: Home with family support (vs home care)  OT - OK to Discharge: Yes (when med clear)   Gabriela Judd  ot student

## 2018-01-25 NOTE — PROGRESS NOTES
Progress note - Palliative and [address]  81 y o  male [phone]    Assessment:  Repeat episodes of HCAP - moraxella catarrhalis PNA  Facial swelling  Laryngeal cancer - s/p chemo/XRT/surgery at [facility]  Locally sees Dr Zeferino Hagen in med/onc and Ivonne Palmer in ENT  Tracheostomy s/p total laryngectomy  Anemia  Depression/existential suffering  Sacral wound  Cancer associated pain  Insomnia  Decreased oral intake    Plan:  1  Continue current pain regimen of Duragesic 25 mcg  2  Continue PRN oxycodone  3  Continue Zyprexa and Remeron  4  Goals- met with patient today with his daughter, Dr Hua Rios and CHERRY GarciaW   - discussed worsening mass in his lung (biopsied yesterday) and what this represents- either metastatic lesion or new lung primary  - discussed that he is not optimized at this time to undergo disease directed cares and to be a candidate for therapy would likely need PEG tube to optimize nutritional status  - discussed not going through cancer treatments and talked about palliative services and hospice services   - daughter had many questions about hospice     - Mr  Julian Oseguera, however, grew upset and asked for the conversation to end prematurely  Code Status: Full - Level 1   Power of :  presumed to be daughter by [place] Act 169   Advance Directive / Living Will: yes   POLST:  no      Interval history:       Patient sitting up in his chair  He states that he gets dyspneic when he lies flat  Family discussions held today with him and his daughter and pertinent details noted above       MEDICATIONS / ALLERGIES:    all current active meds have been reviewed and current meds:   Current Facility-Administered Medications   Medication Dose Route Frequency    acetaminophen (TYLENOL) tablet 650 mg  650 mg Oral Q6H PRN    albuterol (PROVENTIL HFA,VENTOLIN HFA) inhaler 1 puff  1 puff Inhalation Q4H PRN    ALPRAZolam (XANAX) tablet 0 25 mg  0 25 mg Oral 4x Daily PRN    aluminum-magnesium hydroxide-simethicone (MYLANTA) 200-200-20 mg/5 mL oral suspension 30 mL  30 mL Oral Q6H PRN    bisacodyl (DULCOLAX) rectal suppository 10 mg  10 mg Rectal Daily PRN    calcium carbonate-vitamin D (OSCAL-D) 500 mg-200 units per tablet 1 tablet  1 tablet Oral Daily With Breakfast    enoxaparin (LOVENOX) subcutaneous injection 40 mg  40 mg Subcutaneous Daily    fentaNYL (DURAGESIC) 25 mcg/hr TD 72 hr patch 1 patch  1 patch Transdermal Q72H    fluticasone-salmeterol (ADVAIR) 250-50 mcg/dose inhaler 2 puff  2 puff Inhalation Q12H [address]    melatonin tablet 3 mg  3 mg Oral HS    mirtazapine (REMERON) tablet 15 mg  15 mg Oral HS    OLANZapine (ZyPREXA ZYDIS) dispersible tablet 5 mg  5 mg Oral HS    ondansetron (ZOFRAN) injection 4 mg  4 mg Intravenous Q6H PRN    oxyCODONE (ROXICODONE) immediate release tablet 10 mg  10 mg Oral Q4H PRN    oxyCODONE (ROXICODONE) IR tablet 5 mg  5 mg Oral Q4H PRN    pantoprazole (PROTONIX) EC tablet 40 mg  40 mg Oral Early Morning    polyethylene glycol (MIRALAX) packet 17 g  17 g Oral Daily PRN    senna-docusate sodium (SENOKOT S) 8 6-50 mg per tablet 1 tablet  1 tablet Oral Daily       Allergies   Allergen Reactions    Aspirin Anaphylaxis    Cleocin [Clindamycin] Other (See Comments)     Cannot specify but had definite reaction in past!!       OBJECTIVE:    Physical Exam  Physical Exam   Constitutional: He is oriented to person, place, and time  No distress  HENT:   Right Ear: External ear normal    Left Ear: External ear normal    Nose: Nose normal    Edema of face   Eyes: EOM are normal  Right eye exhibits no discharge  Left eye exhibits no discharge  No scleral icterus  Neck:   Trach noted   Cardiovascular: Normal rate, regular rhythm and intact distal pulses  Murmur heard  Pulmonary/Chest: Effort normal and breath sounds normal  No respiratory distress  Currently has oxygen on via trach collar   Abdominal: Soft   Bowel sounds are normal  He exhibits no distension  Musculoskeletal: He exhibits no edema  Neurological: He is alert and oriented to person, place, and time  Skin: Skin is warm and dry  There is pallor  Psychiatric: He has a normal mood and affect  Nursing note and vitals reviewed  Lab Results: I have personally reviewed pertinent labs  , CBC:   Lab Results   Component Value Date    WBC 5 00 01/25/2018    HGB 11 5 (L) 01/25/2018    HCT 34 8 (L) 01/25/2018    MCV 99 (H) 01/25/2018     01/25/2018    MCH 32 7 01/25/2018    MCHC 33 0 01/25/2018    RDW 15 7 (H) 01/25/2018    MPV 9 3 01/25/2018    NRBC 0 01/25/2018   , CMP:   Lab Results   Component Value Date     01/25/2018    K 4 1 01/25/2018     01/25/2018    CO2 31 01/25/2018    ANIONGAP 4 01/25/2018    BUN 18 01/25/2018    CREATININE 0 72 01/25/2018    GLUCOSE 97 01/25/2018    CALCIUM 8 7 01/25/2018    EGFR 87 01/25/2018     Imaging Studies: reviewed  EKG, Pathology, and Other Studies: reviewed    Counseling / Coordination of Care  Total floor / unit time spent today 35+ minutes  Greater than 50% of total time was spent with the patient and / or family counseling and / or coordination of care   A description of the counseling / coordination of care: symptom assessment, family discussions

## 2018-01-25 NOTE — OCCUPATIONAL THERAPY NOTE
Occupational Therapy Treatment Note     01/25/18 0948   Restrictions/Precautions   Weight Bearing Precautions Per Order No   Other Precautions Fall Risk   Lifestyle   Autonomy Per pt, Pt was I w/ ADLS and IADLS, drove, & required no use of DME PTA   Reciprocal Relationships Pt responds to yes/no questions primarily, will write on paper if needing further  Per pt, Pt lives alone however per chart review, CM notes report pt lives with daughter and her family  Pain Assessment   Pain Assessment No/denies pain   Pain Rating: FLACC (Rest) - Face 0   Pain Rating: FLACC (Rest) - Legs 0   Pain Rating: FLACC (Rest) - Activity 0   Pain Rating: FLACC (Rest) - Cry 0   Pain Rating: FLACC (Rest) - Consolability 0   Score: FLACC (Rest) 0   ADL   Where Assessed Standing at sink  (EOB for don pants)   Grooming Assistance 5  Supervision/Setup   Grooming Comments Pt  initially refuses tx, but with vc eventually cooperates  LB Dressing Assistance 5  Supervision/Setup   LB Dressing Deficit Supervision/safety   LB Dressing Comments sba 2* decreased balance, sob   Toileting Assistance  5  Supervision/Setup   Toileting Deficit Increased time to complete   Toileting Comments Pt  utilizes rw and requires sba 2* safety decreased balance   Communication   Communication Yes  (short yes/no  utilizes writing for further thought 2* trach)   Functional Standing Tolerance   Time ~6 min   Activity in stance at sink- hygiene  Bed Mobility   Rolling R 7  Independent   Rolling L 7  Independent   Supine to Sit 7  Independent   Sit to Supine 7  Independent   Transfers   Sit to Stand 7  Independent   Stand to Sit 7  Independent   Functional Mobility   Functional Mobility 5  Supervision   Additional Comments sba for safety 2* decreased safety   Additional items Rolling walker   Cognition   Overall Cognitive Status [facility]   Arousal/Participation Alert; Responsive; Cooperative   Attention Within functional limits   Orientation Level Oriented X4   Memory Unable to assess   Following Commands Follows one step commands without difficulty   Comments pt is alert and cooperative at times   Activity Tolerance   Activity Tolerance Patient tolerated treatment well   Medical Staff Made Aware rn   Assessment   Assessment Pt  Participated in occupational therapy session with focus on Activity tolerance, Grooming/ADLS, functional mobility, and energy conservation edu  Pt  Cleared by Rn/Jeisca for participation in occupational therapy session  Pt  Trach collar,verbalizes short yes/no, or writes responses  Pt  Received EOB  Pt  Presents as alert and initially uncooperative with tx   Pt  States he is frustrated with staff and being in the hospital   Pt  Reports consistently being sob  pt  educated verbally and with literature on energy conservation  pt  express understanding of same  Pt  Displays fair + EOB  Dynamic sitting balance  Pt  Begrudgingly cooperative with session after multiple vc's  Pt  sba for don pants, rw in place for safety  Pt supervision functional mobility to sink 2* decreased balance, impulsive  Pt  Fair activity tolerance in stance at sink for grooming/hygiene, brush teeth, wash face, comb, Rw in place for safety 2* decreased balance  Pt  Supervision for functional mobility retrieval from the closet to bedside 2* decreased balance  Pt  Tolerated shampoo cap while EOB  Pt  Supine in bed to rest at this time  Patient will benefit from further rehab for achieving optimal performance with all functional tasks and safety     Plan   Treatment Interventions ADL retraining;Functional transfer training;Energy conservation   Goal Expiration Date 01/28/18   Treatment Day 2   OT Frequency 3-5x/wk   Recommendation   OT Discharge Recommendation Home with family support  (vs home care)   OT - OK to Discharge Yes  (when med clear)   Barthel Index   Feeding 5   Bathing 0   Grooming Score 5   Dressing Score 5   Bladder Score 10   Bowels Score 10   Toilet Use Score 5 Transfers (Bed/Chair) Score 15   Mobility (Level Surface) Score 10   Stairs Score 0   Barthel Index Score 65   Modified Sabrina Scale   Modified S[place]an Scale 4               MOUNA Ba student

## 2018-01-25 NOTE — PROGRESS NOTES
Pt  C/o shortness of breath starting at 1015 this am  Lung sounds were clear but diminished  Suction per pr  Request, no sputum noted  PRN albuterol given, and pt  Placed on humidification via trach mask  Pt  Still saying he didn't feel right, saturation was WNL, respiratory called, and increased o2 flow for pts  Comfort  Pt  Then was complaining of pain PRN tylenol given and xanax which was effective and pt resting comfortably  Again at 1400 pt  Was complaining of SOB, O2 was 97% still on humidification, pt declined PRN albuterol, and instead asked for pain meds  PRN Oxy 5mg was given  Pt  Sitting up in bed with daughter at bedside

## 2018-01-25 NOTE — ASSESSMENT & PLAN NOTE
· Likely related to chronic disease  · Patient agreed on blood work today, hemoglobin 11 5, will not repeat daily labs at this time

## 2018-01-25 NOTE — PROGRESS NOTES
Progress Note - Stefan Denney  1935, 81 y o  male MRN: [id number]    Unit/Bed#: [facility] 703-01 Encounter: [id number]    Primary Care Provider: oRmario Altman MD   Date and time admitted to hospital: 1/16/2018  7:10 PM        * Lung nodule   Assessment & Plan    - case was discussed with radiologist in regard to previous CT scan obtained in December of 2017 with no mention for the lung nodule  At that time lung nodule was not clearly visible and abnormality on CT scan was thought secondary to pneumonia at that time  Also discussed fast growing rate of the nodule that was not even present on CT scan in September of 2017    This is in line with very aggressive malignancy  - biopsy was performed yesterday afternoon, pathology is pending  - admitting today with palliative care, patient, and daughter  - at this time patient frustrated with bad news and plan of care offered which included therapy for the cancer with possible placement of PEG feeding versus palliative versus hospice care  - patient interrupted encounter, declined any further interaction with physicians and asked us to leave the room  - I have called patient primary care physician as per family request an updated was given, I have left a message for Dr Judith Capone with [facility]  - will follow up with patient and family at later time          History of laryngeal cancer   Assessment & Plan    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  · Previous biopsy of cystic lesion of the neck/supraclavicular area found to be inconclusive, lesion dimension reduced on current CT scan  · Case discussed with Dr Peña Fuentes, at this time no plan for ENT intervention, even if cancer proved to be metastatic patient will be candidate for chemotherapy or immunotherapy and not intervention will be added surgically at this point  · Neck cystic lesion possibly related to postsurgical seroma as per conversation with Radiology as documented in Oncology note              Gram-negative pneumonia ([facility] [address] )   Assessment & Plan    · Sputum culture - Moraxella Catarrhalis pna  · Patient has completed antibiotics on January 22nd, is followed by Infectious Disease, continue to monitor off antibiotics  · As previous conversation with speech pathology, patient low risk for aspiration given post laryngectomy status  · Patient today feels more shortness of breath, without desaturation, for this reason was placed on trach collar with humidified air  · Patient is not allowing physical exam, therefore at this time unclear why shortness of breath although possible anxiety component as his respiratory pattern and feeling of shortness of breath improved with pain medications as well as a dose of Xanax  · Respiratory protocol as been started, p r n  nebulizer requested although patient has been declining at, requested chest x-ray although patient might not be agreeable to undergo this  · Will continue to monitor          Angio-edema, initial encounter   Assessment & Plan    - resolved   - episode occurred on Sunday, with right-sided facial swelling and lip edema, unclear if related to the patient way to lay in bed as there is some degree of edema of bilateral side of the face at baseline  - did receive full treatment with Pepcid and steroids  - continue with monitoring        Depression   Assessment & Plan    - family meeting as above  - continue with mirtazapine, olanzapine, Xanax  - palliative care following        Anemia   Assessment & Plan    · Likely related to chronic disease  · Patient agreed on blood work today, hemoglobin 11 5, will not repeat daily labs at this time          Asthma   Assessment & Plan    · Continue home medical management   · As patient does not allow exam unclear if any exacerbation 1        Sacral decubitus ulcer   Assessment & Plan    · Wound care following  · Stage 3 pressure ulcer, unclear if present on admission  · Continue with local care as per wound care recommendation  · Frequent repositioning   · Outpatient [facility] at the [facility]        Severe protein-calorie malnutrition [facility])   Assessment & Plan    - likely related to acute and chronic illness, follow-up p o  Intake  - dietician is following          VTE Pharmacologic Prophylaxis: yes  Pharmacologic: Enoxaparin (Lovenox)  Mechanical VTE Prophylaxis in Place: Yes     Patient Centered Rounds: I have performed bedside rounds with nursing staff today      Discussions with Specialists or Other Care Team Provider: palliative care     Education and Discussions with Family / Patient:  patient and patient's daughter      Time Spent for Care: 60 minutes   More than 50% of total time spent on counseling and coordination of care as described above      Current Length of Stay: 8 day(s)     Current Patient Status: Inpatient   Certification Statement: The patient will continue to require additional inpatient hospital stay due to refer to above     Discharge Plan: TBD     Code Status: Level 1 - Full Code    Subjective:   Patient complains of shortness of breath since this morning, better on trach collar  Patient declined physical exam   Upset after family meeting  Objective:     Vitals:   Temp (24hrs), Av 8 °F (37 1 °C), Min:98 4 °F (36 9 °C), Max:99 1 °F (37 3 °C)    HR:  [55-78] 78  Resp:  [18] 18  BP: (106-142)/(53-67) 134/67  SpO2:  [92 %-100 %] 97 %  Body mass index is 21 34 kg/m²  Input and Output Summary (last 24 hours):        Intake/Output Summary (Last 24 hours) at 18 1549  Last data filed at 18 1201   Gross per 24 hour   Intake              640 ml   Output             1550 ml   Net             -910 ml       Physical Exam: refused    Physical Exam    Additional Data:     Labs:      Results from last 7 days  Lab Units 18  0657   WBC Thousand/uL 5 00   HEMOGLOBIN g/dL 11 5*   HEMATOCRIT % 34 8*   PLATELETS Thousands/uL 267   NEUTROS PCT % 84*   LYMPHS PCT % 7*   MONOS PCT % 8   EOS PCT % 1       Results from last 7 days  Lab Units 01/25/18  0657   SODIUM mmol/L 139   POTASSIUM mmol/L 4 1   CHLORIDE mmol/L 104   CO2 mmol/L 31   BUN mg/dL 18   CREATININE mg/dL 0 72   CALCIUM mg/dL 8 7   GLUCOSE RANDOM mg/dL 97       Results from last 7 days  Lab Units 01/23/18  2025   INR  1 08       * I Have Reviewed All Lab Data Listed Above  * Additional Pertinent Lab Tests Reviewed: All Labs Within Last 24 Hours Reviewed    Imaging:    Imaging Reports Reviewed Today Include: none  Imaging Personally Reviewed by Myself Includes:  none    Recent Cultures (last 7 days):           Last 24 Hours Medication List:     calcium carbonate-vitamin D 1 tablet Oral Daily With Breakfast   enoxaparin 40 mg Subcutaneous Daily   fentaNYL 1 patch Transdermal Q72H   fluticasone-salmeterol 2 puff Inhalation Q12H JAMES   ipratropium 0 5 mg Nebulization TID   levalbuterol 1 25 mg Nebulization TID   melatonin 3 mg Oral HS   mirtazapine 15 mg Oral HS   OLANZapine 5 mg Oral HS   pantoprazole 40 mg Oral Early Morning   senna-docusate sodium 1 tablet Oral Daily        Today, Patient Was Seen By: Kimberlee Gutierrez MD    ** Please Note: Dragon 360 Dictation voice to text software may have been used in the creation of this document   **

## 2018-01-25 NOTE — PROGRESS NOTES
Pneumothorax noted on chest x-ray  Spoke with IR, for chest tube later on tonight  I went to talk to the patient and at this time he has declined to talk to me and asked me to called daughter Waymond Gaucher  I did call her and left a message including my callback number on her phone

## 2018-01-25 NOTE — ASSESSMENT & PLAN NOTE
- case was discussed with radiologist in regard to previous CT scan obtained in December of 2017 with no mention for the lung nodule  At that time lung nodule was not clearly visible and abnormality on CT scan was thought secondary to pneumonia at that time  Also discussed fast growing rate of the nodule that was not even present on CT scan in September of 2017    This is in line with very aggressive malignancy  - biopsy was performed yesterday afternoon, pathology is pending  - admitting today with palliative care, patient, and daughter  - at this time patient frustrated with bad news and plan of care offered which included therapy for the cancer with possible placement of PEG feeding versus palliative versus hospice care  - patient interrupted encounter, declined any further interaction with physicians and asked us to leave the room  - I have called patient primary care physician as per family request an updated was given, I have left a message for Dr Noemi Riggs with [facility]  - will follow up with patient and family at later time

## 2018-01-25 NOTE — ASSESSMENT & PLAN NOTE
· Wound care following  · Stage 3 pressure ulcer, unclear if present on admission  · Continue with local care as per wound care recommendation  · Frequent repositioning   · Outpatient follow-up at the [facility]

## 2018-01-25 NOTE — ASSESSMENT & PLAN NOTE
· Sputum culture - Moraxella Catarrhalis pna  · Patient has completed antibiotics on January 22nd, is followed by Infectious Disease, continue to monitor off antibiotics  · As previous conversation with speech pathology, patient low risk for aspiration given post laryngectomy status  · Patient today feels more shortness of breath, without desaturation, for this reason was placed on trach collar with humidified air  · Patient is not allowing physical exam, therefore at this time unclear why shortness of breath although possible anxiety component as his respiratory pattern and feeling of shortness of breath improved with pain medications as well as a dose of Xanax  · Respiratory protocol as been started, p r n  nebulizer requested although patient has been declining at, requested chest x-ray although patient might not be agreeable to undergo this  · Will continue to monitor

## 2018-01-26 ENCOUNTER — APPOINTMENT (INPATIENT)
Dept: RADIOLOGY | Facility: HOSPITAL | Age: 83
DRG: 177 | End: 2018-01-26
Attending: RADIOLOGY
Payer: COMMERCIAL

## 2018-01-26 PROBLEM — J95.811 POSTPROCEDURAL PNEUMOTHORAX: Status: ACTIVE | Noted: 2018-01-26

## 2018-01-26 PROCEDURE — 94640 AIRWAY INHALATION TREATMENT: CPT

## 2018-01-26 PROCEDURE — 71045 X-RAY EXAM CHEST 1 VIEW: CPT

## 2018-01-26 PROCEDURE — 99232 SBSQ HOSP IP/OBS MODERATE 35: CPT | Performed by: INTERNAL MEDICINE

## 2018-01-26 PROCEDURE — 94760 N-INVAS EAR/PLS OXIMETRY 1: CPT

## 2018-01-26 RX ADMIN — LEVALBUTEROL HYDROCHLORIDE 1.25 MG: 1.25 SOLUTION, CONCENTRATE RESPIRATORY (INHALATION) at 19:40

## 2018-01-26 RX ADMIN — OXYCODONE HYDROCHLORIDE 10 MG: 10 TABLET ORAL at 17:27

## 2018-01-26 RX ADMIN — ALPRAZOLAM 0.25 MG: 0.25 TABLET ORAL at 01:38

## 2018-01-26 RX ADMIN — OXYCODONE HYDROCHLORIDE 10 MG: 10 TABLET ORAL at 10:02

## 2018-01-26 RX ADMIN — OXYCODONE HYDROCHLORIDE 10 MG: 10 TABLET ORAL at 05:40

## 2018-01-26 RX ADMIN — LEVALBUTEROL HYDROCHLORIDE 1.25 MG: 1.25 SOLUTION, CONCENTRATE RESPIRATORY (INHALATION) at 07:18

## 2018-01-26 RX ADMIN — OXYCODONE HYDROCHLORIDE 10 MG: 10 TABLET ORAL at 13:40

## 2018-01-26 RX ADMIN — OXYCODONE HYDROCHLORIDE 10 MG: 10 TABLET ORAL at 01:38

## 2018-01-26 RX ADMIN — OLANZAPINE 5 MG: 5 TABLET, ORALLY DISINTEGRATING ORAL at 21:18

## 2018-01-26 RX ADMIN — MELATONIN 3 MG: 3 TAB ORAL at 21:18

## 2018-01-26 RX ADMIN — FLUTICASONE PROPIONATE AND SALMETEROL 2 PUFF: 50; 250 POWDER RESPIRATORY (INHALATION) at 21:18

## 2018-01-26 RX ADMIN — IPRATROPIUM BROMIDE 0.5 MG: 0.5 SOLUTION RESPIRATORY (INHALATION) at 19:40

## 2018-01-26 RX ADMIN — LEVALBUTEROL HYDROCHLORIDE 1.25 MG: 1.25 SOLUTION, CONCENTRATE RESPIRATORY (INHALATION) at 13:10

## 2018-01-26 RX ADMIN — MIRTAZAPINE 15 MG: 15 TABLET, FILM COATED ORAL at 21:18

## 2018-01-26 RX ADMIN — IPRATROPIUM BROMIDE 0.5 MG: 0.5 SOLUTION RESPIRATORY (INHALATION) at 07:18

## 2018-01-26 RX ADMIN — IPRATROPIUM BROMIDE 0.5 MG: 0.5 SOLUTION RESPIRATORY (INHALATION) at 13:10

## 2018-01-26 NOTE — ASSESSMENT & PLAN NOTE
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  - case was discussed also with Dr Laila Granda, who performed a laryngectomy, as per my conversation with the physician, surgery was meant to be a salvage surgery, and on the surgical field malignancy appear to be already extended beyond the capsule and have features of aggressive malignancy with multiple lymph node involvement  Also as per physician, the cystic neck mass is likely to be and other metastasis as his surgical field stopped  before the point of origin of the lesion and therefore cannot be considered postsurgical seroma  - also, same physician, tells me that again in order to pursue therapy, either chemotherapy or immunotherapy, patient will need a PEG insertion  - I have discussed this conversation with patient's daughter, as per my conversation with Rosmery Ivey, inter herself or the patient were aware of the extent of the pathology after surgery, and is seems like they were under the impression that the cancer was completely removed    At this time is unclear were the miscommunication might have happened

## 2018-01-26 NOTE — BRIEF OP NOTE (RAD/CATH)
Chest tube Procedure Note    PATIENT NAME: Mario Moody  : 1935  MRN: [id number]     Pre-op Diagnosis:   1  Right middle lobe pneumonia ([address] )    2  Sacral decubitus ulcer    3  History of laryngeal cancer    4  Lung nodule      Post-op Diagnosis:   1  Right middle lobe pneumonia ([address] )    2  Sacral decubitus ulcer    3  History of laryngeal cancer    4  Lung nodule        Surgeon:   Polina Charles MD  Assistants:     No qualified resident was available, Resident is only observing    Estimated Blood Loss:  None  Findings: Moderate-sized right pneumothorax  Status post lung biopsy yesterday  Mildly symptomatic  10 Turkish chest tube placed using fluoroscopic guidance      Specimens:  None    Complications:  None    Anesthesia: Local and IV fentanyl    Polina Charles MD     Date: 2018  Time: 7:33 PM

## 2018-01-26 NOTE — ASSESSMENT & PLAN NOTE
· Likely related to chronic disease  · Last hemoglobin on January 25th 11 5, no daily labs at this point

## 2018-01-26 NOTE — RESTORATIVE TECHNICIAN NOTE
Restorative Specialist Mobility Note       Activity: Other (Comment) (Educated/encouraged pt to ambulate w/assistance 3-4 x's/day, pt refused RN aware  Bed alarm on  Pt callbell, phone/tray within reach )     Assistive Device: Front wheel walker        Repositioned:  Other (Comment) (Rep /sat pt upright in bed )          Fletcher ARANGO, Restorative Technician, [employer] [facility]

## 2018-01-26 NOTE — ASSESSMENT & PLAN NOTE
· Sputum culture - Moraxella Catarrhalis pna  · Patient has completed antibiotics on January 22nd, is followed by Infectious Disease, continue to monitor off antibiotics  · As previous conversation with speech pathology, patient low risk for aspiration given post laryngectomy status  · Continue with monitoring

## 2018-01-26 NOTE — PROGRESS NOTES
Progress Note - Jose Youssef  1935, 81 y o  male MRN: [id number]    Unit/Bed#: [facility] 703-01 Encounter: [id number]    Primary Care Provider: Alfredo Chan MD   Date and time admitted to hospital: 1/16/2018  7:10 PM        * Lung nodule   Assessment & Plan    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  - case was discussed also with Dr Anabel Sweet, who performed a laryngectomy, as per my conversation with the physician, surgery was meant to be a salvage surgery, and on the surgical field malignancy appear to be already extended beyond the capsule and have features of aggressive malignancy with multiple lymph node involvement  Also as per physician, the cystic neck mass is likely to be and other metastasis as his surgical field stopped  before the point of origin of the lesion and therefore cannot be considered postsurgical seroma  - also, same physician, tells me that again in order to pursue therapy, either chemotherapy or immunotherapy, patient will need a PEG insertion  - I have discussed this conversation with patient's daughter, as per my conversation with Xochitl Lazo, inter herself or the patient were aware of the extent of the pathology after surgery, and is seems like they were under the impression that the cancer was completely removed    At this time is unclear were the miscommunication might have happened          Postprocedural pneumothorax Assessment & Plan    - diagnosis yesterday secondary to patient shortness of breath  - IR placed chest tube yesterday night, this has been clamped, repeat chest x-ray with resolution of the pneumothorax, IR to establish timing of removal of the chest tube        History of laryngeal cancer   Assessment & Plan    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  · Previous biopsy of cystic lesion of the neck/supraclavicular area found to be inconclusive, lesion dimension reduced on current CT scan  · Please refer to above              Gram-negative pneumonia (HCC)   Assessment & Plan    · Sputum culture - Moraxella Catarrhalis pna  · Patient has completed antibiotics on January 22nd, is followed by Infectious Disease, continue to monitor off antibiotics  · As previous conversation with speech pathology, patient low risk for aspiration given post laryngectomy status  · Continue with monitoring          Angio-edema, initial encounter   Assessment & Plan    - resolved   - episode occurred on Sunday, with right-sided facial swelling and lip edema, unclear if related to the patient way to lay in bed as there is some degree of edema of bilateral side of the face at baseline  - did receive full treatment with Pepcid and steroids  - continue with monitoring        Depression   Assessment & Plan    - family meeting as above  - continue with mirtazapine, olanzapine, Xanax  - palliative care following        Anemia   Assessment & Plan    · Likely related to chronic disease  · Last hemoglobin on January 25th 11 5, no daily labs at this point          Asthma   Assessment & Plan    · Continue home medical management   · No exacerbation on physical exam        Sacral decubitus ulcer   Assessment & Plan    · Wound care following  · Stage 3 pressure ulcer, unclear if present on admission  · Continue with local care as per wound care recommendation  · Frequent repositioning   · Outpatient follow-up at the [facility]        Severe protein-calorie malnutrition [facility])   Assessment & Plan    - likely related to acute and chronic illness, follow-up p o  Intake  - dietician is following          VTE Pharmacologic Prophylaxis: yes  Pharmacologic: Enoxaparin (Lovenox)  Mechanical VTE Prophylaxis in Place: Yes     Patient Centered Rounds: I have performed bedside rounds with nursing staff today      Discussions with Specialists or Other Care Team Provider: palliative care     Education and Discussions with Family / Patient:  patient's daughter      Time Spent for Care: 60 minutes   More than 50% of total time spent on counseling and coordination of care as described above      Current Length of Stay: 9 day(s)     Current Patient Status: Inpatient   Certification Statement: The patient will continue to require additional inpatient hospital stay due to refer to above     Discharge Plan: TBD     Code Status: Level 1 - Full Code       Subjective:   Patient denies complaints, is awaiting for respiratory therapist for tracheostomy care    Objective:     Vitals:   Temp (24hrs), Av 1 °F (37 3 °C), Min:99 1 °F (37 3 °C), Max:99 1 °F (37 3 °C)    HR:  [59-71] 67  Resp:  [18-20] 20  BP: ()/(49-66) 90/49  SpO2:  [96 %-99 %] 99 %  Body mass index is 21 34 kg/m²  Input and Output Summary (last 24 hours): Intake/Output Summary (Last 24 hours) at 18 1630  Last data filed at 18 1144   Gross per 24 hour   Intake              654 ml   Output             1925 ml   Net            -1271 ml       Physical Exam:     Physical Exam   Constitutional: He is oriented to person, place, and time  He appears well-developed  Cardiovascular: Normal rate, regular rhythm and normal heart sounds  Exam reveals no friction rub  No murmur heard  Pulmonary/Chest: Effort normal  No respiratory distress  He has no wheezes  He has no rales  Course bilaterally   Musculoskeletal: He exhibits edema (Trace at lower extremities)  Neurological: He is alert and oriented to person, place, and time  He exhibits normal muscle tone  Skin: Skin is warm  Additional Data:     Labs:      Results from last 7 days  Lab Units 01/25/18  0657   WBC Thousand/uL 5 00   HEMOGLOBIN g/dL 11 5*   HEMATOCRIT % 34 8*   PLATELETS Thousands/uL 267   NEUTROS PCT % 84*   LYMPHS PCT % 7*   MONOS PCT % 8   EOS PCT % 1       Results from last 7 days  Lab Units 01/25/18  0657   SODIUM mmol/L 139   POTASSIUM mmol/L 4 1   CHLORIDE mmol/L 104   CO2 mmol/L 31   BUN mg/dL 18   CREATININE mg/dL 0 72   CALCIUM mg/dL 8 7   GLUCOSE RANDOM mg/dL 97       Results from last 7 days  Lab Units 01/23/18  2025   INR  1 08       * I Have Reviewed All Lab Data Listed Above  * Additional Pertinent Lab Tests Reviewed: All Labs Within Last 24 Hours Reviewed    Imaging:    Imaging Reports Reviewed Today Include:  None  Imaging Personally Reviewed by Myself Includes:  None    Recent Cultures (last 7 days):           Last 24 Hours Medication List:     calcium carbonate-vitamin D 1 tablet Oral Daily With Breakfast   enoxaparin 40 mg Subcutaneous Daily   fentaNYL 1 patch Transdermal Q72H   fluticasone-salmeterol 2 puff Inhalation Q12H JAMES   ipratropium 0 5 mg Nebulization TID   levalbuterol 1 25 mg Nebulization TID   melatonin 3 mg Oral HS   mirtazapine 15 mg Oral HS   OLANZapine 5 mg Oral HS   pantoprazole 40 mg Oral Early Morning   senna-docusate sodium 1 tablet Oral Daily        Today, Patient Was Seen By: Maxi Mayen MD    ** Please Note: Dragon 360 Dictation voice to text software may have been used in the creation of this document   **

## 2018-01-26 NOTE — ASSESSMENT & PLAN NOTE
· Wound care following  · Stage 3 pressure ulcer, unclear if present on admission  · Continue with local care as per wound care recommendation  · Frequent repositioning   · Outpatient follow-up at the [facility]

## 2018-01-26 NOTE — RESTORATIVE TECHNICIAN NOTE
Restorative Specialist Mobility Note       Activity: Other (Comment) (Educated/encouraged pt to ambulate w/assistance 3-4 x's/day, pt refused RN aware  Bed alarm on   Pt callbell, phone/tray within reach )          Evy ARANGO, Restorative Technician, [employer]

## 2018-01-26 NOTE — ASSESSMENT & PLAN NOTE
- diagnosis yesterday secondary to patient shortness of breath  - IR placed chest tube yesterday night, this has been clamped, repeat chest x-ray with resolution of the pneumothorax, IR to establish timing of removal of the chest tube

## 2018-01-26 NOTE — ASSESSMENT & PLAN NOTE
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

## 2018-01-27 PROCEDURE — 94640 AIRWAY INHALATION TREATMENT: CPT

## 2018-01-27 PROCEDURE — 97535 SELF CARE MNGMENT TRAINING: CPT | Performed by: STUDENT IN AN ORGANIZED HEALTH CARE EDUCATION/TRAINING PROGRAM

## 2018-01-27 PROCEDURE — 99233 SBSQ HOSP IP/OBS HIGH 50: CPT | Performed by: INTERNAL MEDICINE

## 2018-01-27 PROCEDURE — 99232 SBSQ HOSP IP/OBS MODERATE 35: CPT | Performed by: INTERNAL MEDICINE

## 2018-01-27 PROCEDURE — 94760 N-INVAS EAR/PLS OXIMETRY 1: CPT

## 2018-01-27 RX ADMIN — IPRATROPIUM BROMIDE 0.5 MG: 0.5 SOLUTION RESPIRATORY (INHALATION) at 13:35

## 2018-01-27 RX ADMIN — LEVALBUTEROL HYDROCHLORIDE 1.25 MG: 1.25 SOLUTION, CONCENTRATE RESPIRATORY (INHALATION) at 13:35

## 2018-01-27 RX ADMIN — OXYCODONE HYDROCHLORIDE 10 MG: 10 TABLET ORAL at 13:16

## 2018-01-27 RX ADMIN — LEVALBUTEROL HYDROCHLORIDE 1.25 MG: 1.25 SOLUTION, CONCENTRATE RESPIRATORY (INHALATION) at 19:45

## 2018-01-27 RX ADMIN — ALPRAZOLAM 0.25 MG: 0.25 TABLET ORAL at 08:41

## 2018-01-27 RX ADMIN — IPRATROPIUM BROMIDE 0.5 MG: 0.5 SOLUTION RESPIRATORY (INHALATION) at 07:36

## 2018-01-27 RX ADMIN — FLUTICASONE PROPIONATE AND SALMETEROL 2 PUFF: 50; 250 POWDER RESPIRATORY (INHALATION) at 08:02

## 2018-01-27 RX ADMIN — Medication 1 TABLET: at 08:04

## 2018-01-27 RX ADMIN — LEVALBUTEROL HYDROCHLORIDE 1.25 MG: 1.25 SOLUTION, CONCENTRATE RESPIRATORY (INHALATION) at 07:36

## 2018-01-27 RX ADMIN — IPRATROPIUM BROMIDE 0.5 MG: 0.5 SOLUTION RESPIRATORY (INHALATION) at 19:45

## 2018-01-27 RX ADMIN — CALCIUM CARBONATE 500 MG (1,250 MG)-VITAMIN D3 200 UNIT TABLET 1 TABLET: at 08:04

## 2018-01-27 RX ADMIN — ALPRAZOLAM 0.25 MG: 0.25 TABLET ORAL at 21:33

## 2018-01-27 NOTE — ASSESSMENT & PLAN NOTE
· Wound care following  · Stage 3 pressure ulcer, unclear if present on admission  · Continue with local care as per wound care recommendation  · Frequent repositioning   · Outpatient follow-up at the [facility]

## 2018-01-27 NOTE — ASSESSMENT & PLAN NOTE
· Continue home medical management   · Unclear if any exacerbation at this time as patient has declined physical exam today

## 2018-01-27 NOTE — PROGRESS NOTES
Progress note - Palliative and [address]  81 y o  male [phone]    Assessment:  Repeat episodes of HCAP - moraxella catarrhalis PNA  Facial swelling  Laryngeal cancer - s/p chemo/XRT/surgery at [facility]  Locally sees Dr Priya Quintanilla in med/onc and Jailene Hernandez in ENT  Tracheostomy s/p total laryngectomy  Anemia  Depression/existential suffering  Sacral wound  Cancer associated pain  Insomnia  Decreased oral intake  Lung mass- +squamous cell    Plan:  1  Continue current pain regimen of Duragesic 25 mcg  2  Continue PRN oxycodone  3  Continue Zyprexa and Remeron  4  Goals- met with patient's family today with Dr Mayte Mendez   - discussed biopsy results   - spent time reviewing entire history from pre-morbid state to today  They are, obviously, very upset with all that has transpired and feel that he is clinically declining  They feel he has not had a "good day" in some time and the last time he was acting like himself was a period of time in December in between hospitalizations  - Family had many questions about hospice care and all were addressed   - Family also states that he has a DNR at home and understand the importance of this being re-addressed  - Patient only spoke with me present briefly- he wrote down that he did not want any more scans and then dismissed myself and Dr Travis Collins   - Daughter stated that he had indicated to her that he "was done"   - Family requesting time with him in private and we will plan to follow up tomorrow  He agrees to speak to my partner- Dr Maegan Quick  Code Status: Full - Level 1   Power of :  presumed to be daughter by [place] Act 169   Advance Directive / Living Will: yes   POLST:  no      Interval history:       First met with patient's family and then- per his request- had Mylene Bolanos PA-C speak with him  He did eventually allow the team in his room, but was quick to dismiss us  History mostly from his daughter and family meeting discussions  MEDICATIONS / ALLERGIES:    all current active meds have been reviewed and current meds:   Current Facility-Administered Medications   Medication Dose Route Frequency    acetaminophen (TYLENOL) tablet 650 mg  650 mg Oral Q6H PRN    albuterol (PROVENTIL HFA,VENTOLIN HFA) inhaler 1 puff  1 puff Inhalation Q4H PRN    ALPRAZolam (XANAX) tablet 0 25 mg  0 25 mg Oral 4x Daily PRN    aluminum-magnesium hydroxide-simethicone (MYLANTA) 200-200-20 mg/5 mL oral suspension 30 mL  30 mL Oral Q6H PRN    bisacodyl (DULCOLAX) rectal suppository 10 mg  10 mg Rectal Daily PRN    calcium carbonate-vitamin D (OSCAL-D) 500 mg-200 units per tablet 1 tablet  1 tablet Oral Daily With Breakfast    enoxaparin (LOVENOX) subcutaneous injection 40 mg  40 mg Subcutaneous Daily    fentaNYL (DURAGESIC) 25 mcg/hr TD 72 hr patch 1 patch  1 patch Transdermal Q72H    fluticasone-salmeterol (ADVAIR) 250-50 mcg/dose inhaler 2 puff  2 puff Inhalation Q12H [facility]    ipratropium (ATROVENT) 0 02 % inhalation solution 0 5 mg  0 5 mg Nebulization TID    levalbuterol (XOPENEX) inhalation solution 1 25 mg  1 25 mg Nebulization TID    melatonin tablet 3 mg  3 mg Oral HS    mirtazapine (REMERON) tablet 15 mg  15 mg Oral HS    OLANZapine (ZyPREXA ZYDIS) dispersible tablet 5 mg  5 mg Oral HS    ondansetron (ZOFRAN) injection 4 mg  4 mg Intravenous Q6H PRN    oxyCODONE (ROXICODONE) immediate release tablet 10 mg  10 mg Oral Q4H PRN    oxyCODONE (ROXICODONE) IR tablet 5 mg  5 mg Oral Q4H PRN    pantoprazole (PROTONIX) EC tablet 40 mg  40 mg Oral Early Morning    polyethylene glycol (MIRALAX) packet 17 g  17 g Oral Daily PRN    senna-docusate sodium (SENOKOT S) 8 6-50 mg per tablet 1 tablet  1 tablet Oral Daily       Allergies   Allergen Reactions    Aspirin Anaphylaxis    Cleocin [Clindamycin] Other (See Comments)     Cannot specify but had definite reaction in past!!       OBJECTIVE:    Physical Exam  Physical Exam   Patient refused examination  Lab Results: I have personally reviewed pertinent labs  , CBC:   No results found for: WBC, HGB, HCT, MCV, PLT, ADJUSTEDWBC, MCH, MCHC, RDW, MPV, NRBC, CMP:   No results found for: NA, K, CL, CO2, ANIONGAP, BUN, CREATININE, GLUCOSE, CALCIUM, AST, ALT, ALKPHOS, PROT, ALBUMIN, BILITOT, EGFR  Imaging Studies: reviewed  EKG, Pathology, and Other Studies: reviewed    Counseling / Coordination of Care  Total floor / unit time spent today 35+ minutes  Greater than 50% of total time was spent with the patient and / or family counseling and / or coordination of care   A description of the counseling / coordination of care: symptom assessment, family discussions

## 2018-01-27 NOTE — ASSESSMENT & PLAN NOTE
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  - case was discussed also with Dr Jocelynn Mei, who performed a laryngectomy, as per my conversation with the physician, surgery was meant to be a salvage surgery, and on the surgical field malignancy appear to be already extended beyond the capsule and have features of aggressive malignancy with multiple lymph node involvement  Also as per physician, the cystic neck mass is likely to be and other metastasis as his surgical field stopped  before the point of origin of the lesion and therefore cannot be considered postsurgical seroma  - also, same physician, tells me that again in order to pursue therapy, either chemotherapy or immunotherapy, patient will need a PEG insertion  - I have discussed this conversation with patient's daughter, as per my conversation with Kendal Rubio, inter herself or the patient were aware of the extent of the pathology after surgery, and is seems like they were under the impression that the cancer was completely removed    At this time is unclear were the miscommunication might have happened  - today, the patient is complaining of more shortness of breath, the chest tube for recent postprocedural pneumothorax as be removed yesterday, without residual pneumothorax on the chest x-ray  - when I asked the patient if he would like me to perform a CT scan of his chest to dressing shortness of breath or x-ray, states that he is done with scans and other scams  - at this point, discussed in details with daughter as well as other family members in regard of poor prognosis and state lead decline  The family is very interested in hospice care and has voiced as patient has advanced directives at home he which states that he would would like not to have life-prolonging measures and be a DNR/DNI  Addressed with the family that patient remains full code for his desire in our facility  Unfortunately patient dismissed me as well as palliative care physician Dr Michael Medina after brief encounter and did not was to discuss with us and plan of care  Although he is agreeable to talk with Dr Rae Burgess from palliative care tomorrow  - family at this time helping physicians to understand patient wishes although seems like he has repeated several times today that he has done  Family has requested private time with them in effort to understand his desires and now we would like to move forward in his care at this point  - as per today, as patient has declined encounter and other diagnostic status, not much more to offer other than supportive care and continue oxygen through trach collar from the medicine team   Daughter in the room agrees at this time to respect father wishes for the day

## 2018-01-27 NOTE — OCCUPATIONAL THERAPY NOTE
01/27/18 1311   Restrictions/Precautions   Weight Bearing Precautions Per Order No   Other Precautions Fall Risk   Pain Assessment   Pain Assessment Sierra-Baker FACES   Sierra-Baker FACES Pain Rating 2   ADL   Where Assessed Edge of bed   LB Dressing Assistance 5  Supervision/Setup   LB Dressing Deficit Setup   LB Dressing Comments don socks and pants   Functional Standing Tolerance   Time 30 sec   Activity static standing at EOB for LB dressing   Transfers   Sit to Stand 7  Independent   Stand to Sit 7  Independent   Cognition   Overall Cognitive Status WFL   Arousal/Participation Responsive; Uncooperative   Attention Within functional limits   Orientation Level Oriented X4   Memory Unable to assess   Following Commands Follows one step commands with increased time or repetition   Activity Tolerance   Activity Tolerance Treatment limited secondary to agitation   Assessment   Assessment Pt participates in OT session with focus on LB dressing and activity tolerance to increase I with adls  Pt requires encouragement to participate in therapy as he refused previous attempt  Pt setup LB dressing to don/doff socks and pants while seated/standing from EOB  Pt becomes slightly agitated after activity completed and refused further activities this session  Pt I bed mobility to return to supine position in bed  Pt will continue to benefit from activity tolerance and other adls  Plan   Treatment Interventions ADL retraining; Activityengagement   Goal Expiration Date 01/28/18   Treatment Day 3   OT Frequency 3-5x/wk   Recommendation   OT Discharge Recommendation Other (Comment)  (STR vs home with family support pending progress)

## 2018-01-27 NOTE — PROGRESS NOTES
Progress Note - [place] Cor[place]  1935, 81 y o  male MRN: [id number]    [facility] Encounter: [id number]    Primary Care Provider: Noretta Seip, MD   Date and time admitted to hospital: 1/16/2018  7:10 PM        * Lung nodule   Assessment & Plan    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  - case was discussed also with Dr Tiffanie Hernandez, who performed a laryngectomy, as per my conversation with the physician, surgery was meant to be a salvage surgery, and on the surgical field malignancy appear to be already extended beyond the capsule and have features of aggressive malignancy with multiple lymph node involvement  Also as per physician, the cystic neck mass is likely to be and other metastasis as his surgical field stopped  before the point of origin of the lesion and therefore cannot be considered postsurgical seroma  - also, same physician, tells me that again in order to pursue therapy, either chemotherapy or immunotherapy, patient will need a PEG insertion  - I have discussed this conversation with patient's daughter, as per my conversation with Elizabeth Saldana, inter herself or the patient were aware of the extent of the pathology after surgery, and is seems like they were under the impression that the cancer was completely removed    At this time is unclear were the miscommunication might have happened  - today, the patient is complaining of more shortness of breath, the chest tube for recent postprocedural pneumothorax as be removed yesterday, without residual pneumothorax on the chest x-ray  - when I asked the patient if he would like me to perform a CT scan of his chest to dressing shortness of breath or x-ray, states that he is done with scans and other scams  - at this point, discussed in details with daughter as well as other family members in regard of poor prognosis and state lead decline  The family is very interested in hospice care and has voiced as patient has advanced directives at home he which states that he would would like not to have life-prolonging measures and be a DNR/DNI  Addressed with the family that patient remains full code for his desire in our facility  Unfortunately patient dismissed me as well as palliative care physician Dr Bethany Kumar after brief encounter and did not was to discuss with us and plan of care  Although he is agreeable to talk with Dr Sid Watts from palliative care tomorrow  - family at this time helping physicians to understand patient wishes although seems like he has repeated several times today that he has done  Family has requested private time with them in effort to understand his desires and now we would like to move forward in his care at this point  - as per today, as patient has declined encounter and other diagnostic status, not much more to offer other than supportive care and continue oxygen through trach collar from the medicine team   Daughter in the room agrees at this time to respect father wishes for the day            Postprocedural pneumothorax   Assessment & Plan    - chest tube has been discontinued, repeat chest x-ray without residual pneumothorax        History of laryngeal cancer   Assessment & Plan    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  · Previous biopsy of cystic lesion of the neck/supraclavicular area found to be inconclusive, lesion dimension reduced on current CT scan  · Please refer to above              Gram-negative pneumonia (HCC)   Assessment & Plan    · Sputum culture - Moraxella Catarrhalis pna  · Patient has completed antibiotics on January 22nd, is followed by Infectious Disease, continue to monitor off antibiotics  · As previous conversation with speech pathology, patient low risk for aspiration given post laryngectomy status  · Continue with monitoring          Angio-edema, initial encounter   Assessment & Plan    - resolved   - episode occurred on Sunday, with right-sided facial swelling and lip edema, unclear if related to the patient way to lay in bed as there is some degree of edema of bilateral side of the face at baseline  - did receive full treatment with Pepcid and steroids  - continue with monitoring        Depression   Assessment & Plan    - family meeting as above  - continue with mirtazapine, olanzapine, Xanax  - palliative care following        Anemia   Assessment & Plan    · Likely related to chronic disease  · Last hemoglobin on January 25th 11 5, no daily labs at this point          Asthma   Assessment & Plan    · Continue home medical management   · Unclear if any exacerbation at this time as patient has declined physical exam today        Sacral decubitus ulcer   Assessment & Plan    · Wound care following  · Stage 3 pressure ulcer, unclear if present on admission  · Continue with local care as per wound care recommendation  · Frequent repositioning   · Outpatient follow-up at the [facility]        Severe protein-calorie malnutrition [facility])   Assessment & Plan    - likely related to acute and chronic illness, follow-up p o   Intake  - dietician is following          VTE Pharmacologic Prophylaxis: yes  Pharmacologic: Enoxaparin (Lovenox)  Mechanical VTE Prophylaxis in Place: Yes     Patient Centered Rounds: I have performed bedside rounds with nursing staff today      Discussions with Specialists or Other Care Team Provider: palliative care     Education and Discussions with Family / Patient:  patient's daughter, sister, LYDIA     Time Spent for Care: 75 minutes   More than 50% of total time spent on counseling and coordination of care as described above      Current Length of Stay: 10 day(s)     Current Patient Status: Inpatient   Certification Statement: The patient will continue to require additional inpatient hospital stay due to refer to above     Discharge Plan: TBD     Code Status: Level 1 - Full Code    Subjective:   Patient states that he is feeling short of breath  Is not really sure about why  Encounter extremely brief  Patient declined any further discussion or physical exam    Objective:     Vitals:   Temp (24hrs), Av °F (37 2 °C), Min:98 4 °F (36 9 °C), Max:99 5 °F (37 5 °C)    HR:  [62-67] 62  Resp:  [20] 20  BP: ()/(43-61) 107/61  SpO2:  [95 %-100 %] 99 %  Body mass index is 21 34 kg/m²  Input and Output Summary (last 24 hours): Intake/Output Summary (Last 24 hours) at 18 1523  Last data filed at 18 1316   Gross per 24 hour   Intake              360 ml   Output              450 ml   Net              -90 ml       Physical Exam: declined    Physical Exam    Additional Data:     Labs:      Results from last 7 days  Lab Units 18  0657   WBC Thousand/uL 5 00   HEMOGLOBIN g/dL 11 5*   HEMATOCRIT % 34 8*   PLATELETS Thousands/uL 267   NEUTROS PCT % 84*   LYMPHS PCT % 7*   MONOS PCT % 8   EOS PCT % 1       Results from last 7 days  Lab Units 18  0657   SODIUM mmol/L 139   POTASSIUM mmol/L 4 1   CHLORIDE mmol/L 104   CO2 mmol/L 31   BUN mg/dL 18   CREATININE mg/dL 0 72   CALCIUM mg/dL 8 7   GLUCOSE RANDOM mg/dL 97       Results from last 7 days  Lab Units 18  2025   INR  1 08       * I Have Reviewed All Lab Data Listed Above    * Additional Pertinent Lab Tests Reviewed: No New Labs Available For Today    Imaging:    Imaging Reports Reviewed Today Include: CXR  Imaging Personally Reviewed by Myself Includes:  none    Recent Cultures (last 7 days):           Last 24 Hours Medication List:     calcium carbonate-vitamin D 1 tablet Oral Daily With Breakfast   enoxaparin 40 mg Subcutaneous Daily   fentaNYL 1 patch Transdermal Q72H   fluticasone-salmeterol 2 puff Inhalation Q12H JAMES   ipratropium 0 5 mg Nebulization TID   levalbuterol 1 25 mg Nebulization TID   melatonin 3 mg Oral HS   mirtazapine 15 mg Oral HS   OLANZapine 5 mg Oral HS   pantoprazole 40 mg Oral Early Morning   senna-docusate sodium 1 tablet Oral Daily        Today, Patient Was Seen By: Adela Luis MD    ** Please Note: Dragon 360 Dictation voice to text software may have been used in the creation of this document   **

## 2018-01-27 NOTE — PLAN OF CARE
Problem: OCCUPATIONAL THERAPY ADULT  Goal: Performs self-care activities at highest level of function for planned discharge setting  See evaluation for individualized goals  Treatment Interventions: ADL retraining, Functional transfer training, Endurance training, Cognitive reorientation, Patient/family training, Equipment evaluation/education, Compensatory technique education, Continued evaluation, Energy conservation, Activityengagement          See flowsheet documentation for full assessment, interventions and recommendations  Outcome: Progressing  Limitation: Decreased ADL status, Decreased endurance, Decreased self-care trans, Decreased high-level ADLs  Prognosis: Good  Assessment: Pt participates in OT session with focus on LB dressing and activity tolerance to increase I with adls  Pt requires encouragement to participate in therapy as he refused previous attempt  Pt setup LB dressing to don/doff socks and pants while seated/standing from EOB  Pt becomes slightly agitated after activity completed and refused further activities this session  Pt I bed mobility to return to supine position in bed  Pt will continue to benefit from activity tolerance and other adls       OT Discharge Recommendation: Other (Comment) (STR vs home with family support pending progress)  OT - OK to Discharge: Yes (when med clear)

## 2018-01-28 PROBLEM — Z71.89 GOALS OF CARE, COUNSELING/DISCUSSION: Status: ACTIVE | Noted: 2018-01-28

## 2018-01-28 PROBLEM — R62.7 ADULT FAILURE TO THRIVE: Status: ACTIVE | Noted: 2018-01-28

## 2018-01-28 PROBLEM — C78.01 MALIGNANT NEOPLASM METASTATIC TO RIGHT LUNG (HCC): Status: ACTIVE | Noted: 2018-01-22

## 2018-01-28 PROCEDURE — 99233 SBSQ HOSP IP/OBS HIGH 50: CPT | Performed by: FAMILY MEDICINE

## 2018-01-28 PROCEDURE — 94640 AIRWAY INHALATION TREATMENT: CPT

## 2018-01-28 PROCEDURE — 99232 SBSQ HOSP IP/OBS MODERATE 35: CPT | Performed by: INTERNAL MEDICINE

## 2018-01-28 PROCEDURE — 94640 AIRWAY INHALATION TREATMENT: CPT | Performed by: SOCIAL WORKER

## 2018-01-28 PROCEDURE — 94760 N-INVAS EAR/PLS OXIMETRY 1: CPT

## 2018-01-28 RX ORDER — IPRATROPIUM BROMIDE AND ALBUTEROL SULFATE 2.5; .5 MG/3ML; MG/3ML
3 SOLUTION RESPIRATORY (INHALATION)
Status: DISCONTINUED | OUTPATIENT
Start: 2018-01-28 | End: 2018-01-28

## 2018-01-28 RX ORDER — IPRATROPIUM BROMIDE AND ALBUTEROL SULFATE 2.5; .5 MG/3ML; MG/3ML
3 SOLUTION RESPIRATORY (INHALATION) EVERY 4 HOURS PRN
Status: DISCONTINUED | OUTPATIENT
Start: 2018-01-28 | End: 2018-02-02 | Stop reason: HOSPADM

## 2018-01-28 RX ADMIN — LEVALBUTEROL HYDROCHLORIDE 1.25 MG: 1.25 SOLUTION, CONCENTRATE RESPIRATORY (INHALATION) at 14:10

## 2018-01-28 RX ADMIN — IPRATROPIUM BROMIDE 0.5 MG: 0.5 SOLUTION RESPIRATORY (INHALATION) at 07:35

## 2018-01-28 RX ADMIN — FLUTICASONE PROPIONATE AND SALMETEROL 2 PUFF: 50; 250 POWDER RESPIRATORY (INHALATION) at 10:29

## 2018-01-28 RX ADMIN — OLANZAPINE 5 MG: 5 TABLET, ORALLY DISINTEGRATING ORAL at 00:27

## 2018-01-28 RX ADMIN — OXYCODONE HYDROCHLORIDE 10 MG: 10 TABLET ORAL at 00:24

## 2018-01-28 RX ADMIN — OXYCODONE HYDROCHLORIDE 10 MG: 10 TABLET ORAL at 04:51

## 2018-01-28 RX ADMIN — MELATONIN 3 MG: 3 TAB ORAL at 00:26

## 2018-01-28 RX ADMIN — PANTOPRAZOLE SODIUM 40 MG: 40 TABLET, DELAYED RELEASE ORAL at 04:51

## 2018-01-28 RX ADMIN — IPRATROPIUM BROMIDE AND ALBUTEROL SULFATE 3 ML: .5; 3 SOLUTION RESPIRATORY (INHALATION) at 21:37

## 2018-01-28 RX ADMIN — CALCIUM CARBONATE 500 MG (1,250 MG)-VITAMIN D3 200 UNIT TABLET 1 TABLET: at 10:30

## 2018-01-28 RX ADMIN — IPRATROPIUM BROMIDE 0.5 MG: 0.5 SOLUTION RESPIRATORY (INHALATION) at 14:10

## 2018-01-28 RX ADMIN — OLANZAPINE 5 MG: 5 TABLET, ORALLY DISINTEGRATING ORAL at 22:02

## 2018-01-28 RX ADMIN — MIRTAZAPINE 15 MG: 15 TABLET, FILM COATED ORAL at 00:26

## 2018-01-28 RX ADMIN — Medication 1 TABLET: at 10:30

## 2018-01-28 RX ADMIN — MIRTAZAPINE 15 MG: 15 TABLET, FILM COATED ORAL at 22:02

## 2018-01-28 RX ADMIN — OXYCODONE HYDROCHLORIDE 10 MG: 10 TABLET ORAL at 10:41

## 2018-01-28 RX ADMIN — LEVALBUTEROL HYDROCHLORIDE 1.25 MG: 1.25 SOLUTION, CONCENTRATE RESPIRATORY (INHALATION) at 07:35

## 2018-01-28 RX ADMIN — MELATONIN 3 MG: 3 TAB ORAL at 22:02

## 2018-01-28 RX ADMIN — ACETAMINOPHEN 650 MG: 325 TABLET, FILM COATED ORAL at 22:05

## 2018-01-28 NOTE — ASSESSMENT & PLAN NOTE
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

## 2018-01-28 NOTE — ASSESSMENT & PLAN NOTE
- chest tube has been discontinued, repeat chest x-ray on January 26th without residual pneumothorax

## 2018-01-28 NOTE — PROGRESS NOTES
Progress Note - Palliative & Supportive Care  Layla Connors   81 y o   male  PP[facility] 703/PPHP 703-01   MRN: [id number]  Encounter: [id number]     Assessment  Repeat episodes of HCAP - moraxella catarrhalis PNA  Facial swelling  Laryngeal cancer - s/p chemo/XRT/surgery at [facility]  Now Lung mass (+)SCC  Tracheostomy s/p total laryngectomy  Anemia  Depression/existential suffering  Sacral wound  Cancer associated pain  Insomnia  Decreased oral intake    Plan:  Goals of Care:   Multiple conversations had today with the following results:  Patient will transition to level 3 code status as outlined by his advanced directive  If he does not want a PEG, best supportive care through a hospice program has been recommended  If he wants a PEG, he may still opt for best supportive care through a hospice program and use the PEG for some nutrition and medication administration  If he wants a PEG, he can also opt to discuss potentially life prolonging disease directed therapies with medical oncology in the form of immunotherapy or chemotherapy  He understands that his disease is treatable but not curable and there are no guarantees that more disease directed therapy will be successful  If he chooses to pursue more disease directed therapy, his functional status remains of utmost importance and he must continue to pursue rehab  He rates his quality of life as poor  The 2 options that he will have to attempt improvement are outlined above as hospice or an attempt a disease directed therapy  He has asked his daughter to look into the [facility]  I will have case management send a referral to the hospice liaison  History  Conversations held with the patient alone, his daughter and LYDIA alone, and with the patient and daughter together  See plan for outcomes of these conversations      Meds  all current active meds have been reviewed and current meds:   Current Facility-Administered Medications Medication Dose Route Frequency    acetaminophen (TYLENOL) tablet 650 mg  650 mg Oral Q6H PRN    albuterol (PROVENTIL HFA,VENTOLIN HFA) inhaler 1 puff  1 puff Inhalation Q4H PRN    ALPRAZolam (XANAX) tablet 0 25 mg  0 25 mg Oral 4x Daily PRN    aluminum-magnesium hydroxide-simethicone (MYLANTA) 200-200-20 mg/5 mL oral suspension 30 mL  30 mL Oral Q6H PRN    bisacodyl (DULCOLAX) rectal suppository 10 mg  10 mg Rectal Daily PRN    calcium carbonate-vitamin D (OSCAL-D) 500 mg-200 units per tablet 1 tablet  1 tablet Oral Daily With Breakfast    enoxaparin (LOVENOX) subcutaneous injection 40 mg  40 mg Subcutaneous Daily    fentaNYL (DURAGESIC) 25 mcg/hr TD 72 hr patch 1 patch  1 patch Transdermal Q72H    fluticasone-salmeterol (ADVAIR) 250-50 mcg/dose inhaler 2 puff  2 puff Inhalation Q12H [address]    ipratropium (ATROVENT) 0 02 % inhalation solution 0 5 mg  0 5 mg Nebulization TID    levalbuterol (XOPENEX) inhalation solution 1 25 mg  1 25 mg Nebulization TID    melatonin tablet 3 mg  3 mg Oral HS    mirtazapine (REMERON) tablet 15 mg  15 mg Oral HS    OLANZapine (ZyPREXA ZYDIS) dispersible tablet 5 mg  5 mg Oral HS    ondansetron (ZOFRAN) injection 4 mg  4 mg Intravenous Q6H PRN    oxyCODONE (ROXICODONE) immediate release tablet 10 mg  10 mg Oral Q4H PRN    oxyCODONE (ROXICODONE) IR tablet 5 mg  5 mg Oral Q4H PRN    pantoprazole (PROTONIX) EC tablet 40 mg  40 mg Oral Early Morning    polyethylene glycol (MIRALAX) packet 17 g  17 g Oral Daily PRN    senna-docusate sodium (SENOKOT S) 8 6-50 mg per tablet 1 tablet  1 tablet Oral Daily       Allergies   Allergen Reactions    Aspirin Anaphylaxis    Cleocin [Clindamycin] Other (See Comments)     Cannot specify but had definite reaction in past!!       Objective  Physical Exam: BP 98/52 (BP Location: Left arm)   Pulse 64   Temp 98 5 °F (36 9 °C) (Oral)   Resp 20   Ht 5' 7" (1 702 m)   Wt 61 8 kg (136 lb 3 9 oz)   SpO2 97%   BMI 21 34 kg/m²   General appearance: alert, easily annoyed  Throat: facial swelling has improved since last week  Trach in place  Extremities: diffuse muscle wasting  Neurologic: Motor: generalized weakness  Lab Results: I have personally reviewed pertinent labs  Counseling / Coordination of Care  Total floor / unit time spent today 60 minutes  Greater than 50% of total time was spent with the patient and / or family counseling and / or coordinating of care   A description of the counseling / coordination of care: see plan above     Sonia Weber MD  Palliative & Supportive Care  Office number: [phone]

## 2018-01-28 NOTE — PROGRESS NOTES
Progress Note - Brennon Clay  1935, 81 y o  male MRN: [id number]    [facility] Encounter: [id number]    Primary Care Provider: Christianne Lara MD   Date and time admitted to hospital: 1/16/2018  7:10 PM        * Lung nodule   Assessment & Plan    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  - case was discussed also with Dr Edi Lara, who performed a laryngectomy, as per my conversation with the physician, surgery was meant to be a salvage surgery, and on the surgical field malignancy appear to be already extended beyond the capsule and have features of aggressive malignancy with multiple lymph node involvement  Also as per physician, the cystic neck mass is likely to be and other metastasis as his surgical field stopped  before the point of origin of the lesion and therefore cannot be considered postsurgical seroma  - also, same physician, tells me that again in order to pursue therapy, either chemotherapy or immunotherapy, patient will need a PEG insertion  - I have discussed this conversation with patient's daughter, as per my conversation with Alton Hawley, inter herself or the patient were aware of the extent of the pathology after surgery, and is seems like they were under the impression that the cancer was completely removed    At this time is unclear were the miscommunication might have happened  - today, the patient is complaining of more shortness of breath, the chest tube for recent postprocedural pneumothorax as be removed yesterday, without residual pneumothorax on the chest x-ray  - when I asked the patient if he would like me to perform a CT scan of his chest to dressing shortness of breath or x-ray, states that he is done with scans and other scams  - at this point, discussed in details with daughter as well as other family members in regard of poor prognosis and state lead decline    The family is very interested in hospice care and has voiced as patient has advanced directives at home he which states that he would would like not to have life-prolonging measures   - today, palliative care at the conversation with patient, and code status was changed to DNR level 3  - he did allow physical exam today, although declined again any further images  - later today, palliative Care to have another meeting with patient and daughter to discuss overall goal of care including chemo/monotherapy with PEG placement versus hospice care, will follow outcome  - continue oxygen delivery to trach collar, patient saturation 100%, of note noted saturation above 92% even when the trach collar is not in place          Postprocedural pneumothorax   Assessment & Plan    - chest tube has been discontinued, repeat chest x-ray on January 26th without residual pneumothorax        History of laryngeal cancer   Assessment & Plan    · Metastatic, stage IV  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  · Previous biopsy of cystic lesion of the neck/supraclavicular area found to be inconclusive, lesion dimension reduced on current CT scan  · Please refer to above              Gram-negative pneumonia (HCC)   Assessment & Plan    · Sputum culture - Moraxella Catarrhalis pna  · Patient has completed antibiotics on January 22nd, is followed by Infectious Disease, continue to monitor off antibiotics  · As previous conversation with speech pathology, patient low risk for aspiration given post laryngectomy status  · Continue with monitoring          Angio-edema, initial encounter   Assessment & Plan    - resolved   - episode occurred on January 21st, with right-sided facial swelling and lip edema, unclear if related to the patient way to lay in bed as there is some degree of edema of bilateral side of the face at baseline  - did receive full treatment with Pepcid and steroids  - continue with monitoring        Depression   Assessment & Plan    - family meeting as above  - continue with mirtazapine, olanzapine, Xanax  - palliative care following        Anemia   Assessment & Plan    · Likely related to chronic disease  · Last hemoglobin on January 25th 11 5, no daily labs at this point          Asthma   Assessment & Plan    · Continue home medical management   · Exacerbation on physical exam        Sacral decubitus ulcer   Assessment & Plan    · Wound care following  · Stage 3 pressure ulcer, unclear if present on admission  · Continue with local care as per wound care recommendation  · Frequent repositioning   · [facility] the [facility]        Severe protein-calorie malnutrition [facility])   Assessment & Plan    - likely related to acute and chronic illness, follow-up p o   Intake  - dietician is following  - of note, thus far, patient has refused the idea PEG placement          VTE Pharmacologic Prophylaxis: yes  Pharmacologic: Enoxaparin (Lovenox)  Mechanical VTE Prophylaxis in Place: Yes     Patient Centered Rounds: I have performed bedside rounds with nursing staff today      Discussions with Specialists or Other Care Team Provider: palliative care     Education and Discussions with Family / Patient:  patient     Time Spent for Care: 75 minutes   More than 50% of total time spent on counseling and coordination of care as described above      Current Length of Stay: 11 day(s)     Current Patient Status: Inpatient   Certification Statement: The patient will continue to require additional inpatient hospital stay due to refer to above     Discharge Plan: TBD     Code Status: Level 3 - DNR    Subjective:   Patient states that he feels the same, offers no clear complains    Objective:     Vitals:   No data recorded  SpO2:  [97 %-99 %] 97 %  Body mass index is 21 34 kg/m²  Input and Output Summary (last 24 hours): Intake/Output Summary (Last 24 hours) at 01/28/18 1534  Last data filed at 01/28/18 1511   Gross per 24 hour   Intake              718 ml   Output              770 ml   Net              -52 ml       Physical Exam:     Physical Exam   Constitutional: He is oriented to person, place, and time  He appears well-developed  Cardiovascular: Normal rate, regular rhythm and normal heart sounds  Exam reveals no friction rub  No murmur heard  Pulmonary/Chest: Effort normal  No respiratory distress  He has no wheezes  He has no rales  Abdominal: Soft  He exhibits no distension  There is no tenderness  There is no rebound  Musculoskeletal: He exhibits edema (trace at ankle)  Neurological: He is alert and oriented to person, place, and time  He exhibits normal muscle tone  Skin: Skin is warm  Additional Data:     Labs:      Results from last 7 days  Lab Units 01/25/18  0657   WBC Thousand/uL 5 00   HEMOGLOBIN g/dL 11 5*   HEMATOCRIT % 34 8*   PLATELETS Thousands/uL 267   NEUTROS PCT % 84*   LYMPHS PCT % 7*   MONOS PCT % 8   EOS PCT % 1       Results from last 7 days  Lab Units 01/25/18  0657   SODIUM mmol/L 139   POTASSIUM mmol/L 4 1   CHLORIDE mmol/L 104   CO2 mmol/L 31   BUN mg/dL 18   CREATININE mg/dL 0 72   CALCIUM mg/dL 8 7   GLUCOSE RANDOM mg/dL 97       Results from last 7 days  Lab Units 01/23/18  2025   INR  1 08       * I Have Reviewed All Lab Data Listed Above    * Additional Pertinent Lab Tests Reviewed: No New Labs Available For Today    Imaging:    Imaging Reports Reviewed Today Include: none  Imaging Personally Reviewed by Myself Includes:  none    Recent Cultures (last 7 days):           Last 24 Hours Medication List:     calcium carbonate-vitamin D 1 tablet Oral Daily With Breakfast   enoxaparin 40 mg Subcutaneous Daily   fentaNYL 1 patch Transdermal Q72H   fluticasone-salmeterol 2 puff Inhalation Q12H [address]   melatonin 3 mg Oral HS   mirtazapine 15 mg Oral HS   OLANZapine 5 mg Oral HS   pantoprazole 40 mg Oral Early Morning   senna-docusate sodium 1 tablet Oral Daily        Today, Patient Was Seen By: Rosibel Perez MD    ** Please Note: Dragon 360 Dictation voice to text software may have been used in the creation of this document   **

## 2018-01-28 NOTE — ASSESSMENT & PLAN NOTE
· Wound care following  · Stage 3 pressure ulcer, unclear if present on admission  · Continue with local care as per wound care recommendation  · Frequent repositioning   · Outpatient follow-up at the [facility]

## 2018-01-28 NOTE — ASSESSMENT & PLAN NOTE
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  - case was discussed also with Dr Edi Lara, who performed a laryngectomy, as per my conversation with the physician, surgery was meant to be a salvage surgery, and on the surgical field malignancy appear to be already extended beyond the capsule and have features of aggressive malignancy with multiple lymph node involvement  Also as per physician, the cystic neck mass is likely to be and other metastasis as his surgical field stopped  before the point of origin of the lesion and therefore cannot be considered postsurgical seroma  - also, same physician, tells me that again in order to pursue therapy, either chemotherapy or immunotherapy, patient will need a PEG insertion  - I have discussed this conversation with patient's daughter, as per my conversation with Alton Hawley, inter herself or the patient were aware of the extent of the pathology after surgery, and is seems like they were under the impression that the cancer was completely removed    At this time is unclear were the miscommunication might have happened  - today, the patient is complaining of more shortness of breath, the chest tube for recent postprocedural pneumothorax as be removed yesterday, without residual pneumothorax on the chest x-ray  - when I asked the patient if he would like me to perform a CT scan of his chest to dressing shortness of breath or x-ray, states that he is done with scans and other scams  - at this point, discussed in details with daughter as well as other family members in regard of poor prognosis and state lead decline    The family is very interested in hospice care and has voiced as patient has advanced directives at home he which states that he would would like not to have life-prolonging measures   - today, palliative care at the conversation with patient, and code status was changed to DNR level 3  - he did allow physical exam today, although declined again any further images  - later today, palliative Care to have another meeting with patient and daughter to discuss overall goal of care including chemo/monotherapy with PEG placement versus hospice care, will follow outcome  - continue oxygen delivery to trach collar, patient saturation 100%, of note noted saturation above 92% even when the trach collar is not in place

## 2018-01-28 NOTE — PHYSICAL THERAPY NOTE
PT CANCEL    PT TREATMENT ATTEMPTED THIS DATE AND TIME  Pt DECLINING AND REQUESTING THERAPIST TO COME BACK  WILL RE-ATTEMPT AS TIME ALLOWS AND AS PATIENT MEDICALLY APPROPRIATE      Rissa Smith, PT

## 2018-01-28 NOTE — ASSESSMENT & PLAN NOTE
- resolved   - episode occurred on January 21st, with right-sided facial swelling and lip edema, unclear if related to the patient way to lay in bed as there is some degree of edema of bilateral side of the face at baseline  - did receive full treatment with Pepcid and steroids  - continue with monitoring

## 2018-01-28 NOTE — ASSESSMENT & PLAN NOTE
- likely related to acute and chronic illness, follow-up p o   Intake  - dietician is following  - of note, thus far, patient has refused the idea PEG placement

## 2018-01-28 NOTE — PLAN OF CARE
DISCHARGE PLANNING     Discharge to home or other facility with appropriate resources Progressing        DISCHARGE PLANNING - CARE MANAGEMENT     Discharge to post-acute care or home with appropriate resources Progressing        HEMATOLOGIC - ADULT     Maintains hematologic stability Progressing        INFECTION - ADULT     Absence or prevention of progression during hospitalization Progressing        Knowledge Deficit     Patient/family/caregiver demonstrates understanding of disease process, treatment plan, medications, and discharge instructions Progressing        METABOLIC, FLUID AND ELECTROLYTES - ADULT     Electrolytes maintained within normal limits Progressing     Fluid balance maintained Progressing     Glucose maintained within target range Progressing        MUSCULOSKELETAL - ADULT     Maintain or return mobility to safest level of function Progressing     Maintain proper alignment of affected body part Progressing        Nutrition/Hydration-ADULT     Nutrient/Hydration intake appropriate for improving, restoring or maintaining nutritional needs Progressing        Potential for Falls     Patient will remain free of falls Progressing        Prexisting or High Potential for Compromised Skin Integrity     Skin integrity is maintained or improved Progressing        RESPIRATORY - ADULT     Achieves optimal ventilation and oxygenation Progressing        SAFETY ADULT     Maintain or return to baseline ADL function Progressing     Maintain or return mobility status to optimal level Progressing        SKIN/TISSUE INTEGRITY - ADULT     Skin integrity remains intact Progressing

## 2018-01-29 PROCEDURE — 94760 N-INVAS EAR/PLS OXIMETRY 1: CPT

## 2018-01-29 PROCEDURE — 94640 AIRWAY INHALATION TREATMENT: CPT

## 2018-01-29 PROCEDURE — 99231 SBSQ HOSP IP/OBS SF/LOW 25: CPT | Performed by: INTERNAL MEDICINE

## 2018-01-29 RX ADMIN — CALCIUM CARBONATE 500 MG (1,250 MG)-VITAMIN D3 200 UNIT TABLET 1 TABLET: at 07:35

## 2018-01-29 RX ADMIN — FLUTICASONE PROPIONATE AND SALMETEROL 2 PUFF: 50; 250 POWDER RESPIRATORY (INHALATION) at 01:35

## 2018-01-29 RX ADMIN — FLUTICASONE PROPIONATE AND SALMETEROL 2 PUFF: 50; 250 POWDER RESPIRATORY (INHALATION) at 21:54

## 2018-01-29 RX ADMIN — Medication 1 TABLET: at 07:35

## 2018-01-29 RX ADMIN — OXYCODONE HYDROCHLORIDE 10 MG: 10 TABLET ORAL at 13:35

## 2018-01-29 RX ADMIN — FLUTICASONE PROPIONATE AND SALMETEROL 2 PUFF: 50; 250 POWDER RESPIRATORY (INHALATION) at 07:37

## 2018-01-29 RX ADMIN — ALPRAZOLAM 0.25 MG: 0.25 TABLET ORAL at 07:35

## 2018-01-29 RX ADMIN — ALPRAZOLAM 0.25 MG: 0.25 TABLET ORAL at 16:14

## 2018-01-29 RX ADMIN — OXYCODONE HYDROCHLORIDE 10 MG: 10 TABLET ORAL at 07:35

## 2018-01-29 RX ADMIN — MELATONIN 3 MG: 3 TAB ORAL at 21:54

## 2018-01-29 RX ADMIN — OLANZAPINE 5 MG: 5 TABLET, ORALLY DISINTEGRATING ORAL at 21:54

## 2018-01-29 RX ADMIN — IPRATROPIUM BROMIDE AND ALBUTEROL SULFATE 3 ML: .5; 3 SOLUTION RESPIRATORY (INHALATION) at 05:42

## 2018-01-29 RX ADMIN — OXYCODONE HYDROCHLORIDE 10 MG: 10 TABLET ORAL at 19:00

## 2018-01-29 RX ADMIN — MIRTAZAPINE 15 MG: 15 TABLET, FILM COATED ORAL at 21:54

## 2018-01-29 NOTE — ASSESSMENT & PLAN NOTE
· Wound care following  · Stage 3 pressure ulcer, unclear if present on admission  · Continue with local care as per wound care recommendation  · Frequent repositioning   · Outpatient follow-up at the [facility]

## 2018-01-29 NOTE — ASSESSMENT & PLAN NOTE
· Continue home medical management   · Yesterday, no exacerbation on physical exam, yesterday not positive exacerbation to be considered a typo by dictation system  · Today he does not allow physical exam

## 2018-01-29 NOTE — ASSESSMENT & PLAN NOTE
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  - case was discussed also with Dr Valeriano Amaya, who performed a laryngectomy, as per my conversation with the physician, surgery was meant to be a salvage surgery, and on the surgical field malignancy appear to be already extended beyond the capsule and have features of aggressive malignancy with multiple lymph node involvement  Also as per physician, the cystic neck mass is likely to be and other metastasis as his surgical field stopped  before the point of origin of the lesion and therefore cannot be considered postsurgical seroma  - also, same physician, tells me that again in order to pursue therapy, either chemotherapy or immunotherapy, patient will need a PEG insertion  - I have discussed this conversation with patient's daughter, as per my conversation with Rehana Abreu, inter herself or the patient were aware of the extent of the pathology after surgery, and is seems like they were under the impression that the cancer was completely removed    At this time is unclear were the miscommunication might have happened  - today, the patient is complaining of more shortness of breath, the chest tube for recent postprocedural pneumothorax as be removed yesterday, without residual pneumothorax on the chest x-ray  - when I asked the patient if he would like me to perform a CT scan of his chest to dressing shortness of breath or x-ray, states that he is done with scans and other scams  - discussed in details with daughter as well as other family members in regard of poor prognosis and state lead decline    The family is very interested in hospice care and has voiced as patient has advanced directives at home he which states that he would would like not to have life-prolonging measures   - remained DNR level 3  - today, patient did not want to be examined, he made a comment to nurse that he thinks he is dying  - hospice liaison and daughter tried to talk to him today about further goal of care, but patient declining encounter saying that he was too tired  - hospice liaison will try to have discussion tomorrow  - options are home with hospice versus cancer focus care with chemo/immunotherapy after PEG placement  - also discussed with girlfriend, she states that she has known him for a while and she knows that he is ready he just has no courage yet to say it out loud  - will follow up on this matter tomorrow

## 2018-01-29 NOTE — ASSESSMENT & PLAN NOTE
- continue with supportive care, encourage p o   Intake  - as per my several conversationas with the patient does not seem very interested in PEG feeding

## 2018-01-29 NOTE — PROGRESS NOTES
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 [facility])   Assessment & Plan    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  - case was discussed also with Dr Awa Aaron, who performed a laryngectomy, as per my conversation with the physician, surgery was meant to be a salvage surgery, and on the surgical field malignancy appear to be already extended beyond the capsule and have features of aggressive malignancy with multiple lymph node involvement  Also as per physician, the cystic neck mass is likely to be and other metastasis as his surgical field stopped  before the point of origin of the lesion and therefore cannot be considered postsurgical seroma  - also, same physician, tells me that again in order to pursue therapy, either chemotherapy or immunotherapy, patient will need a PEG insertion  - I have discussed this conversation with patient's daughter, as per my conversation with Yana Miller, inter herself or the patient were aware of the extent of the pathology after surgery, and is seems like they were under the impression that the cancer was completely removed    At this time is unclear were the miscommunication might have happened  - today, the patient is complaining of more shortness of breath, the chest tube for recent postprocedural pneumothorax as be removed yesterday, without residual pneumothorax on the chest x-ray  - when I asked the patient if he would like me to perform a CT scan of his chest to dressing shortness of breath or x-ray, states that he is done with scans and other scams  - discussed in details with daughter as well as other family members in regard of poor prognosis and state lead decline    The family is very interested in hospice care and has voiced as patient has advanced directives at home he which states that he would would like not to have life-prolonging measures   - remained DNR level 3  - today, patient did not want to be examined, he made a comment to nurse that he thinks he is dying  - hospice liaison and daughter tried to talk to him today about further goal of care, but patient declining encounter saying that he was too tired  - hospice liaison will try to have discussion tomorrow  - options are home with hospice versus cancer focus care with chemo/immunotherapy after PEG placement  - also discussed with girlfriend, she states that she has known him for a while and she knows that he is ready he just has no courage yet to say it out loud  - will follow up on this matter tomorrow              Postprocedural pneumothorax   Assessment & Plan    - chest tube has been discontinued, repeat chest x-ray on January 26th without residual pneumothorax        Gram-negative pneumonia ([facility] [address] )   Assessment & Plan    · Sputum culture - Moraxella Catarrhalis pna  · Patient has completed antibiotics on January 22nd, is followed by Infectious Disease, continue to monitor off antibiotics  · As previous conversation with speech pathology, patient low risk for aspiration given post laryngectomy status  · Continue with monitoring          Laryngeal cancer [facility])   Assessment & Plan    - metastatic as above        Angio-edema, initial encounter   Assessment & Plan    - resolved   - episode occurred on January 21st, with right-sided facial swelling and lip edema, unclear if related to the patient way to lay in bed as there is some degree of edema of bilateral side of the face at baseline  - did receive full treatment with Pepcid and steroids  - continue with monitoring        Adult failure to thrive   Assessment & Plan    - continue with supportive care, encourage p o   Intake  - as per my several conversationas with the patient does not seem very interested in PEG feeding        Goals of care, counseling/discussion   Assessment & Plan    - discussion ongoing, see above        Asthma   Assessment & Plan    · Continue home medical management   · Yesterday, no exacerbation on physical exam, yesterday not positive exacerbation to be considered a typo by dictation system  · Today he does not allow physical exam        Sacral decubitus ulcer   Assessment & Plan    · Wound care following  · Stage 3 pressure ulcer, unclear if present on admission  · Continue with local care as per wound care recommendation  · Frequent repositioning   · [facility]        Severe protein-calorie malnutrition [facility])   Assessment & Plan    - refer to failure to thrive            VTE Pharmacologic Prophylaxis: yes  Pharmacologic: Enoxaparin (Lovenox)  Mechanical VTE Prophylaxis in Place: Yes     Patient Centered Rounds: I have performed bedside rounds with nursing staff today      Discussions with Specialists or Other Care Team Provider: palliative care     Education and Discussions with Family / Patient:  patient     Time Spent for Care: 15 minutes   More than 50% of total time spent on counseling and coordination of care as described above      Current Length of Stay: 12 day(s)     Current Patient Status: Inpatient   Certification Statement: The patient will continue to require additional inpatient hospital stay due to refer to above     Discharge Plan: TBD    Code Status: Level 3 - DNAR and DNI      Subjective:   Patient denies complaints  He mouthed that he did not like the food    Objective:     Vitals:   Temp (24hrs), Av 5 °F (36 9 °C), Min:98 2 °F (36 8 °C), Max:98 8 °F (37 1 °C)    HR:  [58-76] 58  Resp:  [16-18] 16  BP: (105-125)/(58-74) 105/58  SpO2:  [96 %-100 %] 96 %  Body mass index is 21 34 kg/m²  Input and Output Summary (last 24 hours): Intake/Output Summary (Last 24 hours) at 18 1718  Last data filed at 18 1334   Gross per 24 hour   Intake              240 ml   Output              950 ml   Net             -710 ml       Physical Exam:  Refused, states what for?     Physical Exam    Additional Data:     Labs:      Results from last 7 days  Lab Units 18  0657   WBC Thousand/uL 5 00   HEMOGLOBIN g/dL 11 5*   HEMATOCRIT % 34 8*   PLATELETS Thousands/uL 267   NEUTROS PCT % 84*   LYMPHS PCT % 7*   MONOS PCT % 8   EOS PCT % 1       Results from last 7 days  Lab Units 18  0657   SODIUM mmol/L 139   POTASSIUM mmol/L 4 1   CHLORIDE mmol/L 104   CO2 mmol/L 31   BUN mg/dL 18   CREATININE mg/dL 0 72   CALCIUM mg/dL 8 7   GLUCOSE RANDOM mg/dL 97       Results from last 7 days  Lab Units 18  2025   INR  1 08       * I Have Reviewed All Lab Data Listed Above    * Additional Pertinent Lab Tests Reviewed:  None    Imaging:    Imaging Reports Reviewed Today Include:  None  Imaging Personally Reviewed by Myself Includes:  None    Recent Cultures (last 7 days):           Last 24 Hours Medication List:     calcium carbonate-vitamin D 1 tablet Oral Daily With Breakfast   enoxaparin 40 mg Subcutaneous Daily   fentaNYL 1 patch Transdermal Q72H   fluticasone-salmeterol 2 puff Inhalation Q12H [address]   melatonin 3 mg Oral HS   mirtazapine 15 mg Oral HS   OLANZapine 5 mg Oral HS   pantoprazole 40 mg Oral Early Morning   senna-docusate sodium 1 tablet Oral Daily        Today, Patient Was Seen By: Tino Rowe Joel Crowley MD    ** Please Note: Dragon 360 Dictation voice to text software may have been used in the creation of this document   **

## 2018-01-29 NOTE — PLAN OF CARE
DISCHARGE PLANNING     Discharge to home or other facility with appropriate resources Progressing        DISCHARGE PLANNING - CARE MANAGEMENT     Discharge to post-acute care or home with appropriate resources Progressing        HEMATOLOGIC - ADULT     Maintains hematologic stability Progressing        INFECTION - ADULT     Absence or prevention of progression during hospitalization Progressing        Knowledge Deficit     Patient/family/caregiver demonstrates understanding of disease process, treatment plan, medications, and discharge instructions Progressing        MUSCULOSKELETAL - ADULT     Maintain or return mobility to safest level of function Progressing     Maintain proper alignment of affected body part Progressing        Nutrition/Hydration-ADULT     Nutrient/Hydration intake appropriate for improving, restoring or maintaining nutritional needs Progressing        Potential for Falls     Patient will remain free of falls Progressing        Prexisting or High Potential for Compromised Skin Integrity     Skin integrity is maintained or improved Progressing        RESPIRATORY - ADULT     Achieves optimal ventilation and oxygenation Progressing        SAFETY ADULT     Maintain or return to baseline ADL function Progressing     Maintain or return mobility status to optimal level Progressing        SKIN/TISSUE INTEGRITY - ADULT     Skin integrity remains intact Progressing

## 2018-01-29 NOTE — PHYSICAL THERAPY NOTE
Physical Therapy Cancellation Note  Attempted to see pt for PT session, however, he reported fatigue and was not willing to participate at this time    Jose Ayon, PT

## 2018-01-29 NOTE — PROGRESS NOTES
During day shift, patient's daughter reinforced to patient and respiratory therapy about his HME filter he has in place and the harmful effects of humidification on the filter  About 2130 patient alerted staff via call bell complaining of SOB  On assessment, patient is not in distress and oxygen saturation is 95% on room air  Patient requesting to have trach mack placed around neck for comfort  Respiratory therapist on call, Janet Cifuentes and DALE reeducated the patient about the HME filter and he demonstrated his understanding and became frustrated  He informed staff that he would explain with his daughter his decision to apply trach mask  Reviewed last progress note in chart, Dr Jaime Calzada notes stated to continue oxygen delivery to trach collar  Mask applied, oxygen set to 1L  SLIM on call for night shift was called and made aware  No orders at this time  Will monitor closely

## 2018-01-29 NOTE — HOSPICE NOTE
Met with daughter Jessica Amaya, provided information on home vs SNf hospice support  Krystina states she is unsure of how to proceed, after discussing we agreed we needed pts input as to what direction he would like to go  Krystina states pt is considering treatment vs hospice, she states her home is not really an option, possibly pts home with private hire, or [facility] in assisted living, or perhaps SNF  Plan is to try to meet with pt tomorrow as patient just asked daughter to leave and is now asleep   PRUDENCIO Go aware hospice liaison will try to meet with pt and daughter tomorrow, daughter to call when she is in the hospital

## 2018-01-29 NOTE — PLAN OF CARE
DISCHARGE PLANNING     Discharge to home or other facility with appropriate resources Progressing        DISCHARGE PLANNING - CARE MANAGEMENT     Discharge to post-acute care or home with appropriate resources Progressing        INFECTION - ADULT     Absence or prevention of progression during hospitalization Progressing        Knowledge Deficit     Patient/family/caregiver demonstrates understanding of disease process, treatment plan, medications, and discharge instructions Progressing        MUSCULOSKELETAL - ADULT     Maintain or return mobility to safest level of function Progressing     Maintain proper alignment of affected body part Progressing        Nutrition/Hydration-ADULT     Nutrient/Hydration intake appropriate for improving, restoring or maintaining nutritional needs Progressing        Potential for Falls     Patient will remain free of falls Progressing        Prexisting or High Potential for Compromised Skin Integrity     Skin integrity is maintained or improved Progressing        RESPIRATORY - ADULT     Achieves optimal ventilation and oxygenation Progressing        SAFETY ADULT     Maintain or return to baseline ADL function Progressing     Maintain or return mobility status to optimal level Progressing        SKIN/TISSUE INTEGRITY - ADULT     Skin integrity remains intact Progressing

## 2018-01-29 NOTE — RESTORATIVE TECHNICIAN NOTE
Restorative Specialist Mobility Note       Activity: Other (Comment) (Educated/encouraged pt to ambulate w/assistance 3-4 x's/day, pt refused RN aware )    Elizabeth ARANGO, Restorative Technician, [employer]

## 2018-01-30 PROCEDURE — 94760 N-INVAS EAR/PLS OXIMETRY 1: CPT

## 2018-01-30 PROCEDURE — 97168 OT RE-EVAL EST PLAN CARE: CPT

## 2018-01-30 PROCEDURE — 94640 AIRWAY INHALATION TREATMENT: CPT

## 2018-01-30 PROCEDURE — G8987 SELF CARE CURRENT STATUS: HCPCS

## 2018-01-30 PROCEDURE — 99232 SBSQ HOSP IP/OBS MODERATE 35: CPT | Performed by: INTERNAL MEDICINE

## 2018-01-30 PROCEDURE — G8988 SELF CARE GOAL STATUS: HCPCS

## 2018-01-30 RX ORDER — ALBUTEROL SULFATE 90 UG/1
2 AEROSOL, METERED RESPIRATORY (INHALATION) EVERY 4 HOURS PRN
Status: DISCONTINUED | OUTPATIENT
Start: 2018-01-30 | End: 2018-02-02 | Stop reason: HOSPADM

## 2018-01-30 RX ADMIN — MIRTAZAPINE 15 MG: 15 TABLET, FILM COATED ORAL at 21:29

## 2018-01-30 RX ADMIN — OLANZAPINE 5 MG: 5 TABLET, ORALLY DISINTEGRATING ORAL at 21:29

## 2018-01-30 RX ADMIN — OXYCODONE HYDROCHLORIDE 10 MG: 10 TABLET ORAL at 02:20

## 2018-01-30 RX ADMIN — OXYCODONE HYDROCHLORIDE 10 MG: 10 TABLET ORAL at 15:10

## 2018-01-30 RX ADMIN — ACETAMINOPHEN 650 MG: 325 TABLET, FILM COATED ORAL at 09:03

## 2018-01-30 RX ADMIN — IPRATROPIUM BROMIDE AND ALBUTEROL SULFATE 3 ML: .5; 3 SOLUTION RESPIRATORY (INHALATION) at 10:30

## 2018-01-30 RX ADMIN — IPRATROPIUM BROMIDE AND ALBUTEROL SULFATE 3 ML: .5; 3 SOLUTION RESPIRATORY (INHALATION) at 02:39

## 2018-01-30 RX ADMIN — Medication 1 TABLET: at 09:03

## 2018-01-30 RX ADMIN — FLUTICASONE PROPIONATE AND SALMETEROL 2 PUFF: 50; 250 POWDER RESPIRATORY (INHALATION) at 09:02

## 2018-01-30 RX ADMIN — PANTOPRAZOLE SODIUM 40 MG: 40 TABLET, DELAYED RELEASE ORAL at 06:10

## 2018-01-30 RX ADMIN — IPRATROPIUM BROMIDE AND ALBUTEROL SULFATE 3 ML: .5; 3 SOLUTION RESPIRATORY (INHALATION) at 16:44

## 2018-01-30 RX ADMIN — OXYCODONE HYDROCHLORIDE 10 MG: 10 TABLET ORAL at 21:37

## 2018-01-30 RX ADMIN — MELATONIN 3 MG: 3 TAB ORAL at 21:29

## 2018-01-30 RX ADMIN — FLUTICASONE PROPIONATE AND SALMETEROL 2 PUFF: 50; 250 POWDER RESPIRATORY (INHALATION) at 21:29

## 2018-01-30 RX ADMIN — ALBUTEROL SULFATE 2 PUFF: 90 AEROSOL, METERED RESPIRATORY (INHALATION) at 20:23

## 2018-01-30 RX ADMIN — CALCIUM CARBONATE 500 MG (1,250 MG)-VITAMIN D3 200 UNIT TABLET 1 TABLET: at 09:03

## 2018-01-30 NOTE — ASSESSMENT & PLAN NOTE
·  continue with supportive care, encourage p o   Intake  · Patient remains with poor oral intake and does not seem interested in PEG tube placement  · Will be appropriate for hospice

## 2018-01-30 NOTE — RESTORATIVE TECHNICIAN NOTE
Restorative Specialist Mobility Note       Activity: Ambulate in garner, Ambulate in room, Bathroom privileges, Chair, Stand at bedside, Dangle (Educated/encouraged pt to ambulate w/assistance 3-4 x's/day  Bed alarm on   Pt callbell, phone/tray within reach )     Assistive Device: Front wheel walker             Ilene ARANGO, Restorative Technician, [employer] [facility]

## 2018-01-30 NOTE — ASSESSMENT & PLAN NOTE
· Wound care following  · Stage 3 pressure ulcer, unclear if present on admission  · Continue with local care as per wound care recommendation  · Frequent repositioning   · Outpatient follow-up at the [facility]

## 2018-01-30 NOTE — PLAN OF CARE
Problem: OCCUPATIONAL THERAPY ADULT  Goal: Performs self-care activities at highest level of function for planned discharge setting  See evaluation for individualized goals  Treatment Interventions: ADL retraining, Functional transfer training, Endurance training, Cognitive reorientation, Patient/family training, Equipment evaluation/education, Compensatory technique education, Continued evaluation, Energy conservation, Activityengagement          See flowsheet documentation for full assessment, interventions and recommendations  Outcome: Progressing  Limitation: Decreased ADL status, Decreased UE strength, Decreased endurance, Decreased self-care trans, Decreased high-level ADLs  Prognosis: Good  Assessment: Pt seen for OT re-evaluation as goals from IE  on2018  Pt continues to need assist w LB self care, toileting and functional mobility  He has decreased activity tolerance, decreased balance in stance  he needs encouragment for particiaption in OT, c/o not feeling up for it due to being tired  pt will continue to be seen by OT while in acute care, with dc recommendations for SNF short term rehab vs intermediate w home/outp therapy  Discussed  w CM    Goals from IE remains appropriate and will be extended for 2 more weeks     OT Discharge Recommendation: Other (Comment) (short term rehab vs intermediate)  OT - OK to Discharge: Yes (when med clear)

## 2018-01-30 NOTE — OCCUPATIONAL THERAPY NOTE
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 ([address] )    Postprocedural pneumothorax    Adult failure to thrive    Goals of care, counseling/discussion       Past Medical History:   Diagnosis Date    Asthma     Cancer associated pain     Decreased appetite     Depression     HCAP (healthcare-associated pneumonia)     Hypertension     Insomnia     Larynx cancer ([address] )     Physical deconditioning     Sacral ulcer ([address] )     Severe sepsis ([address] )     Tracheostomy status ([facility] )        Past Surgical History:   Procedure Laterality Date    EGD AND COLONOSCOPY      LARYNGECTOMY      TRACHEOSTOMY N/A 8/25/2017    Procedure: TRACHEOSTOMY (POSSIBLE AWAKE) , MICRO DIRECT LARYNGOSCOPY, Biopsy;  Surgeon: Mely Neely MD;  [facility];  Service: ENT    WOUND DEBRIDEMENT N/A 11/22/2017    Procedure: DEBRIDEMENT WOUND [facility] OUT), sacrum;  Surgeon: Curt Cardenas DO;  [facility];  Service: General        01/30/18 1242   Note Type   Note type Re-eval   Restrictions/Precautions   Other Precautions Fall Risk   Pain Assessment   Pain Assessment No/denies pain   Pain Score No Pain   Home Living   Type of [address] One level   Prior Function   Level of [place] Independent with ADLs and functional mobility   Lives With Alone   Receives Help From Family   ADL Assistance Independent   IADLs Needs assistance   Psychosocial   Patient Behaviors/Mood Flat affect;Irritable   ADL   Grooming Assistance 5  Supervision/Setup   UB Bathing Assistance 5  Supervision/Setup   LB Bathing Assistance 5  Supervision/Setup   UB Dressing Assistance 5 Supervision/Setup   LB Dressing Assistance 4  Minimal Assistance   Toileting Assistance  4  Minimal Assistance   Functional Assistance 4  Minimal Assistance   Bed Mobility   Rolling R 7  Independent   Rolling L 7  Independent   Supine to Sit 7  Independent   Sit to Supine 7  Independent   Transfers   Sit to Stand 7  Independent   Stand to Sit 7  Independent   Stand pivot 5  Supervision   Toilet transfer 5  Supervision   Functional Mobility   Functional Mobility 5  Supervision   Additional items Rolling walker   Balance   Static Sitting Good   Dynamic Sitting Good   Static Standing Fair +   Dynamic Standing Fair   Activity Tolerance   Activity Tolerance Patient limited by fatigue;Treatment limited secondary to agitation   RUE Assessment   RUE Assessment WFL   LUE Assessment   LUE Assessment WFL   Hand Function   Gross Motor Coordination Functional   Fine Motor Coordination Functional   Cognition   Overall Cognitive Status WFL   Arousal/Participation Responsive   Assessment   Limitation Decreased ADL status; Decreased UE strength;Decreased endurance;Decreased self-care trans;Decreased high-level ADLs   Assessment Pt seen for OT re-evaluation as goals from IE  on2018  Pt continues to need assist w LB self care, toileting and functional mobility  He has decreased activity tolerance, decreased balance in stance  he needs encouragment for particiaption in OT, c/o not feeling up for it due to being tired  pt will continue to be seen by OT while in acute care, with dc recommendations for SNF short term rehab vs senior living w home/outp therapy  Discussed  w CM  Goals from IE remains appropriate and will be extended for 2 more weeks   Plan   Treatment Interventions ADL retraining;Functional transfer training;UE strengthening/ROM; Cognitive reorientation;Patient/family training; Compensatory technique education; Energy conservation   Goal Expiration Date 18   OT Frequency 3-5x/wk   Recommendation   OT Discharge Recommendation Other (Comment)  (short term rehab vs FERMIN)   Barthel Index   Feeding 10   Bathing 0   Grooming Score 5   Dressing Score 5   Bladder Score 10   Bowels Score 10   Toilet Use Score 5   Transfers (Bed/Chair) Score 15   Mobility (Level Surface) Score 0   Stairs Score 0   Barthel Index Score 60

## 2018-01-30 NOTE — ASSESSMENT & PLAN NOTE
· Stable from the respiratory standpoint  · Completed IV antibiotic course on 1/22/18 and now be monitored off antibiotics  · Patient is low risk for aspiration given post laryngectomy status per speech pathology

## 2018-01-30 NOTE — PROGRESS NOTES
Progress Note - Yuly oGodman  1935, 81 y o  male MRN: [id number]    Unit/Bed#: 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)   Assessment & Plan    · Has metastatic laryngeal cancer   · Patient continues to hold back not wanting to talk much  · He is aware overall prognosis is poor and has prior expressed wishes for no further diagnostic/imaging studies  · Currently waiting on discussions with patient, daughter and hospice team regarding disposition at discharge  · Current options include home with hospice vs  cancer focused care with chemo/immunotherapy after PEG tube placement  Patient however has declined PEG tube in the past            Adult failure to thrive   Assessment & Plan    ·  continue with supportive care, encourage p o   Intake  · Patient remains with poor oral intake and does not seem interested in PEG tube placement  · Will be appropriate for hospice        Severe protein-calorie malnutrition ([address] )   Assessment & Plan    · Continue nutritional supplements, encourage increased oral intake        Gram-negative pneumonia ([address] )   Assessment & Plan    · Stable from the respiratory standpoint  · Completed IV antibiotic course on 1/22/18 and now be monitored off antibiotics  · Patient is low risk for aspiration given post laryngectomy status per speech pathology          Postprocedural pneumothorax   Assessment & Plan    · chest tube has been discontinued, repeat chest x-ray on January 26th without residual pneumothorax        Sacral decubitus ulcer   Assessment & Plan    · Wound care following  · Stage 3 pressure ulcer, unclear if present on admission  · Continue with local care as per wound care recommendation  · Frequent repositioning   · Outpatient follow-up at the wound [address]    · continue with mirtazapine, olanzapine, Xanax          Anemia   Assessment & Plan    · Likely related to chronic disease  · Last hemoglobin on  5, no daily labs at this point              VTE Pharmacologic Prophylaxis:   Pharmacologic: Enoxaparin (Lovenox)  Mechanical VTE Prophylaxis in Place: Yes    Patient Centered Rounds: I have performed bedside rounds with nursing staff today  Discussions with Specialists or Other Care Team Provider: Nursing/CM    Education and Discussions with Family / Patient: Patient    Current Length of Stay: 13 day(s)    Current Patient Status: Inpatient   Certification Statement: The patient will continue to require additional inpatient hospital stay due to pending placement    Discharge Plan: Awaiting decision on disposition    Code Status: Level 3 - DNAR and DNI      Subjective:   Patient seen and evaluated, he denies any new complaints  No pain, no shortness of breath    Objective:     Vitals:   Temp (24hrs), Av 2 °F (37 3 °C), Min:98 8 °F (37 1 °C), Max:99 6 °F (37 6 °C)    HR:  [58-64] 64  Resp:  [14-16] 14  BP: (105-106)/(55-58) 106/55  SpO2:  [94 %-96 %] 95 %  Body mass index is 21 34 kg/m²  Input and Output Summary (last 24 hours):        Intake/Output Summary (Last 24 hours) at 18 1150  Last data filed at 18 1144   Gross per 24 hour   Intake              260 ml   Output             1200 ml   Net             -940 ml       Physical Exam:  General Appearance:    Alert, chronically ill-appearing, cachectic, no acute distress    Head:    Normocephalic   Eyes:    Conjunctiva/corneas clear, EOM's intact   Neck:   Supple   Lungs:     Clear to auscultation bilaterally, respirations unlabored, no crackles or wheeze     Heart:    Regular rate and rhythm, S1 and S2    Abdomen:     Soft, non-tender, nondistended   Extremities:   No edema   Neurologic:  Alert and oriented x3, moves all extremities, limited evaluation due to patient not wanting to be bothered Additional Data:     Labs:      Results from last 7 days  Lab Units 01/25/18  0657   WBC Thousand/uL 5 00   HEMOGLOBIN g/dL 11 5*   HEMATOCRIT % 34 8*   PLATELETS Thousands/uL 267   NEUTROS PCT % 84*   LYMPHS PCT % 7*   MONOS PCT % 8   EOS PCT % 1       Results from last 7 days  Lab Units 01/25/18  0657   SODIUM mmol/L 139   POTASSIUM mmol/L 4 1   CHLORIDE mmol/L 104   CO2 mmol/L 31   BUN mg/dL 18   CREATININE mg/dL 0 72   CALCIUM mg/dL 8 7   GLUCOSE RANDOM mg/dL 97       Results from last 7 days  Lab Units 01/23/18  2025   INR  1 08       * I Have Reviewed All Lab Data Listed Above  * Additional Pertinent Lab Tests Reviewed: No New Labs Available For Today    Cultures:   Blood Culture:   Lab Results   Component Value Date    BLOODCX No Growth After 5 Days  01/16/2018    BLOODCX No Growth After 5 Days  01/16/2018    BLOODCX No Growth After 5 Days  12/13/2017    BLOODCX No Growth After 5 Days  12/13/2017    BLOODCX No Growth After 5 Days  11/19/2017    BLOODCX No Growth After 5 Days   11/19/2017     Urine Culture: No results found for: URINECX  Sputum Culture: No components found for: SPUTUMCX  Wound Culture: No results found for: WOUNDCULT    Last 24 Hours Medication List:     Current Facility-Administered Medications:  acetaminophen 650 mg Oral Q6H PRN Abel Allison MD   albuterol 2 puff Inhalation Q4H PRN Evan Cavazos MD   ALPRAZolam 0 25 mg Oral 4x Daily PRN Abel Allison MD   aluminum-magnesium hydroxide-simethicone 30 mL Oral Q6H PRN Abel Allison MD   bisacodyl 10 mg Rectal Daily PRN Arianna Flores PA-C   calcium carbonate-vitamin D 1 tablet Oral Daily With Breakfast Abel Allison MD   enoxaparin 40 mg Subcutaneous Daily Evan Valentin MD   fentaNYL 1 patch Transdermal Q72H Abel Allison MD   fluticasone-salmeterol 2 puff Inhalation Q12H [address] Abel Allison MD   ipratropium-albuterol 3 mL Nebulization Q4H PRN Christel Higgins MD   melatonin 3 mg Oral HS Elsa De León PA-C mirtazapine 15 mg Oral HS Abel Allison MD   OLANZapine 5 mg Oral HS Abel Allison MD   ondansetron 4 mg Intravenous Q6H PRN Abel Allison MD   oxyCODONE 10 mg Oral Q4H PRN Abel Allison MD   oxyCODONE 5 mg Oral Q4H PRN Abel Allison MD   pantoprazole 40 mg Oral Early Morning Abel Allison MD   polyethylene glycol 17 g Oral Daily PRN Abel Allison MD   senna-docusate sodium 1 tablet Oral Daily Nicolle Harris MD        Today, Patient Was Seen By: Alie Stearns MD    ** Please Note: Dragon 360 Dictation voice to text software may have been used in the creation of this document   **

## 2018-01-30 NOTE — ASSESSMENT & PLAN NOTE
· Has metastatic laryngeal cancer   · Patient continues to hold back not wanting to talk much  · He is aware overall prognosis is poor and has prior expressed wishes for no further diagnostic/imaging studies  · Currently waiting on discussions with patient, daughter and hospice team regarding disposition at discharge  · Current options include home with hospice vs  cancer focused care with chemo/immunotherapy after PEG tube placement    Patient however has declined PEG tube in the past

## 2018-01-30 NOTE — PROGRESS NOTES
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  Facial swelling  Laryngeal cancer - s/p chemo/XRT/surgery at [facility]  Locally sees Dr Shannon Quesada in med/onc and Ulus Gene in ENT  Tracheostomy s/p total laryngectomy  Anemia  Depression/existential suffering  Sacral wound  Cancer associated pain  Insomnia  Decreased oral intake  Lung mass- +squamous cell    Plan:  1  Continue current pain regimen of Duragesic 25 mcg  2  Continue PRN oxycodone  3  Continue Zyprexa and Remeron  4  Goals- deciding about hospice, daughter is requesting to speak with liaison more today and also with CM about dispo options  We will see if PT/OT can evaluate again to help determine care needs  Daughter would like an [facility]MIN if possible but understands we may be looking more towards SNF  Code Status: Full - Level 3   Power of :  presumed to be daughter by [place] Act 169   Advance Directive / Living Will: yes   POLST:  no      Interval history:       Patient and daughter met with hospice yesterday and will plan to meet up with them again today  Patient states pain is controlled with current regimen       MEDICATIONS / ALLERGIES:    all current active meds have been reviewed and current meds:   Current Facility-Administered Medications   Medication Dose Route Frequency    acetaminophen (TYLENOL) tablet 650 mg  650 mg Oral Q6H PRN    albuterol (PROVENTIL HFA,VENTOLIN HFA) inhaler 2 puff  2 puff Inhalation Q4H PRN    ALPRAZolam (XANAX) tablet 0 25 mg  0 25 mg Oral 4x Daily PRN    aluminum-magnesium hydroxide-simethicone (MYLANTA) 200-200-20 mg/5 mL oral suspension 30 mL  30 mL Oral Q6H PRN    bisacodyl (DULCOLAX) rectal suppository 10 mg  10 mg Rectal Daily PRN    calcium carbonate-vitamin D (OSCAL-D) 500 mg-200 units per tablet 1 tablet  1 tablet Oral Daily With Breakfast    enoxaparin (LOVENOX) subcutaneous injection 40 mg  40 mg Subcutaneous Daily    fentaNYL (DURAGESIC) 25 mcg/hr TD 72 hr patch 1 patch  1 patch Transdermal Q72H    fluticasone-salmeterol (ADVAIR) 250-50 mcg/dose inhaler 2 puff  2 puff Inhalation Q12H [address]    ipratropium-albuterol (DUO-NEB) 0 5-2 5 mg/3 mL inhalation solution 3 mL  3 mL Nebulization Q4H PRN    melatonin tablet 3 mg  3 mg Oral HS    mirtazapine (REMERON) tablet 15 mg  15 mg Oral HS    OLANZapine (ZyPREXA ZYDIS) dispersible tablet 5 mg  5 mg Oral HS    ondansetron (ZOFRAN) injection 4 mg  4 mg Intravenous Q6H PRN    oxyCODONE (ROXICODONE) immediate release tablet 10 mg  10 mg Oral Q4H PRN    oxyCODONE (ROXICODONE) IR tablet 5 mg  5 mg Oral Q4H PRN    pantoprazole (PROTONIX) EC tablet 40 mg  40 mg Oral Early Morning    polyethylene glycol (MIRALAX) packet 17 g  17 g Oral Daily PRN    senna-docusate sodium (SENOKOT S) 8 6-50 mg per tablet 1 tablet  1 tablet Oral Daily       Allergies   Allergen Reactions    Aspirin Anaphylaxis    Cleocin [Clindamycin] Other (See Comments)     Cannot specify but had definite reaction in past!!       OBJECTIVE:    Physical Exam  Physical Exam   Constitutional: He is oriented to person, place, and time  No distress  HENT:   Right Ear: External ear normal    Left Ear: External ear normal    Nose: Nose normal    Edema of face   Eyes: EOM are normal  Right eye exhibits no discharge  Left eye exhibits no discharge  No scleral icterus  Neck:   Trach noted   Cardiovascular: Normal rate, regular rhythm and intact distal pulses  Murmur heard  Pulmonary/Chest: Effort normal and breath sounds normal  No respiratory distress  Currently has oxygen on via trach collar   Abdominal: Soft  Bowel sounds are normal  He exhibits no distension  Musculoskeletal: He exhibits no edema  Neurological: He is alert and oriented to person, place, and time  Skin: Skin is warm and dry  There is pallor  Psychiatric: He has a normal mood and affect     Nursing note and vitals reviewed  Patient refused examination  Lab Results: I have personally reviewed pertinent labs  , CBC:   No results found for: WBC, HGB, HCT, MCV, PLT, ADJUSTEDWBC, MCH, MCHC, RDW, MPV, NRBC, CMP:   No results found for: NA, K, CL, CO2, ANIONGAP, BUN, CREATININE, GLUCOSE, CALCIUM, AST, ALT, ALKPHOS, PROT, ALBUMIN, BILITOT, EGFR  Imaging Studies: reviewed  EKG, Pathology, and Other Studies: reviewed    Counseling / Coordination of Care  Total floor / unit time spent today 25+ minutes  Greater than 50% of total time was spent with the patient and / or family counseling and / or coordination of care   A description of the counseling / coordination of care: symptom assessment, family discussions

## 2018-01-30 NOTE — ASSESSMENT & PLAN NOTE
· chest tube has been discontinued, repeat chest x-ray on January 26th without residual pneumothorax

## 2018-01-30 NOTE — PLAN OF CARE
Problem: Nutrition/Hydration-ADULT  Goal: Nutrient/Hydration intake appropriate for improving, restoring or maintaining nutritional needs  Monitor and assess patient's nutrition/hydration status for malnutrition (ex- brittle hair, bruises, dry skin, pale skin and conjunctiva, muscle wasting, smooth red tongue, and disorientation)  Collaborate with interdisciplinary team and initiate plan and interventions as ordered  Monitor patient's weight and dietary intake as ordered or per policy  Utilize nutrition screening tool and intervene per policy  Determine patient's food preferences and provide high-protein, high-caloric foods as appropriate  INTERVENTIONS:  - Monitor oral intake, urinary output, labs, and treatment plans  - Assess nutrition and hydration status and recommend course of action  - Evaluate amount of meals eaten  - Assist patient with eating if necessary   - Allow adequate time for meals  - Recommend/ encourage appropriate diets, oral nutritional supplements, and vitamin/mineral supplements  - Order, calculate, and assess calorie counts as needed  - Assess need for intravenous fluids  - Provide specific nutrition/hydration education as appropriate  - Include patient/family/caregiver in decisions related to nutrition    Outcome: Progressing  Not increasing meal completions but taking supplements

## 2018-01-31 PROCEDURE — 99233 SBSQ HOSP IP/OBS HIGH 50: CPT | Performed by: INTERNAL MEDICINE

## 2018-01-31 PROCEDURE — 97116 GAIT TRAINING THERAPY: CPT

## 2018-01-31 PROCEDURE — 99232 SBSQ HOSP IP/OBS MODERATE 35: CPT | Performed by: INTERNAL MEDICINE

## 2018-01-31 PROCEDURE — 97530 THERAPEUTIC ACTIVITIES: CPT

## 2018-01-31 PROCEDURE — 97535 SELF CARE MNGMENT TRAINING: CPT

## 2018-01-31 PROCEDURE — 94640 AIRWAY INHALATION TREATMENT: CPT

## 2018-01-31 PROCEDURE — 94760 N-INVAS EAR/PLS OXIMETRY 1: CPT

## 2018-01-31 RX ADMIN — OXYCODONE HYDROCHLORIDE 10 MG: 10 TABLET ORAL at 03:10

## 2018-01-31 RX ADMIN — ALBUTEROL SULFATE 2 PUFF: 90 AEROSOL, METERED RESPIRATORY (INHALATION) at 09:40

## 2018-01-31 RX ADMIN — FLUTICASONE PROPIONATE AND SALMETEROL 2 PUFF: 50; 250 POWDER RESPIRATORY (INHALATION) at 21:09

## 2018-01-31 RX ADMIN — MELATONIN 3 MG: 3 TAB ORAL at 21:11

## 2018-01-31 RX ADMIN — OLANZAPINE 5 MG: 5 TABLET, ORALLY DISINTEGRATING ORAL at 21:11

## 2018-01-31 RX ADMIN — CALCIUM CARBONATE 500 MG (1,250 MG)-VITAMIN D3 200 UNIT TABLET 1 TABLET: at 09:34

## 2018-01-31 RX ADMIN — OXYCODONE HYDROCHLORIDE 10 MG: 10 TABLET ORAL at 13:02

## 2018-01-31 RX ADMIN — OXYCODONE HYDROCHLORIDE 10 MG: 10 TABLET ORAL at 21:12

## 2018-01-31 RX ADMIN — PANTOPRAZOLE SODIUM 40 MG: 40 TABLET, DELAYED RELEASE ORAL at 06:26

## 2018-01-31 RX ADMIN — MIRTAZAPINE 15 MG: 15 TABLET, FILM COATED ORAL at 21:11

## 2018-01-31 RX ADMIN — FENTANYL 1 PATCH: 25 PATCH TRANSDERMAL at 21:11

## 2018-01-31 RX ADMIN — FLUTICASONE PROPIONATE AND SALMETEROL 2 PUFF: 50; 250 POWDER RESPIRATORY (INHALATION) at 09:34

## 2018-01-31 RX ADMIN — IPRATROPIUM BROMIDE AND ALBUTEROL SULFATE 3 ML: .5; 3 SOLUTION RESPIRATORY (INHALATION) at 16:25

## 2018-01-31 RX ADMIN — Medication 1 TABLET: at 09:34

## 2018-01-31 NOTE — PROGRESS NOTES
01/31/18 1000   Plan of Care   Comments  attempted to see pt     Assessment Completed by: Unit visit

## 2018-01-31 NOTE — PROGRESS NOTES
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 [facility])   Assessment & Plan    · Has metastatic laryngeal cancer   · Did not say much today  Did however mention he will be having a meeting later today with the hospice liaison which I confirmed with his daughter Bhumika Hong  · He is aware overall prognosis is poor and has prior expressed wishes for no further diagnostic/imaging studies  · Currently waiting on discussions with patient, daughter and hospice team regarding disposition at discharge  · Current options include home with hospice vs  cancer focused care with chemo/immunotherapy after PEG tube placement  Patient however has declined PEG tube in the past            Adult failure to thrive   Assessment & Plan    ·  continue with supportive care, encourage p o   Intake  · Patient remains with poor oral intake and does not seem interested in PEG tube placement  · Will be appropriate for hospice        Severe protein-calorie malnutrition ([address] )   Assessment & Plan    · Continue nutritional supplements, encourage increased oral intake        Gram-negative pneumonia ([address] )   Assessment & Plan    · Stable from the respiratory standpoint  · Completed IV antibiotic course on 1/22/18 and now be monitored off antibiotics  · Patient is low risk for aspiration given post laryngectomy status per speech pathology          Postprocedural pneumothorax   Assessment & Plan    · chest tube has been discontinued, repeat chest x-ray on January 26th without residual pneumothorax        Sacral decubitus ulcer   Assessment & Plan    · Wound care following  · Stage 3 pressure ulcer, unclear if present on admission  · Continue with local care as per wound care recommendation  · Frequent repositioning   · Outpatient follow-up at the wound [address]    · continue with mirtazapine, olanzapine, Xanax          Anemia   Assessment & Plan    · Likely related to chronic disease  · Last hemoglobin on , no daily labs at this point              VTE Pharmacologic Prophylaxis:   Pharmacologic: Enoxaparin (Lovenox)  Mechanical VTE Prophylaxis in Place: Yes    Patient Centered Rounds: I have performed bedside rounds with nursing staff today  Discussions with Specialists or Other Care Team Provider: Nursing    Education and Discussions with Family / Patient: Patient and daughter Zeina Birch on phone  Daughter requests prescription for HME (heat and moisture exchanger) caps for patient's Farhat tube  Current Length of Stay: 14 day(s)    Current Patient Status: Inpatient   Certification Statement: The patient will continue to require additional inpatient hospital stay due to Pending placement    Discharge Plan:  Meeting with hospice lays on today to determine disposition    Code Status: Level 3 - DNAR and DNI      Subjective:   Offers no complaints  Not wanting to be bothered    Objective:     Vitals:   Temp (24hrs), Av 1 °F (37 3 °C), Min:98 9 °F (37 2 °C), Max:99 3 °F (37 4 °C)    HR:  [56-62] 57  Resp:  [16] 16  BP: (104-112)/(49-53) 109/53  SpO2:  [95 %-100 %] 100 %  Body mass index is 21 34 kg/m²  Input and Output Summary (last 24 hours):        Intake/Output Summary (Last 24 hours) at 18 0846  Last data filed at 18 2408   Gross per 24 hour   Intake              580 ml   Output              650 ml   Net              -70 ml       Physical Exam:  General Appearance:    Alert, chronically ill-appearing, no acute distress   Head:    Normocephalic,    Eyes:    Conjunctiva/corneas clear   Neck:   Supple   Lungs:     Respirations unlabored    Heart:    Regular rate and rhythm, S1 and S2    Abdomen:     Nondistended   Extremities:   No edema   Neurologic:   Alert and oriented x3, moves all extremities         Additional Data:     Labs:      Results from last 7 days  Lab Units 01/25/18  0657   WBC Thousand/uL 5 00   HEMOGLOBIN g/dL 11 5*   HEMATOCRIT % 34 8*   PLATELETS Thousands/uL 267   NEUTROS PCT % 84*   LYMPHS PCT % 7*   MONOS PCT % 8   EOS PCT % 1       Results from last 7 days  Lab Units 01/25/18  0657   SODIUM mmol/L 139   POTASSIUM mmol/L 4 1   CHLORIDE mmol/L 104   CO2 mmol/L 31   BUN mg/dL 18   CREATININE mg/dL 0 72   CALCIUM mg/dL 8 7   GLUCOSE RANDOM mg/dL 97           * I Have Reviewed All Lab Data Listed Above  * Additional Pertinent Lab Tests Reviewed: No New Labs Available For Today    Cultures:   Blood Culture:   Lab Results   Component Value Date    BLOODCX No Growth After 5 Days  01/16/2018    BLOODCX No Growth After 5 Days  01/16/2018    BLOODCX No Growth After 5 Days  12/13/2017    BLOODCX No Growth After 5 Days  12/13/2017    BLOODCX No Growth After 5 Days  11/19/2017    BLOODCX No Growth After 5 Days   11/19/2017     Urine Culture: No results found for: URINECX  Sputum Culture: No components found for: SPUTUMCX  Wound Culture: No results found for: WOUNDCULT    Last 24 Hours Medication List:     Current Facility-Administered Medications:  acetaminophen 650 mg Oral Q6H PRN Abel Allison MD   albuterol 2 puff Inhalation Q4H PRN Alie Stearns MD   ALPRAZolam 0 25 mg Oral 4x Daily PRN Abel Allison MD   aluminum-magnesium hydroxide-simethicone 30 mL Oral Q6H PRN Abel Allison MD   bisacodyl 10 mg Rectal Daily PRN Arianna Flores PA-C   calcium carbonate-vitamin D 1 tablet Oral Daily With Breakfast Abel Allison MD   enoxaparin 40 mg Subcutaneous Daily Ritchie Greenfield MD   fentaNYL 1 patch Transdermal Q72H Abel Allison MD   fluticasone-salmeterol 2 puff Inhalation Q12H [address] Abel Allison MD   ipratropium-albuterol 3 mL Nebulization Q4H PRN Rosibel Perez MD   melatonin 3 mg Oral HS Arianna Flores PA-C   mirtazapine 15 mg Oral HS Abel Allison MD   OLANZapine 5 mg Oral HS Abel Allison MD   ondansetron 4 mg Intravenous Q6H PRN Abel Allison MD   oxyCODONE 10 mg Oral Q4H PRN Abel Allison MD   oxyCODONE 5 mg Oral Q4H PRN Abel Allison MD   pantoprazole 40 mg Oral Early Morning Abel Allison MD   polyethylene glycol 17 g Oral Daily PRN Abel Allison MD   senna-docusate sodium 1 tablet Oral Daily Kirsten Rosenberg MD        Today, Patient Was Seen By: Pedrito Andre MD    ** Please Note: Dragon 360 Dictation voice to text software may have been used in the creation of this document   **

## 2018-01-31 NOTE — PLAN OF CARE
Problem: PHYSICAL THERAPY ADULT  Goal: Performs mobility at highest level of function for planned discharge setting  See evaluation for individualized goals  Treatment/Interventions: Functional transfer training, LE strengthening/ROM, Elevations, Therapeutic exercise, Gait training  Equipment Recommended: Padma Rodriguez       See flowsheet documentation for full assessment, interventions and recommendations  Outcome: Progressing  Prognosis: Good  Problem List: Decreased strength, Decreased endurance, Impaired balance  Assessment: pt demonstrates improvement with gait this session, but does amb with slow pacing, increased fatigue  He defers stairs until next session  Vitals taken after gait, SpO2 on RA 96%, HR 67 bpm  BP WNL  Barriers to Discharge: Inaccessible home environment, Decreased caregiver support     Recommendation: Home with family support     PT - [place] to Discharge: No    See flowsheet documentation for full assessment

## 2018-01-31 NOTE — SOCIAL WORK
Met with pt and pt's daughter Rosmery Ivey to discuss dc plan  Pt requested CM speak with Rosmery Ivey outside of his room  Rosmery Ivey discussed pt receiving rehab at [facility]  Rosmery Ivey is requesting referrals to [employer], [facility] and [facility]  [facility] referrals sent to same  CM explained to Rosmery Ivey that if pt continues to refuse therapy his insurance will not approve rehab  Rosmery Ivey is aware and states plan would then be for pt to dc to [facility] with services

## 2018-01-31 NOTE — PROGRESS NOTES
Progress note - Palliative and [address]  81 y o  male [id number]    Assessment:  Repeat episodes of HCAP - moraxella catarrhalis PNA  Facial swelling  Laryngeal cancer - s/p chemo/XRT/surgery at [facility]  Locally sees Dr Raffy Farr in med/onc and Rai Wilson in ENT  Tracheostomy s/p total laryngectomy  Anemia  Depression/existential suffering  Sacral wound  Cancer associated pain  Insomnia  Decreased oral intake  Lung mass- +squamous cell    Plan:  1  Continue current pain regimen of Duragesic 25 mcg  2  Continue PRN oxycodone  3  Continue Zyprexa and Remeron  4  Goals- presented patient with various options including:   -FERMIN or home with hospice   -SNF for rehab and hospice transition   -SNF for rehab and continue to seek disease directed cares  He remains largely undecided but is clear that he does not want to utilize hospice services as of this point  He is interested in rehab if possible and agrees to work with PT/OT today to see how he does for further recommendations about appropriate level of care  His daughter is hopeful that perhaps there will be VA benefits that can be utilized and Jeanette Gerber, CHERRYW will look into this further  Likely plan will be for d/c to [facility] in the coming days  POLST introduced to prevent re-hospitalizations, was not completed  Code Status: DNR/DNI- Level 3   Power of :  presumed to be daughter by [place] 169   Advance Directive / Living Will: yes   POLST:  no      Interval history:      Patient currently states pain is controlled  He agreed to speak with our team and hospice today to try to determine direction of care and ultimate discharge plan       MEDICATIONS / ALLERGIES:    all current active meds have been reviewed and current meds:   Current Facility-Administered Medications   Medication Dose Route Frequency    acetaminophen (TYLENOL) tablet 650 mg  650 mg Oral Q6H PRN    albuterol (PROVENTIL HFA,VENTOLIN HFA) inhaler 2 puff  2 puff Inhalation Q4H PRN    ALPRAZolam (XANAX) tablet 0 25 mg  0 25 mg Oral 4x Daily PRN    aluminum-magnesium hydroxide-simethicone (MYLANTA) 200-200-20 mg/5 mL oral suspension 30 mL  30 mL Oral Q6H PRN    bisacodyl (DULCOLAX) rectal suppository 10 mg  10 mg Rectal Daily PRN    calcium carbonate-vitamin D (OSCAL-D) 500 mg-200 units per tablet 1 tablet  1 tablet Oral Daily With Breakfast    enoxaparin (LOVENOX) subcutaneous injection 40 mg  40 mg Subcutaneous Daily    fentaNYL (DURAGESIC) 25 mcg/hr TD 72 hr patch 1 patch  1 patch Transdermal Q72H    fluticasone-salmeterol (ADVAIR) 250-50 mcg/dose inhaler 2 puff  2 puff Inhalation Q12H [address]    ipratropium-albuterol (DUO-NEB) 0 5-2 5 mg/3 mL inhalation solution 3 mL  3 mL Nebulization Q4H PRN    melatonin tablet 3 mg  3 mg Oral HS    mirtazapine (REMERON) tablet 15 mg  15 mg Oral HS    OLANZapine (ZyPREXA ZYDIS) dispersible tablet 5 mg  5 mg Oral HS    ondansetron (ZOFRAN) injection 4 mg  4 mg Intravenous Q6H PRN    oxyCODONE (ROXICODONE) immediate release tablet 10 mg  10 mg Oral Q4H PRN    oxyCODONE (ROXICODONE) IR tablet 5 mg  5 mg Oral Q4H PRN    pantoprazole (PROTONIX) EC tablet 40 mg  40 mg Oral Early Morning    polyethylene glycol (MIRALAX) packet 17 g  17 g Oral Daily PRN    senna-docusate sodium (SENOKOT S) 8 6-50 mg per tablet 1 tablet  1 tablet Oral Daily       Allergies   Allergen Reactions    Aspirin Anaphylaxis    Cleocin [Clindamycin] Other (See Comments)     Cannot specify but had definite reaction in past!!       OBJECTIVE:    Physical Exam  Physical Exam   Constitutional: He is oriented to person, place, and time  No distress  HENT:   Right Ear: External ear normal    Left Ear: External ear normal    Nose: Nose normal    Edema of face   Eyes: EOM are normal  Right eye exhibits no discharge  Left eye exhibits no discharge  No scleral icterus     Neck:   Trach noted   Cardiovascular: Normal rate, regular rhythm and intact distal pulses  Murmur heard  Pulmonary/Chest: Effort normal and breath sounds normal  No respiratory distress  Currently has oxygen on via trach collar   Abdominal: Soft  Bowel sounds are normal  He exhibits no distension  Musculoskeletal: He exhibits no edema  Neurological: He is alert and oriented to person, place, and time  Skin: Skin is warm and dry  There is pallor  Psychiatric: He has a normal mood and affect  Nursing note and vitals reviewed  Patient refused examination  Lab Results: I have personally reviewed pertinent labs  , CBC:   No results found for: WBC, HGB, HCT, MCV, PLT, ADJUSTEDWBC, MCH, MCHC, RDW, MPV, NRBC, CMP:   No results found for: NA, K, CL, CO2, ANIONGAP, BUN, CREATININE, GLUCOSE, CALCIUM, AST, ALT, ALKPHOS, PROT, ALBUMIN, BILITOT, EGFR  Imaging Studies: reviewed  EKG, Pathology, and Other Studies: reviewed    Counseling / Coordination of Care  Total floor / unit time spent today 45+ minutes  Greater than 50% of total time was spent with the patient and / or family counseling and / or coordination of care   A description of the counseling / coordination of care: symptom assessment, family discussions, disposition planning

## 2018-01-31 NOTE — ASSESSMENT & PLAN NOTE
· Has metastatic laryngeal cancer   · Did not say much today  Did however mention he will be having a meeting later today with the hospice liaison which I confirmed with his daughter Rosmery Ivey  · He is aware overall prognosis is poor and has prior expressed wishes for no further diagnostic/imaging studies  · Currently waiting on discussions with patient, daughter and hospice team regarding disposition at discharge  · Current options include home with hospice vs  cancer focused care with chemo/immunotherapy after PEG tube placement    Patient however has declined PEG tube in the past

## 2018-01-31 NOTE — ASSESSMENT & PLAN NOTE
· Wound care following  · Stage 3 pressure ulcer, unclear if present on admission  · Continue with local care as per wound care recommendation  · Frequent repositioning   · Outpatient follow-up at the [facility]

## 2018-01-31 NOTE — PHYSICAL THERAPY NOTE
PT PROGRESS NOTE     01/31/18 1415   Pain Assessment   Pain Assessment 0-10   Restrictions/Precautions   Other Precautions Fall Risk   General   Chart Reviewed Yes   Response to Previous Treatment Patient with no complaints from previous session  Family/Caregiver Present No   Cognition   Overall Cognitive Status WFL   Arousal/Participation Responsive; Cooperative   Attention Within functional limits   Orientation Level Oriented X4   Memory Within functional limits   Following Commands Follows one step commands without difficulty   Subjective   Subjective pt states he feels fine, no c/o at this time   Bed Mobility   Rolling R 7  Independent   Rolling L 7  Independent   Supine to Sit 7  Independent   Sit to Supine 7  Independent   Transfers   Sit to Stand 7  Independent   Stand to Sit 7  Independent   Stand pivot 7  Independent   Ambulation/Elevation   Gait pattern Foward flexed; Excessively slow;Decreased foot clearance   Gait Assistance 5  Supervision   Assistive Device Rolling walker   Distance 240'   Balance   Static Sitting Good   Static Standing Fair +   Ambulatory Fair   Endurance Deficit   Endurance Deficit Yes   Endurance Deficit Description pt is deconditioned, fatigues quickly  Activity Tolerance   Activity Tolerance Patient limited by fatigue   Assessment   Prognosis Good   Problem List Decreased strength;Decreased endurance; Impaired balance   Assessment pt demonstrates improvement with gait this session, but does amb with slow pacing, increased fatigue  He defers stairs until next session  Vitals taken after gait, SpO2 on RA 96%, HR 67 bpm  BP WNL  Barriers to Discharge Inaccessible home environment;Decreased caregiver support   Goals   Patient Goals None expressed  STG Expiration Date 02/02/18   Treatment Day 1   Plan   Treatment/Interventions Functional transfer training; Endurance training;Gait training   Progress Improving as expected   PT Frequency 5x/wk   Recommendation   Recommendation Home with family support   Equipment Recommended Walker   PT - OK to Discharge No   Additional Comments (After trialing stairs )     Mera Collier, PTA

## 2018-01-31 NOTE — SOCIAL WORK
Contacted pt's miket Krystina, contact # [phone], to discuss therapy's sessions from today  Informed her that pt was independent with supervision during therapy sessions and PT's recommendation if to go home with family support and OT's recommendation is FERMIN  She stated to discuss d/c options with pt  Cm met with pt to inform of above and discuss rehab vs [facility]ERMIN with home therapy  He stated that he is leaning towards [facility] with home therapy but will need to discuss further with his dgt Krystina

## 2018-01-31 NOTE — RESTORATIVE TECHNICIAN NOTE
Restorative Specialist Mobility Note       Activity: Other (Comment) (Educated/encouraged pt to ambulate with assistance 3-4 x's/day, pt refused  Bed alarm on   Pt callbell, phone/tray within reach )       Autumn ARANGO, Restorative Technician, [employer]

## 2018-01-31 NOTE — PALLIATIVE CARE CONFERENCE
Palliative LSW in attendance earlier today with [facility] physician (Dr Haskel Bamberger), Hospice Liaison Magda Galarza), patient and daughter, Kendal Sat  At that time pt  Expressed wishes to proceed with treatment focused care  Daughter inquired about VA services as he does see a doctor and receive medication at the clinic  LSW emailed Conchita Chao, Director of [employer] to assist in guidance for home services  Provided daughter's contact information as well  Case Management aware of above referral   LSW will continue to follow for support

## 2018-01-31 NOTE — OCCUPATIONAL THERAPY NOTE
Occupational Therapy Treatment Note     01/31/18 1140   Restrictions/Precautions   Weight Bearing Precautions Per Order No   Other Precautions Fall Risk  (trach, aspiration)   Lifestyle   Autonomy Per pt, Pt was I w/ ADLS and IADLS, drove, & required no use of DME PTA   Reciprocal Relationships Pt responds to yes/no questions primarily, will write on paper if needing further  Per pt, Pt lives alone however per chart review, CM notes report pt lives with daughter and her family  Intrinsic Gratification pt reports being with family makes him feel happy   Pain Assessment   Pain Assessment 0-10   Pain Score 7   Pain Type Acute pain   Pain Location Groin   Pain Orientation Left   Pain Descriptors Squeezing   Pain Frequency Intermittent   Patient's Stated Pain Goal 1   Hospital Pain Intervention(s) Medication (See MAR); Ambulation/increased activity; Rest   Response to Interventions pt  reports ambulation does not impact pain   Pain Rating: FLACC (Rest) - Face 0   Pain Rating: FLACC (Rest) - Legs 0   Pain Rating: FLACC (Rest) - Activity 0   Pain Rating: FLACC (Rest) - Cry 0   Pain Rating: FLACC (Rest) - Consolability 0   Score: FLACC (Rest) 0   ADL   Where Assessed Standing at sink  (EOB for clothing mgmt)   Grooming Assistance 6  Modified Independent   Grooming Comments Pt  in stance at sink, has rw for safety but does not utilize  brush teeth, wash face, comb hair, wash hands with good activity tolerance   UB Dressing Assistance 4  Minimal Assistance   UB Dressing Deficit Pull around back   UB Dressing Comments don hospital robe   LB Dressing Assistance 6  Modified independent   LB Dressing Deficit Supervision/safety   LB Dressing Comments don/doff socks  don/doff pants x2 different types   Toileting Assistance  5  Supervision/Setup   Toileting Deficit Verbal cueing;Supervison/safety; Increased time to complete   Communication   Communication Yes  (short yes/no    utilizes writing for further thought 2* trach) Functional Standing Tolerance   Time ~15 min   Activity in stance at sink, functional mobility in room   Bed Mobility   Rolling R 7  Independent   Rolling L 7  Independent   Supine to Sit 7  Independent   Sit to Supine 7  Independent   Transfers   Sit to Stand 7  Independent   Stand to Sit 7  Independent   Stand pivot 6  Modified independent   Functional Mobility   Functional Mobility 6  Modified independent   Additional Comments safely utilized Rw    Cognition   Overall Cognitive Status WFL   Arousal/Participation Responsive   Attention Within functional limits   Orientation Level Oriented X4   Memory Unable to assess; Within functional limits   Following Commands Follows all commands and directions without difficulty   Comments pt alert and cooperative, conversational  reported 10/10 depression  emotional support given   Activity Tolerance   Activity Tolerance Patient tolerated treatment well   Medical Staff Made Aware rn   Assessment   Assessment Pt  Participated in occupational therapy session with focus on functional mobility, Activity tolerance, grooming/ADLS  Clothing mgmt  Pt  Cleared by Sagrario/Estrella for participation in occupational therapy session  Pt  Identifiers confirmed  Pt  Hx tracheostomy, which limits verbal interaction at times  Pt  Will write down when further thoughts need expressed  Pt  Received supine in bed  Pt  Alert and cooperative  Pt  Expressed remembering this therapist and understanding the need for participating in tx sessions  Pt Sat EOB Independently , don/doff l/r socks Independently  Pt  Mgmt Lb dressing with mod I utilizing rw 2* decreased balance  Pt  Sit to stand independently  Pt  Followed multiple step instructions with good carry over to ambulate to sink, wash face, hands, brush teeth, and comb hair (in that order) in stance  Pt displayed good activity tolerance ~15 minutes in stance during hygiene activity  Pt   Then appropriately followed directions to retrieve item from across room closet, and item from his bag at the [facility] [address]   Pt  Complied with same  Pt  Required Min A for [facility] robe 2* decreased balance and rom  Pt  Returned to sitting eob mod I requiring a rw for safety 2* decreased balance  Pt  Completed electric shaving  Pt  Expressed he has depression of 10/10  Emotional support provided  Pt  Stated his family brings him happiness  Pt  Smiley Lutz "I wish I had an appetite "   Rn aware of same  Patient will benefit from further rehab for achieving optimal performance with all functional tasks  Plan   Treatment Interventions ADL retraining;UE strengthening/ROM; Endurance training;Patient/family training;Energy conservation; Activityengagement; Functional transfer training   Goal Expiration Date 02/13/18   Treatment Day 4   OT Frequency 3-5x/wk   Recommendation   OT Discharge Recommendation Other (Comment)  (str vs michele)   OT - OK to Discharge Yes  (when med clear)   Barthel Index   Feeding 10   Bathing 0   Grooming Score 5   Dressing Score 5   Bladder Score 10   Bowels Score 10   Toilet Use Score 5   Transfers (Bed/Chair) Score 15   Mobility (Level Surface) Score 0   Stairs Score 0   Barthel Index Score 60   Modified Beadle Scale   Modified Beadle Scale 3   Neuro QOL Depression Screen - In the Past 7 Days:   I felt depressed 4   I felt hopeless 3   I felt like nothing could cheer me up 3   I felt that my life was empty 2   I felt worthless 3   I felt unhappy 4   I felt I had no reason for living 2   I felt that nothing was interesting 3   Depression Score 24               MOUNA Cano student

## 2018-01-31 NOTE — PLAN OF CARE
Problem: OCCUPATIONAL THERAPY ADULT  Goal: Performs self-care activities at highest level of function for planned discharge setting  See evaluation for individualized goals  Treatment Interventions: ADL retraining, Functional transfer training, Endurance training, Cognitive reorientation, Patient/family training, Equipment evaluation/education, Compensatory technique education, Continued evaluation, Energy conservation, Activityengagement          See flowsheet documentation for full assessment, interventions and recommendations  Outcome: Progressing  Limitation: Decreased ADL status, Decreased UE strength, Decreased endurance, Decreased self-care trans, Decreased high-level ADLs  Prognosis: Good  Assessment: Pt  Participated in occupational therapy session with focus on functional mobility, Activity tolerance, grooming/ADLS  Clothing mgmt  Pt  Cleared by Glenis for participation in occupational therapy session  Pt  Identifiers confirmed  Pt  Hx tracheostomy, which limits verbal interaction at times  Pt  Will write down when further thoughts need expressed  Pt  Received supine in bed  Pt  Alert and cooperative  Pt  Expressed remembering this therapist and understanding the need for participating in tx sessions  Pt Sat EOB Independently , don/doff l/r socks Independently  Pt  Mgmt Lb dressing with mod I utilizing rw 2* decreased balance  Pt  Sit to stand independently  Pt  Followed multiple step instructions with good carry over to ambulate to sink, wash face, hands, brush teeth, and comb hair (in that order) in stance  Pt displayed good activity tolerance ~15 minutes in stance during hygiene activity  Pt  Then appropriately followed directions to retrieve item from across room closet, and item from his bag at the [address]  Pt  Complied with same  Pt  Required Min A for [facility] 2* decreased balance and rom  Pt  Returned to sitting eob mod I requiring a rw for safety 2* decreased balance   Pt  Completed electric shaving  Pt  Expressed he has depression of 10/10  Emotional support provided  Pt  Stated his family brings him happiness  Pt  Stefanie Romano "I wish I had an appetite "   Rn aware of same  Patient will benefit from further rehab for achieving optimal performance with all functional tasks       OT Discharge Recommendation: Other (Comment) (str vs [facility])  OT - OK to Discharge: Yes (when med clear)  Lashonda Gudino  ot student

## 2018-02-01 PROBLEM — J95.811 POSTPROCEDURAL PNEUMOTHORAX: Status: RESOLVED | Noted: 2018-01-26 | Resolved: 2018-02-01

## 2018-02-01 PROCEDURE — 99232 SBSQ HOSP IP/OBS MODERATE 35: CPT | Performed by: INTERNAL MEDICINE

## 2018-02-01 PROCEDURE — 94760 N-INVAS EAR/PLS OXIMETRY 1: CPT

## 2018-02-01 RX ADMIN — OXYCODONE HYDROCHLORIDE 10 MG: 10 TABLET ORAL at 05:27

## 2018-02-01 RX ADMIN — MELATONIN 3 MG: 3 TAB ORAL at 21:02

## 2018-02-01 RX ADMIN — OXYCODONE HYDROCHLORIDE 10 MG: 10 TABLET ORAL at 09:56

## 2018-02-01 RX ADMIN — Medication 1 TABLET: at 09:57

## 2018-02-01 RX ADMIN — MIRTAZAPINE 15 MG: 15 TABLET, FILM COATED ORAL at 21:02

## 2018-02-01 RX ADMIN — FLUTICASONE PROPIONATE AND SALMETEROL 2 PUFF: 50; 250 POWDER RESPIRATORY (INHALATION) at 21:02

## 2018-02-01 RX ADMIN — CALCIUM CARBONATE 500 MG (1,250 MG)-VITAMIN D3 200 UNIT TABLET 1 TABLET: at 09:56

## 2018-02-01 RX ADMIN — OXYCODONE HYDROCHLORIDE 10 MG: 10 TABLET ORAL at 14:06

## 2018-02-01 RX ADMIN — PANTOPRAZOLE SODIUM 40 MG: 40 TABLET, DELAYED RELEASE ORAL at 05:27

## 2018-02-01 RX ADMIN — OXYCODONE HYDROCHLORIDE 10 MG: 10 TABLET ORAL at 21:02

## 2018-02-01 RX ADMIN — OLANZAPINE 5 MG: 5 TABLET, ORALLY DISINTEGRATING ORAL at 21:02

## 2018-02-01 RX ADMIN — FLUTICASONE PROPIONATE AND SALMETEROL 2 PUFF: 50; 250 POWDER RESPIRATORY (INHALATION) at 09:56

## 2018-02-01 NOTE — PROGRESS NOTES
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  · Remains largely undecided regarding goals of care however he does not want to utilize hospice services at this time per discussions with palliative care team  · Patient interested in rehab if possible however based on PT evaluation, he was able to ambulate 240ft using a rolling walker and is recommended for discharge home with family support  · Patient and family however requesting referral to [facility] and  currently working on this      Adult failure to thrive   Assessment & Plan    ·  continue with supportive care, encourage p o   Intake  · Declines PEG tube placement      Severe protein-calorie malnutrition (HCC)   Assessment & Plan    · Continue nutritional supplements, encourage increased oral intake      Gram-negative pneumonia (HCC)   Assessment & Plan    · Stable from the respiratory standpoint  · Completed IV antibiotic course on 1/22/18 and now be monitored off antibiotics  · Patient is low risk for aspiration given post laryngectomy status per speech pathology      Sacral decubitus ulcer   Assessment & Plan    · Stage 3 pressure ulcer, unclear if present on admission  · Continue with local care as per wound care recommendation  · Frequent repositioning   · Outpatient follow-up at the wound [address] [place] [address]    · continue with mirtazapine, olanzapine, Xanax      Anemia   Assessment & Plan    · Likely related to chronic disease  · Last hemoglobin on January 25th 11 5, no daily labs at this point        VTE Pharmacologic Prophylaxis:   Pharmacologic: Enoxaparin (Lovenox)  Mechanical VTE Prophylaxis in Place: Yes    Patient Centered Rounds: I have performed bedside rounds with nursing staff today  Discussions with Specialists or Other Care Team Provider: Nursing/CM    Education and Discussions with Family / Patient: Patient    Current Length of Stay: 15 day(s)    Current Patient Status: Inpatient   Certification Statement: The patient will continue to require additional inpatient hospital stay due to Pending placement    Discharge Plan:  Awaiting placement     Code Status: Level 3 - DNAR and DNI      Subjective:   No new complaints or acute overnight events  Objective:     Vitals:   Temp (24hrs), Av 4 °F (36 9 °C), Min:97 8 °F (36 6 °C), Max:98 7 °F (37 1 °C)    HR:  [57-67] 57  Resp:  [18-20] 18  BP: (105-113)/(50-59) 113/59  SpO2:  [98 %-100 %] 98 %  Body mass index is 21 34 kg/m²  Input and Output Summary (last 24 hours): Intake/Output Summary (Last 24 hours) at 18 1500  Last data filed at 18 0800   Gross per 24 hour   Intake              600 ml   Output              785 ml   Net             -185 ml       Physical Exam:  General Appearance:    Alert, cachectic, chronically ill-appearing, no acute distress    Head:    Normocephalic, without obvious abnormality, atraumatic, mucous membranes moist    Eyes:    Conjunctiva/corneas clear   Neck:   Supple   Lungs:     Respirations unlabored     Heart:    Regular rate and rhythm, S1 and S2    Abdomen:     Soft, non-tender, nondistended   Extremities:   No edema   Neurologic:  nonfocal         Additional Data:     Labs: Invalid input(s): LABALBU        * I Have Reviewed All Lab Data Listed Above  * Additional Pertinent Lab Tests Reviewed: No New Labs Available For Today    Cultures:   Blood Culture:   Lab Results   Component Value Date    BLOODCX No Growth After 5 Days  2018    BLOODCX No Growth After 5 Days  2018    BLOODCX No Growth After 5 Days  2017    BLOODCX No Growth After 5 Days  12/13/2017    BLOODCX No Growth After 5 Days  11/19/2017    BLOODCX No Growth After 5 Days  11/19/2017     Urine Culture: No results found for: URINECX  Sputum Culture: No components found for: SPUTUMCX  Wound Culture: No results found for: WOUNDCULT    Last 24 Hours Medication List:     Current Facility-Administered Medications:  acetaminophen 650 mg Oral Q6H PRN Abel Allison MD   albuterol 2 puff Inhalation Q4H PRN Gregg Haas MD   ALPRAZolam 0 25 mg Oral 4x Daily PRN Abel Allison MD   aluminum-magnesium hydroxide-simethicone 30 mL Oral Q6H PRN Abel Allison MD   bisacodyl 10 mg Rectal Daily PRN Arianna Flores PA-C   calcium carbonate-vitamin D 1 tablet Oral Daily With Breakfast Abel Allison MD   enoxaparin 40 mg Subcutaneous Daily Radha Ano, MD   fentaNYL 1 patch Transdermal Q72H Abel Allison MD   fluticasone-salmeterol 2 puff Inhalation Q12H [address] Abel Allison MD   ipratropium-albuterol 3 mL Nebulization Q4H PRN Chelsie Foote, MD   melatonin 3 mg Oral HS Arianna Flores PA-C   mirtazapine 15 mg Oral HS Abel Allison MD   OLANZapine 5 mg Oral HS Abel Allison MD   ondansetron 4 mg Intravenous Q6H PRN Abel Allison MD   oxyCODONE 10 mg Oral Q4H PRN Abel Allison MD   oxyCODONE 5 mg Oral Q4H PRN Abel Allison MD   pantoprazole 40 mg Oral Early Morning Abel Allison MD   polyethylene glycol 17 g Oral Daily PRN Abel Allison MD   senna-docusate sodium 1 tablet Oral Daily Speedy Allison MD        Today, Patient Was Seen By: Gregg Haas MD    ** Please Note: Dragon 360 Dictation voice to text software may have been used in the creation of this document   **

## 2018-02-01 NOTE — ASSESSMENT & PLAN NOTE
· Stage 3 pressure ulcer, unclear if present on admission  · Continue with local care as per wound care recommendation  · Frequent repositioning   · Outpatient follow-up at the [facility]

## 2018-02-01 NOTE — SOCIAL WORK
[facility] - Extended Stay  Optimal GLOS: 2  Hospital Day: 15 days  DC Readiness:   Discharge Readiness  Return to top of Pneumonia, Community Acquired RRG - ISC  · Discharge readiness is indicated by patient meeting Recovery Milestones, including ALL of the following:  ? No CO2 retention or acidosis  ? Hemodynamic stability  ? Afebrile, or temperature acceptable for next level of care  ? Tachypnea absent  ? Mental status at baseline  ? Antibiotic regimen acceptable for next level of care  ? Oxygen absent or at baseline need  ? Ambulatory  ? Oral hydration, medications, and diet  ?  Discharge plans and education understood    Identified Barriers:Goals of care and disposition  Discussion Date (Time): 02/01/18 with Dr Terrance Ortiz

## 2018-02-01 NOTE — PROGRESS NOTES
02/01/18 1100 [facility]   Spiritual Beliefs/Perceptions   Concept of God Accepting   Relationship with God Close   Spiritual Strengths Cecile   Plan of Care   Comments  visited with pt  Pt is not able to verbally speak, but is able to communicate       Assessment Completed by: Unit visit

## 2018-02-01 NOTE — SOCIAL WORK
Spoke with pt's dgt to discuss pt's discharge plan  Informed her that the liaison from [facility] has reviewed pt's information and has denied pt due to being too functional and not having rehab goals  She stated that if pt is not approved for inpt rehab, that pt will be discharged to [facility] where he will go for respite care upon discharge  She also requested information in regards to VA benefits and informed her that PRUDENCIO spoke with SADIQ Colbert with Palliative, who stated that she made a referral to the Director of [facility] and is awaiting a response  She stated that she will transport pt to [facility] upon discharge

## 2018-02-01 NOTE — PROGRESS NOTES
02/01/18 1500   Clinical Encounter Type   Visited With Patient   Routine Visit Introduction   Nondenominational Encounters   Nondenominational Needs Prayer

## 2018-02-01 NOTE — ASSESSMENT & PLAN NOTE
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

## 2018-02-01 NOTE — RESTORATIVE TECHNICIAN NOTE
Restorative Specialist Mobility Note       Activity: Other (Comment) (Educated/encouraged pt to ambulate with assistance 3-4 x's/day, pt refused RN aware  Bed alarm on   Pt callbell, phone/tray within reach )       Rosetta ARANGO, Restorative Technician, [employer]

## 2018-02-01 NOTE — OCCUPATIONAL THERAPY NOTE
Occupational Therapy Treatment Note     01/31/18 1140   Restrictions/Precautions   Weight Bearing Precautions Per Order No   Other Precautions Fall Risk  (trach, aspiration)   Lifestyle   Autonomy Per pt, Pt was I w/ ADLS and IADLS, drove, & required no use of DME PTA   Reciprocal Relationships Pt responds to yes/no questions primarily, will write on paper if needing further  Per pt, Pt lives alone however per chart review, CM notes report pt lives with daughter and her family  Intrinsic Gratification pt reports being with family makes him feel happy   Pain Assessment   Pain Assessment 0-10   Pain Score 7   Pain Type Acute pain   Pain Location Groin   Pain Orientation Left   Pain Descriptors Squeezing   Pain Frequency Intermittent   Patient's Stated Pain Goal 1   Hospital Pain Intervention(s) Medication (See MAR); Ambulation/increased activity; Rest   Response to Interventions pt  reports ambulation does not impact pain   Pain Rating: FLACC (Rest) - Face 0   Pain Rating: FLACC (Rest) - Legs 0   Pain Rating: FLACC (Rest) - Activity 0   Pain Rating: FLACC (Rest) - Cry 0   Pain Rating: FLACC (Rest) - Consolability 0   Score: FLACC (Rest) 0   ADL   Where Assessed Standing at sink  (EOB for clothing mgmt)   Grooming Assistance 6  Modified Independent   Grooming Comments Pt  in stance at sink, has rw for safety but does not utilize  brush teeth, wash face, comb hair, wash hands with good activity tolerance   UB Dressing Assistance 4  Minimal Assistance   UB Dressing Deficit Pull around back   UB Dressing Comments don hospital robe   LB Dressing Assistance 6  Modified independent   LB Dressing Deficit Supervision/safety   LB Dressing Comments don/doff socks  don/doff pants x2 different types   Toileting Assistance  5  Supervision/Setup   Toileting Deficit Verbal cueing;Supervison/safety; Increased time to complete   Communication   Communication Yes  (short yes/no    utilizes writing for further thought 2* trach) Functional Standing Tolerance   Time ~15 min   Activity in stance at sink, functional mobility in room   Bed Mobility   Rolling R 7  Independent   Rolling L 7  Independent   Supine to Sit 7  Independent   Sit to Supine 7  Independent   Transfers   Sit to Stand 7  Independent   Stand to Sit 7  Independent   Stand pivot 6  Modified independent   Functional Mobility   Functional Mobility 6  Modified independent   Additional Comments safely utilized Rw    Cognition   Overall Cognitive Status WFL   Arousal/Participation Responsive   Attention Within functional limits   Orientation Level Oriented X4   Memory Unable to assess; Within functional limits   Following Commands Follows all commands and directions without difficulty   Comments pt alert and cooperative, conversational  reported 10/10 depression  emotional support given   Activity Tolerance   Activity Tolerance Patient tolerated treatment well   Medical Staff Made Aware rn   Assessment   Assessment Pt  Participated in occupational therapy session with focus on functional mobility, Activity tolerance, grooming/ADLS  Clothing mgmt  Pt  Cleared by Sagrario/Estrella for participation in occupational therapy session  Pt  Identifiers confirmed  Pt  Hx tracheostomy, which limits verbal interaction at times  Pt  Will write down when further thoughts need expressed  Pt  Received supine in bed  Pt  Alert and cooperative  Pt  Expressed remembering this therapist and understanding the need for participating in tx sessions  Pt Sat EOB Independently , don/doff l/r socks Independently  Pt  Mgmt Lb dressing with mod I utilizing rw 2* decreased balance  Pt  Sit to stand independently  Pt  Followed multiple step instructions with good carry over to ambulate to sink, wash face, hands, brush teeth, and comb hair (in that order) in stance  Pt displayed good activity tolerance ~15 minutes in stance during hygiene activity  Pt   Then appropriately followed directions to retrieve item from across room closet, and item from his bag at the [address] [facility]   Pt  Complied with same  Pt  Required Min A for [facility] robe 2* decreased balance and rom  Pt  Returned to sitting eob mod I requiring a rw for safety 2* decreased balance  Pt  Completed electric shaving  Pt  Expressed he has depression on a scale of 1-10 scoring 10/10  Rn made aware  Emotional support provided  Pt  Stated his family brings him happiness  Pt  Lakeshia Blunt "I wish I had an appetite "   Rn aware of same  Patient will benefit from further rehab for achieving optimal performance with all functional tasks  Plan   Treatment Interventions ADL retraining;UE strengthening/ROM; Endurance training;Patient/family training;Energy conservation; Activityengagement; Functional transfer training   Goal Expiration Date 02/13/18   Treatment Day 4   OT Frequency 3-5x/wk   Recommendation   OT Discharge Recommendation Other (Comment)  (str vs half-way)   OT - OK to Discharge Yes  (when med clear)   Barthel Index   Feeding 10   Bathing 0   Grooming Score 5   Dressing Score 5   Bladder Score 10   Bowels Score 10   Toilet Use Score 5   Transfers (Bed/Chair) Score 15   Mobility (Level Surface) Score 0   Stairs Score 0   Barthel Index Score 60   Modified [place] Scale   Modified [place] Scale 3     * error in charting neuro depression score          Julian Oneil, MOUNA student

## 2018-02-02 VITALS
OXYGEN SATURATION: 96 % | TEMPERATURE: 99.2 F | DIASTOLIC BLOOD PRESSURE: 51 MMHG | SYSTOLIC BLOOD PRESSURE: 100 MMHG | BODY MASS INDEX: 21.38 KG/M2 | RESPIRATION RATE: 16 BRPM | WEIGHT: 136.24 LBS | HEART RATE: 64 BPM | HEIGHT: 67 IN

## 2018-02-02 PROBLEM — J15.6 GRAM-NEGATIVE PNEUMONIA (HCC): Status: RESOLVED | Noted: 2018-01-20 | Resolved: 2018-02-02

## 2018-02-02 LAB — GLUCOSE SERPL-MCNC: 102 MG/DL (ref 65–140)

## 2018-02-02 PROCEDURE — 82948 REAGENT STRIP/BLOOD GLUCOSE: CPT

## 2018-02-02 PROCEDURE — 99239 HOSP IP/OBS DSCHRG MGMT >30: CPT | Performed by: INTERNAL MEDICINE

## 2018-02-02 PROCEDURE — 94760 N-INVAS EAR/PLS OXIMETRY 1: CPT

## 2018-02-02 RX ORDER — OXYCODONE HYDROCHLORIDE 5 MG/1
5 CAPSULE ORAL EVERY 4 HOURS PRN
Qty: 30 CAPSULE | Refills: 0 | Status: SHIPPED | OUTPATIENT
Start: 2018-02-02

## 2018-02-02 RX ORDER — ALPRAZOLAM 0.25 MG/1
0.25 TABLET ORAL 4 TIMES DAILY PRN
Qty: 30 TABLET | Refills: 0 | Status: SHIPPED | OUTPATIENT
Start: 2018-02-02

## 2018-02-02 RX ORDER — FENTANYL 25 UG/H
1 PATCH TRANSDERMAL
Qty: 10 PATCH | Refills: 0 | Status: SHIPPED | OUTPATIENT
Start: 2018-02-02

## 2018-02-02 RX ORDER — IPRATROPIUM BROMIDE AND ALBUTEROL SULFATE 2.5; .5 MG/3ML; MG/3ML
3 SOLUTION RESPIRATORY (INHALATION) EVERY 4 HOURS PRN
Refills: 0
Start: 2018-02-02

## 2018-02-02 RX ORDER — ONDANSETRON 4 MG/1
4 TABLET, ORALLY DISINTEGRATING ORAL ONCE
Status: COMPLETED | OUTPATIENT
Start: 2018-02-02 | End: 2018-02-02

## 2018-02-02 RX ORDER — OXYCODONE HYDROCHLORIDE 10 MG/1
10 TABLET ORAL EVERY 4 HOURS PRN
Qty: 30 TABLET | Refills: 0 | Status: SHIPPED | OUTPATIENT
Start: 2018-02-02 | End: 2018-02-12

## 2018-02-02 RX ADMIN — OXYCODONE HYDROCHLORIDE 10 MG: 10 TABLET ORAL at 05:31

## 2018-02-02 RX ADMIN — CALCIUM CARBONATE 500 MG (1,250 MG)-VITAMIN D3 200 UNIT TABLET 1 TABLET: at 09:02

## 2018-02-02 RX ADMIN — ACETAMINOPHEN 650 MG: 325 TABLET, FILM COATED ORAL at 16:34

## 2018-02-02 RX ADMIN — Medication 1 TABLET: at 09:02

## 2018-02-02 RX ADMIN — PANTOPRAZOLE SODIUM 40 MG: 40 TABLET, DELAYED RELEASE ORAL at 05:31

## 2018-02-02 RX ADMIN — ENOXAPARIN SODIUM 40 MG: 40 INJECTION SUBCUTANEOUS at 09:02

## 2018-02-02 RX ADMIN — ONDANSETRON 4 MG: 4 TABLET, ORALLY DISINTEGRATING ORAL at 16:34

## 2018-02-02 NOTE — PALLIATIVE CARE CONFERENCE
Spoke with daughter, Kodak Buitrago on phone  She is aware pt  Is being discharged today to [address]  Informed her referral was made to [facility] in [facility] to begin process for aide and attendant care and a representative will call her but unsure of a timeline  Krystina thankful of assistance  Pt  Can follow up in [facility] [place] outpatient clinic if desired for additional symptom management

## 2018-02-02 NOTE — SOCIAL WORK
Received a call from Chad at [facility] who expressed concerns regarding pts trach  [place] [facility], pts daughter Symone Fleming, and Kandace Nguyễn bedside RN spoke to [employer] at [employer] explaining trach care pt is accepted at [facility] today

## 2018-02-02 NOTE — DISCHARGE SUMMARY
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 [facility])  Active Problems:    Adult failure to thrive    Severe protein-calorie malnutrition ([facility] [address] )    Sacral decubitus ulcer    Depression    Anemia    Asthma    Laryngeal cancer ([facility] [address] )    Goals of care, counseling/discussion      Consultations During Hospital Stay:  · Infectious Disease  · Palliative care  · Oncology    Procedures Performed:   · CT needle lung biopsy on 1/24/18 - "Lung, right lower lobe, biopsy:  Keratinizing squamous cell carcinoma "    Significant findings:  · CT neck soft tissue - Stable postsurgical and posttreatment related change throughout the neck with laryngectomy and neck dissection  Edema within the subcutaneous fat with loss of normal fascial planes  Thickening of the mucosal surface of the oropharynx and hypopharynx  Residual/recurrent tumor not excluded  Stable small level 1 nodes  Slight interval decrease in size in a 3 3 cm cystic mass within the left anterior inferior neck which may represent necrotic node or postoperative seroma  · CT chest -  Marked increase in size of right lower lobe nodule, suspicious for metastatic disease  Small right pleural effusion with compressive basilar atelectasis  Hospital Course:   Sosa Mcginnis  is a 81 y o  male patient who originally presented to the hospital on 1/16/2018 due to healthcare associated pneumonia  Patient has a history of laryngeal cancer and is status post laryngectomy, radiation and chemotherapy with tracheostomy in place    He had recently been hospitalized from 12/13 through 12/20/17 for healthcare associated pneumonia and completed a 7 day antibiotic course  During this admission, he was again treated for an completed a full course of IV antibiotics with cefepime and metronidazole for healthcare associated pneumonia  He underwent a lung biopsy on 1/24/18 which showed keratinizing squamous cell carcinoma  There were extensive discussions held with patient, daughter and the palliative care team as well as Medicine team's through his hospital course  Patient made it clear he did not want any further aggressive treatment including PEG tube placement for malnutrition  He however declined hospice and at this time is being discharged to [facility] for resp[facility] care  Condition at Discharge: stable     Discharge Day Visit / Exam:     Subjective:  No new complaints or acute overnight events    Vitals: Blood Pressure: 141/74 (02/02/18 0726)  Pulse: 65 (02/02/18 0726)  Temperature: 97 9 °F (36 6 °C) (02/02/18 0726)  Temp Source: Oral (02/02/18 0726)  Respirations: 18 (02/02/18 0726)  Height: 5' 7" (170 2 cm) (01/25/18 1155)  Weight - Scale: 61 8 kg (136 lb 3 9 oz) (01/25/18 1155)  SpO2: 95 % (02/02/18 0726)    General Appearance:    Alert, declined full exam    Head:    Normocephalic      Discharge instructions/Information to patient and family:   See after visit summary for information provided to patient and family  Provisions for Follow-Up Care:  See after visit summary for information related to follow-up care and any pertinent home health orders  Disposition: [address] and left a message with patient's daughter on the day of discharge  Left my call back number if she had any questions  Discharge Statement:  I spent >40 minutes discharging the patient  This time was spent on the day of discharge  I had direct contact with the patient on the day of discharge   Greater than 50% of the total time was spent examining patient, answering all patient questions, arranging and discussing plan of care with patient as well as directly providing post-discharge instructions  Additional time then spent on discharge activities  Discharge Medications:  See after visit summary for reconciled discharge medications provided to patient and family  ** Please Note: Dragon 360 Dictation voice to text software may have been used in the creation of this document   **

## 2018-02-02 NOTE — RESTORATIVE TECHNICIAN NOTE
Restorative Specialist Mobility Note       Activity: Ambulate in garner, Ambulate in room, Bathroom privileges, Chair, Dangle, Stand at bedside (Educated/encouraged pt to ambulate with assistance 3-4 x's/day  Bed alarm on   Pt callbell, phone/tray within reach )     Assistive Device: Front wheel walker          [employer] BS, Restorative Technician, [employer]

## 2018-02-06 LAB
MYCOBACTERIUM SPEC CULT: NORMAL
RHODAMINE-AURAMINE STN SPEC: NORMAL

## 2018-03-01 ENCOUNTER — HOSPITAL ENCOUNTER (EMERGENCY)
Facility: HOSPITAL | Age: 83
Discharge: HOME | End: 2018-03-02
Attending: EMERGENCY MEDICINE | Admitting: EMERGENCY MEDICINE
Payer: COMMERCIAL

## 2018-03-01 ENCOUNTER — APPOINTMENT (EMERGENCY)
Dept: RADIOLOGY | Facility: HOSPITAL | Age: 83
End: 2018-03-01
Payer: COMMERCIAL

## 2018-03-01 VITALS
WEIGHT: 139 LBS | HEIGHT: 67 IN | RESPIRATION RATE: 18 BRPM | DIASTOLIC BLOOD PRESSURE: 90 MMHG | HEART RATE: 95 BPM | SYSTOLIC BLOOD PRESSURE: 121 MMHG | OXYGEN SATURATION: 96 % | BODY MASS INDEX: 21.82 KG/M2 | TEMPERATURE: 97.9 F
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
## 2018-03-01 PROCEDURE — 96372 THER/PROPH/DIAG INJ SC/IM: CPT

## 2018-03-01 PROCEDURE — 71046 X-RAY EXAM CHEST 2 VIEWS: CPT

## 2018-03-01 RX ORDER — ALPRAZOLAM 0.25 MG/1
0.25 TABLET ORAL ONCE
Status: DISCONTINUED | OUTPATIENT
Start: 2018-03-01 | End: 2018-03-02 | Stop reason: HOSPADM

## 2018-03-01 RX ORDER — VENLAFAXINE 25 MG/1
25 TABLET ORAL 2 TIMES DAILY
COMMUNITY

## 2018-03-01 RX ORDER — LORAZEPAM 2 MG/ML
0.5 INJECTION INTRAMUSCULAR ONCE
Status: COMPLETED | OUTPATIENT
Start: 2018-03-01 | End: 2018-03-01

## 2018-03-01 RX ORDER — ACETAMINOPHEN 325 MG/1
650 TABLET ORAL ONCE
Status: DISCONTINUED | OUTPATIENT
Start: 2018-03-01 | End: 2018-03-02 | Stop reason: HOSPADM

## 2018-03-01 RX ORDER — ASENAPINE 10 MG/1
10 TABLET SUBLINGUAL ONCE
Status: DISCONTINUED | OUTPATIENT
Start: 2018-03-01 | End: 2018-03-01

## 2018-03-01 RX ADMIN — LORAZEPAM 0.5 MG: 2 INJECTION INTRAMUSCULAR; INTRAVENOUS at 21:50

## 2018-03-02 PROCEDURE — 99284 EMERGENCY DEPT VISIT MOD MDM: CPT

## 2018-03-02 NOTE — DISCHARGE INSTRUCTIONS
Fall Prevention for Older Adults   WHAT YOU NEED TO KNOW:   As you age, your muscles weaken and your risk for falls increases  Your risk also increases if you take medicines that make you sleepy or dizzy  You may also be at risk if you have vision or joint problems, have low blood pressure, or are not active  DISCHARGE INSTRUCTIONS:   Call 911 or have someone else call if:   · You have fallen and are unconscious  · You have fallen and cannot move part of your body  Contact your healthcare provider if:   · You have fallen and have pain or a headache  · You have questions or concerns about your condition or care  Fall prevention tips:   · Stay active  Exercise can help strengthen your muscles and improve your balance  Your healthcare provider may recommend water aerobics, walking, or Jonathan Chi  He may also recommend physical therapy to improve your coordination  Never start an exercise program without asking your healthcare provider first     · Wear shoes that fit well and have soles that   Wear shoes both inside and outside  Use slippers with good   Avoid shoes with high heels  · Use assistive devices as directed  Your healthcare provider may suggest that you use a cane or walker to help you keep your balance  You may need to have grab bars put in your bathroom near the toilet or in the shower  · Stand or sit up slowly  This may help you keep your balance and prevent falls  · Wear a personal alarm  This is a device that allows you to call 911 if you need help  Ask for more information on personal alarms  · Manage your medical conditions  Keep all appointments with your healthcare providers  Visit your eye doctor as directed  Home safety tips:   · Add items to prevent falls in the bathroom  Put nonslip strips on your bath or shower floor to prevent you from slipping  Use a bath mat if you do not have carpet in the bathroom   This will prevent you from falling when you step out of the bath or shower  Use a shower seat so you do not need to stand while you shower  Sit on the toilet or a chair in your bathroom to dry yourself and put on clothing  This will prevent you from losing your balance from drying or dressing yourself while you are standing  · Keep paths clear  Remove books, shoes, and other objects from walkways and stairs  Place cords for telephones and lamps out of the way so that you do not need to walk over them  Tape them down if you cannot move them  Remove small rugs  If you cannot remove a rug, secure it with double-sided tape  This will prevent you from tripping  · Install bright lights in your home  Use night lights to help light paths to the bathroom or kitchen  Always turn on the light before you start walking  · Keep items you use often on shelves within reach  Do not use a step stool to help you reach an item  · Paint or place reflective tape on the edges of your stairs  This will help you see the stairs better  Follow up with your healthcare provider as directed:  Write down your questions so you remember to ask them during your visits  © 2017 [address] [employer] Information is for End User's use only and may not be sold, redistributed or otherwise used for commercial purposes  All illustrations and images included in CareNotes® are the copyrighted property of A D A M , Inc  or Gerry Jacobson  The above information is an  only  It is not intended as medical advice for individual conditions or treatments  Talk to your doctor, nurse or pharmacist before following any medical regimen to see if it is safe and effective for you

## 2018-03-02 NOTE — ED NOTES
Transport arranged with [facility] for 0015 back to [facility]  Facility and family made aware       Katina Cristina RN  03/01/18 3983

## 2018-03-02 NOTE — ED NOTES
Pt refusing to take xanax to help calm him down for xray  Dr Beto Leavitt aware, will give IM medications       Katina Cristina RN  03/01/18 9490

## 2018-03-02 NOTE — ED PROVIDER NOTES
History  Chief Complaint   Patient presents with    Fall     Pt had a fall and was found by his girlfriend  No LOC, no thinners  This is an 81-year-old male with history of injury cancer and lung cancer who presents today from nursing facility regarding a fall  Patient fell on sure why since patient is nonverbal at baseline  He did not lose conscious since he is not on any blood thinners  Patient was acting his normal self according to nursing home along with paramedics  Patient has a superficial bruise on his back  Nontender to palpation  Patient also has paperwork stating he is comfort measures only  I discussed with girlfriend was at bedside regarding speaking with the sister who is the POA  Girlfriend states patient should never have been brought to the emergency department since he is comfort care  Patient is in no obvious distress  According to girlfriend patient is acting his baseline  81-year-old male that presents today after a fall  Will speak with girlfriend before proceeding onto any workup  If patient is comfort care and family understands regarding that 3 getting irritated areas there might be missed fractures or bleeds will discharge back to nursing facility  If daughter wants a workup will follow their wishes            Prior to Admission Medications   Prescriptions Last Dose Informant Patient Reported? Taking?    ALPRAZolam (XANAX) 0 25 mg tablet   No Yes   Sig: Take 1 tablet (0 25 mg total) by mouth 4 (four) times a day as needed for anxiety   Calcium 600-200 MG-UNIT per tablet   Yes Yes   Sig: Take 1 tablet by mouth daily   Mometasone Furo-Formoterol Fum (DULERA) 200-5 MCG/ACT AERO   Yes Yes   Sig: Inhale 2 puffs 2 (two) times a day   OLANZapine (ZyPREXA ZYDIS) 5 mg dispersible tablet   No Yes   Sig: Take 1 tablet by mouth daily at bedtime   Sennosides-Docusate Sodium (SENEXON-S PO)   Yes Yes   Sig: Take 1 tablet by mouth   acetaminophen (TYLENOL) 325 mg tablet   No Yes   Sig: Take 2 tablets by mouth every 6 (six) hours as needed for mild pain, headaches or fever   albuterol (PROVENTIL HFA) 90 mcg/act inhaler   Yes Yes   Sig: Inhale 2 puffs as needed   fentaNYL (DURAGESIC) 25 mcg/hr   No Yes   Sig: Place 1 patch on the skin every third day Max Daily Amount: 1 patch   ipratropium-albuterol (DUO-NEB) 0 5-2 5 mg/3 mL   No Yes   Sig: Take 3 mL by nebulization every 4 (four) hours as needed for wheezing   melatonin 3 mg   No Yes   Sig: Take 1 tablet by mouth daily at bedtime   mirtazapine (REMERON) 15 mg tablet   No Yes   Sig: Take 1 tablet by mouth daily at bedtime   omeprazole (PriLOSEC) 40 MG capsule   Yes Yes   Sig: Take 40 mg by mouth daily   oxyCODONE (OXY-IR) 5 MG capsule   No Yes   Sig: Take 1 capsule (5 mg total) by mouth every 4 (four) hours as needed for moderate pain 5 mg for mod pain q4 10 mg for severe pain q4  Max Daily Amount: 30 mg   polyethylene glycol (MIRALAX) 17 g packet   No Yes   Sig: Take 17 g by mouth daily as needed (CONSTIPATION)   venlafaxine (EFFEXOR) 25 mg tablet   Yes Yes   Sig: Take 25 mg by mouth 2 (two) times a day      Facility-Administered Medications: None       Past Medical History:   Diagnosis Date    Asthma     Cancer associated pain     Decreased appetite     Depression     HCAP (healthcare-associated pneumonia)     Hypertension     Insomnia     [facility]     Physical deconditioning     [facility]ral ulcer [facility]     Severe sepsis ([facility]     Tracheostomy status ([facility]        Past Surgical History:   Procedure Laterality Date    EGD AND COLONOSCOPY      LARYNGECTOMY      TRACHEOSTOMY N/A 8/25/2017    Procedure: TRACHEOSTOMY (POSSIBLE AWAKE) , MICRO DIRECT LARYNGOSCOPY, Biopsy;  Surgeon: Nik Duarte MD;  [facility];  Service: ENT    WOUND DEBRIDEMENT N/A 11/22/2017    Procedure: DEBRIDEMENT WOUND [facility]), sacrum;  Surgeon: William Mahoney DO;  [facility];  Service: General       History reviewed   No pertinent family history  I have reviewed and agree with the history as documented  Social History   Substance Use Topics    Smoking status: Former Smoker    Smokeless tobacco: Never Used    Alcohol use No        Review of Systems   Unable to perform ROS: Patient nonverbal   All other systems reviewed and are negative  Physical Exam  ED Triage Vitals [03/01/18 1942]   Temperature Pulse Respirations Blood Pressure SpO2   97 9 °F (36 6 °C) 93 18 115/88 99 %      Temp Source Heart Rate Source Patient Position - Orthostatic VS BP Location FiO2 (%)   Oral Monitor Lying Right arm --      Pain Score       No Pain           Orthostatic Vital Signs  Vitals:    03/01/18 1942 03/01/18 2155   BP: 115/88 (!) 173/104   Pulse: 93 91   Patient Position - Orthostatic VS: Lying Lying       Physical Exam   Constitutional: He appears well-developed and well-nourished  No distress  HENT:   Head: Normocephalic  Mouth/Throat: Oropharynx is clear and moist    Trach in place   Eyes: Conjunctivae and EOM are normal  Pupils are equal, round, and reactive to light  Neck: Normal range of motion  Neck supple  Cardiovascular: Normal rate, regular rhythm and normal heart sounds  No murmur heard  Pulmonary/Chest: Effort normal and breath sounds normal  No respiratory distress  He has no wheezes  Abdominal: Soft  Bowel sounds are normal  He exhibits no distension  There is no tenderness  Musculoskeletal: Normal range of motion  Arms:  Small superficial abrasion best midline with no tenderness palpation   Neurological: He is alert  Unable to assess for orientation with patient being nonverbal which is baseline according to girlfriend  Moving all extremities   Skin: Skin is warm  Capillary refill takes less than 2 seconds  He is not diaphoretic  Vitals reviewed        ED Medications  Medications   acetaminophen (TYLENOL) tablet 650 mg (650 mg Oral Not Given 3/1/18 2108)   ALPRAZolam [place]na [place]) tablet 0 25 mg (0 25 mg Oral Not Given 3/1/18 2146)   LORazepam (ATIVAN) 2 mg/mL injection 0 5 mg (0 5 mg Intramuscular Given 3/1/18 2150)       Diagnostic Studies  Results Reviewed     None                 XR chest 2 views    (Results Pending)         Procedures  Procedures      Phone Consults  ED Phone Contact    ED Course  ED Course as of Mar 01 2350   Thu Mar 01, 2018   2114 I had a discussion with daughter Junior Cisse regarding workup  And he said she would not like a CT of the head performed  She states she has only okay with a chest x-ray and a spine x-ray  No blood work since patient does not want to be treated with anything no blood transfusions  After results I will discuss with nito again regarding disposition    [phone] Will discharge as per daughter back to [address] [place] [address] Time    Disposition  Final diagnoses:   Fall     Time reflects when diagnosis was documented in both MDM as applicable and the Disposition within this note     Time User Action Codes Description Comment    3/1/2018 11:25 PM Prosper Deya Robbins U  8  [id number]  XXXA] Fall       ED Disposition     ED Disposition Condition Comment    Discharge  [address]  discharge to home/self care  Condition at discharge: Good        Follow-up Information     Follow up With Specialties Details Why Stevenson Gilford, MD Allergy Schedule an appointment as soon as possible for a visit As needed, If symptoms worsen [address] , [address]  [phone]          Patient's Medications   Discharge Prescriptions    No medications on file     No discharge procedures on file  ED Provider  Attending physically available and evaluated [address]    I managed the patient along with the ED Attending      Electronically Signed by         Lidia Higgins MD  03/01/18 8169

## 2018-03-02 NOTE — ED ATTENDING ATTESTATION
Lane Lees MD, saw and evaluated the patient  All available labs and X-rays were ordered by me or the resident and have been reviewed by myself  I discussed the patient with the resident / non-physician and agree with the resident's / non-physician practitioner's findings and plan as documented in the resident's / non-physician practicitioner's note, except where noted  At this point, I agree with the current assessment done in the ED  Chief Complaint   Patient presents with    Fall     Pt had a fall and was found by his girlfriend  No LOC, no thinners  This is an 81year old male presenting for a fall  He is non-verbal at baseline and so cannot provide any hx  Reportedly: fall at 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  PMH:  - Asthma  - HTN  - Tracheostomy site  - Depression  PSH:  - EGD  - Tracheostomy  - Wound debridement  - Laryngectomy  Former smoker  No alcohol/drugs  PE:  Vitals:    03/01/18 1942 03/01/18 2155   BP: 115/88 (!) 173/104   BP Location: Right arm Right arm   Pulse: 93 91   Resp: 18 18   Temp: 97 9 °F (36 6 °C)    TempSrc: Oral    SpO2: 99% 95%   Weight: 63 kg (139 lb)    Height: 5' 7" (1 702 m)    General: VS reviewed  Appears in NAD  awake, alert  Well-nourished, well-developed  Appears stated age  Speaking normally in full sentences  Head: Normocephalic, atraumatic, nontender  Eyes: EOM-I  No diplopia  No hyphema  No subconjunctival hemorrhages  Symmetrical lids  ENT: Atraumatic external nose and ears  Dry MM  No malocclusion  No stridor  Normal phonation  No drooling  Normal swallowing  Neck: No JVD  CV:   No tachycardia  No murmurs   No pallor noted  Peripheral pulses +2 throughout  No chest wall tenderness  Lungs:   Mild tachypnea  Adeventitious breath sounds b/l equal  No respiratory distress  MSK:   FROM   Skin: Dry, intact     Abrasion on the thoracic spine potentially  There is a small abrasion on the left wrist, proximal to his watch  Neuro: Awake, alert, CN II-XII grossly intact except not talking  Motor grossly intact moving all 4 grossly  Psychiatric/Behavioral: Appropriate mood and affect   Exam: deferred  A:  - Fall (reported)  - abrasion   P:  - Will discuss with family first  He is DNR/DNI, no antibiotics, no interventions  - Will discuss if imaging should be done or not  Daughter [facility]) is en route  - 13 point ROS couldn't be performed   - Nursing note reviewed  Vitals reviewed  - Orders placed by myself and/or advanced practitioner / resident     - Previous chart was reviewed  - No language barrier    - History obtained from chart   - There are limitations to the history obtained  Reasons ROS could not be obtained: non-verbal  - Critical care time: Not applicable for this patient  Final Diagnosis:  1  Fall      ED Course      Medications   acetaminophen (TYLENOL) tablet 650 mg (650 mg Oral Not Given 3/1/18 2108)   ALPRAZolam [place]) tablet 0 25 mg (0 25 mg Oral Not Given 3/1/18 2146)   LORazepam (ATIVAN) 2 mg/mL injection 0 5 mg (0 5 mg Intramuscular Given 3/1/18 2150)     XR chest 2 views    (Results Pending)     Orders Placed This Encounter   Procedures    XR chest 2 views     Labs Reviewed - No data to display  Time reflects when diagnosis was documented in both MDM as applicable and the Disposition within this note     Time User Action Codes Description Comment    3/1/2018 11:25 PM Deya Yee U  8  [facility]  XXXA] Fall       ED Disposition     ED Disposition Condition Comment    Discharge  [address]  discharge to home/self care  Condition at discharge: Good        Follow-up Information     Follow up With Specialties Details Why Stevenson Gilford, MD Allergy Schedule an appointment as soon as possible for a visit As needed, If symptoms worsen [address] , [address]          Patient's Medications Discharge Prescriptions    No medications on file     No discharge procedures on file  Prior to Admission Medications   Prescriptions Last Dose Informant Patient Reported? Taking?    ALPRAZolam (XANAX) 0 25 mg tablet   No Yes   Sig: Take 1 tablet (0 25 mg total) by mouth 4 (four) times a day as needed for anxiety   Calcium 600-200 MG-UNIT per tablet   Yes Yes   Sig: Take 1 tablet by mouth daily   Mometasone Furo-Formoterol Fum (DULERA) 200-5 MCG/ACT AERO   Yes Yes   Sig: Inhale 2 puffs 2 (two) times a day   OLANZapine (ZyPREXA ZYDIS) 5 mg dispersible tablet   No Yes   Sig: Take 1 tablet by mouth daily at bedtime   Sennosides-Docusate Sodium (SENEXON-S PO)   Yes Yes   Sig: Take 1 tablet by mouth   acetaminophen (TYLENOL) 325 mg tablet   No Yes   Sig: Take 2 tablets by mouth every 6 (six) hours as needed for mild pain, headaches or fever   albuterol (PROVENTIL HFA) 90 mcg/act inhaler   Yes Yes   Sig: Inhale 2 puffs as needed   fentaNYL (DURAGESIC) 25 mcg/hr   No Yes   Sig: Place 1 patch on the skin every third day Max Daily Amount: 1 patch   ipratropium-albuterol (DUO-NEB) 0 5-2 5 mg/3 mL   No Yes   Sig: Take 3 mL by nebulization every 4 (four) hours as needed for wheezing   melatonin 3 mg   No Yes   Sig: Take 1 tablet by mouth daily at bedtime   mirtazapine (REMERON) 15 mg tablet   No Yes   Sig: Take 1 tablet by mouth daily at bedtime   omeprazole (PriLOSEC) 40 MG capsule   Yes Yes   Sig: Take 40 mg by mouth daily   oxyCODONE (OXY-IR) 5 MG capsule   No Yes   Sig: Take 1 capsule (5 mg total) by mouth every 4 (four) hours as needed for moderate pain 5 mg for mod pain q4 10 mg for severe pain q4  Max Daily Amount: 30 mg   polyethylene glycol (MIRALAX) 17 g packet   No Yes   Sig: Take 17 g by mouth daily as needed (CONSTIPATION)   venlafaxine (EFFEXOR) 25 mg tablet   Yes Yes   Sig: Take 25 mg by mouth 2 (two) times a day      Facility-Administered Medications: None       Portions of the record may have been created with voice recognition software  Occasional wrong word or "sound a like" substitutions may have occurred due to the inherent limitations of voice recognition software  Read the chart carefully and recognize, using context, where substitutions have occurred      Electronically signed by:  Ashley Madsen

## 2018-03-02 NOTE — ED NOTES
Pt's family does not want anything done other then xrays for patient as he is on comfort care  Dr Davi Robbins in contact with [facility] and family of patient       Radha Prescott RN  03/01/18 8393

## 2019-12-13 NOTE — ASSESSMENT & PLAN NOTE
· Patient with leukocytosis, fever, tachycardia, POA  · Suspected secondary to PNA vs sacral ulcer infx  · Chest x-ray shows right lower lobe pneumonia  · Blood cultures negative  · ID following  · Continue cefepime, day 5  · Also on flagyl 13-Dec-2019 21:35

## 2020-10-26 NOTE — PLAN OF CARE
Last visit: 10/7/2020  Last Med refill: 9/30/2020  Does patient have enough medication for 72 hours: Yes    Next Visit Date:  No future appointments.     Health Maintenance   Topic Date Due    Hepatitis B vaccine (2 of 3 - 3-dose primary series) 03/12/1998    Cervical cancer screen  04/22/2016    Flu vaccine (1) 09/01/2020    Varicella vaccine (1 of 2 - 2-dose childhood series) 12/17/2047 (Originally 1/25/1992)    DTaP/Tdap/Td vaccine (2 - Td) 01/01/2023    HIV screen  Completed    Hepatitis A vaccine  Aged Out    Hib vaccine  Aged Out    Meningococcal (ACWY) vaccine  Aged Out    Pneumococcal 0-64 years Vaccine  Aged Out       No results found for: LABA1C          ( goal A1C is < 7)   No results found for: LABMICR  No results found for: LDLCHOLESTEROL, LDLCALC    (goal LDL is <100)   BUN (mg/dL)   Date Value   12/03/2012 6     BP Readings from Last 3 Encounters:   11/06/19 110/70   10/14/19 110/78   03/11/19 102/78          (goal 120/80)    All Future Testing planned in [facility]  Lab Frequency Next Occurrence               Patient Active Problem List:     Anemia, iron deficiency     Beta thalassemia trait     Chronic back pain     Depression     Chronic midline low back pain without sciatica     Chronic pain of left ankle     Anxiety     History of opioid abuse (HCC)     Mild episode of recurrent major depressive disorder (HCC)     Recurrent depression (HCC)     Endometriosis     Peroneal tendinitis, left     Thalassemia     Disorder of thyroid Problem: Nutrition/Hydration-ADULT  Goal: Nutrient/Hydration intake appropriate for improving, restoring or maintaining nutritional needs  Monitor and assess patient's nutrition/hydration status for malnutrition (ex- brittle hair, bruises, dry skin, pale skin and conjunctiva, muscle wasting, smooth red tongue, and disorientation)  Collaborate with interdisciplinary team and initiate plan and interventions as ordered  Monitor patient's weight and dietary intake as ordered or per policy  Utilize nutrition screening tool and intervene per policy  Determine patient's food preferences and provide high-protein, high-caloric foods as appropriate       INTERVENTIONS:  - Monitor oral intake, urinary output, labs, and treatment plans  - Assess nutrition and hydration status and recommend course of action  - Evaluate amount of meals eaten  - Assist patient with eating if necessary   - Allow adequate time for meals  - Recommend/ encourage appropriate diets, oral nutritional supplements, and vitamin/mineral supplements  - Order, calculate, and assess calorie counts as needed  - Assess need for intravenous fluids  - Provide specific nutrition/hydration education as appropriate  - Include patient/family/caregiver in decisions related to nutrition    Outcome: Progressing

## 2022-02-23 NOTE — ASSESSMENT & PLAN NOTE
For any future reference, yes percocet is taking PRN. Noted in regards to below encounter   · POA, evidenced by hyperthermia, lactic acidosis, leukocytosis and PNA  · Appreciate Infectious Disease input  · Sputum culture positive for MSSA  · Blood cultures negative  · Ancef discontinued today, has completed > 7 days abx

## 2022-12-20 NOTE — PROGRESS NOTES
Progress note - Palliative and Supportive Care   Megan Núñez  81 y o  male [id number]    Assessment:   Asthma    Laryngeal cancer ([facility] [address] [facility]    Asthma    Depression    Laryngeal stenosis    Sepsis ([facility] [address] [facility]    Decubitus ulcer of sacral region, stage 4 ([facility] [address] )    HCAP (healthcare-associated pneumonia)    Continuous opioid dependence ([facility] [address] )    Anemia   Cancer pain      Plan:  1  Continue Duragesic 37 mcg  2  Continue Oxycodone 5 mg q6h PRN  3  Bowel regimen to prevent opioid induced constipation  4  Continue Remeron 15 mg qHS- unfortunately patient declined to meet with psychiatry so far this admission  5  Goals- patient is severely malnourished but he is refusing PEG tube  Currently Level 1, Full Code  Awaiting call from daughter to arrange family discussion about further goals of care     Code Status: Full - Level 1   Power of :  presumed to be daughter by [place]   Advance Directive / Living Will: he has a POA document only   POLST:  no      Interval history:       Patient still with pain but not requesting use of his PRN oxycodone  Otherwise he offers no complaints  His daughter is not currently available       MEDICATIONS / ALLERGIES:    all current active meds have been reviewed and current meds:   Current Facility-Administered Medications   Medication Dose Route Frequency    acetaminophen (TYLENOL) rectal suppository 650 mg  650 mg Rectal Q4H PRN    albuterol (PROVENTIL HFA,VENTOLIN HFA) inhaler 2 puff  2 puff Inhalation Q6H PRN    calcium carbonate-vitamin D (OSCAL-D) 500 mg-200 units per tablet 1 tablet  1 tablet Oral Daily    collagenase (SANTYL) ointment   Topical Daily    doxazosin (CARDURA) tablet 2 mg  2 mg Oral HS    enoxaparin (LOVENOX) subcutaneous injection 40 mg  40 mg Subcutaneous Daily    fentaNYL (DURAGESIC) 12 mcg/hr TD 72 hr patch 12 mcg  12 mcg Transdermal Q72H    fentaNYL (DURAGESIC) 25 mcg/hr TD 72 hr patch 25 mcg  25 mcg Transdermal Q72H    fluticasone (FLOVENT HFA) 220 mcg/act inhaler 2 puff  2 puff Inhalation BID    fluticasone-salmeterol (ADVAIR) 250-50 mcg/dose inhaler 1 puff  1 puff Inhalation Q12H [address]    mirtazapine (REMERON) tablet 15 mg  15 mg Oral HS    oxyCODONE (ROXICODONE) IR tablet 5 mg  5 mg Oral Q6H PRN    pantoprazole (PROTONIX) EC tablet 40 mg  40 mg Oral Early Morning    piperacillin-tazobactam (ZOSYN) 4 5 g in dextrose 5 % 100 mL IVPB  4 5 g Intravenous Q6H    polyethylene glycol (MIRALAX) packet 17 g  17 g Oral Daily PRN    senna-docusate sodium (SENOKOT S) 8 6-50 mg per tablet 1 tablet  1 tablet Oral BID    sodium chloride 0 9 % infusion  75 mL/hr Intravenous Continuous       Allergies   Allergen Reactions    Aspirin     Cleocin [Clindamycin]        OBJECTIVE:    Physical Exam  Physical Exam   Constitutional: No distress  Chronically ill appearing   HENT:   Head: Normocephalic and atraumatic  Right Ear: External ear normal    Left Ear: External ear normal    Nose: Nose normal    Eyes: EOM are normal  Right eye exhibits no discharge  Left eye exhibits no discharge  No scleral icterus  Neck:   Trach in place   Cardiovascular: Normal rate, regular rhythm and intact distal pulses  Pulmonary/Chest: Effort normal and breath sounds normal  No respiratory distress  Abdominal: Soft  Bowel sounds are normal  He exhibits no distension  Musculoskeletal: He exhibits no edema  Neurological: He is alert  Skin: Skin is warm and dry  There is pallor  Psychiatric: He has a normal mood and affect  Nursing note and vitals reviewed  Lab Results:   I have personally reviewed pertinent labs  , CBC:   Lab Results   Component Value Date    WBC 3 47 (L) 12/16/2017    HGB 9 1 (L) 12/16/2017    HCT 28 4 (L) 12/16/2017    MCV 99 (H) 12/16/2017     12/16/2017    MCH 31 8 12/16/2017    MCHC 32 0 12/16/2017    RDW 16 3 (H) 12/16/2017    MPV 9 3 12/16/2017   , CMP:   Lab Results   Component Value Date     12/16/2017    K 3 7 12/16/2017    CL 107 12/16/2017    CO2 28 12/16/2017    ANIONGAP 5 12/16/2017    BUN 22 12/16/2017    CREATININE 0 80 12/16/2017    GLUCOSE 95 12/16/2017    CALCIUM 8 1 (L) 12/16/2017    EGFR 84 12/16/2017     Imaging Studies: reviewed  EKG, Pathology, and Other Studies: reviewed    Counseling / Coordination of Care  Total floor / unit time spent today 25 minutes  Greater than 50% of total time was spent with the patient and / or family counseling and / or coordination of care   A description of the counseling / coordination of care: symptom assessment DISPLAY PLAN FREE TEXT

## (undated) DEVICE — SPONGE LAP 18 X 18 IN

## (undated) DEVICE — BULB SYRINGE,IRRIGATION WITH PROTECTIVE CAP: Brand: DOVER

## (undated) DEVICE — REM POLYHESIVE ADULT PATIENT RETURN ELECTRODE: Brand: VALLEYLAB

## (undated) DEVICE — TIBURON SPLIT SHEET: Brand: CONVERTORS

## (undated) DEVICE — TRACH TUBE HOLDER

## (undated) DEVICE — 3000CC GUARDIAN II: Brand: GUARDIAN

## (undated) DEVICE — STRL UNIVERSAL OUTPATIENT PACK: Brand: CARDINAL HEALTH

## (undated) DEVICE — PACK PBDS PLASTIC HEAD AND NECK RF

## (undated) DEVICE — PLUMEPEN PRO 10FT

## (undated) DEVICE — GLOVE INDICATOR PI UNDERGLOVE SZ 7.5 BLUE

## (undated) DEVICE — SOFT SILICONE HYDROCELLULAR FOAM DRESSING WITH LOCK AWAY LAYER: Brand: ALLEVYN LIFE M 12.9X12.9 CTN10

## (undated) DEVICE — SPONGE STICK WITH PVP-I: Brand: KENDALL

## (undated) DEVICE — SYRINGE 10ML LL

## (undated) DEVICE — INSULATED BLADE ELECTRODE: Brand: EDGE

## (undated) DEVICE — IODOFORM PACKING STRIP: Brand: CURITY

## (undated) DEVICE — 3M™ IOBAN™ 2 ANTIMICROBIAL INCISE DRAPE 6650EZ: Brand: IOBAN™ 2

## (undated) DEVICE — GLOVE SRG BIOGEL 7.5

## (undated) DEVICE — SUT SILK 2-0 SH 30 IN K833H

## (undated) DEVICE — NEEDLE 25G X 1 1/2

## (undated) DEVICE — INTENDED FOR TISSUE SEPARATION, AND OTHER PROCEDURES THAT REQUIRE A SHARP SURGICAL BLADE TO PUNCTURE OR CUT.: Brand: BARD-PARKER SAFETY BLADES SIZE 15, STERILE

## (undated) DEVICE — SUT SILK 3-0 TIES 144 IN LA54G

## (undated) DEVICE — PEANUT 5 PK

## (undated) DEVICE — CHLORAPREP HI-LITE 26ML ORANGE